# Patient Record
Sex: MALE | Race: WHITE | NOT HISPANIC OR LATINO | Employment: OTHER | URBAN - METROPOLITAN AREA
[De-identification: names, ages, dates, MRNs, and addresses within clinical notes are randomized per-mention and may not be internally consistent; named-entity substitution may affect disease eponyms.]

---

## 2017-01-30 ENCOUNTER — ALLSCRIPTS OFFICE VISIT (OUTPATIENT)
Dept: OTHER | Facility: OTHER | Age: 62
End: 2017-01-30

## 2017-05-07 LAB — HCV AB SER-ACNC: NEGATIVE

## 2017-05-30 ENCOUNTER — GENERIC CONVERSION - ENCOUNTER (OUTPATIENT)
Dept: OTHER | Facility: OTHER | Age: 62
End: 2017-05-30

## 2017-06-13 ENCOUNTER — ALLSCRIPTS OFFICE VISIT (OUTPATIENT)
Dept: OTHER | Facility: OTHER | Age: 62
End: 2017-06-13

## 2017-09-06 ENCOUNTER — ALLSCRIPTS OFFICE VISIT (OUTPATIENT)
Dept: OTHER | Facility: OTHER | Age: 62
End: 2017-09-06

## 2017-09-06 DIAGNOSIS — E11.9 TYPE 2 DIABETES MELLITUS WITHOUT COMPLICATIONS (HCC): ICD-10-CM

## 2017-09-06 DIAGNOSIS — R93.7 ABNORMAL FINDINGS ON DIAGNOSTIC IMAGING OF OTHER PARTS OF MUSCULOSKELETAL SYSTEM: ICD-10-CM

## 2017-09-06 DIAGNOSIS — M51.26 OTHER INTERVERTEBRAL DISC DISPLACEMENT, LUMBAR REGION: ICD-10-CM

## 2017-09-06 DIAGNOSIS — I25.10 ATHEROSCLEROTIC HEART DISEASE OF NATIVE CORONARY ARTERY WITHOUT ANGINA PECTORIS: ICD-10-CM

## 2017-09-06 DIAGNOSIS — M25.552 PAIN IN LEFT HIP: ICD-10-CM

## 2017-09-08 ENCOUNTER — TRANSCRIBE ORDERS (OUTPATIENT)
Dept: ADMINISTRATIVE | Facility: HOSPITAL | Age: 62
End: 2017-09-08

## 2017-09-08 DIAGNOSIS — R52 PAIN: Primary | ICD-10-CM

## 2017-09-10 ENCOUNTER — TRANSCRIBE ORDERS (OUTPATIENT)
Dept: ADMINISTRATIVE | Age: 62
End: 2017-09-10

## 2017-09-10 ENCOUNTER — LAB (OUTPATIENT)
Dept: LAB | Age: 62
End: 2017-09-10
Payer: COMMERCIAL

## 2017-09-10 DIAGNOSIS — M25.552 LEFT HIP PAIN: ICD-10-CM

## 2017-09-10 DIAGNOSIS — E13.9 OTHER SPECIFIED DIABETES MELLITUS: Primary | ICD-10-CM

## 2017-09-10 DIAGNOSIS — E11.9 TYPE 2 DIABETES MELLITUS WITHOUT COMPLICATIONS (HCC): ICD-10-CM

## 2017-09-10 DIAGNOSIS — R93.7 ABNORMAL COMPUTED TOMOGRAPHY OF LUMBAR SPINE: ICD-10-CM

## 2017-09-10 DIAGNOSIS — I25.10 ATHEROSCLEROTIC HEART DISEASE OF NATIVE CORONARY ARTERY WITHOUT ANGINA PECTORIS: ICD-10-CM

## 2017-09-10 LAB
ALBUMIN SERPL BCP-MCNC: 3.8 G/DL (ref 3.5–5)
ANION GAP SERPL CALCULATED.3IONS-SCNC: 8 MMOL/L (ref 4–13)
BUN SERPL-MCNC: 19 MG/DL (ref 5–25)
CALCIUM SERPL-MCNC: 9.2 MG/DL (ref 8.3–10.1)
CHLORIDE SERPL-SCNC: 104 MMOL/L (ref 100–108)
CO2 SERPL-SCNC: 28 MMOL/L (ref 21–32)
CREAT SERPL-MCNC: 0.99 MG/DL (ref 0.6–1.3)
CREAT UR-MCNC: 300 MG/DL
ERYTHROCYTE [DISTWIDTH] IN BLOOD BY AUTOMATED COUNT: 14.9 % (ref 11.6–15.1)
GFR SERPL CREATININE-BSD FRML MDRD: 81 ML/MIN/1.73SQ M
GLUCOSE P FAST SERPL-MCNC: 205 MG/DL (ref 65–99)
HCT VFR BLD AUTO: 44.5 % (ref 36.5–49.3)
HGB BLD-MCNC: 15.2 G/DL (ref 12–17)
MCH RBC QN AUTO: 31.5 PG (ref 26.8–34.3)
MCHC RBC AUTO-ENTMCNC: 34.2 G/DL (ref 31.4–37.4)
MCV RBC AUTO: 92 FL (ref 82–98)
MICROALBUMIN UR-MCNC: 196 MG/L (ref 0–20)
MICROALBUMIN/CREAT 24H UR: 65 MG/G CREATININE (ref 0–30)
PHOSPHATE SERPL-MCNC: 3.4 MG/DL (ref 2.3–4.1)
PLATELET # BLD AUTO: 138 THOUSANDS/UL (ref 149–390)
PMV BLD AUTO: 11.3 FL (ref 8.9–12.7)
POTASSIUM SERPL-SCNC: 4 MMOL/L (ref 3.5–5.3)
RBC # BLD AUTO: 4.83 MILLION/UL (ref 3.88–5.62)
SODIUM SERPL-SCNC: 140 MMOL/L (ref 136–145)
WBC # BLD AUTO: 4.99 THOUSAND/UL (ref 4.31–10.16)

## 2017-09-10 PROCEDURE — 85027 COMPLETE CBC AUTOMATED: CPT

## 2017-09-10 PROCEDURE — 82570 ASSAY OF URINE CREATININE: CPT | Performed by: INTERNAL MEDICINE

## 2017-09-10 PROCEDURE — 36415 COLL VENOUS BLD VENIPUNCTURE: CPT

## 2017-09-10 PROCEDURE — 80069 RENAL FUNCTION PANEL: CPT

## 2017-09-10 PROCEDURE — 82043 UR ALBUMIN QUANTITATIVE: CPT | Performed by: INTERNAL MEDICINE

## 2017-09-11 ENCOUNTER — GENERIC CONVERSION - ENCOUNTER (OUTPATIENT)
Dept: OTHER | Facility: OTHER | Age: 62
End: 2017-09-11

## 2017-09-13 ENCOUNTER — GENERIC CONVERSION - ENCOUNTER (OUTPATIENT)
Dept: OTHER | Facility: OTHER | Age: 62
End: 2017-09-13

## 2017-09-14 ENCOUNTER — ALLSCRIPTS OFFICE VISIT (OUTPATIENT)
Dept: OTHER | Facility: OTHER | Age: 62
End: 2017-09-14

## 2017-09-14 ENCOUNTER — GENERIC CONVERSION - ENCOUNTER (OUTPATIENT)
Dept: OTHER | Facility: OTHER | Age: 62
End: 2017-09-14

## 2017-09-16 ENCOUNTER — TRANSCRIBE ORDERS (OUTPATIENT)
Dept: LAB | Facility: CLINIC | Age: 62
End: 2017-09-16

## 2017-09-16 ENCOUNTER — APPOINTMENT (OUTPATIENT)
Dept: LAB | Facility: CLINIC | Age: 62
End: 2017-09-16
Payer: COMMERCIAL

## 2017-09-16 DIAGNOSIS — E11.9 TYPE 2 DIABETES MELLITUS WITHOUT COMPLICATIONS (HCC): ICD-10-CM

## 2017-09-16 LAB
EST. AVERAGE GLUCOSE BLD GHB EST-MCNC: 171 MG/DL
HBA1C MFR BLD: 7.6 % (ref 4.2–6.3)

## 2017-09-16 PROCEDURE — 36415 COLL VENOUS BLD VENIPUNCTURE: CPT

## 2017-09-16 PROCEDURE — 83036 HEMOGLOBIN GLYCOSYLATED A1C: CPT

## 2017-09-20 ENCOUNTER — HOSPITAL ENCOUNTER (OUTPATIENT)
Dept: MRI IMAGING | Facility: HOSPITAL | Age: 62
Discharge: HOME | End: 2017-09-20
Payer: COMMERCIAL

## 2017-09-20 DIAGNOSIS — R52 PAIN: ICD-10-CM

## 2017-09-20 PROCEDURE — 72158 MRI LUMBAR SPINE W/O & W/DYE: CPT

## 2017-09-20 PROCEDURE — A9585 GADOBUTROL INJECTION: HCPCS | Performed by: RADIOLOGY

## 2017-09-20 RX ADMIN — GADOBUTROL 12 ML: 604.72 INJECTION INTRAVENOUS at 23:26

## 2017-09-27 ENCOUNTER — GENERIC CONVERSION - ENCOUNTER (OUTPATIENT)
Dept: OTHER | Facility: OTHER | Age: 62
End: 2017-09-27

## 2017-10-02 ENCOUNTER — TRANSCRIBE ORDERS (OUTPATIENT)
Dept: ADMINISTRATIVE | Facility: HOSPITAL | Age: 62
End: 2017-10-02

## 2017-10-02 ENCOUNTER — APPOINTMENT (OUTPATIENT)
Dept: LAB | Facility: MEDICAL CENTER | Age: 62
End: 2017-10-02

## 2017-10-02 ENCOUNTER — APPOINTMENT (OUTPATIENT)
Dept: URGENT CARE | Facility: MEDICAL CENTER | Age: 62
End: 2017-10-02

## 2017-10-02 DIAGNOSIS — Z00.8 HEALTH EXAMINATION IN POPULATION SURVEY: Primary | ICD-10-CM

## 2017-10-02 PROCEDURE — 82175 ASSAY OF ARSENIC: CPT | Performed by: PREVENTIVE MEDICINE

## 2017-10-02 PROCEDURE — 83655 ASSAY OF LEAD: CPT

## 2017-10-02 PROCEDURE — 82495 ASSAY OF CHROMIUM: CPT | Performed by: PREVENTIVE MEDICINE

## 2017-10-02 PROCEDURE — 36415 COLL VENOUS BLD VENIPUNCTURE: CPT | Performed by: PREVENTIVE MEDICINE

## 2017-10-02 PROCEDURE — 83018 HEAVY METAL QUAN EACH NES: CPT | Performed by: PREVENTIVE MEDICINE

## 2017-10-02 PROCEDURE — 83825 ASSAY OF MERCURY: CPT

## 2017-10-02 PROCEDURE — 82300 ASSAY OF CADMIUM: CPT

## 2017-10-02 PROCEDURE — 83885 ASSAY OF NICKEL: CPT | Performed by: PREVENTIVE MEDICINE

## 2017-10-06 ENCOUNTER — APPOINTMENT (OUTPATIENT)
Dept: LAB | Facility: CLINIC | Age: 62
End: 2017-10-06
Payer: COMMERCIAL

## 2017-10-06 DIAGNOSIS — M25.552 PAIN IN LEFT HIP: ICD-10-CM

## 2017-10-06 LAB — ERYTHROCYTE [SEDIMENTATION RATE] IN BLOOD: 2 MM/HOUR (ref 0–10)

## 2017-10-06 PROCEDURE — 85652 RBC SED RATE AUTOMATED: CPT

## 2017-10-06 PROCEDURE — 36415 COLL VENOUS BLD VENIPUNCTURE: CPT

## 2017-10-07 LAB — MISCELLANEOUS LAB TEST RESULT: NORMAL

## 2017-10-08 LAB
MISCELLANEOUS LAB TEST RESULT: NORMAL

## 2017-10-09 ENCOUNTER — HOSPITAL ENCOUNTER (OUTPATIENT)
Dept: CT IMAGING | Facility: HOSPITAL | Age: 62
Discharge: HOME | End: 2017-10-09
Payer: COMMERCIAL

## 2017-10-09 ENCOUNTER — HOSPITAL ENCOUNTER (OUTPATIENT)
Dept: CT IMAGING | Facility: HOSPITAL | Age: 62
End: 2017-10-09
Payer: COMMERCIAL

## 2017-10-09 DIAGNOSIS — R93.7 ABNORMAL FINDINGS ON DIAGNOSTIC IMAGING OF OTHER PARTS OF MUSCULOSKELETAL SYSTEM: ICD-10-CM

## 2017-10-09 PROCEDURE — 72192 CT PELVIS W/O DYE: CPT

## 2017-10-11 ENCOUNTER — GENERIC CONVERSION - ENCOUNTER (OUTPATIENT)
Dept: OTHER | Facility: OTHER | Age: 62
End: 2017-10-11

## 2017-10-26 NOTE — PROGRESS NOTES
Assessment  Assessed    1  CAD (coronary artery disease) (414 00) (I25 10)   2  Hyperlipidemia (272 4) (E78 5)   3  Benign essential hypertension (401 1) (I10)    Plan  Benign essential hypertension, CAD (coronary artery disease), Hyperlipidemia    · Follow-up visit in 6 months Evaluation and Treatment  Follow-up  Status: Complete   Done: [id number]   Ordered; For: Benign essential hypertension, CAD (coronary artery disease), Hyperlipidemia; Ordered By: Freddy Rios Performed:  Due: 97CNC0817; Last Updated By: Anna Hickman; 9/14/2017 8:48:13 AM  CAD (coronary artery disease), Hyperlipidemia    · Atorvastatin Calcium 40 MG Oral Tablet; TAKE 1 TABLET AT BEDTIME   Rx By: Freddy Rios; Dispense: 90 Days ; #:90 Tablet; Refill: 3;For: CAD (coronary artery disease), Hyperlipidemia; VETO = N; Verified Transmission to [facility]; Last Updated By: [facility]; 9/14/2017 8:38:13 AM    Discussion/Summary  Cardiology Discussion Summary Free Text Note Form [facility]:   I had the pleasure of seeing Earline Wesley for a follow up visit  is a pleasant 66-year-old gentleman with a history of hypertension, diabetes and dyslipidemia  He also has coronary artery disease and has had multiple stenting procedures  Based on his last coronary angiography report from the [facility] in September 2016, left main was patent, LAD was patent including a drug-eluting stent-implanted in 2011  His proximal RCA had a stent implanted in June 2015-in-stent restenosis-December 2015-restented, again had in-stent restenosis that was treated with a drug-eluting 3X 18 mm Xience stent  This was done for anginal symptoms  his knowledge, he has not had a myocardial infarction  he is moderately active working as a   In the summer he sometimes walks the golf course  No anginal symptoms at this time  He had vein stripping of his right lower extremity which has disproportionately more edema      artery disease: Status post [id number]-patent, status post proximal RCA stent in-stent - in-stent-last-September 2016 with a Xience stentanginal symptoms at this timeaspirin and Plavix, not on metoprolol but asymptomaticatorvastatin 40 mg 2017-total cholesterol 121, triglycerides 167, HDL 39, LDL 49  was well controlled, He will check it twice daily at home and send his locked was  As above  last A1c was 6 9,   right more than left lower extremity edema: No need for Dopplers, had vein stripping for varicose veins in the past  Foot end elevation, compression stockings were recommended  in 6 months  Medication SE Review and Pt Understands Tx: Possible side effects of new medications were reviewed with the patient/guardian today  Counseling Documentation With Imm: total time of encounter was 37 minutes-- and-- 20 minutes was spent counseling  Chief Complaint  Chief Complaint Free Text Note Form: 3 month cardiac follow up  Patient gets epistasis and HA at times  History of Present Illness  Cardiology HPI Free Text Note Form [facility]: I had the pleasure of seeing Jenelle Jimenez for a follow up visit  is a pleasant 40-year-old gentleman with a history of hypertension, diabetes and dyslipidemia  He also has coronary artery disease and has had multiple stenting procedures  Based on his last coronary angiography report from the [facility] in September 2016, left main was patent, LAD was patent including a drug-eluting stent-implanted in 2011  His proximal RCA had a stent implanted in June 2015-in-stent restenosis-December 2015-restented, again had in-stent restenosis that was treated with a drug-eluting 3X 18 mm Xience stent  This was done for anginal symptoms  his knowledge, he has not had a myocardial infarction  he is moderately active working as a   In the summer he sometimes walks the golf course  No anginal symptoms at this time   He had vein stripping of his right lower extremity which has disproportionately more edema  Review of Systems  Cardiology Male ROS:     Cardiac: No complaints of chest pain, no palpitations, no fainiting  Skin: No complaints of nonhealing sores or skin rash  Genitourinary: No complaints of recurrent urinary tract infections, frequent urination at night, difficult urination, blood in urine, kidney stones, loss of bladder control, no kidney or prostate problems, no erectile dysfunction  Psychological: No complaints of feeling depressed, anxiety, panic attacks, or difficulty concentrating  General: No complaints of trouble sleeping, lack of energy, fatigue, appetite changes, weight changes, fever, frequent infections, or night sweats  Respiratory: No complaints of shortness of breath, cough with sputum, or wheezing  HEENT: No complaints of serious problems, hearing problems, nose problems, throat problems, or snoring  Gastrointestinal: No complaints of liver problems, nausea, vomiting, heartburn, constipation, bloody stools, diarrhea, problems swallowing, adbominal pain, or rectal bleeding  Hematologic: No complaints of bleeding disorders, anemia, blood clots, or excessive brusing  Neurological: No complaints of numbness, tingling, dizziness, weakness, seizures, headaches, syncope or fainting, AM fatigue, daytime sleepiness, no witnessed apnea episodes  Musculoskeletal: arthritis-- and-- back pain, but-- no swelling/pain   ROS Reviewed:   ROS reviewed  Active Problems  Problems    1  Acute medial meniscus tear of right knee (836 0) (S83 241A)   2  Aftercare following surgery of the musculoskeletal system (V58 78) (Z47 89)   3  Benign essential hypertension (401 1) (I10)   4  CAD (coronary artery disease) (414 00) (I25 10)   5  Claustrophobia (300 29) (F40 240)   6  Diabetes mellitus (250 00) (E11 9)   7  Finger pain (729 5) (M79 646)   8  Herniation of intervertebral disc of lumbar spine (722 10) (M51 26)   9  Hyperlipidemia (272 4) (E78 5)   10  Knee clicking (983 93) (T65 223)   11  Knee effusion, right (719 06) (M25 461)   12  Knee instability (718 86) (M25 369)   13  Primary osteoarthritis of left knee (715 16) (M17 12)   14  Right knee pain (719 46) (M25 561)   15  S/P trigger finger release (V45 89) (Z98 890)   16  Screen for colon cancer (V76 51) (Z12 11)    Past Medical History  Problems    1  History of H/O angioplasty (V45 89) (Z98 62)   2  History of chest pain (V13 89) (Z87 898)   3  History of edema (V13 89) (Z87 898)   4  History of varicose veins of lower extremity (V12 59) (Z86 79)   5  History of Trigger ring finger of left hand (727 03) (M65 342)  Active Problems And Past Medical History Reviewed: The active problems and past medical history were reviewed and updated today  Surgical History  Problems    1  History of Cardiac Cath Procedure Summary   2  History of Eye Surgery   3  History of Gallbladder Surgery   4  History of Heart Surgery   5  History of Varicose Vein Ligation  Surgical History Reviewed: The surgical history was reviewed and updated today  Family History  Mother    1  Family history of CAD (coronary artery disease)  Father    2  Family history of CAD (coronary artery disease)   3  Family history of diabetes mellitus (V18 0) (Z83 3)   4  Family history of hypertension (V17 49) (Z82 49)  Brother    5  Family history of CAD (coronary artery disease)   6  Family history of hyperlipidemia (V18 19) (Z83 49)   7  Family history of hypertension (V17 49) (Z82 49)  Family History Reviewed: The family history was reviewed and updated today  Social History  Problems    · Denied: History of Alcohol use   · Former smoker (V15 82) (O54 334)   · No illicit drug use  Social History Reviewed: The social history was reviewed and updated today  The social history was reviewed and is unchanged  Current Meds   1  ALPRAZolam 0 5 MG Oral Tablet; use 1/2 hr before procedure and can use one more if   necessary;  Last ZA:57ZUS0615 Ordered   2  AmLODIPine Besylate 2 5 MG Oral Tablet; Take 1 tablet daily; Therapy: (Recorded:13Jun2017) to Recorded   3  Aspirin EC 81 MG Oral Tablet Delayed Release; Take 1 tablet qod alt with 2;   Therapy: (Recorded:30Jan2017) to Recorded   4  Atorvastatin Calcium 40 MG Oral Tablet; TAKE 1 TABLET AT BEDTIME; Therapy: 91RDM3449 to (Evaluate:01Jun2017)  Requested for: 71RZX6675; Last   Rx:95Rfy6941 Ordered   5  Clopidogrel Bisulfate 75 MG Oral Tablet; Take 1 tablet daily; Therapy: (Recorded:30Jan2017) to Recorded   6  Fish Oil CAPS; Therapy: (Recorded:30Jan2017) to Recorded   7  Glimepiride 2 MG Oral Tablet; TAKE 1 TABLET DAILY AS DIRECTED; Therapy: (Recorded:30Jan2017) to Recorded   8  Lisinopril 20 MG Oral Tablet; Take 1 tablet daily; Therapy: (Recorded:30Jan2017) to Recorded   9  Lisinopril-Hydrochlorothiazide 20-25 MG Oral Tablet; TAKE 1 TABLET ONCE DAILY; Therapy: (Recorded:30Jan2017) to Recorded   10  MetFORMIN HCl - 500 MG Oral Tablet; take 2 tablet twice daily; Therapy: (Recorded:30Jan2017) to Recorded   11  Sildenafil Citrate 20 MG Oral Tablet; Therapy: (Recorded:30Jan2017) to Recorded   12  Vitamin E CAPS; Therapy: (0345 74 47 21) to Recorded  Medication List Reviewed: The medication list was reviewed and updated today  Allergies  Medication    1  Cipro SOLN   2  Norvasc TABS    Vitals  Vital Signs    Recorded: 14Sep2017 08:43AM Recorded: 14Sep2017 08:31AM   Heart Rate  58, Apical   Pulse Quality  Normal, Apical   Systolic 510 990, LUE, Sitting   Diastolic 90 94, LUE, Sitting   Height  5 ft 10 in   Weight  287 lb    BMI Calculated  41 18   BSA Calculated  2 43   O2 Saturation  98, RA     Physical Exam    Constitutional   General appearance: Abnormal   obese  Eyes   Conjunctiva and Sclera examination: Conjunctiva pink, sclera anicteric      Ears, Nose, Mouth, and Throat - Oropharynx: Clear, nares are clear, mucous membranes are moist    Neck   Neck and thyroid: Normal, supple, trachea midline, no thyromegaly  Pulmonary   Respiratory effort: No increased work of breathing or signs of respiratory distress  Auscultation of lungs: Clear to auscultation, no rales, no rhonchi, no wheezing, good air movement  Cardiovascular   Auscultation of heart: Normal rate and rhythm, normal S1 and S2, no murmurs  Carotid pulses: Normal, 2+ bilaterally  Peripheral vascular exam: Normal pulses throughout, no tenderness, erythema or swelling  Pedal pulses: Normal, 2+ bilaterally  Examination of extremities for edema and/or varicosities: Normal     Abdomen   Abdomen: Non-tender and no distention  Liver and spleen: No hepatomegaly or splenomegaly  Musculoskeletal Gait and station: Normal gait  -- Digits and nails: Normal without clubbing or cyanosis  -- Inspection/palpation of joints, bones, and muscles: Normal, ROM normal     Skin - Skin and subcutaneous tissue: Normal without rashes or lesions  Skin is warm and well perfused, normal turgor  Neurologic - Cranial nerves: II - XII intact  -- Speech: Normal     Psychiatric - Orientation to person, place, and time: Normal -- Mood and affect: Normal       Future Appointments    Date/Time Provider Specialty Site   11/01/2017 02:40 PM TORSTEN Mackay  Orthopedic Surgery [facility] SPECIALISTS [facility]   09/27/2017 04:00 PM Radha Mon MD Internal Medicine [facility] AND WOMEN'S [facility] [place] [facility]\""     Signatures   Electronically signed by :  Vashti Harada, M D ; Sep 14 2017  8:54AM EST                       (Author)

## 2017-10-31 ENCOUNTER — ALLSCRIPTS OFFICE VISIT (OUTPATIENT)
Dept: OTHER | Facility: OTHER | Age: 62
End: 2017-10-31

## 2017-10-31 DIAGNOSIS — R93.7 ABNORMAL FINDINGS ON DIAGNOSTIC IMAGING OF OTHER PARTS OF MUSCULOSKELETAL SYSTEM: ICD-10-CM

## 2017-10-31 DIAGNOSIS — M25.561 PAIN IN RIGHT KNEE: ICD-10-CM

## 2017-10-31 DIAGNOSIS — I25.10 ATHEROSCLEROTIC HEART DISEASE OF NATIVE CORONARY ARTERY WITHOUT ANGINA PECTORIS: ICD-10-CM

## 2017-10-31 DIAGNOSIS — M25.552 PAIN IN LEFT HIP: ICD-10-CM

## 2017-10-31 DIAGNOSIS — E78.5 HYPERLIPIDEMIA: ICD-10-CM

## 2017-10-31 DIAGNOSIS — E11.9 TYPE 2 DIABETES MELLITUS WITHOUT COMPLICATIONS (HCC): ICD-10-CM

## 2017-11-01 NOTE — PROGRESS NOTES
Assessment    1  Diabetes mellitus (250 00) (E11 9)  2  Herniation of intervertebral disc of lumbar spine (722 10) (M51 26)  3  Left hip pain (719 45) (M25 552)    Plan  Abnormal MRI, lumbar spine    · *1 -  [facility] (ORTHOPEDIC SURGERY ) Co-Management  *  Status:Active  Requested for: 31Oct2017  () Care Summary provided  : Yes  Abnormal MRI, lumbar spine, Left hip pain, Right knee pain    · *1 -  Physical Therapy Co-Management  *  Status: Active  Requested for: 31Oct2017  () Care Summary provided  : Yes  Benign essential hypertension    · Renew: AmLODIPine Besylate 2 5 MG Oral Tablet; Take 1 tablet daily   · Renew: Lisinopril 20 MG Oral Tablet; Take 1 tablet daily   · Renew: Lisinopril-Hydrochlorothiazide 20-25 MG Oral Tablet; TAKE 1 TABLET ONCE DAILY  Diabetes mellitus    · Renew: Glimepiride 2 MG Oral Tablet; TAKE 1 TABLET DAILY AS DIRECTED   · Renew: MetFORMIN HCl - 500 MG Oral Tablet; take 2 tablet twice daily   · Renew: Sildenafil Citrate 20 MG Oral Tablet; 5 tablest 30 minutes prior to intercourse as needed   · (1) MICROALBUMIN CREATININE RATIO, RANDOM URINE; Status:Active; Requested for:31Oct2017;    · (1) PSA (SCREEN) (Dx V76 44 Screen for Prostate Cancer); Status:Active; Requestedfor:31Oct2017;    · (1) RENAL FUNCTION PANEL; Status:Active; Requested for:31Oct2017; Health Maintenance    · (Q) HEPATITIS C ANTIBODY; Status:Active; Requested for:31Oct2017; Herniation of intervertebral disc of lumbar spine    · *VB - Foot Exam; Status:Complete;   Done: 96RCO8556 12:00AM   · *VB - Foot Exam ; every 1 year; Last 84QPB7598; Next 39TDH3594; Status:Active  Hyperlipidemia    · (1) LIPID PANEL, FASTING; Status:Active; Requested for:31Oct2017;     Discussion/Summary  Discussion Summary:   #1 hypertension: Blood pressure still elevated in the morning he recently saw his cardiologist who added amlodipine   But only 2 5 mg Will continue to monitor his blood pressure if still elevated in the morning we'll go up on amlodipine  diabetes mellitus patient is noncompliant in terms of exercise diet , as he does not check his blood sugars regularly  He was advised to do so  A continue on his present regimen  Gen  is to check his blood sugar at least once a day if not twice  And reported next visit to check labspatient has some hesitancy  Rectal exam his prostate appeared to be normal we'll check PSA  Health Maintenance discussed vaccinations including pneumonia and for herpes zoster  He also should have a screening for hepatitis C  Patient is an ex-smoker some 30 years ago he gave it up labs see orders  Counseling Documentation With Imm: The patient was counseled regarding instructions for management  total time of encounter was 30 minutes  Chief Complaint  Chief Complaint Free Text Note Form: Patient is here today for a 4 month follow up for diabetes, and back pain follow up scan done 10/9/17  testing done 10/2/17  refills needed  Chief Complaint Chronic Condition [facility]: Patient is here today for follow up of chronic conditions described in HPI  Advance Directives  Advance Directive [facility]:   The patient is in agreement to receive the Advance Care Planning service--   YES - Patient has an advance health care directive  The patient has a living will located   History of Present Illness  HPI: Pt  unsure about his BS needs to test more frequently, weight is the same  foot exam today ok ,needs podiatry, sees eye doctor reg  MRI has arthritis of hip and back   To ortho to evaluate and start PT  Pt  needs pneumonia shot and Zostavax  Pt  stopped smokeing 30 + years ago  Needs to be screen for Hep C (age related)  Review of Systems  Complete-Male:  Constitutional: No fever or chills, feels well, no tiredness, no recent weight gain or weight loss  Eyes: get reg  exams, but-- No complaints of eye pain, no red eyes, no discharge from eyes, no itchy eyes    ENT: no complaints of earache, no hearing loss, no nosebleeds, no nasal discharge, no sore throat, no hoarseness  Cardiovascular: hip pain, but-- the heart rate was not slow,-- no chest pain,-- no intermittent leg claudication,-- the heart rate was not fast-- and-- no palpitations  Respiratory: No complaints of shortness of breath, no wheezing, no cough, no SOB on exertion, no orthopnea or PND  Gastrointestinal: using yoguat, but-- No complaints of abdominal pain, no constipation, no nausea or vomiting, no diarrhea or bloody stools,-- no nausea,-- no constipation-- and-- no diarrhea  Genitourinary: urinary hesitancy, but-- no dysuria,-- no incontinence,-- no nocturia-- and-- no testicular pain  Musculoskeletal: arthralgias,-- myalgias-- and-- hip and shoulder pain  Integumentary: No complaints of skin rash or skin lesions, no itching, no skin wound, no dry skin  Neurological: headache, but-- no numbness,-- no tingling,-- no confusion,-- no dizziness,-- no limb weakness,-- no convulsions,-- no fainting-- and-- no difficulty walking  Psychiatric: using sleep apnea machine, but-- Is not suicidal, no sleep disturbances, no anxiety or depression, no change in personality, no emotional problems  Endocrine: erectile dysfunction, but-- no muscle weakness,-- no deepening of the voice-- and-- no feelings of weakness  Hematologic/Lymphatic: a tendency for easy bleeding,-- a tendency for easy bruising-- and-- on ASA and Plavix  Active Problems  1  Abnormal MRI, lumbar spine (793 7) (R93 7)  2  Acute medial meniscus tear of right knee (836 0) (S83 241A)  3  Aftercare following surgery of the musculoskeletal system (V58 78) (Z47 89)  4  Benign essential hypertension (401 1) (I10)  5  CAD (coronary artery disease) (414 00) (I25 10)  6  Cataract (366 9) (H26 9)  7  Claustrophobia (300 29) (F40 240)  8  Diabetes mellitus (250 00) (E11 9)  9  Finger pain (729 5) (M79 646)  10  Herniation of intervertebral disc of lumbar spine (722 10) (A65 11)  11  Hyperlipidemia (272 4) (E78 5)  12  Knee clicking (683 83) (V04 762)  13  Knee effusion, right (719 06) (M25 461)  14  Knee instability (718 86) (M25 369)  15  Left hip pain (719 45) (M25 552)  16  Presbyopia (367 4) (H52 4)  17  Primary osteoarthritis of left knee (715 16) (M17 12)  18  Right knee pain (719 46) (M25 561)  19  S/P trigger finger release (V45 89) (Z98 890)  20  Screen for colon cancer (V76 51) (Z12 11)  21  Uveitis (364 3) (H20 9)    Past Medical History  1  History of H/O angioplasty (V45 89) (Z98 62)  2  History of chest pain (V13 89) (Z87 898)  3  History of edema (V13 89) (Z87 898)  4  History of varicose veins of lower extremity (V12 59) (Z86 79)  5  History of Trigger ring finger of left hand (727 03) (M65 342)    Surgical History  1  History of Cardiac Cath Procedure Summary  2  History of Cholecystectomy  3  History of Eye Surgery  4  History of Gallbladder Surgery  5  History of Heart Surgery  6  History of Hernia Repair  7  History of Knee Surgery  8  History of Varicose Vein Ligation    Family History  Mother   1  Family history of CAD (coronary artery disease)  Father   2  Family history of CAD (coronary artery disease)  3  Family history of diabetes mellitus (V18 0) (Z83 3)  4  Family history of hypertension (V17 49) (Z82 49)  Brother   5  Family history of CAD (coronary artery disease)  6  Family history of hyperlipidemia (V18 19) (Z83 49)  7  Family history of hypertension (V17 49) (Z82 49)    Social History     · Denied: History of Alcohol use   · Former smoker (V15 82) (H80 626)   · No illicit drug use    Current Meds  1  AmLODIPine Besylate 2 5 MG Oral Tablet; Take 1 tablet daily; Therapy: (Recorded:13Jun2017) to Recorded  2  Aspirin EC 81 MG Oral Tablet Delayed Release; Take 1 tablet qod alt with 2; Therapy: (Recorded:30Jan2017) to Recorded  3  Atorvastatin Calcium 40 MG Oral Tablet; TAKE 1 TABLET AT BEDTIME;  Therapy: 81BOD5248 to (Evaluate:24Opt2323)  Requested for: 85Rbt4683; Last Rx:97Rym9058 Ordered  4  Clopidogrel Bisulfate 75 MG Oral Tablet; Take 1 tablet daily; Therapy: (Recorded:30Jan2017) to Recorded  5  Fish Oil CAPS; Therapy: (Recorded:30Jan2017) to Recorded  6  Glimepiride 2 MG Oral Tablet; TAKE 1 TABLET DAILY AS DIRECTED; Therapy: (Recorded:30Jan2017) to Recorded  7  Lisinopril 20 MG Oral Tablet; Take 1 tablet daily  Requested for: 18Sep2017; Last Rx:18Sep2017 Ordered  8  Lisinopril-Hydrochlorothiazide 20-25 MG Oral Tablet; TAKE 1 TABLET ONCE DAILY; Therapy: (Recorded:30Jan2017) to Recorded  9  MetFORMIN HCl - 500 MG Oral Tablet; take 2 tablet twice daily; Therapy: (Recorded:30Jan2017) to Recorded  10  Sildenafil Citrate 20 MG Oral Tablet; Therapy: (Recorded:30Jan2017) to Recorded  11  Vitamin E CAPS; Therapy: (Recorded:13Yao7078) to Recorded    Allergies  1  Cipro SOLN  2  Terazosin HCl TABS  3  Norvasc TABS    Vitals  Vital Signs    Recorded: 59ZKA9881 08:36AM   Temperature 97 8 F, Tympanic   Heart Rate 60, R Radial   Pulse Quality Regular, R Radial   Respiration 16   Systolic 406, LUE, Sitting   Diastolic 82, LUE, Sitting   BP CUFF SIZE Large   Height 5 ft 11 in   Weight 284 lb 3 2 oz   BMI Calculated 39 64   BSA Calculated 2 45   O2 Saturation 98, RA       Physical Exam  Socks and shoes removed, Right Foot Findings: no swelling,-- no tenderness,-- no warmth-- and-- no maceration  The toes were normal  Socks and Shoes removed, Left Foot Findings: no swelling,-- no erythema,-- no dryness,-- no tenderness,-- no warmth-- and-- no maceration     Monofilament Testing: Tactile sensation in the right foot (see image for location): number 1 was normal,-- number 4 was normal,-- number 5 was normal,-- number 6 was normal,-- number 2 was normal,-- number 3 was normal,-- number 7 was normal,-- number 8 was normal,-- number 9 was normal-- and-- number 10 was normal  Tactile sensation in the left foot (see image for location): number 1 was normal,-- number 4 was normal,-- number 5 was normal,-- number 6 was normal,-- number 2 was normal,-- number 3 was normal,-- number 7 was normal,-- number 8 was normal,-- number 9 was normal-- and-- number 10 was normal    Vascular: Capillary refills findings on the right were delayed in the toes  Capillary refills findings on the left were delayed in the toes  Constitutional  General appearance: No acute distress, well appearing and well nourished  Eyes  Conjunctiva and lids: No swelling, erythema, or discharge  Pupils and irises: Equal, round and reactive to light  Ears, Nose, Mouth, and Throat  External inspection of ears and nose: Normal    Otoscopic examination: Tympanic membrance translucent with normal light reflex  Canals patent without erythema  Nasal mucosa, septum, and turbinates: Normal without edema or erythema  Pulmonary  Respiratory effort: No increased work of breathing or signs of respiratory distress  Auscultation of lungs: Clear to auscultation, equal breath sounds bilaterally, no wheezes, no rales, no rhonci  Cardiovascular  Auscultation of heart: Abnormal    Examination of extremities for edema and/or varicosities: Normal    Carotid pulses: Normal    Abdomen  Abdomen: Non-tender, no masses  -- Distended  Liver and spleen: No hepatomegaly or splenomegaly  Musculoskeletal  Gait and station: Abnormal  -- atalgic gait  Digits and nails: Abnormal    Inspection/palpation of joints, bones, and muscles: Abnormal  -- DJD especially knees and shoulders  Skin  Skin and subcutaneous tissue: Normal without rashes or lesions  Psychiatric  Orientation to person, place and time: Normal    Mood and affect: Normal    Additional Exam:  Prostate exam was essentially negative stools were also negative for blood          Results/Data  *VB - Foot Exam 80ITI4485 12:00AM [place]     Test Name Result Flag Reference   FOOT EXAM [id number]           Health Management  Herniation of intervertebral disc of lumbar spine   *VB - Foot Exam; every 1 year; Last 74YNM4736; Next Due: 24GCR1615;  Active    Signatures   Electronically signed by : Pamela Rush MD; Oct 31 2017  9:34AM EST                       (Author)    Electronically signed by : Pamela Rush MD; Nov 14 2017  3:51PM EST                       (Author)

## 2017-12-05 ENCOUNTER — HOSPITAL ENCOUNTER (OUTPATIENT)
Dept: NON INVASIVE DIAGNOSTICS | Facility: HOSPITAL | Age: 62
Discharge: HOME | End: 2017-12-05
Payer: COMMERCIAL

## 2017-12-08 ENCOUNTER — HOSPITAL ENCOUNTER (OUTPATIENT)
Dept: NON INVASIVE DIAGNOSTICS | Facility: HOSPITAL | Age: 62
Discharge: HOME | End: 2017-12-08
Payer: COMMERCIAL

## 2017-12-08 ENCOUNTER — GENERIC CONVERSION - ENCOUNTER (OUTPATIENT)
Dept: CARDIOLOGY CLINIC | Facility: HOSPITAL | Age: 62
End: 2017-12-08

## 2017-12-08 ENCOUNTER — GENERIC CONVERSION - ENCOUNTER (OUTPATIENT)
Dept: OTHER | Facility: OTHER | Age: 62
End: 2017-12-08

## 2017-12-08 DIAGNOSIS — I25.10 ATHEROSCLEROTIC HEART DISEASE OF NATIVE CORONARY ARTERY WITHOUT ANGINA PECTORIS: ICD-10-CM

## 2017-12-08 PROCEDURE — 93005 ELECTROCARDIOGRAM TRACING: CPT

## 2017-12-08 PROCEDURE — 93017 CV STRESS TEST TRACING ONLY: CPT

## 2017-12-09 LAB
ATRIAL RATE: 65 BPM
P AXIS: 56 DEGREES
PR INTERVAL: 160 MS
QRS AXIS: 69 DEGREES
QRSD INTERVAL: 118 MS
QT INTERVAL: 426 MS
QTC INTERVAL: 443 MS
T WAVE AXIS: 58 DEGREES
VENTRICULAR RATE: 65 BPM

## 2017-12-11 LAB
CHEST PAIN STATEMENT: NORMAL
MAX DIASTOLIC BP: 80 MMHG
MAX HEART RATE: 139 BPM
MAX PREDICTED HEART RATE: 158 BPM
MAX. SYSTOLIC BP: 160 MMHG
PROTOCOL NAME: NORMAL
REASON FOR TERMINATION: NORMAL
TARGET HR FORMULA: NORMAL
TEST INDICATION: NORMAL
TIME IN EXERCISE PHASE: NORMAL

## 2017-12-18 ENCOUNTER — ALLSCRIPTS OFFICE VISIT (OUTPATIENT)
Dept: OTHER | Facility: OTHER | Age: 62
End: 2017-12-18

## 2017-12-18 ENCOUNTER — TRANSCRIBE ORDERS (OUTPATIENT)
Dept: ADMINISTRATIVE | Facility: HOSPITAL | Age: 62
End: 2017-12-18

## 2017-12-18 DIAGNOSIS — I10 ESSENTIAL (PRIMARY) HYPERTENSION: ICD-10-CM

## 2017-12-18 DIAGNOSIS — E78.5 HYPERLIPIDEMIA, UNSPECIFIED HYPERLIPIDEMIA TYPE: ICD-10-CM

## 2017-12-18 DIAGNOSIS — E78.5 HYPERLIPIDEMIA: ICD-10-CM

## 2017-12-18 DIAGNOSIS — I25.10 ATHEROSCLEROSIS OF NATIVE CORONARY ARTERY OF NATIVE HEART WITHOUT ANGINA PECTORIS: ICD-10-CM

## 2017-12-18 DIAGNOSIS — I10 ESSENTIAL HYPERTENSION, MALIGNANT: ICD-10-CM

## 2017-12-18 DIAGNOSIS — R94.39 ABNORMAL BALLISTOCARDIOGRAM: Primary | ICD-10-CM

## 2017-12-18 DIAGNOSIS — R94.39 ABNORMAL RESULT OF OTHER CARDIOVASCULAR FUNCTION STUDY (CODE): ICD-10-CM

## 2017-12-18 DIAGNOSIS — I25.10 ATHEROSCLEROTIC HEART DISEASE OF NATIVE CORONARY ARTERY WITHOUT ANGINA PECTORIS: ICD-10-CM

## 2017-12-19 NOTE — PROGRESS NOTES
Assessment  Assessed    1  CAD (coronary artery disease) (414 00) (I25 10)   2  Hyperlipidemia (272 4) (E78 5)   3  Benign essential hypertension (401 1) (I10)   4  Abnormal stress test (794 39) (R94 39)    Plan  Abnormal stress test, Benign essential hypertension, CAD (coronary artery disease),Hyperlipidemia    · * NM MYOCARDIAL PERFUSION SPECT (STRESS AND/OR REST); Status:NeedInformation - Financial Authorization; Requested for:[id number];    Perform:[facility] Radiology; Due:43Qin3645; Ordered; For:Abnormal stress test, Benign essential hypertension, CAD (coronary artery disease), Hyperlipidemia; Ordered By:Danita Delgado;   · Follow-up visit in 6 months Evaluation and Treatment  Follow-up  Status: Hold For -Scheduling  Requested for: 27YNX9862   Ordered; For: Abnormal stress test, Benign essential hypertension, CAD (coronary artery disease), Hyperlipidemia; Ordered By: Heather Botello Performed:  Due: 45YTB7805    Discussion/Summary  Cardiology Discussion Summary Free Text Note Form [facility]:   I had the pleasure of seeing Jose C Snyder for a follow up visit  He is a pleasant 24-year-old gentleman with a history of hypertension, diabetes and dyslipidemia  He also has coronary artery disease and has had multiple stenting procedures  Based on his last coronary angiography report from the [facility] in September 2016, left main was patent, LAD was patent including a drug-eluting stent-implanted in 2011  His proximal RCA had a stent implanted in June 2015-in-stent restenosis-December 2015-restented, again had in-stent restenosis that was treated with a drug-eluting 3X 18 mm Xience stent  This was done for anginal symptoms  To his knowledge, he has not had a myocardial infarction  Physically he is moderately active working as a   In the summer he sometimes walks the golf [place]  No anginal symptoms at this time   He had vein stripping of his right lower extremity which has disproportionately more edema  Recently had an Ex stress test that was borderline with borderline ECG changes - peak exercise of 7 min without CPsPlan:Coronary artery disease: Status post [id number]-patent, status post proximal RCA stent in-stent - in-stent-last-September 2016 with a Xience stentNo anginal symptoms at this timeContinue aspirin and Plavix, not on metoprolol but asymptomaticContinue atorvastatin 40 mg  May 2017-total cholesterol 121, triglycerides 167, HDL 39, LDL 49Hypertension: decreased by 20 mm since presentation  continue to watch  was well controlled during the stress testDyslipidemia: As aboveDiabetes: last A1c was 6 9,Unilateral right more than left lower extremity edema: No need for Dopplers, had vein stripping for varicose veins in the past  Foot end elevation, compression stockings were recommended  Abnormal Stress test: 7 min, borderline ECG changes  no CP  will repeat an Ex Nuc stress test - 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 anginal symptoms at baselineFollow-up in 6 months  Medication SE Review and Pt Understands Tx: Possible side effects of new medications were reviewed with the patient/guardian today  Counseling Documentation With Imm: total time of encounter was 37 minutes-- and-- 20 minutes was spent counseling  Chief Complaint  Chief Complaint Free Text Note Form: Patient is here today for f/u with testing  Patient has no cardiac complaints  History of Present Illness  Cardiology HPI Free Text Note Form [facility]: I had the pleasure of seeing Keesha Ohara for a follow up visit  He is a pleasant 79-year-old gentleman with a history of hypertension, diabetes and dyslipidemia  He also has coronary artery disease and has had multiple stenting procedures  Based on his last coronary angiography report from the [facility] in September 2016, left main was patent, LAD was patent including a drug-eluting stent-implanted in 2011   His proximal RCA had a stent implanted in June 2015-in-stent restenosis-December 2015-restented, again had in-stent restenosis that was treated with a drug-eluting 3X 18 mm Xience stent  This was done for anginal symptoms  To his knowledge, he has not had a myocardial infarction  Physically he is moderately active working as a   In the summer he sometimes walks the golf course  No anginal symptoms at this time  He had vein stripping of his right lower extremity which has disproportionately more edema  Recently had an Ex stress test that was borderline with borderline ECG changes - peak exercise of 7 min without CPs      Review of Systems  Cardiology Male ROS:    Cardiac: No complaints of chest pain, no palpitations, no fainiting  Skin: No complaints of nonhealing sores or skin rash  Genitourinary: No complaints of recurrent urinary tract infections, frequent urination at night, difficult urination, blood in urine, kidney stones, loss of bladder control, no kidney or prostate problems, no erectile dysfunction  Psychological: No complaints of feeling depressed, anxiety, panic attacks, or difficulty concentrating  General: No complaints of trouble sleeping, lack of energy, fatigue, appetite changes, weight changes, fever, frequent infections, or night sweats  Respiratory: No complaints of shortness of breath, cough with sputum, or wheezing  HEENT: No complaints of serious problems, hearing problems, nose problems, throat problems, or snoring  Gastrointestinal: No complaints of liver problems, nausea, vomiting, heartburn, constipation, bloody stools, diarrhea, problems swallowing, adbominal pain, or rectal bleeding  Hematologic: No complaints of bleeding disorders, anemia, blood clots, or excessive brusing  Neurological: No complaints of numbness, tingling, dizziness, weakness, seizures, headaches, syncope or fainting, AM fatigue, daytime sleepiness, no witnessed apnea episodes    Musculoskeletal: No complaints of arthritis, back pain, or painfull swelling  ROS Reviewed:   ROS reviewed  Active Problems  Problems    1  Abnormal MRI, lumbar spine (793 7) (R93 7)   2  Acute medial meniscus tear of right knee (836 0) (S83 241A)   3  Aftercare following surgery of the musculoskeletal system (V58 78) (Z47 89)   4  Benign essential hypertension (401 1) (I10)   5  CAD (coronary artery disease) (414 00) (I25 10)   6  Cataract (366 9) (H26 9)   7  Claustrophobia (300 29) (F40 240)   8  Diabetes mellitus (250 00) (E11 9)   9  Finger pain (729 5) (M79 646)   10  Herniation of intervertebral disc of lumbar spine (722 10) (M51 26)   11  Hyperlipidemia (272 4) (E78 5)   12  Knee clicking (509 62) (L88 643)   13  Knee effusion, right (719 06) (M25 461)   14  Knee instability (718 86) (M25 369)   15  Left hip pain (719 45) (M25 552)   16  Presbyopia (367 4) (H52 4)   17  Primary osteoarthritis of left knee (715 16) (M17 12)   18  Right knee pain (719 46) (M25 561)   19  S/P trigger finger release (V45 89) (Z98 890)   20  Uveitis (364 3) (H20 9)    Past Medical History  Problems    1  History of H/O angioplasty (V45 89) (Z98 62)   2  History of chest pain (V13 89) (Z87 898)   3  History of edema (V13 89) (Z87 898)   4  History of varicose veins of lower extremity (V12 59) (Z86 79)   5  History of Trigger ring finger of left hand (727 03) (M65 342)  Active Problems And Past Medical History Reviewed: The active problems and past medical history were reviewed and updated today  Surgical History  Problems    1  History of Cardiac Cath Procedure Summary   2  History of Cholecystectomy   3  History of Eye Surgery   4  History of Gallbladder Surgery   5  History of Heart Surgery   6  History of Hernia Repair   7  History of Knee Surgery   8  History of Varicose Vein Ligation  Surgical History Reviewed: The surgical history was reviewed and updated today  Family History  Mother    1  Family history of CAD (coronary artery disease)  Father    2   Family history of CAD (coronary artery disease)   3  Family history of diabetes mellitus (V18 0) (Z83 3)   4  Family history of hypertension (V17 49) (Z82 49)  Brother    5  Family history of CAD (coronary artery disease)   6  Family history of hyperlipidemia (V18 19) (Z83 49)   7  Family history of hypertension (V17 49) (Z82 49)  Family History Reviewed: The family history was reviewed and updated today  Social History  Problems    · Denied: History of Alcohol use   · Former smoker (V15 82) (Q13 094)   · No illicit drug use  Social History Reviewed: The social history was reviewed and updated today  The social history was reviewed and is unchanged  Current Meds   1  AmLODIPine Besylate 2 5 MG Oral Tablet; Take 1 tablet daily; Last Rx:31Oct2017 Ordered   2  Aspirin EC 81 MG Oral Tablet Delayed Release; Take 1 tablet qod alt with 2; Therapy: (Recorded:30Jan2017) to Recorded   3  Atorvastatin Calcium 40 MG Oral Tablet; TAKE 1 TABLET AT BEDTIME; Therapy: 98WCB3987 to (Evaluate:36Tqd2481)  Requested for: 29Srg1336; Last Rx:14Sep2017 Ordered   4  Clopidogrel Bisulfate 75 MG Oral Tablet; Take 1 tablet daily; Therapy: (Recorded:30Jan2017) to Recorded   5  Fish Oil CAPS; Therapy: (Recorded:30Jan2017) to Recorded   6  Glimepiride 2 MG Oral Tablet; TAKE 1 TABLET DAILY AS DIRECTED; Last Rx:31Oct2017 Ordered   7  Lisinopril 20 MG Oral Tablet; Take 1 tablet daily  Requested for: 63JGG6310; Last Rx:31Oct2017 Ordered   8  Lisinopril-Hydrochlorothiazide 20-25 MG Oral Tablet; TAKE 1 TABLET ONCE DAILY; Last Rx:31Oct2017 Ordered   9  MetFORMIN HCl - 500 MG Oral Tablet; take 2 tablet twice daily; Last Rx:31Oct2017 Ordered   10  Sildenafil Citrate 20 MG Oral Tablet; 5 tablest 30 minutes prior to intercourse as needed; Last Rx:31Oct2017 Ordered   11  Vitamin E CAPS; Therapy: ([phone]) to Recorded  Medication List Reviewed: The medication list was reviewed and updated today  Allergies  Medication    1  Cipro SOLN   2  Terazosin HCl TABS   3  Norvasc TABS    Vitals  Vital Signs    Recorded: 05ANC4229 02:57PM   Heart Rate 69   Systolic 546, LUE, Sitting   Diastolic 78, LUE, Sitting   Height 5 ft 11 in   Weight 284 lb    BMI Calculated 39 61   BSA Calculated 2 45   O2 Saturation 97     Physical Exam   Constitutional  General appearance: No acute distress, well appearing and well nourished  Eyes  Conjunctiva and Sclera examination: Conjunctiva pink, sclera anicteric  Ears, Nose, Mouth, and Throat - Oropharynx: Clear, nares are clear, mucous membranes are moist   Neck  Neck and thyroid: Normal, supple, trachea midline, no thyromegaly  Pulmonary  Respiratory effort: No increased work of breathing or signs of respiratory distress  Auscultation of lungs: Clear to auscultation, no rales, no rhonchi, no wheezing, good air movement  Cardiovascular  Auscultation of heart: Normal rate and rhythm, normal S1 and S2, no murmurs  Carotid pulses: Normal, 2+ bilaterally  Peripheral vascular exam: Normal pulses throughout, no tenderness, erythema or swelling  Pedal pulses: Normal, 2+ bilaterally  Examination of extremities for edema and/or varicosities: Normal    Abdomen  Abdomen: Non-tender and no distention  Liver and spleen: No hepatomegaly or splenomegaly  Musculoskeletal Gait and station: Normal gait  -- Digits and nails: Normal without clubbing or cyanosis  -- Inspection/palpation of joints, bones, and muscles: Normal, ROM normal    Skin - Skin and subcutaneous tissue: Normal without rashes or lesions  Skin is warm and well perfused, normal turgor  Neurologic - Cranial nerves: II - XII intact  -- Speech: Normal    Psychiatric - Orientation to person, place, and time: Normal -- Mood and affect: Normal       Results/Data  ECG Report:  Rhythm and rate:  normal sinus rhythm  Health Management  Herniation of intervertebral disc of lumbar spine   *VB - Foot Exam; every 1 year;  Last 65XJB6971; Next Due: 36UDR3717; Active    Signatures   Electronically signed by :  TORSTEN Perea ; Dec 18 2017  3:48PM EST                       (Author)

## 2017-12-21 ENCOUNTER — GENERIC CONVERSION - ENCOUNTER (OUTPATIENT)
Dept: CARDIOLOGY CLINIC | Facility: HOSPITAL | Age: 62
End: 2017-12-21

## 2017-12-21 ENCOUNTER — HOSPITAL ENCOUNTER (OUTPATIENT)
Dept: NON INVASIVE DIAGNOSTICS | Facility: CLINIC | Age: 62
Discharge: HOME | End: 2017-12-21
Payer: COMMERCIAL

## 2017-12-21 DIAGNOSIS — I10 ESSENTIAL HYPERTENSION, MALIGNANT: ICD-10-CM

## 2017-12-21 DIAGNOSIS — R94.39 ABNORMAL BALLISTOCARDIOGRAM: ICD-10-CM

## 2017-12-21 DIAGNOSIS — I25.10 ATHEROSCLEROSIS OF NATIVE CORONARY ARTERY OF NATIVE HEART WITHOUT ANGINA PECTORIS: ICD-10-CM

## 2017-12-21 DIAGNOSIS — E78.5 HYPERLIPIDEMIA, UNSPECIFIED HYPERLIPIDEMIA TYPE: ICD-10-CM

## 2017-12-21 PROCEDURE — A9502 TC99M TETROFOSMIN: HCPCS

## 2017-12-21 PROCEDURE — 93017 CV STRESS TEST TRACING ONLY: CPT

## 2017-12-21 PROCEDURE — 78452 HT MUSCLE IMAGE SPECT MULT: CPT

## 2017-12-22 ENCOUNTER — GENERIC CONVERSION - ENCOUNTER (OUTPATIENT)
Dept: OTHER | Facility: OTHER | Age: 62
End: 2017-12-22

## 2017-12-26 LAB
CHEST PAIN STATEMENT: NORMAL
MAX DIASTOLIC BP: 88 MMHG
MAX HEART RATE: 137 BPM
MAX PREDICTED HEART RATE: 158 BPM
MAX. SYSTOLIC BP: 216 MMHG
PROTOCOL NAME: NORMAL
REASON FOR TERMINATION: NORMAL
TARGET HR FORMULA: NORMAL
TEST INDICATION: NORMAL
TIME IN EXERCISE PHASE: NORMAL

## 2018-01-12 NOTE — MISCELLANEOUS
Message     Recorded as Task   Date: 09/10/2017 01:30 PM, Created By: Sam Carrasquillo   Task Name: Verify Patient Results   Assigned To: Griselda Milner   Regarding Patient: Rick Chiang, Status: Active   CommentJoanna [facility] - 10 Sep 2017 1:30 PM        Cristian Arnold - 11 Sep 2017 5:59 AM     TASK EDITED  pending appt 9/27   Alisha Rasmussen - 11 Sep 2017 11:36 AM     TASK REPLIED TO: Previously Assigned To [facility] Clinical,team  Please call pt and have him complete A1c prior to f/u appt with Dr Merly Schneider  lab ordered  thanks   [address] - 11 Sep 2017 3:27 PM     TASK EDITED   Griselda Milner - 11 Sep 2017 4:14 PM     TASK EDITED  Message left on patient's home voicemail with this instruction  Order printed and mailed to his home address via [address] [place] [address]  Active Problems    1  Acute medial meniscus tear of right knee (836 0) (S83 241A)   2  Aftercare following surgery of the musculoskeletal system (V58 78) (Z47 89)   3  Benign essential hypertension (401 1) (I10)   4  CAD (coronary artery disease) (414 00) (I25 10)   5  Claustrophobia (300 29) (F40 240)   6  Diabetes mellitus (250 00) (E11 9)   7  Finger pain (729 5) (M79 646)   8  Herniation of intervertebral disc of lumbar spine (722 10) (M51 26)   9  Hyperlipidemia (272 4) (E78 5)   10  Knee clicking (268 96) (E82 443)   11  Knee effusion, right (719 06) (M25 461)   12  Knee instability (718 86) (M25 369)   13  Primary osteoarthritis of left knee (715 16) (M17 12)   14  Right knee pain (719 46) (M25 561)   15  S/P trigger finger release (V45 89) (Z98 890)   16  Screen for colon cancer (V76 51) (Z12 11)    Current Meds   1  ALPRAZolam 0 5 MG Oral Tablet (Xanax); use 1/2 hr before procedure and can use one   more if necessary; Last Rx:06Sep2017 Ordered   2  AmLODIPine Besylate 2 5 MG Oral Tablet; Take 1 tablet daily; Therapy: (Recorded:13Jun2017) to Recorded   3  Aspirin EC 81 MG Oral Tablet Delayed Release;  Take 1 tablet qod alt with 2;   Therapy: (Recorded:30Jan2017) to Recorded   4  Atorvastatin Calcium 40 MG Oral Tablet; TAKE 1 TABLET AT BEDTIME; Therapy: 68PHA5968 to (Evaluate:01Jun2017)  Requested for: 80KAW9471; Last   Rx:45Erm1039 Ordered   5  Clopidogrel Bisulfate 75 MG Oral Tablet; Take 1 tablet daily; Therapy: (Recorded:30Jan2017) to Recorded   6  Fish Oil CAPS; Therapy: (Recorded:30Jan2017) to Recorded   7  Glimepiride 2 MG Oral Tablet; TAKE 1 TABLET DAILY AS DIRECTED; Therapy: (Recorded:30Jan2017) to Recorded   8  Lisinopril 20 MG Oral Tablet; Take 1 tablet daily; Therapy: (Recorded:30Jan2017) to Recorded   9  Lisinopril-Hydrochlorothiazide 20-25 MG Oral Tablet; TAKE 1 TABLET ONCE DAILY; Therapy: (Recorded:30Jan2017) to Recorded   10  MetFORMIN HCl - 500 MG Oral Tablet; take 2 tablet twice daily; Therapy: (Recorded:30Jan2017) to Recorded   11  Sildenafil Citrate 20 MG Oral Tablet; Therapy: (Recorded:30Jan2017) to Recorded   12  Vitamin E CAPS; Therapy: (Recorded:01Dvw9777) to Recorded    Allergies    1  Cipro SOLN   2   Norvasc TABS    Signatures   Electronically signed by : Tea Gallardo RN; Sep 11 2017  4:15PM EST                       (Author)

## 2018-01-12 NOTE — MISCELLANEOUS
Message  Return to work or school:        Tangela Martinez had surgery on 8/25/2016 and will be out of work until we see him back on 9/7/16  Return to work note will be updated at that visit  thank you  Jean Carlos Douglas PA-C        Signatures   Electronically signed by : Jean Carlos Douglas, [facility]; Sep  1 2016  4:53PM EST                       (Author)

## 2018-01-12 NOTE — MISCELLANEOUS
Message     Recorded as Task   Date: 09/10/2017 01:30 PM, Created By: Camilla Liu   Task Name: Verify Patient Results   Assigned To: Flaco Reyes   Regarding Patient: Padmaja Flores, Status: Active   CommentStarla Spurling - 10 Sep 2017 1:30 PM        Cristian Arnold - 11 Sep 2017 5:59 AM     TASK EDITED  pending appt 9/27   Alisha Rasmussen - 11 Sep 2017 11:36 AM     TASK REPLIED TO: Previously Assigned To hospitals Clinical,team  Please call pt and have him complete A1c prior to f/u appt with Dr Chrissie Sullivan  lab ordered  thanks   [address] - 11 Sep 2017 3:27 PM     TASK EDITED   Flaco Reyes - 11 Sep 2017 4:14 PM     TASK EDITED  Message left on patient's home voicemail with this instruction  Order printed and mailed to his home address via [address] [place] [address]  Active Problems    1  Acute medial meniscus tear of right knee (836 0) (S83 241A)   2  Aftercare following surgery of the musculoskeletal system (V58 78) (Z47 89)   3  Benign essential hypertension (401 1) (I10)   4  CAD (coronary artery disease) (414 00) (I25 10)   5  Claustrophobia (300 29) (F40 240)   6  Diabetes mellitus (250 00) (E11 9)   7  Finger pain (729 5) (M79 646)   8  Herniation of intervertebral disc of lumbar spine (722 10) (M51 26)   9  Hyperlipidemia (272 4) (E78 5)   10  Knee clicking (916 19) (M77 847)   11  Knee effusion, right (719 06) (M25 461)   12  Knee instability (718 86) (M25 369)   13  Primary osteoarthritis of left knee (715 16) (M17 12)   14  Right knee pain (719 46) (M25 561)   15  S/P trigger finger release (V45 89) (Z98 890)   16  Screen for colon cancer (V76 51) (Z12 11)    Current Meds   1  ALPRAZolam 0 5 MG Oral Tablet (Xanax); use 1/2 hr before procedure and can use one   more if necessary; Last Rx:06Sep2017 Ordered   2  AmLODIPine Besylate 2 5 MG Oral Tablet; Take 1 tablet daily; Therapy: (Recorded:13Jun2017) to Recorded   3  Aspirin EC 81 MG Oral Tablet Delayed Release;  Take 1 tablet qod alt with 2;   Therapy: (Recorded:30Jan2017) to Recorded   4  Atorvastatin Calcium 40 MG Oral Tablet; TAKE 1 TABLET AT BEDTIME; Therapy: 91HDN4404 to (Evaluate:01Jun2017)  Requested for: 10BQC6942; Last   Rx:23Onw7982 Ordered   5  Clopidogrel Bisulfate 75 MG Oral Tablet; Take 1 tablet daily; Therapy: (Recorded:30Jan2017) to Recorded   6  Fish Oil CAPS; Therapy: (Recorded:30Jan2017) to Recorded   7  Glimepiride 2 MG Oral Tablet; TAKE 1 TABLET DAILY AS DIRECTED; Therapy: (Recorded:30Jan2017) to Recorded   8  Lisinopril 20 MG Oral Tablet; Take 1 tablet daily; Therapy: (Recorded:30Jan2017) to Recorded   9  Lisinopril-Hydrochlorothiazide 20-25 MG Oral Tablet; TAKE 1 TABLET ONCE DAILY; Therapy: (Recorded:30Jan2017) to Recorded   10  MetFORMIN HCl - 500 MG Oral Tablet; take 2 tablet twice daily; Therapy: (Recorded:30Jan2017) to Recorded   11  Sildenafil Citrate 20 MG Oral Tablet; Therapy: (Recorded:30Jan2017) to Recorded   12  Vitamin E CAPS; Therapy: (Recorded:20Etd0298) to Recorded    Allergies    1  Cipro SOLN   2   Norvasc TABS    Signatures   Electronically signed by : Bulmaro Menard DO; Sep 12 2017 11:18AM EST

## 2018-01-13 VITALS
BODY MASS INDEX: 37.93 KG/M2 | HEIGHT: 72 IN | HEART RATE: 60 BPM | WEIGHT: 280 LBS | DIASTOLIC BLOOD PRESSURE: 78 MMHG | SYSTOLIC BLOOD PRESSURE: 128 MMHG

## 2018-01-14 VITALS
WEIGHT: 282.2 LBS | BODY MASS INDEX: 39.51 KG/M2 | OXYGEN SATURATION: 98 % | HEIGHT: 71 IN | DIASTOLIC BLOOD PRESSURE: 72 MMHG | HEART RATE: 60 BPM | TEMPERATURE: 98.3 F | SYSTOLIC BLOOD PRESSURE: 138 MMHG

## 2018-01-14 VITALS
DIASTOLIC BLOOD PRESSURE: 90 MMHG | SYSTOLIC BLOOD PRESSURE: 170 MMHG | BODY MASS INDEX: 41.09 KG/M2 | OXYGEN SATURATION: 98 % | HEIGHT: 70 IN | HEART RATE: 58 BPM | WEIGHT: 287 LBS

## 2018-01-14 NOTE — RESULT NOTES
Discussion/Summary   apt 2 weeks     Verified Results  (1) RENAL FUNCTION PANEL 10Sep2017 11:13AM Leeann JRKICKZ Order Number: [id number]     Test Name Result Flag Reference   ANION GAP (CALC) 8 mmol/L  4-13   ALBUMIN 3 8 g/dL  3 5-5 0   BLOOD UREA NITROGEN 19 mg/dL  5-25   CALCIUM 9 2 mg/dL  8 3-10 1   CHLORIDE 104 mmol/L  100-108   CARBON DIOXIDE 28 mmol/L  21-32   CREATININE 0 99 mg/dL  0 60-1 30   Standardized to IDMS reference method   POTASSIUM 4 0 mmol/L  3 5-5 3   SODIUM 140 mmol/L  136-145   PHOSPHORUS 3 4 mg/dL  2 3-4 1   eGFR 81 ml/min/1 73sq m     GLUCOSE FASTING 205 mg/dL H 65-99   Specimen collection should occur prior to Sulfasalazine administration due to the potential for falsely depressed results  Specimen collection should occur prior to Sulfapyridine administration due to the potential for falsely elevated results  Specimen collection should occur prior to Sulfasalazine administration due to the potential for falsely depressed results  Specimen collection should occur prior to Sulfapyridine administration due to the potential for falsely elevated results  National Kidney Disease Education Program recommendations are as follows:  GFR calculation is accurate only with a steady state creatinine  Chronic Kidney disease less than 60 ml/min/1 73 sq  meters  Kidney failure less than 15 ml/min/1 73 sq  meters       (1) MICROALBUMIN CREATININE RATIO, RANDOM URINE 10Sep2017 11:13AM Cynthia Helms     Test Name Result Flag Reference   MICROALBUMIN/ CREAT R 65 mg/g creatinine H 0-30   MICROALBUMIN,URINE 196 0 mg/L H 0 0-20 0   CREATININE URINE 300 0 mg/dL

## 2018-01-15 VITALS
WEIGHT: 284.06 LBS | HEART RATE: 70 BPM | BODY MASS INDEX: 38.48 KG/M2 | DIASTOLIC BLOOD PRESSURE: 90 MMHG | SYSTOLIC BLOOD PRESSURE: 150 MMHG | HEIGHT: 72 IN

## 2018-01-15 VITALS
WEIGHT: 284.2 LBS | HEIGHT: 71 IN | TEMPERATURE: 97.8 F | DIASTOLIC BLOOD PRESSURE: 82 MMHG | HEART RATE: 60 BPM | RESPIRATION RATE: 16 BRPM | BODY MASS INDEX: 39.79 KG/M2 | SYSTOLIC BLOOD PRESSURE: 168 MMHG | OXYGEN SATURATION: 98 %

## 2018-01-16 NOTE — RESULT NOTES
Verified Results  CT PELVIS WO CONTRAST [id number] 06:55PM [facility] Order Number: [id number]   Performing Comments: Per the radiologist that spoke with Dr Melany Fox on this matter  e   - Patient Instructions: To schedule this appointment, please contact Central Scheduling at [phone]  Test Name Result Flag Reference   CT PELVIS WO CONTRAST (Report)     CT PELVIS WITHOUT IV CONTRAST     INDICATION: R93 7: Abnormal findings on diagnostic imaging of other parts of musculoskeletal system  History taken directly from the electronic ordering system  Left hip pain and limited range of motion  COMPARISON: None  TECHNIQUE: CT examination of the pelvis was performed without intravenous contrast  Reformatted images were created in axial, sagittal, and coronal planes  Radiation dose length product (DLP) for this visit: 1041 mGy-cm   This examination, like all CT scans performed in the [facility], was performed utilizing techniques to minimize radiation dose exposure, including the use of iterative    reconstruction and automated exposure control  Enteric contrast was administered  FINDINGS:     VISUALIZED KIDNEYS/URETERS: No significant abnormality identified in the partially imaged kidneys and ureters  REPRODUCTIVE ORGANS: Unremarkable for patient's age  URINARY BLADDER: Unremarkable  APPENDIX: No findings to suggest appendicitis  VISUALIZED BOWEL: Unremarkable  ABDOMINOPELVIC CAVITY: No ascites or free intraperitoneal air  No lymphadenopathy  VISUALIZED VESSELS: Unremarkable for patient's age  ABDOMINOPELVIC WALL/INGUINAL REGIONS: Unremarkable  OSSEOUS STRUCTURES: No acute fracture or destructive osseous lesion  There is mild sacroiliac joint osteoarthritis bilaterally  There is moderate symphysis pubis osteoarthritis   There is mild osteoarthritis of both hips as evidenced by joint space    narrowing and small marginal osteophytes  IMPRESSION:     There are no acute osseous abnormalities in the pelvis  Mild osteoarthritis of both hips  Moderate symphysis pubis osteoarthritis  Mild bilateral sacroiliac joint osteoarthritis         Workstation performed: [id number]     Signed by:   Kanika Griffith MD   10/13/17

## 2018-01-16 NOTE — RESULT NOTES
Verified Results  (1) HEMOGLOBIN A1C 64Bgu7846 11:26AM Clotilde AQUINO Order Number: [id number]     Test Name Result Flag Reference   HEMOGLOBIN A1C 7 6 % H 4 2-6 3   EST  AVG   GLUCOSE 171 mg/dl

## 2018-01-22 VITALS
HEIGHT: 70 IN | BODY MASS INDEX: 40.23 KG/M2 | DIASTOLIC BLOOD PRESSURE: 68 MMHG | OXYGEN SATURATION: 98 % | TEMPERATURE: 97 F | SYSTOLIC BLOOD PRESSURE: 138 MMHG | WEIGHT: 281 LBS | HEART RATE: 66 BPM

## 2018-01-22 VITALS
SYSTOLIC BLOOD PRESSURE: 160 MMHG | HEART RATE: 69 BPM | BODY MASS INDEX: 39.76 KG/M2 | OXYGEN SATURATION: 97 % | DIASTOLIC BLOOD PRESSURE: 78 MMHG | WEIGHT: 284 LBS | HEIGHT: 71 IN

## 2018-01-23 NOTE — RESULT NOTES
Verified Results  * NM MYOCARDIAL PERFUSION SPECT (STRESS AND/OR REST) [id number] 08:13AM [facility]     Test Name Result Flag Reference   NM MYOCARDIAL PERFUSION SPECT (STRESS AND/OR REST) (Report)     Sybil Matson   [address] [facility]   [place] Magu[place], [place] [facility]   [phone]     Rest/Stress Gated SPECT Myocardial Perfusion Imaging After Exercise     Patient: Braden Muñoz   MR number: [id number]   Account number: [de-identified]   : 1955   Age: 58 years   Gender: Male   Status: Outpatient   Location: [address] [place] [address] [facility] [place]   Height: 71 in   Weight: 284 lb   BP: 150/ 82 mmHg     Allergies: CIPROFLOXACIN, AMLODIPINE     Diagnosis: R94 39 - Abnormal result of other cardiovascular function study     Interpreting Physician: Barb Do MD   Referring Physician: Debi Becerril MD   Primary Physician: Mindy Cordova MD   Technician: Janette Orozco   RN: Farooq Oconnor RN   Group: [facility]   Cardiology Fellow: Lavern Myles MD   Report Prepared by[de-identified] Farooq Oconnor RN     INDICATIONS: Evaluation of known coronary artery disease  HISTORY: The patient is a 58year old  male  Chest pain status: no chest pain  Coronary artery disease risk factors: dyslipidemia and hypertension  Cardiovascular history: coronary artery disease  Prior cardiovascular procedures:   percutaneous transluminal coronary angioplasty s/p multiple stents Co-morbidity: obesity  Medications: a calcium channel blocker, an ACE inhibitor/ARB, aspirin, clopidogrel, a lipid lowering agent, and diabetic medications  Previous test   results: abnormal stress test done 2017     PHYSICAL EXAM: Baseline physical exam screening: normal      REST ECG: Normal sinus rhythm  PROCEDURE: The study was performed at the [address]  Treadmill exercise testing was performed, using the Hugh protocol   Gated SPECT myocardial perfusion imaging was performed after stress  Systolic blood pressure   was 150 mmHg, at the start of the study  Diastolic blood pressure was 82 mmHg, at the start of the study  The heart rate was 64 bpm, at the start of the study  IV double checked  MARLENY PROTOCOL:   HR bpm SBP mmHg DBP mmHg Symptoms ST change Rhythm/conduct   Baseline 64 150 82 none none NSR, no ectopy   Stage 1 101 168 100 none none NSR, no ectopy   Stage 2 122 208 90 none none occasional PVC's   Stage 3 137 216 88 moderate fatigue none ventricular couplets   Recovery 1 102 198 96 none none occasional PVC's   Recovery 2 80 170 78 none -- --   No medications or fluids given  STRESS SUMMARY: Duration of exercise was 7 min and 31 sec  The patient exercised to protocol stage 3  Maximal work rate was 10 1 METs  Functional capacity was normal  Maximal heart rate during stress was 137 bpm ( 87 % of maximal predicted   heart rate)  The heart rate response to stress was normal  There was resting hypertension with a hypertensive blood pressure response to stress  The rate-pressure product for the peak heart rate and blood pressure was 68330  There was no   chest pain during stress  The stress test was terminated due to achievement of maximal (symptom limited) exercise  The stress ECG was negative for ischemia and normal  Arrhythmia during stress: isolated premature ventricular beats  ISOTOPE ADMINISTRATION:   Resting isotope administration Stress isotope administration   Agent Tetrofosmin Tetrofosmin   Dose 16 08 mCi 48 9 mCi   Date 12/21/2017 12/21/2017   Injection time 08:40 10:30   Imaging time 09:51 11:15   Injection-image interval 71 min 45 min     The radiopharmaceutical was injected one minute before the end of exercise  MYOCARDIAL PERFUSION IMAGING:   The image quality was good  Rotating projection images reveal mild diaphragmatic attenuation  Left ventricular size was normal      PERFUSION DEFECTS:   - There were no perfusion defects       GATED SPECT:   The calculated left ventricular ejection fraction was 56 %  Left ventricular ejection fraction was within normal limits by visual estimate  There was no left ventricular regional abnormality  SUMMARY:   - Stress results: Duration of exercise was 7 min and 31 sec  Target heart rate was achieved  There was resting hypertension with a hypertensive blood pressure response to stress  There was no chest pain during stress  - ECG conclusions: The stress ECG was negative for ischemia and normal    - Perfusion imaging: There were no perfusion defects    - Gated SPECT: The calculated left ventricular ejection fraction was 56 %  Left ventricular ejection fraction was within normal limits by visual estimate  There was no left ventricular regional abnormality  IMPRESSIONS: Normal study after maximal exercise without reproduction of symptoms  Myocardial perfusion imaging was normal at rest and with stress   Left ventricular systolic function was normal      Prepared and signed by     Alvarez Hardin MD   Signed 12/21/2017 12:49:15

## 2018-01-23 NOTE — MISCELLANEOUS
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

## 2018-02-26 DIAGNOSIS — I10 BENIGN HYPERTENSION: Primary | ICD-10-CM

## 2018-02-26 RX ORDER — LISINOPRIL AND HYDROCHLOROTHIAZIDE 25; 20 MG/1; MG/1
1 TABLET ORAL DAILY
Qty: 90 TABLET | Refills: 0 | Status: SHIPPED | OUTPATIENT
Start: 2018-02-26 | End: 2018-05-16 | Stop reason: SDUPTHER

## 2018-02-26 RX ORDER — LISINOPRIL AND HYDROCHLOROTHIAZIDE 25; 20 MG/1; MG/1
1 TABLET ORAL DAILY
COMMUNITY
End: 2018-02-26 | Stop reason: SDUPTHER

## 2018-05-14 ENCOUNTER — OFFICE VISIT (OUTPATIENT)
Dept: CARDIOLOGY CLINIC | Facility: MEDICAL CENTER | Age: 63
End: 2018-05-14
Payer: COMMERCIAL

## 2018-05-14 VITALS
DIASTOLIC BLOOD PRESSURE: 64 MMHG | WEIGHT: 279.5 LBS | BODY MASS INDEX: 39.13 KG/M2 | OXYGEN SATURATION: 97 % | HEIGHT: 71 IN | HEART RATE: 62 BPM | SYSTOLIC BLOOD PRESSURE: 128 MMHG

## 2018-05-14 DIAGNOSIS — E78.5 HYPERLIPIDEMIA, UNSPECIFIED HYPERLIPIDEMIA TYPE: ICD-10-CM

## 2018-05-14 DIAGNOSIS — I25.10 CORONARY ARTERY DISEASE INVOLVING NATIVE HEART WITHOUT ANGINA PECTORIS, UNSPECIFIED VESSEL OR LESION TYPE: Primary | ICD-10-CM

## 2018-05-14 DIAGNOSIS — I10 HYPERTENSION, UNSPECIFIED TYPE: ICD-10-CM

## 2018-05-14 PROCEDURE — 99214 OFFICE O/P EST MOD 30 MIN: CPT | Performed by: INTERNAL MEDICINE

## 2018-05-14 RX ORDER — AMLODIPINE BESYLATE 2.5 MG/1
1 TABLET ORAL DAILY
COMMUNITY
End: 2018-05-14 | Stop reason: SDUPTHER

## 2018-05-14 RX ORDER — CLOPIDOGREL BISULFATE 75 MG/1
75 TABLET ORAL DAILY
Qty: 90 TABLET | Refills: 3
Start: 2018-05-14 | End: 2018-05-16 | Stop reason: SDUPTHER

## 2018-05-14 RX ORDER — ATORVASTATIN CALCIUM 40 MG/1
40 TABLET, FILM COATED ORAL DAILY
Qty: 90 TABLET | Refills: 3
Start: 2018-05-14 | End: 2018-05-16 | Stop reason: SDUPTHER

## 2018-05-14 RX ORDER — AMLODIPINE BESYLATE 2.5 MG/1
2.5 TABLET ORAL DAILY
Qty: 90 TABLET | Refills: 3
Start: 2018-05-14 | End: 2018-05-16 | Stop reason: SDUPTHER

## 2018-05-14 RX ORDER — SILDENAFIL CITRATE 20 MG/1
TABLET ORAL DAILY
COMMUNITY
End: 2018-05-16 | Stop reason: SDUPTHER

## 2018-05-14 NOTE — PROGRESS NOTES
Cardiology Follow Up    Licha Villalta  1955  [id number]  HEART & VASCULAR [facility]    No diagnosis found  I had the pleasure of seeing Licha Villalta for a follow up visit  Interval History: none    History of the presenting illness, Discussion/Summary and My Plan are as follows:    He is a pleasant 75-year-old gentleman with a history of hypertension, diabetes and dyslipidemia  He also has coronary artery disease and has had multiple stenting procedures  Based on his last coronary angiography report from the [facility] in September 2016, left main was patent, LAD was patent including a drug-eluting stent-implanted in 2011  His proximal RCA had a stent implanted in June 2015-in-stent restenosis-December 2015-restented, again had in-stent restenosis that was treated with a drug-eluting 3X 18 mm Xience stent  This was done for anginal symptoms  To his knowledge, he has not had a myocardial infarction  He is moderately active working as a   In the summer he sometimes walks the golf course  No anginal symptoms at this time  He had vein stripping of his right lower extremity which has disproportionately more edema  Plan:     Coronary artery disease: Status post [id number]-patent, status post proximal RCA stent in-stent - in-stent-last-September 2016 with a Xience stent, (3 STENTS) anginal symptoms at this timeaspirin and Plavix, not on metoprolol but asymptomatic  December 2017: Exercise nuclear perfusion study, 7 minutes 31 seconds, resting hypertension with hypertensive response, no ischemia, no ECG changes, no chest pains    Dyslipidemia: on atorvastatin 40 mg 2017-total cholesterol 121, triglycerides 167, HDL 39, LDL 49    HTN:  well controlled,     DM: As above   last A1c was 7 6 in Sept 2017,     Chronic right more than left lower extremity edema: No need for Dopplers, had vein stripping for varicose veins in the past  Foot end elevation, compression stockings were recommended  No increase with Amlodipine use    WILL CALL WITH HIS PHARMACY DETAILS TO SEND 90 D SUPPLY FOR PLAVIX  AMLODIPINE AND ATORVASTATIN    Follow up in 6 months  Patient Active Problem List   Diagnosis   (none) - all problems resolved or deleted     Past Medical History:   Diagnosis Date    Arthritis     knee    Coronary artery disease     Diabetes mellitus ([address] )     H/O angioplasty     Hypercholesteremia     Hypertension     Varicose veins of lower extremity      Social History     Social History    Marital status: /Civil Union     Spouse name: N/A    Number of children: N/A    Years of education: N/A     Occupational History    Not on file       Social History Main Topics    Smoking status: Former Smoker     Quit date: 1982    Smokeless tobacco: Never Used    Alcohol use Yes      Comment: vacation, no alcohol use, per allscripts    Drug use: No    Sexual activity: Not on file     Other Topics Concern    Not on file     Social History Narrative    No narrative on file      Family History   Problem Relation Age of Onset    Cancer Mother     Coronary artery disease Mother     Heart disease Father     Early death Father     Coronary artery disease Father     Diabetes Father     Hypertension Father     Coronary artery disease Brother     Hyperlipidemia Brother     Hypertension Brother      Past Surgical History:   Procedure Laterality Date    CARDIAC CATHETERIZATION      CARDIAC SURGERY      CHOLECYSTECTOMY  01/01/2010    COLONOSCOPY      CORONARY ANGIOPLASTY WITH STENT PLACEMENT      EYE SURGERY Left     ocular lens implant    GALLBLADDER SURGERY      HERNIA REPAIR  01/01/2009    KNEE ARTHROSCOPY W/ MENISCAL REPAIR Right     KNEE SURGERY  09/10/2015    AZ INCISE FINGER TENDON SHEATH Left 8/25/2016    Procedure: RING TRIGGER FINGER RELEASE ; Surgeon: Lenin Arce MD;  Location[facility];  Service: Orthopedics    VARICOSE VEIN SURGERY      Ligation       Current Outpatient Prescriptions:     amLODIPine (NORVASC) 2 5 mg tablet, Take 1 tablet by mouth daily, Disp: , Rfl:     aspirin (ECOTRIN LOW STRENGTH) 81 mg EC tablet, Take 81 mg by mouth daily  , Disp: , Rfl:     clopidogrel (PLAVIX) 75 mg tablet, Take by mouth , Disp: , Rfl:     glimepiride (AMARYL) 2 mg tablet, Take 2 mg by mouth every morning before breakfast , Disp: , Rfl:     lisinopril (ZESTRIL) 20 mg tablet, Take by mouth , Disp: , Rfl:     lisinopril-hydrochlorothiazide (PRINZIDE,ZESTORETIC) 20-25 MG per tablet, Take 1 tablet by mouth daily, Disp: 90 tablet, Rfl: 0    metFORMIN (GLUCOPHAGE) 500 mg tablet, Take by mouth 2 (two) times a day  , Disp: , Rfl:     Omega-3 Fatty Acids (FISH OIL PO), Take by mouth , Disp: , Rfl:     sildenafil (REVATIO) 20 mg tablet, Take by mouth, Disp: , Rfl:     SIMVASTATIN PO, Take by mouth , Disp: , Rfl:     traMADol (ULTRAM) 50 mg tablet, Take 50 mg by mouth every 6 (six) hours as needed for moderate pain , Disp: , Rfl:     VITAMIN E PO, Take by mouth , Disp: , Rfl:     HYDROcodone-acetaminophen (NORCO) 5-325 mg per tablet, 1 tab by mouth every 6 hours for pain, Disp: 20 tablet, Rfl: 0  Allergies   Allergen Reactions    Ciprofloxacin Swelling    Norvasc [Amlodipine]     Terazosin      Annotation - [id number]: Blood in sperm       Labs:  No visits with results within 2 Month(s) from this visit  Latest known visit with results is:   Hospital Outpatient Visit on 12/21/2017   Component Date Value    Protocol Name 12/21/2017 Radha Alvarado Time In Exercise Phase 12/21/2017 00:07:31     MAX   SYSTOLIC BP 40/85/1469 528     Max Diastolic Bp 33/89/3924 88     Max Heart Rate 12/21/2017 137     Max Predicted Heart Rate 12/21/2017 158     Reason for Termination 12/21/2017 Fatigue     Test Indication 12/21/2017 Abnormal Treadmill Test     Target Hr Formular 12/21/2017 (220 - Age)*100%     Arrhy During Ex 12/21/2017                      Value:atrial premature beats  ventricular premature beats      Chest Pain Statement 12/21/2017 none      Imaging: No results found  Review of Systems:  Review of Systems   Constitutional: Negative  HENT: Negative  Eyes: Negative  Respiratory: Negative  Cardiovascular: Negative  Gastrointestinal: Negative  Endocrine: Negative  Genitourinary: Negative  Musculoskeletal: Negative  Allergic/Immunologic: Negative  Neurological: Negative  Hematological: Negative  Psychiatric/Behavioral: Negative  Physical Exam:  Physical Exam   Constitutional: He is oriented to person, place, and time  He appears well-developed and well-nourished  No distress  HENT:   Head: Normocephalic and atraumatic  Eyes: Conjunctivae and EOM are normal  Pupils are equal, round, and reactive to light  Right eye exhibits no discharge  Left eye exhibits no discharge  Neck: Normal range of motion  Neck supple  No JVD present  No tracheal deviation present  No thyromegaly present  Cardiovascular: Normal rate and normal heart sounds  Exam reveals no friction rub  No murmur heard  Pulmonary/Chest: Effort normal and breath sounds normal  No stridor  No respiratory distress  He has no wheezes  He has no rales  Abdominal: Soft  Bowel sounds are normal  He exhibits no distension  There is no tenderness  There is no rebound  Musculoskeletal: Normal range of motion  He exhibits no edema or deformity  Neurological: He is alert and oriented to person, place, and time  No cranial nerve deficit  Coordination normal    Skin: Skin is warm and dry  No rash noted  He is not diaphoretic  No erythema  No pallor

## 2018-05-16 ENCOUNTER — OFFICE VISIT (OUTPATIENT)
Dept: INTERNAL MEDICINE CLINIC | Age: 63
End: 2018-05-16
Payer: COMMERCIAL

## 2018-05-16 VITALS
TEMPERATURE: 97.7 F | SYSTOLIC BLOOD PRESSURE: 150 MMHG | DIASTOLIC BLOOD PRESSURE: 80 MMHG | BODY MASS INDEX: 38.33 KG/M2 | OXYGEN SATURATION: 97 % | HEART RATE: 67 BPM | HEIGHT: 71 IN | RESPIRATION RATE: 20 BRPM | WEIGHT: 273.8 LBS

## 2018-05-16 DIAGNOSIS — E78.5 HYPERLIPIDEMIA, UNSPECIFIED HYPERLIPIDEMIA TYPE: ICD-10-CM

## 2018-05-16 DIAGNOSIS — N52.9 ERECTILE DYSFUNCTION, UNSPECIFIED ERECTILE DYSFUNCTION TYPE: ICD-10-CM

## 2018-05-16 DIAGNOSIS — Z23 ENCOUNTER FOR IMMUNIZATION: ICD-10-CM

## 2018-05-16 DIAGNOSIS — I25.10 CORONARY ARTERY DISEASE INVOLVING NATIVE HEART WITHOUT ANGINA PECTORIS, UNSPECIFIED VESSEL OR LESION TYPE: ICD-10-CM

## 2018-05-16 DIAGNOSIS — I10 HYPERTENSION, UNSPECIFIED TYPE: ICD-10-CM

## 2018-05-16 DIAGNOSIS — Z12.11 SCREENING FOR COLON CANCER: ICD-10-CM

## 2018-05-16 DIAGNOSIS — E13.9 DIABETES 1.5, MANAGED AS TYPE 2 (HCC): Primary | ICD-10-CM

## 2018-05-16 DIAGNOSIS — I10 BENIGN HYPERTENSION: ICD-10-CM

## 2018-05-16 PROCEDURE — 82962 GLUCOSE BLOOD TEST: CPT | Performed by: INTERNAL MEDICINE

## 2018-05-16 PROCEDURE — 99214 OFFICE O/P EST MOD 30 MIN: CPT | Performed by: INTERNAL MEDICINE

## 2018-05-16 RX ORDER — AMLODIPINE BESYLATE 5 MG/1
5 TABLET ORAL DAILY
Qty: 30 TABLET | Refills: 1 | Status: SHIPPED | OUTPATIENT
Start: 2018-05-16 | End: 2018-07-02 | Stop reason: SDUPTHER

## 2018-05-16 RX ORDER — GLIMEPIRIDE 2 MG/1
4 TABLET ORAL
Qty: 90 TABLET | Refills: 1 | Status: SHIPPED | OUTPATIENT
Start: 2018-05-16 | End: 2018-07-02 | Stop reason: SDUPTHER

## 2018-05-16 RX ORDER — CLOPIDOGREL BISULFATE 75 MG/1
75 TABLET ORAL DAILY
Qty: 90 TABLET | Refills: 0
Start: 2018-05-16 | End: 2018-12-18 | Stop reason: SDUPTHER

## 2018-05-16 RX ORDER — ATORVASTATIN CALCIUM 40 MG/1
40 TABLET, FILM COATED ORAL DAILY
Qty: 90 TABLET | Refills: 1 | Status: SHIPPED | OUTPATIENT
Start: 2018-05-16 | End: 2018-08-24 | Stop reason: SDUPTHER

## 2018-05-16 RX ORDER — INSULIN PUMP/INFUS. SET/METER
KIT MISCELLANEOUS 2 TIMES DAILY
Qty: 1 KIT | Refills: 0 | Status: SHIPPED | COMMUNITY
Start: 2018-05-16 | End: 2018-06-20 | Stop reason: CLARIF

## 2018-05-16 RX ORDER — SILDENAFIL CITRATE 20 MG/1
20 TABLET ORAL DAILY
Qty: 50 TABLET | Refills: 1 | Status: SHIPPED | OUTPATIENT
Start: 2018-05-16 | End: 2019-03-20 | Stop reason: SDUPTHER

## 2018-05-16 RX ORDER — LISINOPRIL AND HYDROCHLOROTHIAZIDE 25; 20 MG/1; MG/1
1 TABLET ORAL DAILY
Qty: 90 TABLET | Refills: 0 | Status: SHIPPED | OUTPATIENT
Start: 2018-05-16 | End: 2018-09-19 | Stop reason: SDUPTHER

## 2018-05-16 RX ORDER — LISINOPRIL 20 MG/1
20 TABLET ORAL DAILY
Qty: 90 TABLET | Refills: 1 | Status: SHIPPED | OUTPATIENT
Start: 2018-05-16 | End: 2018-10-17 | Stop reason: SDUPTHER

## 2018-05-16 RX ORDER — LANCETS
EACH MISCELLANEOUS 2 TIMES DAILY
Qty: 200 EACH | Refills: 1 | Status: SHIPPED | OUTPATIENT
Start: 2018-05-16 | End: 2018-06-20 | Stop reason: CLARIF

## 2018-05-16 NOTE — PATIENT INSTRUCTIONS
1  He was advised to use a paraffin bath to relieve swelling and pain in his index finger  He was also encouraged to use a small hand-sized exercise ball to relieve pain  2  He was advised to eliminate any salt, caffeine, and soda from his diet  3  He will increase his dosage of Amlodipine to 5 mg daily  4  He will have a Renal function panel, album/creat ratio drawn at his next appointment  5   He will have a PSA drawn prior to his next appointment  6   He was instructed to increase his Amaryl to 4 mg daily  7  Because of the chance of hypoglycemia, we instructed him to keep an instant glucose tablet for emergencies  8   He will have a fasting blood sugar and an HgbA1C level drawn prior to his next appointment  9   He will keep a blood glucose level log which he will check twice daily  10   He was instructed to continue using his compression stockings  11   He was advised to minimize processed foods and increase natural foods in his diet  He was encouraged to minimize his white starches, carbohydrates, and concentrated sugars from his diet  12   He was advised about the benefits of the new Shingrix vaccine and was given a prescription  He will have it done at his earliest convenience  13  He will come back for a BP check in 1 month  14  He will follow up with us in 2 months

## 2018-05-16 NOTE — PROGRESS NOTES
Assessment/Plan:    1  Arthritic pain of Right index finger  He was advised to use a paraffin bath to relieve swelling and pain in his index finger  He was also encouraged to use a small hand-sized exercise ball to relieve pain  2  HTN  His BP today was elevated at 150/80 at the office today  He was advised to eliminate any salt, caffeine, and soda from his diet  He will increase his dosage of Amlodipine to 5 mg daily  He will have a Renal function panel, album/creat ratio drawn at his next appointment  3  Difficulty urinating  He will have a PSA drawn prior to his next appointment  We will do the ISAIAS at his next appointment  4  Diabetes  His blood glucose checked in the office today was elevated at 296  He reports he has not checked his sugar levels regularly  We gave him a sample of a glucometer so he can check his sugar level regularly  He was instructed to increase his Amaryl to 4 mg daily  Because of the chance of hypoglycemia, we instructed him to keep an instant glucose tablet for emergencies  He will have a fasting blood sugar and an HgbA1C level drawn prior to his next appointment  He will keep a blood glucose level log which he will check twice daily  5  BLE edema with brawny discolorations and stasis dermatitis  He was instructed to continue using his compression stockings  6  Morbid Obesity  He is morbidly obese with a BMI of 38 46  He was advised to minimize processed foods and increase natural foods in his diet  He was encouraged to minimize his white starches, carbohydrates, and concentrated sugars from his diet  7  Health Maintenance  He was advised about the benefits of the new Shingrix vaccine and was given a prescription  He will have it done at his earliest convenience  He will come back for a BP check in 1 month  He will follow up with us in 2 months        Diagnoses and all orders for this visit:    Diabetes 1 5, managed as type 2 ([address] )  -     metFORMIN (GLUCOPHAGE) 1000 MG tablet; Take 1 tablet (1,000 mg total) by mouth 2 (two) times a day  -     glimepiride (AMARYL) 2 mg tablet; Take 2 tablets (4 mg total) by mouth daily with breakfast  -     HEMOGLOBIN A1C W/ EAG ESTIMATION; Future  -     Glucose, fasting; Future    Screening for colon cancer  -     Ambulatory referral to Gastroenterology; Future    Benign hypertension  -     lisinopril-hydrochlorothiazide (PRINZIDE,ZESTORETIC) 20-25 MG per tablet; Take 1 tablet by mouth daily  -     lisinopril (ZESTRIL) 20 mg tablet; Take 1 tablet (20 mg total) by mouth daily  -     PSA Total, Diagnostic; Future    Coronary artery disease involving native heart without angina pectoris, unspecified vessel or lesion type  -     clopidogrel (PLAVIX) 75 mg tablet; Take 1 tablet (75 mg total) by mouth daily    Hyperlipidemia, unspecified hyperlipidemia type  -     atorvastatin (LIPITOR) 40 mg tablet; Take 1 tablet (40 mg total) by mouth daily    Hypertension, unspecified type  -     amLODIPine (NORVASC) 5 mg tablet; Take 1 tablet (5 mg total) by mouth daily  -     Renal function panel; Future  -     Microalbumin / creatinine urine ratio    Erectile dysfunction, unspecified erectile dysfunction type  -     sildenafil (REVATIO) 20 mg tablet; Take 1 tablet (20 mg total) by mouth daily    Encounter for immunization  -     Zoster Vaccine Recombinant IM          Subjective:      Patient ID: Beba Caceres is a 61 y o  male  Nando Tian is a 61year old male who presents today for a 6 month follow up regarding his Diabetes and HTN  He complains of arthritic pain in his right index finger and attributes that to occupational pain due to him working as a   He complains of seasonal allergies which are relieved by OTC medications  He is morbidly obese with a BMI of 38 46  He has a ventral hernia in the midline of the abdomen             The following portions of the patient's history were reviewed and updated as appropriate: allergies, current medications, past family history, past medical history, past social history, past surgical history and problem list     Review of Systems   Constitutional: Negative for appetite change, chills, diaphoresis, fatigue, fever and unexpected weight change  HENT: Positive for dental problem  Negative for congestion, drooling, ear discharge, ear pain, facial swelling, hearing loss, mouth sores, nosebleeds, postnasal drip, rhinorrhea, sinus pain, sinus pressure and sore throat  Eyes: Negative  Respiratory: Negative  Cardiovascular: Negative  Gastrointestinal: Negative  Genitourinary: Positive for difficulty urinating (on getting up)  Negative for dysuria, frequency and hematuria  Musculoskeletal: Negative for arthralgias, back pain, gait problem, joint swelling and myalgias  Skin: Negative  Allergic/Immunologic: Negative for environmental allergies (seasonal)  Neurological: Negative for dizziness, tremors, seizures, syncope, facial asymmetry, speech difficulty, weakness, light-headedness, numbness and headaches  Hematological: Negative  Negative for adenopathy  Does not bruise/bleed easily  Psychiatric/Behavioral: Negative  Objective:      /80 (BP Location: Left arm, Patient Position: Sitting, Cuff Size: Large)   Pulse 67   Temp 97 7 °F (36 5 °C) (Tympanic)   Resp 20   Ht 5' 10 75" (1 797 m)   Wt 124 kg (273 lb 12 8 oz)   SpO2 97% Comment: room air  BMI 38 46 kg/m²          Physical Exam   Constitutional: He is oriented to person, place, and time  He appears well-developed and well-nourished  No distress  HENT:   Head: Normocephalic and atraumatic  Mouth/Throat: Oropharynx is clear and moist    Eyes: Conjunctivae and EOM are normal  Right eye exhibits no discharge  Left eye exhibits no discharge  Neck: Normal range of motion  Neck supple  No JVD present  No thyromegaly present  Cardiovascular: Normal rate, regular rhythm and normal heart sounds      No murmur heard   Pulmonary/Chest: Effort normal and breath sounds normal  No respiratory distress  He has no wheezes  He has no rales  Abdominal: Soft  Bowel sounds are normal  He exhibits no distension  There is no tenderness  There is no rebound  Musculoskeletal: He exhibits edema  brawny changes of stasis dermatitis and varicosities  Neurological: He is alert and oriented to person, place, and time  He has normal reflexes  Skin: Skin is warm and dry  Rash noted  He is not diaphoretic  There is erythema  Psychiatric: He has a normal mood and affect  His behavior is normal  Judgment and thought content normal          Scribe Signature and Attestation:  By signing my name below, Aj Jeane attest that this documentation has been prepared under the direction and in the presence of Dr Cinthia Billy MD  Electronically signed: Rojelio Price  5/16/18    Provider Attestation:  I, Dr Cinthia Billy, personally performed the services described in this documentation  All medical record entries made by the scribyumiko were at my direction and in my presence  I have reviewed the chart and discharge instructions (if applicable) and agree that the record reflects my personal performance and is accurate and complete  Dr Cinthia Billy MD  5/16/18

## 2018-05-21 ENCOUNTER — TELEPHONE (OUTPATIENT)
Dept: INTERNAL MEDICINE CLINIC | Facility: CLINIC | Age: 63
End: 2018-05-21

## 2018-05-21 NOTE — TELEPHONE ENCOUNTER
Criss Carey called from [facility] [employer] to [employer]o that they can give him the test strips he needs in stead of what was sent in  He needs accucheck guide test strips  Call # [phone] ref # [id number]

## 2018-05-22 DIAGNOSIS — E11.8 TYPE 2 DIABETES MELLITUS WITH COMPLICATION, WITHOUT LONG-TERM CURRENT USE OF INSULIN (HCC): Primary | ICD-10-CM

## 2018-06-04 LAB
LEFT EYE DIABETIC RETINOPATHY: NORMAL
RIGHT EYE DIABETIC RETINOPATHY: NORMAL
SEVERITY (EYE EXAM): NORMAL

## 2018-06-05 DIAGNOSIS — E11.8 TYPE 2 DIABETES MELLITUS WITH COMPLICATION, UNSPECIFIED WHETHER LONG TERM INSULIN USE: Primary | ICD-10-CM

## 2018-06-19 DIAGNOSIS — E11.9 DIABETES MELLITUS TYPE 2, NONINSULIN DEPENDENT (HCC): Primary | ICD-10-CM

## 2018-06-19 NOTE — TELEPHONE ENCOUNTER
Patient needs one touch products are the preferred brand for his insurance he will be calling back to give us the name of his jerome  Is the one touch ultra he needs test strips sent to the pharmacy he tests BID

## 2018-06-20 RX ORDER — BLOOD-GLUCOSE METER
EACH MISCELLANEOUS
Qty: 1 EACH | Refills: 0 | Status: SHIPPED | OUTPATIENT
Start: 2018-06-20

## 2018-06-20 RX ORDER — LANCETS 33 GAUGE
EACH MISCELLANEOUS
Qty: 300 EACH | Refills: 0 | Status: SHIPPED | OUTPATIENT
Start: 2018-06-20

## 2018-07-02 ENCOUNTER — TRANSCRIBE ORDERS (OUTPATIENT)
Dept: LAB | Facility: CLINIC | Age: 63
End: 2018-07-02

## 2018-07-02 ENCOUNTER — APPOINTMENT (OUTPATIENT)
Dept: LAB | Facility: CLINIC | Age: 63
End: 2018-07-02
Payer: COMMERCIAL

## 2018-07-02 DIAGNOSIS — E13.9 DIABETES 1.5, MANAGED AS TYPE 2 (HCC): ICD-10-CM

## 2018-07-02 DIAGNOSIS — I10 HYPERTENSION, UNSPECIFIED TYPE: ICD-10-CM

## 2018-07-02 DIAGNOSIS — E78.5 HYPERLIPIDEMIA, UNSPECIFIED HYPERLIPIDEMIA TYPE: ICD-10-CM

## 2018-07-02 DIAGNOSIS — I10 BENIGN HYPERTENSION: ICD-10-CM

## 2018-07-02 LAB
ALBUMIN SERPL BCP-MCNC: 4.2 G/DL (ref 3.5–5)
ANION GAP SERPL CALCULATED.3IONS-SCNC: 10 MMOL/L (ref 4–13)
BUN SERPL-MCNC: 18 MG/DL (ref 5–25)
CALCIUM SERPL-MCNC: 9.4 MG/DL (ref 8.3–10.1)
CHLORIDE SERPL-SCNC: 103 MMOL/L (ref 100–108)
CHOLEST SERPL-MCNC: 118 MG/DL (ref 50–200)
CO2 SERPL-SCNC: 27 MMOL/L (ref 21–32)
CREAT SERPL-MCNC: 1.05 MG/DL (ref 0.6–1.3)
EST. AVERAGE GLUCOSE BLD GHB EST-MCNC: 169 MG/DL
GFR SERPL CREATININE-BSD FRML MDRD: 75 ML/MIN/1.73SQ M
GLUCOSE P FAST SERPL-MCNC: 177 MG/DL (ref 65–99)
GLUCOSE P FAST SERPL-MCNC: 178 MG/DL (ref 65–99)
HBA1C MFR BLD: 7.5 % (ref 4.2–6.3)
HDLC SERPL-MCNC: 42 MG/DL (ref 40–60)
LDLC SERPL CALC-MCNC: 51 MG/DL (ref 0–100)
PHOSPHATE SERPL-MCNC: 3.9 MG/DL (ref 2.3–4.1)
POTASSIUM SERPL-SCNC: 3.9 MMOL/L (ref 3.5–5.3)
PSA SERPL-MCNC: 1.8 NG/ML (ref 0–4)
SODIUM SERPL-SCNC: 140 MMOL/L (ref 136–145)
TRIGL SERPL-MCNC: 123 MG/DL

## 2018-07-02 PROCEDURE — 80061 LIPID PANEL: CPT

## 2018-07-02 PROCEDURE — 80069 RENAL FUNCTION PANEL: CPT

## 2018-07-02 PROCEDURE — 84153 ASSAY OF PSA TOTAL: CPT

## 2018-07-02 PROCEDURE — 83036 HEMOGLOBIN GLYCOSYLATED A1C: CPT

## 2018-07-02 PROCEDURE — 82947 ASSAY GLUCOSE BLOOD QUANT: CPT

## 2018-07-02 PROCEDURE — 36415 COLL VENOUS BLD VENIPUNCTURE: CPT

## 2018-07-02 RX ORDER — GLIMEPIRIDE 4 MG/1
4 TABLET ORAL
Qty: 90 TABLET | Refills: 1 | Status: SHIPPED | OUTPATIENT
Start: 2018-07-02 | End: 2018-10-17 | Stop reason: SDUPTHER

## 2018-07-02 RX ORDER — AMLODIPINE BESYLATE 5 MG/1
5 TABLET ORAL DAILY
Qty: 90 TABLET | Refills: 1 | Status: SHIPPED | OUTPATIENT
Start: 2018-07-02 | End: 2018-10-17 | Stop reason: SDUPTHER

## 2018-08-24 DIAGNOSIS — E78.5 HYPERLIPIDEMIA, UNSPECIFIED HYPERLIPIDEMIA TYPE: ICD-10-CM

## 2018-08-24 RX ORDER — ATORVASTATIN CALCIUM 40 MG/1
TABLET, FILM COATED ORAL
Qty: 90 TABLET | Refills: 3 | Status: SHIPPED | OUTPATIENT
Start: 2018-08-24 | End: 2019-03-20 | Stop reason: SDUPTHER

## 2018-08-29 ENCOUNTER — OFFICE VISIT (OUTPATIENT)
Dept: INTERNAL MEDICINE CLINIC | Age: 63
End: 2018-08-29
Payer: COMMERCIAL

## 2018-08-29 VITALS
HEART RATE: 62 BPM | TEMPERATURE: 97.7 F | HEIGHT: 71 IN | SYSTOLIC BLOOD PRESSURE: 144 MMHG | WEIGHT: 277.6 LBS | BODY MASS INDEX: 38.86 KG/M2 | OXYGEN SATURATION: 96 % | DIASTOLIC BLOOD PRESSURE: 78 MMHG

## 2018-08-29 DIAGNOSIS — E11.8 TYPE 2 DIABETES MELLITUS WITH COMPLICATION, WITHOUT LONG-TERM CURRENT USE OF INSULIN (HCC): Primary | ICD-10-CM

## 2018-08-29 DIAGNOSIS — I10 ESSENTIAL HYPERTENSION: ICD-10-CM

## 2018-08-29 DIAGNOSIS — E66.01 CLASS 2 SEVERE OBESITY DUE TO EXCESS CALORIES WITH SERIOUS COMORBIDITY AND BODY MASS INDEX (BMI) OF 39.0 TO 39.9 IN ADULT (HCC): ICD-10-CM

## 2018-08-29 PROCEDURE — 3008F BODY MASS INDEX DOCD: CPT | Performed by: INTERNAL MEDICINE

## 2018-08-29 PROCEDURE — 99213 OFFICE O/P EST LOW 20 MIN: CPT | Performed by: INTERNAL MEDICINE

## 2018-08-29 NOTE — PROGRESS NOTES
Assessment/Plan:    1  Type 2 non-insulin dependent diabetes mellitus  He reports he checked his fasting glucose level which ranged between 175-270 in the past two weeks  His fasting glucose was elevated at 178 on 7/2/18 and his HgbA1C was elevated at 7 2 on 7/2/18  He was instructed to discontinue his Metformin and replace it with Janumet  mg per tablet  He will have an amylase, lipase, ALT, fasting glucose, hgba1c, and renal function panel drawn prior to his next appointment  If he becomes hypoglycemic, we will consider decreasing his Amaryl  He should call us in 1 week to let us know how he is doing  2  Hyperlipidemia  Her Triglycerides were normal at 123, total cholesterol normal at 118, HDL was normal at 41, LDL was normal at 51 on 7/2/18  He will continue on his current medications as his hyperlipidemia is well controlled and he is tolerating them without significant side effects  3  Constipation  He reports he has occasional constipation  He was advised to use Miralax as instructed for his constipation  4  Digital Recta Exam  His PSA was normal at 1 8 on 7/2/18  A ISAIAS was performed and the prostate was small without nodules  The stool blood test performed in the office was negative for blood  5  Hypertension  His BP today was elevated at 144/78 today at the office  He was also advised to minimize salt and caffeine from his diet  6  Healthcare Maintenance  He was advised about the benefits of the new Shingrix vaccine and he will have it done at his earliest convenience  He was advised about [facility] Screening programs  He will follow up with us in 4-5 weeks or as needed  We will discuss the pneumonia vaccination at his next visit  Diagnoses and all orders for this visit:    Type 2 diabetes mellitus with complication, without long-term current use of insulin (HCC)  -     sitaGLIPtin-metFORMIN (JANUMET)  MG per tablet;  Take 1 tablet by mouth 2 (two) times a day with meals  - Amylase; Future  -     Lipase; Future  -     ALT; Future  -     Glucose, fasting; Future  -     HEMOGLOBIN A1C W/ EAG ESTIMATION; Future  -     Renal function panel; Future    Essential hypertension    Class 2 severe obesity due to excess calories with serious comorbidity and body mass index (BMI) of 39 0 to 39 9 in [facility])          Subjective:      Patient ID: Hector Mancilla is a 61 y o  male  Peg Ash is a 61year old male who presents today for a 2 month follow up regarding his type 2 non-insulin dependent diabetes mellitus, hyperlipidemia, and hypertension  He has no significant complaints today and states is doing well overall  He follows with his cardiologist regularly who informed him he was stable  He has some edema in the legs but he is doing well with wearing compression stockings  He reports he checked his fasting glucose level which ranged between 175-270 in the past two weeks  He follows with Dr Medina Jackson for his ocular follow ups  He reports he has occasional bilateral hip pain, and attributes that to arthritis  He has occasional back pain due to a history of herniated discs  He reports he has some constipation  He notes he is a former smoker and quit over 35 years ago  The following portions of the patient's history were reviewed and updated as appropriate: allergies, current medications, past family history, past medical history, past social history, past surgical history and problem list     Review of Systems   Constitutional: Negative for activity change, appetite change, chills, diaphoresis, fatigue, fever and unexpected weight change  HENT: Negative for congestion, ear discharge, ear pain, hearing loss, mouth sores, nosebleeds and postnasal drip  Eyes: Negative  Negative for visual disturbance (sees dr Vitor Simpson)  Respiratory: Negative  Negative for apnea, cough, choking, chest tightness, shortness of breath, wheezing and stridor  Cardiovascular: Negative  Gastrointestinal: Positive for constipation  Negative for abdominal pain, anal bleeding, blood in stool, diarrhea and nausea  Genitourinary: Negative for difficulty urinating, flank pain, frequency and hematuria  Musculoskeletal: Positive for arthralgias (hip) and back pain (occ  herniated disc)  Negative for gait problem, joint swelling and neck pain  Skin: Negative  Neurological: Negative for dizziness, syncope, speech difficulty, light-headedness, numbness and headaches  Psychiatric/Behavioral: Negative  Objective:      /78 (BP Location: Left arm, Patient Position: Sitting, Cuff Size: Large)   Pulse 62   Temp 97 7 °F (36 5 °C) (Tympanic)   Ht 5' 10 67" (1 795 m)   Wt 126 kg (277 lb 9 6 oz)   SpO2 96%   BMI 39 08 kg/m²          Physical Exam   Constitutional: He is oriented to person, place, and time  He appears well-developed and well-nourished  No distress  HENT:   Head: Normocephalic and atraumatic  Eyes: Conjunctivae and EOM are normal  Right eye exhibits no discharge  Left eye exhibits no discharge  No scleral icterus  Neck: Normal range of motion  Neck supple  Cardiovascular: Regular rhythm  Murmur heard  Pulmonary/Chest: Effort normal and breath sounds normal  No respiratory distress  He has no wheezes  He has no rales  Abdominal: He exhibits distension  There is no tenderness  There is no rebound and no guarding  Genitourinary: Rectum normal and prostate normal    Genitourinary Comments: Stools neg  Prostate small no nodules   Musculoskeletal: He exhibits edema (plus 1 rt>left)  Neurological: He is alert and oriented to person, place, and time  He has normal reflexes  Skin: Skin is warm and dry  No rash noted  He is not diaphoretic  There is erythema  No pallor  Psychiatric: He has a normal mood and affect   His behavior is normal  Judgment and thought content normal          Scribe Signature and Attestation:  By signing my name below, Emily Benitez attest that this documentation has been prepared under the direction and in the presence of Dr Carol Higuera MD  Electronically signed: Rojelio Gage  8/29/18    Provider Attestation:  I, Dr Carol Higuera, personally performed the services described in this documentation  All medical record entries made by the scribe were at my direction and in my presence  I have reviewed the chart and discharge instructions (if applicable) and agree that the record reflects my personal performance and is accurate and complete  Dr Carol Higuera MD  8/29/18

## 2018-08-29 NOTE — PATIENT INSTRUCTIONS
1  He will have an amylase, lipase, ALT, fasting glucose, hgba1c, and renal function panel drawn prior to his next appointment  If he becomes hypoglycemic, we will consider decreasing his Amaryl  He should call us in 1 week to let us know how he is doing  2  He was advised to use Miralax as instructed for his constipation  3  He was advised to minimize salt and caffeine from his diet  4  He was advised about the benefits of the new Shingrix vaccine and he will have it done at his earliest convenience  He was advised about [facility] programs  5 He will follow up with us in 4-5 weeks or as needed

## 2018-08-31 ENCOUNTER — TRANSCRIBE ORDERS (OUTPATIENT)
Dept: ADMINISTRATIVE | Facility: HOSPITAL | Age: 63
End: 2018-08-31

## 2018-08-31 DIAGNOSIS — E13.9 DIABETES MELLITUS OF OTHER TYPE WITHOUT COMPLICATION, UNSPECIFIED WHETHER LONG TERM INSULIN USE (HCC): Primary | ICD-10-CM

## 2018-09-17 ENCOUNTER — HOSPITAL ENCOUNTER (OUTPATIENT)
Dept: NON INVASIVE DIAGNOSTICS | Facility: CLINIC | Age: 63
Discharge: HOME | End: 2018-09-17
Payer: COMMERCIAL

## 2018-09-17 DIAGNOSIS — E13.9 DIABETES MELLITUS OF OTHER TYPE WITHOUT COMPLICATION, UNSPECIFIED WHETHER LONG TERM INSULIN USE (HCC): ICD-10-CM

## 2018-09-19 DIAGNOSIS — I10 BENIGN HYPERTENSION: ICD-10-CM

## 2018-09-19 RX ORDER — LISINOPRIL AND HYDROCHLOROTHIAZIDE 25; 20 MG/1; MG/1
1 TABLET ORAL DAILY
Qty: 90 TABLET | Refills: 1 | Status: SHIPPED | OUTPATIENT
Start: 2018-09-19 | End: 2018-10-17 | Stop reason: SDUPTHER

## 2018-09-29 ENCOUNTER — APPOINTMENT (OUTPATIENT)
Dept: LAB | Facility: CLINIC | Age: 63
End: 2018-09-29
Payer: COMMERCIAL

## 2018-09-29 DIAGNOSIS — E11.8 TYPE 2 DIABETES MELLITUS WITH COMPLICATION, WITHOUT LONG-TERM CURRENT USE OF INSULIN (HCC): ICD-10-CM

## 2018-09-29 LAB
ALBUMIN SERPL BCP-MCNC: 3.6 G/DL (ref 3.5–5)
ALT SERPL W P-5'-P-CCNC: 31 U/L (ref 12–78)
AMYLASE SERPL-CCNC: 49 IU/L (ref 25–115)
ANION GAP SERPL CALCULATED.3IONS-SCNC: 8 MMOL/L (ref 4–13)
BUN SERPL-MCNC: 24 MG/DL (ref 5–25)
CALCIUM SERPL-MCNC: 8.7 MG/DL (ref 8.3–10.1)
CHLORIDE SERPL-SCNC: 105 MMOL/L (ref 100–108)
CO2 SERPL-SCNC: 28 MMOL/L (ref 21–32)
CREAT SERPL-MCNC: 0.99 MG/DL (ref 0.6–1.3)
CREAT UR-MCNC: 193 MG/DL
EST. AVERAGE GLUCOSE BLD GHB EST-MCNC: 160 MG/DL
GFR SERPL CREATININE-BSD FRML MDRD: 81 ML/MIN/1.73SQ M
GLUCOSE P FAST SERPL-MCNC: 218 MG/DL (ref 65–99)
GLUCOSE P FAST SERPL-MCNC: 218 MG/DL (ref 65–99)
HBA1C MFR BLD: 7.2 % (ref 4.2–6.3)
LIPASE SERPL-CCNC: 545 U/L (ref 73–393)
MICROALBUMIN UR-MCNC: 119 MG/L (ref 0–20)
MICROALBUMIN/CREAT 24H UR: 62 MG/G CREATININE (ref 0–30)
PHOSPHATE SERPL-MCNC: 3.8 MG/DL (ref 2.3–4.1)
POTASSIUM SERPL-SCNC: 4.1 MMOL/L (ref 3.5–5.3)
SODIUM SERPL-SCNC: 141 MMOL/L (ref 136–145)

## 2018-09-29 PROCEDURE — 82150 ASSAY OF AMYLASE: CPT

## 2018-09-29 PROCEDURE — 84460 ALANINE AMINO (ALT) (SGPT): CPT

## 2018-09-29 PROCEDURE — 83036 HEMOGLOBIN GLYCOSYLATED A1C: CPT

## 2018-09-29 PROCEDURE — 80069 RENAL FUNCTION PANEL: CPT

## 2018-09-29 PROCEDURE — 82570 ASSAY OF URINE CREATININE: CPT | Performed by: INTERNAL MEDICINE

## 2018-09-29 PROCEDURE — 82043 UR ALBUMIN QUANTITATIVE: CPT | Performed by: INTERNAL MEDICINE

## 2018-09-29 PROCEDURE — 3060F POS MICROALBUMINURIA REV: CPT | Performed by: INTERNAL MEDICINE

## 2018-09-29 PROCEDURE — 36415 COLL VENOUS BLD VENIPUNCTURE: CPT

## 2018-09-29 PROCEDURE — 83690 ASSAY OF LIPASE: CPT

## 2018-09-29 PROCEDURE — 82947 ASSAY GLUCOSE BLOOD QUANT: CPT

## 2018-10-02 ENCOUNTER — TELEPHONE (OUTPATIENT)
Dept: INTERNAL MEDICINE CLINIC | Facility: CLINIC | Age: 63
End: 2018-10-02

## 2018-10-02 DIAGNOSIS — E78.2 MIXED HYPERLIPIDEMIA: ICD-10-CM

## 2018-10-02 DIAGNOSIS — K85.90 ACUTE PANCREATITIS WITHOUT INFECTION OR NECROSIS, UNSPECIFIED PANCREATITIS TYPE: Primary | ICD-10-CM

## 2018-10-02 DIAGNOSIS — E11.9 NON-INSULIN DEPENDENT TYPE 2 DIABETES MELLITUS (HCC): ICD-10-CM

## 2018-10-02 DIAGNOSIS — R74.8 ELEVATED LIPASE: Primary | ICD-10-CM

## 2018-10-02 DIAGNOSIS — K85.90 ACUTE PANCREATITIS WITHOUT INFECTION OR NECROSIS, UNSPECIFIED PANCREATITIS TYPE: ICD-10-CM

## 2018-10-02 NOTE — TELEPHONE ENCOUNTER
Left message  Pt should have an US of liver and pancreas to r/u gallstone for cause of elevated lipase  He will have blood work completed prior to his appointment

## 2018-10-03 ENCOUNTER — TELEPHONE (OUTPATIENT)
Dept: INTERNAL MEDICINE CLINIC | Age: 63
End: 2018-10-03

## 2018-10-04 ENCOUNTER — HOSPITAL ENCOUNTER (OUTPATIENT)
Dept: RADIOLOGY | Age: 63
Discharge: HOME | End: 2018-10-04
Payer: COMMERCIAL

## 2018-10-04 ENCOUNTER — OFFICE VISIT (OUTPATIENT)
Dept: INTERNAL MEDICINE CLINIC | Age: 63
End: 2018-10-04
Payer: COMMERCIAL

## 2018-10-04 ENCOUNTER — APPOINTMENT (OUTPATIENT)
Dept: LAB | Facility: CLINIC | Age: 63
End: 2018-10-04
Payer: COMMERCIAL

## 2018-10-04 ENCOUNTER — TELEPHONE (OUTPATIENT)
Dept: INTERNAL MEDICINE CLINIC | Age: 63
End: 2018-10-04

## 2018-10-04 VITALS
OXYGEN SATURATION: 97 % | HEIGHT: 71 IN | TEMPERATURE: 97.1 F | DIASTOLIC BLOOD PRESSURE: 84 MMHG | HEART RATE: 62 BPM | SYSTOLIC BLOOD PRESSURE: 140 MMHG | BODY MASS INDEX: 38.92 KG/M2 | WEIGHT: 278 LBS

## 2018-10-04 DIAGNOSIS — N52.9 ERECTILE DYSFUNCTION, UNSPECIFIED ERECTILE DYSFUNCTION TYPE: ICD-10-CM

## 2018-10-04 DIAGNOSIS — K85.30 DRUG-INDUCED ACUTE PANCREATITIS, UNSPECIFIED COMPLICATION STATUS: ICD-10-CM

## 2018-10-04 DIAGNOSIS — R10.30 LOWER ABDOMINAL PAIN: Primary | ICD-10-CM

## 2018-10-04 DIAGNOSIS — R39.198 DIFFICULTY URINATING: ICD-10-CM

## 2018-10-04 DIAGNOSIS — E11.9 NON-INSULIN DEPENDENT TYPE 2 DIABETES MELLITUS (HCC): ICD-10-CM

## 2018-10-04 DIAGNOSIS — R10.30 LOWER ABDOMINAL PAIN: ICD-10-CM

## 2018-10-04 LAB
ALP SERPL-CCNC: 56 U/L (ref 46–116)
AMYLASE SERPL-CCNC: 49 IU/L (ref 25–115)
AST SERPL W P-5'-P-CCNC: 13 U/L (ref 5–45)
BASOPHILS # BLD AUTO: 0.03 THOUSANDS/ΜL (ref 0–0.1)
BASOPHILS NFR BLD AUTO: 0 % (ref 0–1)
CALCIUM SERPL-MCNC: 9.3 MG/DL (ref 8.3–10.1)
CHOLEST SERPL-MCNC: 133 MG/DL (ref 50–200)
EOSINOPHIL # BLD AUTO: 0.03 THOUSAND/ΜL (ref 0–0.61)
EOSINOPHIL NFR BLD AUTO: 0 % (ref 0–6)
ERYTHROCYTE [DISTWIDTH] IN BLOOD BY AUTOMATED COUNT: 15.3 % (ref 11.6–15.1)
GLUCOSE P FAST SERPL-MCNC: 115 MG/DL (ref 65–99)
HCT VFR BLD AUTO: 45.7 % (ref 36.5–49.3)
HDLC SERPL-MCNC: 42 MG/DL (ref 40–60)
HGB BLD-MCNC: 15.6 G/DL (ref 12–17)
IMM GRANULOCYTES # BLD AUTO: 0.02 THOUSAND/UL (ref 0–0.2)
IMM GRANULOCYTES NFR BLD AUTO: 0 % (ref 0–2)
LDH SERPL-CCNC: 123 U/L (ref 81–234)
LDLC SERPL CALC-MCNC: 57 MG/DL (ref 0–100)
LIPASE SERPL-CCNC: 333 U/L (ref 73–393)
LYMPHOCYTES # BLD AUTO: 1.24 THOUSANDS/ΜL (ref 0.6–4.47)
LYMPHOCYTES NFR BLD AUTO: 17 % (ref 14–44)
MCH RBC QN AUTO: 31.6 PG (ref 26.8–34.3)
MCHC RBC AUTO-ENTMCNC: 34.1 G/DL (ref 31.4–37.4)
MCV RBC AUTO: 93 FL (ref 82–98)
MONOCYTES # BLD AUTO: 0.41 THOUSAND/ΜL (ref 0.17–1.22)
MONOCYTES NFR BLD AUTO: 6 % (ref 4–12)
NEUTROPHILS # BLD AUTO: 5.58 THOUSANDS/ΜL (ref 1.85–7.62)
NEUTS SEG NFR BLD AUTO: 77 % (ref 43–75)
NONHDLC SERPL-MCNC: 91 MG/DL
NRBC BLD AUTO-RTO: 0 /100 WBCS
PLATELET # BLD AUTO: 169 THOUSANDS/UL (ref 149–390)
PMV BLD AUTO: 9.9 FL (ref 8.9–12.7)
RBC # BLD AUTO: 4.94 MILLION/UL (ref 3.88–5.62)
TRIGL SERPL-MCNC: 170 MG/DL
WBC # BLD AUTO: 7.31 THOUSAND/UL (ref 4.31–10.16)

## 2018-10-04 PROCEDURE — 84450 TRANSFERASE (AST) (SGOT): CPT

## 2018-10-04 PROCEDURE — 84075 ASSAY ALKALINE PHOSPHATASE: CPT

## 2018-10-04 PROCEDURE — 82150 ASSAY OF AMYLASE: CPT

## 2018-10-04 PROCEDURE — 76700 US EXAM ABDOM COMPLETE: CPT

## 2018-10-04 PROCEDURE — 36415 COLL VENOUS BLD VENIPUNCTURE: CPT

## 2018-10-04 PROCEDURE — 99214 OFFICE O/P EST MOD 30 MIN: CPT | Performed by: INTERNAL MEDICINE

## 2018-10-04 PROCEDURE — 83615 LACTATE (LD) (LDH) ENZYME: CPT

## 2018-10-04 PROCEDURE — 80061 LIPID PANEL: CPT

## 2018-10-04 PROCEDURE — 85025 COMPLETE CBC W/AUTO DIFF WBC: CPT

## 2018-10-04 PROCEDURE — 83690 ASSAY OF LIPASE: CPT

## 2018-10-04 PROCEDURE — 82310 ASSAY OF CALCIUM: CPT

## 2018-10-04 PROCEDURE — 82947 ASSAY GLUCOSE BLOOD QUANT: CPT

## 2018-10-04 NOTE — TELEPHONE ENCOUNTER
Spoke with pt, u/s normal and labs have returned to normal, lipase is 333  lfts and ca+ normal  Pt may return to work - letter in chart  I left him a message to this effect

## 2018-10-04 NOTE — TELEPHONE ENCOUNTER
Spoke to the patient and faxed his work letter [phone]  Patient would like a call back from [facility]

## 2018-10-04 NOTE — PROGRESS NOTES
Assessment/Plan:    1  Pancreatitis  He reports lower abdominal pain and because he has had a cholecystectomy, his elevated amylase level, and his current use of Janumet, he will have a STAT US of the abdomen for further evaluation  He was instructed to go on a bland diet for 2-3 days  Kirsten will f/u with the results of the [address]  He will have STAT lipase, LDH, CBC, fasting blood sugar, amyalse, AST, and calcium drawn for immediate evaluation  2  Hypertension  His BP is slightly elevated at 140/84 today at the office  He was advised to monitor his salt and caffeine in his diet to improve his BP  3  Type 2 non-insulin dependent diabetes mellitus  His fasting glucose was elevated at 218 and his hgba1c was elevated at 7 2 on 9/29/18  His Microalbumin/creatinine ratio was elevated at 62 on 9/29/18  He states his blood glucose levels have been ranging in the 160s at home, but he has not been monitoring his food habits closely  His lipase was elevated at 545 on 9/29/18 and that might be a side effect of his Janumet  He will discontinue his Janumet and restart with his Metformin 1000 mg BID  He will also take 4 mg Amaryl daily  He was advised to monitor his diet closely and lose weight as that will help him greatly  4  Erectile Dysfunction  He states his ED has been doing worse and he is concerned about this  He was given a referral to Dr Joe Keller for further evaluation  5  Healthcare Maintenance  He is due for his Prevnar vaccine  He states he had his influenza vaccine administered on 9/24/18 through his work  He will follow up with us in 1 week  If he feels worse, he should go to the ER  Diagnoses and all orders for this visit:    Lower abdominal pain  -     US abdomen complete; Future    Non-insulin dependent type 2 diabetes mellitus (HCC)  -     metFORMIN (GLUCOPHAGE) 1000 MG tablet;  Take 1 tablet (1,000 mg total) by mouth 2 (two) times a day with meals    Drug-induced acute pancreatitis, unspecified complication status  -     Lipid panel; Future    Difficulty urinating  -     Ambulatory referral to Urology; Future    Erectile dysfunction, unspecified erectile dysfunction type  -     Ambulatory referral to Urology; Future          Subjective:      Patient ID: Moisés Grossman is a 61 y o  male  Renea Boxer is a 61year old male who presents today for a 4 week follow up regarding his hypertension, diabetes, and lower abdominal pain  He reports lower abdominal pain andd he has pancreatitis due to his taking Janumet  He notes his ED has been doing worse and he is concerned about this  He denies any congestion, sinus problems, ocular problems, respiratory problems, CV problems, muscular problems, neurological problems, and psychiatric problems  He follows with Dr Rosa Ferguson, ophthalmologist, regularly  He drinks alcohol socially occasionally  The following portions of the patient's history were reviewed and updated as appropriate: allergies, current medications, past family history, past medical history, past social history, past surgical history and problem list     Review of Systems   Constitutional: Negative for appetite change, chills, diaphoresis, fatigue, fever and unexpected weight change  HENT: Negative for congestion, ear discharge, ear pain, facial swelling, hearing loss, mouth sores, nosebleeds and postnasal drip  Eyes: Negative for photophobia, pain, discharge, redness, itching and visual disturbance  Respiratory: Negative for cough, choking, chest tightness, shortness of breath, wheezing and stridor  Cardiovascular: Positive for leg swelling  Negative for chest pain and palpitations  Gastrointestinal: Positive for abdominal distention and abdominal pain (lower abdominal discomfort)  Negative for anal bleeding, blood in stool, constipation, diarrhea, nausea, rectal pain and vomiting  Genitourinary: Positive for difficulty urinating (when he gets up)   Negative for flank pain, frequency, hematuria and urgency  Musculoskeletal: Negative for arthralgias, back pain, gait problem, joint swelling, myalgias, neck pain and neck stiffness  Skin: Positive for color change  Negative for pallor, rash and wound  Neurological: Negative for dizziness, tremors, seizures, syncope, speech difficulty, weakness, light-headedness, numbness and headaches  Psychiatric/Behavioral: Negative  Objective:      /84 (BP Location: Left arm, Patient Position: Sitting, Cuff Size: Adult)   Pulse 62   Temp (!) 97 1 °F (36 2 °C) (Tympanic)   Ht 5' 10 87" (1 8 m)   Wt 126 kg (278 lb)   SpO2 97% Comment: ra  BMI 38 92 kg/m²          Physical Exam   Constitutional: He is oriented to person, place, and time  He appears well-developed and well-nourished  No distress  obese   HENT:   Head: Normocephalic and atraumatic  Mouth/Throat: No oropharyngeal exudate  Eyes: Conjunctivae and EOM are normal  Right eye exhibits no discharge  Left eye exhibits no discharge  No scleral icterus  Neck: Normal range of motion  Neck supple  Cardiovascular: Normal rate, regular rhythm and normal heart sounds  Exam reveals no gallop  No murmur heard  Pulmonary/Chest: No respiratory distress  He has no wheezes  He has no rales  He exhibits no tenderness  Abdominal: Soft  Bowel sounds are normal  He exhibits no distension  There is no tenderness  There is no rebound  Ventral hernia   Musculoskeletal: He exhibits edema (trace)  He exhibits no tenderness or deformity  Neurological: He is alert and oriented to person, place, and time  He has normal reflexes  Skin: Skin is warm and dry  No rash noted  He is not diaphoretic  There is erythema  No pallor  brawny changes of venous insuf  Psychiatric: He has a normal mood and affect   His behavior is normal          Scribe Signature and Attestation:  By signing my name below, Regla DELONG attest that this documentation has been prepared under the direction and in the presence of Dr Michelle Sierra MD  Electronically signed: Rojelio Pham  10/4/18    Provider Attestation:  I, Dr Michelle Sierra, personally performed the services described in this documentation  All medical record entries made by the govindibe were at my direction and in my presence  I have reviewed the chart and discharge instructions (if applicable) and agree that the record reflects my personal performance and is accurate and complete  Dr Michelle Sierra MD  10/4/18

## 2018-10-04 NOTE — PATIENT INSTRUCTIONS
1  He will have STAT blood work done and STAT US of the abdomen to evaluate his pancreatitis  2  He was advised to monitor his salt and caffeine in his diet to improve his BP    3  He will discontinue his Janumet and restart with his Metformin 1000 mg BID  He will also take 4 mg Amaryl daily  He was advised to monitor his diet closely and lose weight as that will help him greatly  4  He was instructed to go on a liquid diet with occasional solid bland food for 2-3 days  However, if any pain develops with eating, he should continue with liquids  5  He was given a referral to Dr Jitendra Fox for further evaluation including impotence and urinary dysfunction  6  He is due for his Prevnar vaccine  7  He states he had his influenza vaccine administered on 9/24/18 through his work  8  He will follow up with us in 1 week  9  If he feels worse, he should go to the ER

## 2018-10-04 NOTE — TELEPHONE ENCOUNTER
Patient called requesting a doctor's note to return to work tomorrow, latest Monday  Would like this before the weekend  Patient was also looking for lab results  We informed him that they are still in process

## 2018-10-04 NOTE — TELEPHONE ENCOUNTER
Spoke with patient and gave results, he will hold janumet and stay on bland diet and f/u with Dr Cindi Ball next week

## 2018-10-09 ENCOUNTER — TELEPHONE (OUTPATIENT)
Dept: INTERNAL MEDICINE CLINIC | Age: 63
End: 2018-10-09

## 2018-10-09 DIAGNOSIS — K85.30 DRUG-INDUCED ACUTE PANCREATITIS WITHOUT INFECTION OR NECROSIS: ICD-10-CM

## 2018-10-09 DIAGNOSIS — R16.1 SPLENOMEGALY: Primary | ICD-10-CM

## 2018-10-09 DIAGNOSIS — E11.8 TYPE 2 DIABETES MELLITUS WITH COMPLICATION, WITHOUT LONG-TERM CURRENT USE OF INSULIN (HCC): ICD-10-CM

## 2018-10-09 NOTE — TELEPHONE ENCOUNTER
Patient returned your phone call  He is doing well but wondering if he needs to have lab work done before his next visit on next Wednesday?

## 2018-10-09 NOTE — TELEPHONE ENCOUNTER
Yes, pls call him and inform that he needs to have more bloodwork completed  I have placed the orders

## 2018-10-13 ENCOUNTER — APPOINTMENT (OUTPATIENT)
Dept: LAB | Facility: CLINIC | Age: 63
End: 2018-10-13
Payer: COMMERCIAL

## 2018-10-13 DIAGNOSIS — K85.30 DRUG-INDUCED ACUTE PANCREATITIS WITHOUT INFECTION OR NECROSIS: ICD-10-CM

## 2018-10-13 DIAGNOSIS — R16.1 SPLENOMEGALY: ICD-10-CM

## 2018-10-13 DIAGNOSIS — E11.8 TYPE 2 DIABETES MELLITUS WITH COMPLICATION, WITHOUT LONG-TERM CURRENT USE OF INSULIN (HCC): ICD-10-CM

## 2018-10-13 LAB
ALT SERPL W P-5'-P-CCNC: 35 U/L (ref 12–78)
AMYLASE SERPL-CCNC: 35 IU/L (ref 25–115)
BASOPHILS # BLD AUTO: 0.02 THOUSANDS/ΜL (ref 0–0.1)
BASOPHILS NFR BLD AUTO: 0 % (ref 0–1)
EOSINOPHIL # BLD AUTO: 0.06 THOUSAND/ΜL (ref 0–0.61)
EOSINOPHIL NFR BLD AUTO: 1 % (ref 0–6)
ERYTHROCYTE [DISTWIDTH] IN BLOOD BY AUTOMATED COUNT: 14.8 % (ref 11.6–15.1)
GLUCOSE P FAST SERPL-MCNC: 202 MG/DL (ref 65–99)
HCT VFR BLD AUTO: 46.2 % (ref 36.5–49.3)
HGB BLD-MCNC: 15.8 G/DL (ref 12–17)
IMM GRANULOCYTES # BLD AUTO: 0.03 THOUSAND/UL (ref 0–0.2)
IMM GRANULOCYTES NFR BLD AUTO: 1 % (ref 0–2)
LIPASE SERPL-CCNC: 222 U/L (ref 73–393)
LYMPHOCYTES # BLD AUTO: 1.12 THOUSANDS/ΜL (ref 0.6–4.47)
LYMPHOCYTES NFR BLD AUTO: 19 % (ref 14–44)
MCH RBC QN AUTO: 31.6 PG (ref 26.8–34.3)
MCHC RBC AUTO-ENTMCNC: 34.2 G/DL (ref 31.4–37.4)
MCV RBC AUTO: 92 FL (ref 82–98)
MONOCYTES # BLD AUTO: 0.42 THOUSAND/ΜL (ref 0.17–1.22)
MONOCYTES NFR BLD AUTO: 7 % (ref 4–12)
NEUTROPHILS # BLD AUTO: 4.37 THOUSANDS/ΜL (ref 1.85–7.62)
NEUTS SEG NFR BLD AUTO: 72 % (ref 43–75)
NRBC BLD AUTO-RTO: 0 /100 WBCS
PLATELET # BLD AUTO: 152 THOUSANDS/UL (ref 149–390)
PMV BLD AUTO: 10.1 FL (ref 8.9–12.7)
RBC # BLD AUTO: 5 MILLION/UL (ref 3.88–5.62)
WBC # BLD AUTO: 6.02 THOUSAND/UL (ref 4.31–10.16)

## 2018-10-13 PROCEDURE — 82150 ASSAY OF AMYLASE: CPT

## 2018-10-13 PROCEDURE — 82947 ASSAY GLUCOSE BLOOD QUANT: CPT

## 2018-10-13 PROCEDURE — 85025 COMPLETE CBC W/AUTO DIFF WBC: CPT

## 2018-10-13 PROCEDURE — 84460 ALANINE AMINO (ALT) (SGPT): CPT

## 2018-10-13 PROCEDURE — 83690 ASSAY OF LIPASE: CPT

## 2018-10-13 PROCEDURE — 36415 COLL VENOUS BLD VENIPUNCTURE: CPT

## 2018-10-17 ENCOUNTER — OFFICE VISIT (OUTPATIENT)
Dept: INTERNAL MEDICINE CLINIC | Age: 63
End: 2018-10-17
Payer: COMMERCIAL

## 2018-10-17 VITALS
BODY MASS INDEX: 38.72 KG/M2 | DIASTOLIC BLOOD PRESSURE: 84 MMHG | TEMPERATURE: 98.1 F | SYSTOLIC BLOOD PRESSURE: 158 MMHG | WEIGHT: 276.6 LBS | OXYGEN SATURATION: 98 % | HEART RATE: 57 BPM | HEIGHT: 71 IN

## 2018-10-17 DIAGNOSIS — R16.0 HEPATOMEGALY: ICD-10-CM

## 2018-10-17 DIAGNOSIS — E11.9 NON-INSULIN DEPENDENT TYPE 2 DIABETES MELLITUS (HCC): ICD-10-CM

## 2018-10-17 DIAGNOSIS — E78.5 HYPERLIPIDEMIA, UNSPECIFIED HYPERLIPIDEMIA TYPE: ICD-10-CM

## 2018-10-17 DIAGNOSIS — I10 HYPERTENSION, UNSPECIFIED TYPE: ICD-10-CM

## 2018-10-17 DIAGNOSIS — E13.9 DIABETES 1.5, MANAGED AS TYPE 2 (HCC): ICD-10-CM

## 2018-10-17 DIAGNOSIS — I10 BENIGN HYPERTENSION: ICD-10-CM

## 2018-10-17 DIAGNOSIS — R16.1 SPLENOMEGALY: ICD-10-CM

## 2018-10-17 DIAGNOSIS — K85.30 DRUG-INDUCED ACUTE PANCREATITIS WITHOUT INFECTION OR NECROSIS: Primary | ICD-10-CM

## 2018-10-17 PROCEDURE — 1036F TOBACCO NON-USER: CPT | Performed by: INTERNAL MEDICINE

## 2018-10-17 PROCEDURE — 3008F BODY MASS INDEX DOCD: CPT | Performed by: INTERNAL MEDICINE

## 2018-10-17 PROCEDURE — 99214 OFFICE O/P EST MOD 30 MIN: CPT | Performed by: INTERNAL MEDICINE

## 2018-10-17 RX ORDER — LISINOPRIL AND HYDROCHLOROTHIAZIDE 25; 20 MG/1; MG/1
1 TABLET ORAL DAILY
Qty: 90 TABLET | Refills: 1 | Status: SHIPPED | OUTPATIENT
Start: 2018-10-17 | End: 2019-02-28 | Stop reason: SDUPTHER

## 2018-10-17 RX ORDER — LISINOPRIL 20 MG/1
20 TABLET ORAL DAILY
Qty: 90 TABLET | Refills: 1 | Status: SHIPPED | OUTPATIENT
Start: 2018-10-17 | End: 2019-02-25 | Stop reason: SDUPTHER

## 2018-10-17 RX ORDER — PREDNISOLONE ACETATE 10 MG/ML
1 SUSPENSION/ DROPS OPHTHALMIC EVERY OTHER DAY
COMMUNITY
Start: 2018-10-15

## 2018-10-17 RX ORDER — GLIMEPIRIDE 2 MG/1
TABLET ORAL
Qty: 90 TABLET | Refills: 3 | Status: SHIPPED | OUTPATIENT
Start: 2018-10-17 | End: 2018-10-17 | Stop reason: SDUPTHER

## 2018-10-17 RX ORDER — AMLODIPINE BESYLATE 5 MG/1
5 TABLET ORAL DAILY
Qty: 30 TABLET | Refills: 0 | Status: SHIPPED | OUTPATIENT
Start: 2018-10-17 | End: 2018-10-17 | Stop reason: SDUPTHER

## 2018-10-17 NOTE — PATIENT INSTRUCTIONS
1  He was instructed to not drink any alcohol  2  He will also complete a CT scan of the abdomen to rule out any pancreatic tumors and to assess his hepatomegaly and his splenomegaly  3  He will have an Alpha Fetoprotein drawn at his earliest convenience  4  He was instructed to take 2 Amaryl tablets in the morning and 4 tablets at night  5  He will also take his Metformin 2000 mg 2 hours after dinner  6  He was given a referral to Dr Brice and will see him at his earliest convenience  7  He will have a CMP drawn prior to his next appointment  8  He was instructed to lose 10% of his body weight and eliminate any fatty foods in his diet  He needs to monitor his diet carefully as that will improve his hepatomegaly and offer better control to his diabetes  9  He was instructed to use Miralax daily to relieve his constipation  10  He will follow up with us in 4 weeks or as needed

## 2018-10-17 NOTE — PROGRESS NOTES
Assessment/Plan:    1  Pancreatitis secondary to adverse drug effects  He has Diabetes and was given Janumet which caused pancreatitis  This has now resolved and his amylase, Lipase, ALT, and CBC are all normal at 10/13/18  He was instructed to not drink any alcohol  He will also complete a CT scan to rule out any pancreatic tumors  He will have an Alpha Fetoprotein drawn at his earliest convenience  2  Type 2 Diabetes Mellitus  He is not able to use Janumet as that causes adverse side effects  We spoke to Dr Rommel Dial, endocrinologist, for consultation of starting him on basal insulin  Instead, he instructed the patient to take 2 Amaryl tablets in the morning and 4 tablets at night  He will also take his Metformin 2000 mg 2 hours after dinner  He was given a referral to Dr Rommel Dial and will see him at his earliest convenience  He will have a CMP drawn prior to his next appointment  We will advise him to speak to his ophthalmologist about changing his eye drops as that may be exacerbating his blood glucose levels  3  Hepatomegaly with fatty infiltration  The US of his abdomen showed hepatomegaly with fatty infiltration and splenomegaly  He was instructed to lose 10% of his body weight and eliminate any fatty foods in his diet  He needs to monitor his diet carefully as that will improve his hepatomegaly and offer better control to his diabetes  The splenomegaly will be evaluated with the CT scan as well  4  HTN  His BP today at the office is elevated at 158/84  He has not been taking his BP medications on time, but he said he will start doing that  He will take his Lisinopril in the morning and his HCTZ at night  5  Constipation  He reports he has some constipation  He was instructed to use Miralax daily to relieve his constipation  6  Healthcare Maintenance  We will discuss his Prevnar vaccine at his next visit  He will follow up with us in 4 weeks or as needed        Diagnoses and all orders for this visit:    Drug-induced acute pancreatitis without infection or necrosis  -     CT abdomen pelvis wo contrast; Future  -     AFP tumor marker; Future  -     Comprehensive metabolic panel; Future    Benign hypertension  -     lisinopril (ZESTRIL) 20 mg tablet; Take 1 tablet (20 mg total) by mouth daily  -     lisinopril-hydrochlorothiazide (PRINZIDE,ZESTORETIC) 20-25 MG per tablet; Take 1 tablet by mouth daily    Diabetes 1 5, managed as type 2 (HCC)  -     glimepiride (AMARYL) 2 mg tablet; Take 2 mg with breakfast and 4 mg in the evening daily  Hypertension, unspecified type  -     amLODIPine (NORVASC) 5 mg tablet; Take 1 tablet (5 mg total) by mouth daily    Hyperlipidemia, unspecified hyperlipidemia type    Non-insulin dependent type 2 diabetes mellitus ([facility] [address] )  -     metFORMIN (GLUCOPHAGE) 1000 MG tablet; Take 2 tablets (2,000 mg total) by mouth daily after dinner  -     Ambulatory referral to Endocrinology; Future  -     Comprehensive metabolic panel; Future    Hepatomegaly  -     CT abdomen pelvis wo contrast; Future  -     Comprehensive metabolic panel; Future    Splenomegaly  -     CT abdomen pelvis wo contrast; Future  -     Comprehensive metabolic panel; Future    Other orders  -     prednisoLONE acetate (PRED FORTE) 1 % ophthalmic suspension;           Subjective:      Patient ID: Elvi Luther is a 61 y o  male  Dannielle Oakley is a 61year old male who presents today for a 1 week follow up regarding his pancreatitis and diabetes  His pancreatitis has improved  He denies any significant complaints today  Because he is driving, he states he does not take his BP medications at the morning as he will need the bathroom throughout the day  He complains of constipation occasionally  He reports abdominal distension and abdominal pain especially in his left upper quadrant  He states he has left hip and knee pain             The following portions of the patient's history were reviewed and updated as appropriate: allergies, current medications, past family history, past medical history, past social history, past surgical history and problem list     Review of Systems   Constitutional: Negative for activity change, appetite change, chills, diaphoresis, fatigue, fever and unexpected weight change  HENT: Negative for congestion, ear discharge, ear pain, facial swelling, hearing loss, mouth sores, nosebleeds, postnasal drip, rhinorrhea, sinus pain, sinus pressure and sneezing  Eyes: Negative  Negative for photophobia, pain, discharge, redness, itching and visual disturbance  Respiratory: Negative for apnea, cough, choking, chest tightness, shortness of breath, wheezing and stridor  Cardiovascular: Negative for chest pain, palpitations and leg swelling  Gastrointestinal: Positive for abdominal distention, abdominal pain (left upper quad ) and constipation  Negative for anal bleeding, blood in stool, diarrhea, nausea, rectal pain and vomiting  Genitourinary: Negative for difficulty urinating, dysuria, hematuria, testicular pain and urgency  Musculoskeletal: Positive for arthralgias (hips and left knee)  Negative for back pain, gait problem, joint swelling, myalgias, neck pain and neck stiffness  Skin: Negative  Neurological: Negative for dizziness, tremors, seizures, syncope, facial asymmetry, speech difficulty, weakness, light-headedness and numbness  Hematological: Negative for adenopathy  Psychiatric/Behavioral: Negative  Objective:      /84 (BP Location: Left arm, Patient Position: Sitting, Cuff Size: Standard)   Pulse 57   Temp 98 1 °F (36 7 °C) (Tympanic)   Ht 5' 11 02" (1 804 m)   Wt 125 kg (276 lb 9 6 oz)   SpO2 98%   BMI 38 55 kg/m²          Physical Exam   Constitutional: He is oriented to person, place, and time  He appears well-developed and well-nourished  He appears distressed  obese   HENT:   Head: Normocephalic and atraumatic     Eyes: Conjunctivae and EOM are normal  Right eye exhibits no discharge  Left eye exhibits no discharge  No scleral icterus  Neck: Normal range of motion  Neck supple  No thyromegaly present  Cardiovascular: Normal rate, regular rhythm and normal heart sounds  Pulmonary/Chest: Effort normal and breath sounds normal  No respiratory distress  He has no wheezes  He has no rales  Abdominal: Soft  Bowel sounds are normal  He exhibits distension  He exhibits no mass  There is tenderness (luq)  There is no rebound and no guarding  Musculoskeletal: Normal range of motion  He exhibits edema (trace)  He exhibits no tenderness or deformity  Neurological: He is alert and oriented to person, place, and time  He has normal reflexes  Skin: Skin is warm and dry  No rash noted  He is not diaphoretic  No erythema  No pallor  Psychiatric: He has a normal mood and affect  His behavior is normal  Judgment and thought content normal          Scribe Signature and Attestation:  By signing my name below, Diamante Kranthi attest that this documentation has been prepared under the direction and in the presence of Dr Chanda Allen MD  Electronically signed: Rojelio Valerio  10/17/18    Provider Attestation:  I, Dr Chanda Allen, personally performed the services described in this documentation  All medical record entries made by the govindibyumiko were at my direction and in my presence  I have reviewed the chart and discharge instructions (if applicable) and agree that the record reflects my personal performance and is accurate and complete  Dr Chanda Allen MD  10/17/18

## 2018-10-18 RX ORDER — AMLODIPINE BESYLATE 5 MG/1
5 TABLET ORAL DAILY
Qty: 90 TABLET | Refills: 1 | Status: SHIPPED | OUTPATIENT
Start: 2018-10-18 | End: 2019-02-28 | Stop reason: SDUPTHER

## 2018-10-18 RX ORDER — GLIMEPIRIDE 2 MG/1
TABLET ORAL
Qty: 270 TABLET | Refills: 1 | Status: SHIPPED | OUTPATIENT
Start: 2018-10-18 | End: 2019-02-28 | Stop reason: SDUPTHER

## 2018-12-08 ENCOUNTER — APPOINTMENT (OUTPATIENT)
Dept: LAB | Facility: CLINIC | Age: 63
End: 2018-12-08
Payer: COMMERCIAL

## 2018-12-08 ENCOUNTER — TRANSCRIBE ORDERS (OUTPATIENT)
Dept: LAB | Facility: CLINIC | Age: 63
End: 2018-12-08

## 2018-12-08 DIAGNOSIS — R16.1 SPLENOMEGALY: ICD-10-CM

## 2018-12-08 DIAGNOSIS — E11.9 NON-INSULIN DEPENDENT TYPE 2 DIABETES MELLITUS (HCC): ICD-10-CM

## 2018-12-08 DIAGNOSIS — K85.30 DRUG-INDUCED ACUTE PANCREATITIS WITHOUT INFECTION OR NECROSIS: ICD-10-CM

## 2018-12-08 DIAGNOSIS — R16.0 HEPATOMEGALY: ICD-10-CM

## 2018-12-08 LAB
AFP-TM SERPL-MCNC: 3.4 NG/ML (ref 0.5–8)
ALBUMIN SERPL BCP-MCNC: 4.1 G/DL (ref 3.5–5)
ALP SERPL-CCNC: 61 U/L (ref 46–116)
ALT SERPL W P-5'-P-CCNC: 42 U/L (ref 12–78)
ANION GAP SERPL CALCULATED.3IONS-SCNC: 8 MMOL/L (ref 4–13)
AST SERPL W P-5'-P-CCNC: 36 U/L (ref 5–45)
BILIRUB SERPL-MCNC: 1.2 MG/DL (ref 0.2–1)
BUN SERPL-MCNC: 22 MG/DL (ref 5–25)
CALCIUM SERPL-MCNC: 9.6 MG/DL (ref 8.3–10.1)
CHLORIDE SERPL-SCNC: 101 MMOL/L (ref 100–108)
CO2 SERPL-SCNC: 30 MMOL/L (ref 21–32)
CREAT SERPL-MCNC: 1.15 MG/DL (ref 0.6–1.3)
GFR SERPL CREATININE-BSD FRML MDRD: 67 ML/MIN/1.73SQ M
GLUCOSE P FAST SERPL-MCNC: 151 MG/DL (ref 65–99)
POTASSIUM SERPL-SCNC: 4.5 MMOL/L (ref 3.5–5.3)
PROT SERPL-MCNC: 7.4 G/DL (ref 6.4–8.2)
SODIUM SERPL-SCNC: 139 MMOL/L (ref 136–145)

## 2018-12-08 PROCEDURE — 36415 COLL VENOUS BLD VENIPUNCTURE: CPT

## 2018-12-08 PROCEDURE — 80053 COMPREHEN METABOLIC PANEL: CPT

## 2018-12-08 PROCEDURE — 82105 ALPHA-FETOPROTEIN SERUM: CPT

## 2018-12-18 ENCOUNTER — OFFICE VISIT (OUTPATIENT)
Dept: INTERNAL MEDICINE CLINIC | Age: 63
End: 2018-12-18
Payer: COMMERCIAL

## 2018-12-18 VITALS
BODY MASS INDEX: 37.8 KG/M2 | TEMPERATURE: 97.2 F | WEIGHT: 270 LBS | SYSTOLIC BLOOD PRESSURE: 134 MMHG | HEART RATE: 57 BPM | DIASTOLIC BLOOD PRESSURE: 76 MMHG | OXYGEN SATURATION: 98 % | HEIGHT: 71 IN

## 2018-12-18 DIAGNOSIS — E11.9 NON-INSULIN DEPENDENT TYPE 2 DIABETES MELLITUS (HCC): ICD-10-CM

## 2018-12-18 DIAGNOSIS — I10 MODERATE HYPERTENSION CONTROL: ICD-10-CM

## 2018-12-18 DIAGNOSIS — I25.10 CORONARY ARTERY DISEASE INVOLVING NATIVE HEART WITHOUT ANGINA PECTORIS, UNSPECIFIED VESSEL OR LESION TYPE: ICD-10-CM

## 2018-12-18 DIAGNOSIS — K59.04 CHRONIC IDIOPATHIC CONSTIPATION: ICD-10-CM

## 2018-12-18 DIAGNOSIS — L21.9 SEBORRHEA: ICD-10-CM

## 2018-12-18 DIAGNOSIS — R39.198 SLOW URINARY STREAM: Primary | ICD-10-CM

## 2018-12-18 DIAGNOSIS — I10 ESSENTIAL HYPERTENSION: ICD-10-CM

## 2018-12-18 DIAGNOSIS — Z87.19 HISTORY OF PANCREATITIS: ICD-10-CM

## 2018-12-18 PROCEDURE — 99214 OFFICE O/P EST MOD 30 MIN: CPT | Performed by: INTERNAL MEDICINE

## 2018-12-18 RX ORDER — KETOCONAZOLE 20 MG/ML
1 SHAMPOO TOPICAL 2 TIMES WEEKLY
Qty: 120 ML | Refills: 0 | Status: SHIPPED | OUTPATIENT
Start: 2018-12-20 | End: 2020-12-10 | Stop reason: ALTCHOICE

## 2018-12-18 RX ORDER — CLOPIDOGREL BISULFATE 75 MG/1
75 TABLET ORAL DAILY
Qty: 90 TABLET | Refills: 1 | Status: SHIPPED | OUTPATIENT
Start: 2018-12-18 | End: 2019-04-01 | Stop reason: SDUPTHER

## 2018-12-18 NOTE — PROGRESS NOTES
Diabetic Foot Exam    Patient's shoes and socks removed  Right Foot/Ankle   Right Foot Inspection  Skin Exam: skin normal and skin intact no dry skin, no warmth, no callus, no erythema, no maceration, no abnormal color, no pre-ulcer, no ulcer and no callus                          Toe Exam: ROM and strength within normal limits  Sensory       Monofilament testing: intact      Left Foot/Ankle  Left Foot Inspection  Skin Exam: skin normal and skin intactno dry skin, no warmth, no erythema, no maceration, normal color, no pre-ulcer, no ulcer and no callus                                         Sensory       Monofilament: intact

## 2018-12-18 NOTE — PROGRESS NOTES
Assessment/Plan:    1  Slow urinary stream  He reports a slower start in his stream and had a ISAIAS completed in the past few months  He will have a PSA drawn prior to his next appointment  2  Type 2 Diabetes Mellitus  He reports that his blood glucose was 167 this morning  His fasting blood glucose was 151 on 12/8/18  He is currently taking Metformin 1000 mg BID and Amaryl 2 mg in the morning and 4 mg in the evening daily  He was advised to minimize processed foods and increase natural foods in his diet  He was encouraged to minimize his white starches, carbohydrates, and concentrated sugars from his diet  Losing weight will improve his sugar levels  3  Hypertension  His BP was stable at 134/76 today at the office  He will continue on his current medications as he is tolerating those without significant side effects  He will have a CMP drawn prior to his next appointment  4  Status post acute pancreatitis  His AFP tumor marker was normal on 3 4 on 12/8/18  He will have an amylase and lipase level drawn prior to his next appointment  5  Seborrhea on scalp  He complains of itchiness on his scalp and upon physical exam, seborrhea on scalp was noticed  He was instructed to use ketoconazole shampoo once or twice weekly to improve this  6  Chronic venous insufficiency with bilateral edema  He uses compression stockings regularly  He was advised to elevate his legs and improve his weight loss as that will help  7  Constipation  He states he takes maalox occasionally for constipation  He was instructed to use Miralax regularly for this  8  Healthcare Maintenance  He will follow up with us in 3 months or as needed  Greater than 50% of the 45 minute evaluation was a face-to-face discussion regarding the pathophysiology of his current symptoms and further plan, as well as counseling, educating, and coordinating the patient's care          Diagnoses and all orders for this visit:    Slow urinary stream  - PSA Total, Diagnostic; Future    Coronary artery disease involving native heart without angina pectoris, unspecified vessel or lesion type  -     clopidogrel (PLAVIX) 75 mg tablet; Take 1 tablet (75 mg total) by mouth daily    Moderate hypertension control  -     Comprehensive metabolic panel; Future    Seborrhea  -     ketoconazole (NIZORAL) 2 % shampoo; Apply 1 application topically 2 (two) times a week    History of pancreatitis  -     Comprehensive metabolic panel; Future  -     Amylase; Future  -     Lipase; Future    Essential hypertension    Non-insulin dependent type 2 diabetes mellitus ([facility] [address] )  -     Comprehensive metabolic panel; Future    Chronic idiopathic constipation          Subjective:      Patient ID: Tangela Pringle is a 61 y o  male  Romana Fly is a 61year old male who presents today for an 8 week follow up regarding his Type 2 Diabetes mellitus, history of pancreatitis, and hypertension  He complains of an itchy scalp today  Otherwise, he has no acute complaints  He has no new ocular problems, but has uvites for many years without any diabetic changes  He decided to not complete a CT scan of the abdomen as of yet and would like to wait about 13 months until he is on Medicare due to affordability  He reports he has constipation and takes Maalox occasionally for this  He reports a slower start in his stream and had a ISAIAS completed in the past few months  He states he has back pain due to problems in his fifth vertebrae  He states he has lost some weight  This morning his sugar was 167  He has very strong family history of cardiac disease with his father passing away at 40 and brother passing away at 61  He had stents placed in his heart a few years ago             The following portions of the patient's history were reviewed and updated as appropriate: allergies, current medications, past family history, past medical history, past social history, past surgical history and problem list     Review of Systems   Constitutional: Positive for unexpected weight change (loss of several lbs)  Negative for chills, diaphoresis, fatigue and fever  HENT: Negative for congestion, ear pain, facial swelling, hearing loss, mouth sores, nosebleeds, rhinorrhea and sinus pain  Eyes: Positive for visual disturbance (uvites  for many years, no diabetic changes)  Negative for pain, discharge and itching  Respiratory: Negative for cough, choking, chest tightness, shortness of breath and wheezing  Cardiovascular: Negative for chest pain, palpitations and leg swelling  Gastrointestinal: Positive for constipation  Negative for abdominal distention, abdominal pain, anal bleeding, blood in stool, diarrhea, nausea, rectal pain and vomiting  Genitourinary: Positive for frequency  Negative for difficulty urinating, dysuria, scrotal swelling, testicular pain and urgency  Slower start to urine in am to check psa   Musculoskeletal: Positive for back pain (fifth vertibre)  Negative for arthralgias, gait problem, joint swelling, myalgias, neck pain and neck stiffness  Skin: Negative  Neurological: Negative for dizziness, tremors, seizures, syncope, speech difficulty, weakness, light-headedness, numbness and headaches  Psychiatric/Behavioral: Negative  Objective:      /76 (BP Location: Left arm, Patient Position: Sitting, Cuff Size: Large)   Pulse 57   Temp (!) 97 2 °F (36 2 °C) (Tympanic)   Ht 5' 10 98" (1 803 m)   Wt 122 kg (270 lb)   SpO2 98%   BMI 37 67 kg/m²          Physical Exam   Constitutional: He appears well-developed and well-nourished  No distress  HENT:   Head: Normocephalic and atraumatic  Mouth/Throat: No oropharyngeal exudate  Eyes: Conjunctivae and EOM are normal  Right eye exhibits no discharge  Left eye exhibits no discharge  No scleral icterus  Neck: Normal range of motion  Neck supple  No thyromegaly present  Cardiovascular: Normal rate      Murmur heard   Pulmonary/Chest: Effort normal and breath sounds normal  No respiratory distress  He has no wheezes  He has no rales  Abdominal: Soft  Bowel sounds are normal  He exhibits distension  There is tenderness  There is no rebound and no guarding  Musculoskeletal: He exhibits edema  brawny changes of  Chronic venous insuf  With pigmentation no ulcerations   Skin: Rash noted  He is not diaphoretic  There is erythema  Chronic venous insuf  Mild seborea on scalp   Psychiatric: He has a normal mood and affect  His behavior is normal  Judgment and thought content normal          Scribe Signature and Attestation:  By signing my name below, Junior Burkitt attest that this documentation has been prepared under the direction and in the presence of Dr Melecio Brownlee MD  Electronically signed: Eddie Barton Asa  12/18/18    Provider Attestation:  I, Dr Melecio Brownlee, personally performed the services described in this documentation  All medical record entries made by the eddie were at my direction and in my presence  I have reviewed the chart and discharge instructions (if applicable) and agree that the record reflects my personal performance and is accurate and complete  Dr Melecio Brownlee MD  12/18/18

## 2018-12-18 NOTE — PATIENT INSTRUCTIONS
1  He will have a PSA drawn prior to his next appointment  2  He was advised to minimize processed foods and increase natural foods in his diet  He was encouraged to minimize his white starches, carbohydrates, and concentrated sugars from his diet  Losing weight will improve his sugar levels  3  He will have a CMP drawn prior to his next appointment  4  He will have an amylase and lipase level drawn prior to his next appointment  5  He was instructed to use ketoconazole shampoo once or twice weekly to improve his scalp  6  He was advised to elevate his legs and improve his weight loss as that will help  7  He was instructed to use Miralax regularly for his constipation  8  He will follow up with us in 3 months or as needed

## 2018-12-20 ENCOUNTER — TELEPHONE (OUTPATIENT)
Dept: CARDIOLOGY CLINIC | Facility: CLINIC | Age: 63
End: 2018-12-20

## 2018-12-20 ENCOUNTER — TELEPHONE (OUTPATIENT)
Dept: INTERNAL MEDICINE CLINIC | Age: 63
End: 2018-12-20

## 2018-12-20 NOTE — TELEPHONE ENCOUNTER
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

## 2018-12-21 NOTE — TELEPHONE ENCOUNTER
Okay, if there is anything for me to sign, send to me    I do not see any letters for me to sign on epic

## 2018-12-21 NOTE — TELEPHONE ENCOUNTER
Patient called back and just wanted to make sure that the Hemoglobin A1C that he had done back in September was faxed to the [facility] place  I told him that I did fax it to them the being of this week when he called  He will call them to make sure that they did receive it because he hasn't heard back from them on this  He thinks they are waiting for the cardiologist to get back to them    If they didn't receive the blood work from us patient will come back to pick it up here at the bath office

## 2019-02-22 DIAGNOSIS — I10 BENIGN HYPERTENSION: ICD-10-CM

## 2019-02-25 RX ORDER — LISINOPRIL 20 MG/1
20 TABLET ORAL DAILY
Qty: 90 TABLET | Refills: 1 | Status: SHIPPED | OUTPATIENT
Start: 2019-02-25 | End: 2019-02-28

## 2019-02-28 DIAGNOSIS — E11.9 NON-INSULIN DEPENDENT TYPE 2 DIABETES MELLITUS (HCC): ICD-10-CM

## 2019-02-28 DIAGNOSIS — I10 BENIGN HYPERTENSION: ICD-10-CM

## 2019-02-28 DIAGNOSIS — E13.9 DIABETES 1.5, MANAGED AS TYPE 2 (HCC): ICD-10-CM

## 2019-02-28 DIAGNOSIS — I10 HYPERTENSION, UNSPECIFIED TYPE: ICD-10-CM

## 2019-02-28 RX ORDER — AMLODIPINE BESYLATE 5 MG/1
5 TABLET ORAL DAILY
Qty: 90 TABLET | Refills: 1 | Status: SHIPPED | OUTPATIENT
Start: 2019-02-28 | End: 2019-10-21 | Stop reason: SDUPTHER

## 2019-02-28 RX ORDER — GLIMEPIRIDE 2 MG/1
TABLET ORAL
Qty: 270 TABLET | Refills: 1 | Status: SHIPPED | OUTPATIENT
Start: 2019-02-28 | End: 2019-11-29 | Stop reason: SDUPTHER

## 2019-02-28 RX ORDER — LISINOPRIL AND HYDROCHLOROTHIAZIDE 25; 20 MG/1; MG/1
1 TABLET ORAL DAILY
Qty: 90 TABLET | Refills: 1 | Status: SHIPPED | OUTPATIENT
Start: 2019-02-28 | End: 2019-07-18

## 2019-03-16 ENCOUNTER — APPOINTMENT (OUTPATIENT)
Dept: LAB | Facility: CLINIC | Age: 64
End: 2019-03-16
Payer: COMMERCIAL

## 2019-03-16 DIAGNOSIS — R39.198 SLOW URINARY STREAM: ICD-10-CM

## 2019-03-16 DIAGNOSIS — I10 MODERATE HYPERTENSION CONTROL: ICD-10-CM

## 2019-03-16 DIAGNOSIS — E11.9 NON-INSULIN DEPENDENT TYPE 2 DIABETES MELLITUS (HCC): ICD-10-CM

## 2019-03-16 DIAGNOSIS — Z87.19 HISTORY OF PANCREATITIS: ICD-10-CM

## 2019-03-16 LAB
ALBUMIN SERPL BCP-MCNC: 3.7 G/DL (ref 3.5–5)
ALP SERPL-CCNC: 62 U/L (ref 46–116)
ALT SERPL W P-5'-P-CCNC: 33 U/L (ref 12–78)
AMYLASE SERPL-CCNC: 30 IU/L (ref 25–115)
ANION GAP SERPL CALCULATED.3IONS-SCNC: 9 MMOL/L (ref 4–13)
AST SERPL W P-5'-P-CCNC: 13 U/L (ref 5–45)
BILIRUB SERPL-MCNC: 0.7 MG/DL (ref 0.2–1)
BUN SERPL-MCNC: 20 MG/DL (ref 5–25)
CALCIUM SERPL-MCNC: 9.4 MG/DL (ref 8.3–10.1)
CHLORIDE SERPL-SCNC: 104 MMOL/L (ref 100–108)
CO2 SERPL-SCNC: 29 MMOL/L (ref 21–32)
CREAT SERPL-MCNC: 1.12 MG/DL (ref 0.6–1.3)
GFR SERPL CREATININE-BSD FRML MDRD: 69 ML/MIN/1.73SQ M
GLUCOSE P FAST SERPL-MCNC: 178 MG/DL (ref 65–99)
LIPASE SERPL-CCNC: 127 U/L (ref 73–393)
POTASSIUM SERPL-SCNC: 4.5 MMOL/L (ref 3.5–5.3)
PROT SERPL-MCNC: 7 G/DL (ref 6.4–8.2)
SODIUM SERPL-SCNC: 142 MMOL/L (ref 136–145)

## 2019-03-16 PROCEDURE — 80053 COMPREHEN METABOLIC PANEL: CPT

## 2019-03-16 PROCEDURE — 82150 ASSAY OF AMYLASE: CPT

## 2019-03-16 PROCEDURE — 36415 COLL VENOUS BLD VENIPUNCTURE: CPT

## 2019-03-16 PROCEDURE — 83690 ASSAY OF LIPASE: CPT

## 2019-03-17 ENCOUNTER — TRANSCRIBE ORDERS (OUTPATIENT)
Dept: LAB | Facility: HOSPITAL | Age: 64
End: 2019-03-17

## 2019-03-17 DIAGNOSIS — R39.198 SLOW URINARY STREAM: Primary | ICD-10-CM

## 2019-03-18 ENCOUNTER — APPOINTMENT (OUTPATIENT)
Dept: LAB | Facility: CLINIC | Age: 64
End: 2019-03-18
Payer: COMMERCIAL

## 2019-03-18 DIAGNOSIS — R39.198 SLOW URINARY STREAM: ICD-10-CM

## 2019-03-18 LAB — PSA SERPL-MCNC: 2.3 NG/ML (ref 0–4)

## 2019-03-18 PROCEDURE — 84153 ASSAY OF PSA TOTAL: CPT

## 2019-03-20 ENCOUNTER — OFFICE VISIT (OUTPATIENT)
Dept: INTERNAL MEDICINE CLINIC | Age: 64
End: 2019-03-20
Payer: COMMERCIAL

## 2019-03-20 VITALS
WEIGHT: 273.4 LBS | TEMPERATURE: 97.4 F | SYSTOLIC BLOOD PRESSURE: 144 MMHG | DIASTOLIC BLOOD PRESSURE: 86 MMHG | OXYGEN SATURATION: 98 % | HEIGHT: 71 IN | HEART RATE: 64 BPM | BODY MASS INDEX: 38.27 KG/M2

## 2019-03-20 DIAGNOSIS — N52.9 ERECTILE DYSFUNCTION, UNSPECIFIED ERECTILE DYSFUNCTION TYPE: ICD-10-CM

## 2019-03-20 DIAGNOSIS — E78.5 HYPERLIPIDEMIA, UNSPECIFIED HYPERLIPIDEMIA TYPE: ICD-10-CM

## 2019-03-20 DIAGNOSIS — I10 BENIGN ESSENTIAL HYPERTENSION: ICD-10-CM

## 2019-03-20 DIAGNOSIS — E11.59 TYPE 2 DIABETES MELLITUS WITH OTHER CIRCULATORY COMPLICATION, WITHOUT LONG-TERM CURRENT USE OF INSULIN (HCC): Primary | ICD-10-CM

## 2019-03-20 PROBLEM — H26.9 CATARACT: Status: ACTIVE | Noted: 2017-10-27

## 2019-03-20 PROBLEM — H26.9 CATARACT: Status: RESOLVED | Noted: 2017-10-27 | Resolved: 2019-03-20

## 2019-03-20 PROBLEM — F40.240 CLAUSTROPHOBIA: Status: ACTIVE | Noted: 2017-09-06

## 2019-03-20 PROBLEM — M51.26 HERNIATION OF INTERVERTEBRAL DISC OF LUMBAR SPINE: Status: ACTIVE | Noted: 2017-09-06

## 2019-03-20 PROBLEM — R93.7 ABNORMAL MRI, LUMBAR SPINE: Status: ACTIVE | Noted: 2017-09-27

## 2019-03-20 PROBLEM — H52.4 PRESBYOPIA: Status: ACTIVE | Noted: 2017-10-27

## 2019-03-20 PROCEDURE — 99214 OFFICE O/P EST MOD 30 MIN: CPT | Performed by: PHYSICIAN ASSISTANT

## 2019-03-20 PROCEDURE — 1036F TOBACCO NON-USER: CPT | Performed by: PHYSICIAN ASSISTANT

## 2019-03-20 PROCEDURE — 3008F BODY MASS INDEX DOCD: CPT | Performed by: PHYSICIAN ASSISTANT

## 2019-03-20 RX ORDER — ATORVASTATIN CALCIUM 40 MG/1
40 TABLET, FILM COATED ORAL
Qty: 90 TABLET | Refills: 1 | Status: SHIPPED | OUTPATIENT
Start: 2019-03-20 | End: 2019-11-29 | Stop reason: SDUPTHER

## 2019-03-20 RX ORDER — SILDENAFIL CITRATE 20 MG/1
20 TABLET ORAL DAILY
Qty: 50 TABLET | Refills: 1 | Status: SHIPPED | OUTPATIENT
Start: 2019-03-20 | End: 2019-11-21 | Stop reason: SDUPTHER

## 2019-03-20 NOTE — PROGRESS NOTES
Assessment/Plan:       Diagnoses and all orders for this visit:    Type 2 diabetes mellitus with other circulatory complication, without long-term current use of insulin (HCC)  Comments:  discussed diet  check hga1c and will likely need to add third agent, would recommend prandin with meals if elevated   Orders:  -     HEMOGLOBIN A1C W/ EAG ESTIMATION; Future    Erectile dysfunction, unspecified erectile dysfunction type  -     sildenafil (REVATIO) 20 mg tablet; Take 1 tablet (20 mg total) by mouth daily    Benign essential hypertension  Comments:  controlled, avoid salt in diet     Hyperlipidemia, unspecified hyperlipidemia type  -     atorvastatin (LIPITOR) 40 mg tablet; Take 1 tablet (40 mg total) by mouth daily at bedtime for 90 days        If bs not improved would recommend prandin in addition to current regimen, may need to start basal insulin if not responding     Subjective:      Patient ID: Renetta Valentin is a 59 y o  male  F/u for DM - pt has been checking at home   Due for hga1c  Pt states he has not been doing well with his diet and has had trouble avoiding sweets   Not as active as he would like to be   Pt has not been following with endocrine for this   Pt states he is taking his amaryl and metformin as prescribed     Hx of pancreatitis - lipase and amylase are normal   No ab pain, no change in stools             The following portions of the patient's history were reviewed and updated as appropriate: allergies, current medications, past family history, past medical history, past social history, past surgical history and problem list     Review of Systems   Constitutional: Negative for activity change, appetite change, chills, fatigue and fever  HENT: Negative for congestion, hearing loss and postnasal drip  Respiratory: Negative for cough, shortness of breath and wheezing  Cardiovascular: Negative for chest pain, palpitations and leg swelling     Gastrointestinal: Negative for abdominal pain, constipation, diarrhea and nausea  Genitourinary: Negative for dysuria and urgency  Musculoskeletal: Negative for arthralgias, back pain, gait problem and myalgias  Skin: Negative for rash  Neurological: Negative for dizziness, light-headedness and headaches  Hematological: Does not bruise/bleed easily  Psychiatric/Behavioral: Negative for sleep disturbance  The patient is not nervous/anxious  Past Medical History:   Diagnosis Date    Arthritis     knee    Coronary artery disease     Diabetes mellitus ([facility] [address] )     H/O angioplasty     Hypercholesteremia     Hypertension     Varicose veins of lower extremity          Current Outpatient Medications:     amLODIPine (NORVASC) 5 mg tablet, Take 1 tablet (5 mg total) by mouth daily, Disp: 90 tablet, Rfl: 1    aspirin (ECOTRIN LOW STRENGTH) 81 mg EC tablet, Take 81 mg by mouth daily  , Disp: , Rfl:     atorvastatin (LIPITOR) 40 mg tablet, Take 1 tablet (40 mg total) by mouth daily at bedtime for 90 days, Disp: 90 tablet, Rfl: 1    Blood Glucose Monitoring Suppl (ONE TOUCH ULTRA 2) w/Device KIT, Test blood glucose 2 times daily (One Touch Ultra 2), Disp: 1 each, Rfl: 0    clopidogrel (PLAVIX) 75 mg tablet, Take 1 tablet (75 mg total) by mouth daily, Disp: 90 tablet, Rfl: 1    glimepiride (AMARYL) 2 mg tablet, Take 2 mg with breakfast and 4 mg in the evening daily  , Disp: 270 tablet, Rfl: 1    glucose blood (ONE TOUCH ULTRA TEST) test strip, Test blood glucose 2 times daily (One Touch Ultra Blue), Disp: 300 each, Rfl: 0    ketoconazole (NIZORAL) 2 % shampoo, Apply 1 application topically 2 (two) times a week, Disp: 120 mL, Rfl: 0    lisinopril-hydrochlorothiazide (PRINZIDE,ZESTORETIC) 20-25 MG per tablet, Take 1 tablet by mouth daily, Disp: 90 tablet, Rfl: 1    metFORMIN (GLUCOPHAGE) 1000 MG tablet, Take 2 tablets (2,000 mg total) by mouth daily after dinner, Disp: 180 tablet, Rfl: 1    Omega-3 Fatty Acids (FISH OIL PO), Take by mouth daily  , Disp: , Rfl:     ONETOUCH DELICA LANCETS 84F MISC, Test blood glucose 2 times daily, Disp: 300 each, Rfl: 0    prednisoLONE acetate (PRED FORTE) 1 % ophthalmic suspension, Administer 1 drop into the left eye once a week , Disp: , Rfl:     sildenafil (REVATIO) 20 mg tablet, Take 1 tablet (20 mg total) by mouth daily, Disp: 50 tablet, Rfl: 1    VITAMIN E PO, Take 1 capsule by mouth daily  , Disp: , Rfl:     Allergies   Allergen Reactions    Ciprofloxacin Swelling    Janumet [Sitagliptin-Metformin Hcl]      Elevated liver enzymes    Terazosin      Annotation - [id number]: Blood in sperm       Social History   Past Surgical History:   Procedure Laterality Date    CARDIAC CATHETERIZATION      CARDIAC SURGERY      CHOLECYSTECTOMY  01/01/2010    COLONOSCOPY      CORONARY ANGIOPLASTY WITH STENT PLACEMENT      EYE SURGERY Left     ocular lens implant    GALLBLADDER SURGERY      HERNIA REPAIR  01/01/2009    KNEE ARTHROSCOPY W/ MENISCAL REPAIR Right     KNEE SURGERY  09/10/2015    AL INCISE FINGER TENDON SHEATH Left 8/25/2016    Procedure: RING TRIGGER FINGER RELEASE ;  Surgeon: Jearldine Goodell, MD;  Location:  MAIN OR;  Service: Orthopedics    VARICOSE VEIN SURGERY      Ligation     Family History   Problem Relation Age of Onset    Cancer Mother     Coronary artery disease Mother     Heart disease Father     Early death Father     Coronary artery disease Father     Diabetes Father     Hypertension Father     Coronary artery disease Brother     Hyperlipidemia Brother     Hypertension Brother        Objective:  /86 (BP Location: Left arm, Patient Position: Sitting, Cuff Size: Large)   Pulse 64   Temp (!) 97 4 °F (36 3 °C) (Tympanic)   Ht 5' 10 75" (1 797 m)   Wt 124 kg (273 lb 6 4 oz)   SpO2 98%   BMI 38 40 kg/m²        Physical Exam   Constitutional: He is oriented to person, place, and time  He appears well-developed and well-nourished  No distress     HENT:   Head: Normocephalic and atraumatic  Right Ear: External ear normal    Left Ear: External ear normal    Nose: Nose normal    Mouth/Throat: Oropharynx is clear and moist    Eyes: Pupils are equal, round, and reactive to light  Conjunctivae and EOM are normal    Neck: Normal range of motion  Neck supple  Cardiovascular: Normal rate, regular rhythm and normal heart sounds  No murmur heard  Pulmonary/Chest: Effort normal and breath sounds normal  No respiratory distress  He has no wheezes  He has no rales  Abdominal: Soft  Bowel sounds are normal  He exhibits no distension  There is no tenderness  Musculoskeletal: Normal range of motion  He exhibits no edema or deformity  Lymphadenopathy:     He has no cervical adenopathy  Neurological: He is alert and oriented to person, place, and time  No cranial nerve deficit  Coordination normal    Skin: Skin is warm and dry  No rash noted  He is not diaphoretic  No erythema  Psychiatric: He has a normal mood and affect  His behavior is normal  Judgment and thought content normal    Vitals reviewed

## 2019-04-01 DIAGNOSIS — I25.10 CORONARY ARTERY DISEASE INVOLVING NATIVE HEART WITHOUT ANGINA PECTORIS, UNSPECIFIED VESSEL OR LESION TYPE: ICD-10-CM

## 2019-04-02 RX ORDER — CLOPIDOGREL BISULFATE 75 MG/1
75 TABLET ORAL DAILY
Qty: 90 TABLET | Refills: 1 | Status: SHIPPED | OUTPATIENT
Start: 2019-04-02 | End: 2019-04-04 | Stop reason: SDUPTHER

## 2019-04-04 DIAGNOSIS — I25.10 CORONARY ARTERY DISEASE INVOLVING NATIVE HEART WITHOUT ANGINA PECTORIS, UNSPECIFIED VESSEL OR LESION TYPE: ICD-10-CM

## 2019-04-04 RX ORDER — CLOPIDOGREL BISULFATE 75 MG/1
75 TABLET ORAL DAILY
Qty: 90 TABLET | Refills: 1 | Status: SHIPPED | OUTPATIENT
Start: 2019-04-04 | End: 2019-10-21 | Stop reason: SDUPTHER

## 2019-04-18 ENCOUNTER — TRANSCRIBE ORDERS (OUTPATIENT)
Dept: LAB | Facility: CLINIC | Age: 64
End: 2019-04-18

## 2019-06-22 ENCOUNTER — TRANSCRIBE ORDERS (OUTPATIENT)
Dept: LAB | Facility: CLINIC | Age: 64
End: 2019-06-22

## 2019-06-22 ENCOUNTER — APPOINTMENT (OUTPATIENT)
Dept: LAB | Facility: CLINIC | Age: 64
End: 2019-06-22
Payer: COMMERCIAL

## 2019-06-22 DIAGNOSIS — E11.59 TYPE 2 DIABETES MELLITUS WITH OTHER CIRCULATORY COMPLICATION, WITHOUT LONG-TERM CURRENT USE OF INSULIN (HCC): ICD-10-CM

## 2019-06-22 LAB
EST. AVERAGE GLUCOSE BLD GHB EST-MCNC: 171 MG/DL
HBA1C MFR BLD: 7.6 % (ref 4.2–6.3)

## 2019-06-22 PROCEDURE — 36415 COLL VENOUS BLD VENIPUNCTURE: CPT

## 2019-06-22 PROCEDURE — 83036 HEMOGLOBIN GLYCOSYLATED A1C: CPT

## 2019-07-18 ENCOUNTER — OFFICE VISIT (OUTPATIENT)
Dept: INTERNAL MEDICINE CLINIC | Age: 64
End: 2019-07-18
Payer: COMMERCIAL

## 2019-07-18 VITALS
SYSTOLIC BLOOD PRESSURE: 152 MMHG | HEIGHT: 70 IN | WEIGHT: 272 LBS | HEART RATE: 72 BPM | OXYGEN SATURATION: 98 % | BODY MASS INDEX: 38.94 KG/M2 | TEMPERATURE: 97.8 F | DIASTOLIC BLOOD PRESSURE: 82 MMHG

## 2019-07-18 DIAGNOSIS — E11.59 TYPE 2 DIABETES MELLITUS WITH OTHER CIRCULATORY COMPLICATION, WITHOUT LONG-TERM CURRENT USE OF INSULIN (HCC): Primary | ICD-10-CM

## 2019-07-18 DIAGNOSIS — I10 BENIGN ESSENTIAL HYPERTENSION: ICD-10-CM

## 2019-07-18 DIAGNOSIS — E78.5 HYPERLIPIDEMIA, UNSPECIFIED HYPERLIPIDEMIA TYPE: ICD-10-CM

## 2019-07-18 DIAGNOSIS — Z23 NEED FOR VACCINATION AGAINST STREPTOCOCCUS PNEUMONIAE USING PNEUMOCOCCAL CONJUGATE VACCINE 13: ICD-10-CM

## 2019-07-18 PROCEDURE — 1036F TOBACCO NON-USER: CPT | Performed by: PHYSICIAN ASSISTANT

## 2019-07-18 PROCEDURE — 90670 PCV13 VACCINE IM: CPT | Performed by: PHYSICIAN ASSISTANT

## 2019-07-18 PROCEDURE — 3008F BODY MASS INDEX DOCD: CPT | Performed by: PHYSICIAN ASSISTANT

## 2019-07-18 PROCEDURE — 99214 OFFICE O/P EST MOD 30 MIN: CPT | Performed by: PHYSICIAN ASSISTANT

## 2019-07-18 PROCEDURE — 90471 IMMUNIZATION ADMIN: CPT | Performed by: PHYSICIAN ASSISTANT

## 2019-07-18 RX ORDER — REPAGLINIDE 1 MG/1
1 TABLET ORAL
Qty: 90 TABLET | Refills: 4 | Status: SHIPPED | OUTPATIENT
Start: 2019-07-18 | End: 2019-11-21

## 2019-07-18 RX ORDER — LISINOPRIL AND HYDROCHLOROTHIAZIDE 20; 12.5 MG/1; MG/1
2 TABLET ORAL DAILY
Qty: 60 TABLET | Refills: 4 | Status: SHIPPED | OUTPATIENT
Start: 2019-07-18 | End: 2020-01-06 | Stop reason: SDUPTHER

## 2019-07-18 NOTE — PROGRESS NOTES
Assessment/Plan:         Diagnoses and all orders for this visit:    Type 2 diabetes mellitus with other circulatory complication, without long-term current use of insulin (HCC)  Comments:  add repaglinide with meals  f/u hga1c and labs   Orders:  -     repaglinide (PRANDIN) 1 mg tablet; Take 1 tablet (1 mg total) by mouth 3 (three) times a day before meals  -     CBC and differential; Future  -     Comprehensive metabolic panel; Future  -     Lipid panel; Future  -     HEMOGLOBIN A1C W/ EAG ESTIMATION; Future  -     PNEUMOCOCCAL CONJUGATE VACCINE 13-VALENT GREATER THAN 6 MONTHS    Benign essential hypertension  Comments:  increase lisinopril to 40/25mg (will take 2 20/12  5)   Orders:  -     lisinopril-hydrochlorothiazide (PRINZIDE,ZESTORETIC) 20-12 5 MG per tablet; Take 2 tablets by mouth daily  -     CBC and differential; Future  -     Comprehensive metabolic panel; Future  -     Lipid panel; Future  -     HEMOGLOBIN A1C W/ EAG ESTIMATION; Future    Hyperlipidemia, unspecified hyperlipidemia type  -     CBC and differential; Future  -     Comprehensive metabolic panel; Future  -     Lipid panel; Future  -     HEMOGLOBIN A1C W/ EAG ESTIMATION; Future    Need for vaccination against Streptococcus pneumoniae using pneumococcal conjugate vaccine 13  -     PNEUMOCOCCAL CONJUGATE VACCINE 13-VALENT GREATER THAN 6 MONTHS        Start prandin tid with meals, hold if skipping meal   F/u labs in 3 months   Subjective:   BMI Counseling: Body mass index is 38 68 kg/m²  Discussed the patient's BMI with him  The BMI is above average  BMI counseling and education was provided to the patient  Nutrition recommendations include decreasing overall calorie intake and increasing intake of lean protein  Exercise recommendations include exercising 3-5 times per week  Patient ID: Yanely Marin is a 59 y o  male  Pt with history of HTN, HLD, DM, arthritis, BARBI, and radiculopathy presents for his follow up with no complaints    He occasionally checks his BP at home, usually 140s/80s  He is taking his BP medications every day  He does not check his sugars  He does take his medications  He does follow a particular diet  He occasionally has some joint pain in his fingers and hips but is doing well  He has a 15 packyear smoking history but does not smoke anymore  The following portions of the patient's history were reviewed and updated as appropriate: allergies, current medications, past family history, past medical history, past social history, past surgical history and problem list     Review of Systems   Constitutional: Negative for chills, fatigue, fever and unexpected weight change  HENT: Negative for congestion, hearing loss, sinus pressure, sinus pain and sore throat  Eyes: Negative for visual disturbance  Respiratory: Negative for cough, chest tightness and shortness of breath  Cardiovascular: Negative for chest pain, palpitations and leg swelling  Gastrointestinal: Positive for diarrhea (after eating or drinking soda, couple times per week)  Negative for abdominal pain, blood in stool, constipation, nausea and vomiting  Endocrine: Negative for polydipsia and polyuria  Genitourinary: Positive for difficulty urinating (hesitancy in the morning, resolves after sitting upright for a few minutes)  Negative for dysuria, hematuria and urgency  Musculoskeletal: Positive for arthralgias (occasionally hips and fingers, right shoulder)  Negative for myalgias  Skin: Negative  Neurological: Negative for dizziness, light-headedness and headaches  Hematological: Negative  Psychiatric/Behavioral: Negative for dysphoric mood  The patient is not nervous/anxious            Objective:      /82 (BP Location: Left arm, Patient Position: Sitting, Cuff Size: Large)   Pulse 72   Temp 97 8 °F (36 6 °C) (Tympanic)   Ht 5' 10 32" (1 786 m)   Wt 123 kg (272 lb)   SpO2 98%   BMI 38 68 kg/m²          Physical Exam Constitutional: He is oriented to person, place, and time  He appears well-developed and well-nourished  No distress  obese   HENT:   Head: Normocephalic and atraumatic  Right Ear: External ear normal    Left Ear: External ear normal    Nose: Nose normal    Mouth/Throat: Oropharynx is clear and moist    Eyes: Pupils are equal, round, and reactive to light  Conjunctivae and EOM are normal    Neck: Normal range of motion  Cardiovascular: Normal rate, regular rhythm and normal heart sounds  No murmur heard  Pulmonary/Chest: Effort normal and breath sounds normal  No respiratory distress  He has no wheezes  He has no rales  Abdominal: Soft  Bowel sounds are normal    Musculoskeletal: Normal range of motion  He exhibits no edema or deformity  Lymphadenopathy:     He has no cervical adenopathy  Neurological: He is alert and oriented to person, place, and time  No cranial nerve deficit  Coordination normal    Skin: Skin is warm and dry  No rash noted  No erythema  Psychiatric: He has a normal mood and affect  His behavior is normal    Vitals reviewed

## 2019-08-26 DIAGNOSIS — I10 BENIGN ESSENTIAL HYPERTENSION: ICD-10-CM

## 2019-08-26 DIAGNOSIS — E11.9 NON-INSULIN DEPENDENT TYPE 2 DIABETES MELLITUS (HCC): ICD-10-CM

## 2019-08-26 LAB
LEFT EYE DIABETIC RETINOPATHY: NORMAL
RIGHT EYE DIABETIC RETINOPATHY: NORMAL

## 2019-08-26 RX ORDER — LISINOPRIL AND HYDROCHLOROTHIAZIDE 20; 12.5 MG/1; MG/1
2 TABLET ORAL DAILY
Qty: 60 TABLET | Refills: 4 | Status: CANCELLED | OUTPATIENT
Start: 2019-08-26

## 2019-08-26 NOTE — TELEPHONE ENCOUNTER
Please advise patient that there are refills on   Lisinopril hydrochlorothiazide, will send in   Metformin

## 2019-08-28 DIAGNOSIS — E11.9 NON-INSULIN DEPENDENT TYPE 2 DIABETES MELLITUS (HCC): ICD-10-CM

## 2019-09-16 ENCOUNTER — TELEPHONE (OUTPATIENT)
Dept: INTERNAL MEDICINE CLINIC | Age: 64
End: 2019-09-16

## 2019-09-16 NOTE — TELEPHONE ENCOUNTER
Patient dropped off a form for Kirsten to fill out for his new job at work  Patient did make an appointment for PPD test for next week but the job wants him to have this done before next week      Patient would like to have the form filled out by [facility] and he will have to go to a [facility] to have the PPD test done since they have late hours

## 2019-09-23 ENCOUNTER — CLINICAL SUPPORT (OUTPATIENT)
Dept: INTERNAL MEDICINE CLINIC | Age: 64
End: 2019-09-23
Payer: COMMERCIAL

## 2019-09-23 DIAGNOSIS — Z11.1 SCREENING FOR TUBERCULOSIS: Primary | ICD-10-CM

## 2019-09-23 PROCEDURE — 86580 TB INTRADERMAL TEST: CPT

## 2019-09-25 ENCOUNTER — CLINICAL SUPPORT (OUTPATIENT)
Dept: INTERNAL MEDICINE CLINIC | Age: 64
End: 2019-09-25

## 2019-09-25 DIAGNOSIS — Z11.1 TUBERCULOSIS SCREENING: Primary | ICD-10-CM

## 2019-09-25 LAB
INDURATION: 0 MM
TB SKIN TEST: NEGATIVE

## 2019-10-21 ENCOUNTER — APPOINTMENT (OUTPATIENT)
Dept: LAB | Facility: CLINIC | Age: 64
End: 2019-10-21
Payer: COMMERCIAL

## 2019-10-21 DIAGNOSIS — I10 BENIGN ESSENTIAL HYPERTENSION: ICD-10-CM

## 2019-10-21 DIAGNOSIS — I10 HYPERTENSION, UNSPECIFIED TYPE: ICD-10-CM

## 2019-10-21 DIAGNOSIS — E11.59 TYPE 2 DIABETES MELLITUS WITH OTHER CIRCULATORY COMPLICATION, WITHOUT LONG-TERM CURRENT USE OF INSULIN (HCC): ICD-10-CM

## 2019-10-21 DIAGNOSIS — E78.5 HYPERLIPIDEMIA, UNSPECIFIED HYPERLIPIDEMIA TYPE: ICD-10-CM

## 2019-10-21 DIAGNOSIS — I25.10 CORONARY ARTERY DISEASE INVOLVING NATIVE HEART WITHOUT ANGINA PECTORIS, UNSPECIFIED VESSEL OR LESION TYPE: ICD-10-CM

## 2019-10-21 LAB
ALBUMIN SERPL BCP-MCNC: 3.7 G/DL (ref 3.5–5)
ALP SERPL-CCNC: 65 U/L (ref 46–116)
ALT SERPL W P-5'-P-CCNC: 28 U/L (ref 12–78)
ANION GAP SERPL CALCULATED.3IONS-SCNC: 10 MMOL/L (ref 4–13)
AST SERPL W P-5'-P-CCNC: 15 U/L (ref 5–45)
BASOPHILS # BLD AUTO: 0.03 THOUSANDS/ΜL (ref 0–0.1)
BASOPHILS NFR BLD AUTO: 1 % (ref 0–1)
BILIRUB SERPL-MCNC: 0.6 MG/DL (ref 0.2–1)
BUN SERPL-MCNC: 17 MG/DL (ref 5–25)
CALCIUM SERPL-MCNC: 9.3 MG/DL (ref 8.3–10.1)
CHLORIDE SERPL-SCNC: 103 MMOL/L (ref 100–108)
CHOLEST SERPL-MCNC: 119 MG/DL (ref 50–200)
CO2 SERPL-SCNC: 28 MMOL/L (ref 21–32)
CREAT SERPL-MCNC: 0.97 MG/DL (ref 0.6–1.3)
EOSINOPHIL # BLD AUTO: 0.07 THOUSAND/ΜL (ref 0–0.61)
EOSINOPHIL NFR BLD AUTO: 1 % (ref 0–6)
ERYTHROCYTE [DISTWIDTH] IN BLOOD BY AUTOMATED COUNT: 14.8 % (ref 11.6–15.1)
EST. AVERAGE GLUCOSE BLD GHB EST-MCNC: 171 MG/DL
GFR SERPL CREATININE-BSD FRML MDRD: 82 ML/MIN/1.73SQ M
GLUCOSE P FAST SERPL-MCNC: 211 MG/DL (ref 65–99)
HBA1C MFR BLD: 7.6 % (ref 4.2–6.3)
HCT VFR BLD AUTO: 45.2 % (ref 36.5–49.3)
HDLC SERPL-MCNC: 36 MG/DL
HGB BLD-MCNC: 15 G/DL (ref 12–17)
IMM GRANULOCYTES # BLD AUTO: 0.03 THOUSAND/UL (ref 0–0.2)
IMM GRANULOCYTES NFR BLD AUTO: 1 % (ref 0–2)
LDLC SERPL CALC-MCNC: 50 MG/DL (ref 0–100)
LYMPHOCYTES # BLD AUTO: 1.16 THOUSANDS/ΜL (ref 0.6–4.47)
LYMPHOCYTES NFR BLD AUTO: 21 % (ref 14–44)
MCH RBC QN AUTO: 31.8 PG (ref 26.8–34.3)
MCHC RBC AUTO-ENTMCNC: 33.2 G/DL (ref 31.4–37.4)
MCV RBC AUTO: 96 FL (ref 82–98)
MONOCYTES # BLD AUTO: 0.43 THOUSAND/ΜL (ref 0.17–1.22)
MONOCYTES NFR BLD AUTO: 8 % (ref 4–12)
NEUTROPHILS # BLD AUTO: 3.87 THOUSANDS/ΜL (ref 1.85–7.62)
NEUTS SEG NFR BLD AUTO: 68 % (ref 43–75)
NONHDLC SERPL-MCNC: 83 MG/DL
NRBC BLD AUTO-RTO: 0 /100 WBCS
PLATELET # BLD AUTO: 153 THOUSANDS/UL (ref 149–390)
PMV BLD AUTO: 10.4 FL (ref 8.9–12.7)
POTASSIUM SERPL-SCNC: 3.7 MMOL/L (ref 3.5–5.3)
PROT SERPL-MCNC: 7 G/DL (ref 6.4–8.2)
RBC # BLD AUTO: 4.72 MILLION/UL (ref 3.88–5.62)
SODIUM SERPL-SCNC: 141 MMOL/L (ref 136–145)
TRIGL SERPL-MCNC: 164 MG/DL
WBC # BLD AUTO: 5.59 THOUSAND/UL (ref 4.31–10.16)

## 2019-10-21 PROCEDURE — 36415 COLL VENOUS BLD VENIPUNCTURE: CPT

## 2019-10-21 PROCEDURE — 85025 COMPLETE CBC W/AUTO DIFF WBC: CPT

## 2019-10-21 PROCEDURE — 80061 LIPID PANEL: CPT

## 2019-10-21 PROCEDURE — 80053 COMPREHEN METABOLIC PANEL: CPT

## 2019-10-21 PROCEDURE — 83036 HEMOGLOBIN GLYCOSYLATED A1C: CPT

## 2019-10-21 RX ORDER — CLOPIDOGREL BISULFATE 75 MG/1
75 TABLET ORAL DAILY
Qty: 90 TABLET | Refills: 1 | Status: SHIPPED | OUTPATIENT
Start: 2019-10-21 | End: 2020-04-10 | Stop reason: SDUPTHER

## 2019-10-21 RX ORDER — AMLODIPINE BESYLATE 5 MG/1
5 TABLET ORAL DAILY
Qty: 90 TABLET | Refills: 1 | Status: SHIPPED | OUTPATIENT
Start: 2019-10-21 | End: 2019-11-19 | Stop reason: SDUPTHER

## 2019-11-12 ENCOUNTER — OFFICE VISIT (OUTPATIENT)
Dept: CARDIOLOGY CLINIC | Facility: MEDICAL CENTER | Age: 64
End: 2019-11-12
Payer: COMMERCIAL

## 2019-11-12 VITALS
SYSTOLIC BLOOD PRESSURE: 128 MMHG | OXYGEN SATURATION: 97 % | WEIGHT: 274.8 LBS | DIASTOLIC BLOOD PRESSURE: 70 MMHG | HEIGHT: 70 IN | BODY MASS INDEX: 39.34 KG/M2 | HEART RATE: 66 BPM

## 2019-11-12 DIAGNOSIS — E78.5 HYPERLIPIDEMIA, UNSPECIFIED HYPERLIPIDEMIA TYPE: ICD-10-CM

## 2019-11-12 DIAGNOSIS — I25.10 CORONARY ARTERY DISEASE INVOLVING NATIVE HEART WITHOUT ANGINA PECTORIS, UNSPECIFIED VESSEL OR LESION TYPE: Primary | ICD-10-CM

## 2019-11-12 DIAGNOSIS — I25.10 ATHEROSCLEROSIS OF NATIVE CORONARY ARTERY WITHOUT ANGINA PECTORIS, UNSPECIFIED WHETHER NATIVE OR TRANSPLANTED HEART: ICD-10-CM

## 2019-11-12 DIAGNOSIS — I10 HYPERTENSION, UNSPECIFIED TYPE: ICD-10-CM

## 2019-11-12 DIAGNOSIS — I10 BENIGN ESSENTIAL HYPERTENSION: ICD-10-CM

## 2019-11-12 PROCEDURE — 99214 OFFICE O/P EST MOD 30 MIN: CPT | Performed by: INTERNAL MEDICINE

## 2019-11-12 PROCEDURE — 93000 ELECTROCARDIOGRAM COMPLETE: CPT | Performed by: INTERNAL MEDICINE

## 2019-11-12 NOTE — PROGRESS NOTES
Cardiology Follow Up    Tianmeliza Ramses  1955  [id number]  [facility]  [address]  [address]  [phone]    1  Coronary artery disease involving native heart without angina pectoris, unspecified vessel or lesion type  POCT ECG   2  Hypertension, unspecified type  POCT ECG   3  Atherosclerosis of native coronary artery without angina pectoris, unspecified whether native or transplanted heart  POCT ECG       Diagnoses and all orders for this visit:    Coronary artery disease involving native heart without angina pectoris, unspecified vessel or lesion type  -     POCT ECG    Hypertension, unspecified type  -     POCT ECG    Atherosclerosis of native coronary artery without angina pectoris, unspecified whether native or transplanted heart  -     POCT ECG      I had the pleasure of seeing Tianmeliza Ramses for a follow up visit  INTERVAL HISTORY:  None    History of the presenting illness, Discussion/Summary and My Plan are as follows:::    He is a pleasant 79-year-old gentleman with a history of hypertension, diabetes and dyslipidemia  He also has coronary artery disease and has had multiple stenting procedures  Based on his last coronary angiography report from the [facility] in September 2016, left main was patent, LAD was patent including a drug-eluting stent-implanted in 2011  His proximal RCA had a stent implanted in June 2015-in-stent restenosis-December 2015-restented, again had in-stent restenosis that was treated with a drug-eluting 3X 18 mm Xience stent  This was done for anginal symptoms  To his knowledge, he has not had a myocardial infarction  Working with [employer]  now  Sometimes walks the golf course  No anginal symptoms at this time   He had vein stripping of his right lower extremity which has disproportionately more edema      Plan:      Coronary artery disease: Status post [id number]-patent, status post proximal RCA stent in-stent - in-stent-last-September 2016 with a Xience stent, (3 STENTS)  No anginal symptoms at this time  Aspirin and Plavix, not on metoprolol but asymptomatic  , heart rates in the 60s     December 2017: Exercise nuclear perfusion study, 7 minutes 31 seconds, resting hypertension with hypertensive response, no ischemia, no ECG changes, no chest pains     Dyslipidemia: on atorvastatin 40 mg, October 2019 LDL-50    HTN:  well controlled,      DM: As above  last A1c was 7 6 in Sept 2017, June and October 2019     Chronic right more than left lower extremity edema: No need for Dopplers, had vein stripping for varicose veins in the past  Foot end elevation, compression stockings were recommended  No increase with Amlodipine use    For his DOT physical, 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 stress test      Follow up in 6 months       Results for Mark Snyder (MRN [id number]) as of 11/12/2019 11:56   Ref   Range 10/21/2019 11:47   Cholesterol Latest Ref Range: 50 - 200 mg/dL 119   Triglycerides Latest Ref Range: <=150 mg/dL 164 (H)   HDL Latest Ref Range: >=40 mg/dL 36 (L)   Non-HDL Cholesterol Latest Units: mg/dl 83   LDL Direct Latest Ref Range: 0 - 100 mg/dL 50       Patient Active Problem List   Diagnosis    Abnormal MRI, lumbar spine    Benign essential hypertension    CAD (coronary artery disease)    Claustrophobia    Diabetes mellitus with circulatory complication (HCC)    Herniation of intervertebral disc of lumbar spine    Hyperlipidemia    Presbyopia    Primary osteoarthritis of left knee     Past Medical History:   Diagnosis Date    Arthritis     knee    Coronary artery disease     Diabetes mellitus ([address] )     H/O angioplasty     Hypercholesteremia     Hypertension     Varicose veins of lower extremity      Social History     Socioeconomic History    Marital status: /Civil Union     Spouse name: Not on file    Number of children: Not on file    Years of education: Not on file    Highest education level: Not on file   Occupational History    Not on file   Social Needs    Financial resource strain: Not on file    Food insecurity:     Worry: Not on file     Inability: Not on file    Transportation needs:     Medical: Not on file     Non-medical: Not on file   Tobacco Use    Smoking status: Former Smoker     Types: Cigarettes     Last attempt to quit:      Years since quittin 8    Smokeless tobacco: Never Used   Substance and Sexual Activity    Alcohol use: Yes     Comment: social use    Drug use: No    Sexual activity: Not on file   Lifestyle    Physical activity:     Days per week: Not on file     Minutes per session: Not on file    Stress: Not on file   Relationships    Social connections:     Talks on phone: Not on file     Gets together: Not on file     Attends [employer] service: Not on file     Active member of club or organization: Not on file     Attends meetings of clubs or organizations: Not on file     Relationship status: Not on file    Intimate partner violence:     Fear of current or ex partner: Not on file     Emotionally abused: Not on file     Physically abused: Not on file     Forced sexual activity: Not on file   Other Topics Concern    Not on file   Social History Narrative    Not on file      Family History   Problem Relation Age of Onset    Cancer Mother     Coronary artery disease Mother     Heart disease Father     Early death Father     Coronary artery disease Father     Diabetes Father     Hypertension Father     Coronary artery disease Brother     Hyperlipidemia Brother     Hypertension Brother      Past Surgical History:   Procedure Laterality Date    CARDIAC CATHETERIZATION      CARDIAC SURGERY      CHOLECYSTECTOMY  2010    COLONOSCOPY      CORONARY ANGIOPLASTY WITH STENT PLACEMENT      EYE SURGERY Left     ocular lens implant    GALLBLADDER SURGERY      HERNIA REPAIR  01/01/2009    KNEE ARTHROSCOPY W/ MENISCAL REPAIR Right     KNEE SURGERY  09/10/2015    NE INCISE FINGER TENDON SHEATH Left 8/25/2016    Procedure: RING TRIGGER FINGER RELEASE ;  Surgeon: Junior Luna MD;  Location: [facility];  Service: Orthopedics    VARICOSE VEIN SURGERY      Ligation       Current Outpatient Medications:     amLODIPine (NORVASC) 5 mg tablet, Take 1 tablet (5 mg total) by mouth daily, Disp: 90 tablet, Rfl: 1    aspirin (ECOTRIN LOW STRENGTH) 81 mg EC tablet, Take 81 mg by mouth daily  , Disp: , Rfl:     atorvastatin (LIPITOR) 40 mg tablet, Take 1 tablet (40 mg total) by mouth daily at bedtime for 90 days, Disp: 90 tablet, Rfl: 1    Blood Glucose Monitoring Suppl (ONE TOUCH ULTRA 2) w/Device KIT, Test blood glucose 2 times daily (One Touch Ultra 2), Disp: 1 each, Rfl: 0    clopidogrel (PLAVIX) 75 mg tablet, Take 1 tablet (75 mg total) by mouth daily, Disp: 90 tablet, Rfl: 1    glimepiride (AMARYL) 2 mg tablet, Take 2 mg with breakfast and 4 mg in the evening daily  , Disp: 270 tablet, Rfl: 1    glucose blood (ONE TOUCH ULTRA TEST) test strip, Test blood glucose 2 times daily (One Touch Ultra Blue), Disp: 300 each, Rfl: 0    ketoconazole (NIZORAL) 2 % shampoo, Apply 1 application topically 2 (two) times a week, Disp: 120 mL, Rfl: 0    lisinopril-hydrochlorothiazide (PRINZIDE,ZESTORETIC) 20-12 5 MG per tablet, Take 2 tablets by mouth daily, Disp: 60 tablet, Rfl: 4    metFORMIN (GLUCOPHAGE) 1000 MG tablet, Take 2 tablets (2,000 mg total) by mouth daily after dinner, Disp: 180 tablet, Rfl: 1    Omega-3 Fatty Acids (FISH OIL PO), Take by mouth daily  , Disp: , Rfl:     ONETOUCH DELICA LANCETS 16R MISC, Test blood glucose 2 times daily, Disp: 300 each, Rfl: 0    repaglinide (PRANDIN) 1 mg tablet, Take 1 tablet (1 mg total) by mouth 3 (three) times a day before meals, Disp: 90 tablet, Rfl: 4    VITAMIN E PO, Take 1 capsule by mouth daily  , Disp: , Rfl:    prednisoLONE acetate (PRED FORTE) 1 % ophthalmic suspension, Administer 1 drop into the left eye once a week , Disp: , Rfl:     sildenafil (REVATIO) 20 mg tablet, Take 1 tablet (20 mg total) by mouth daily (Patient not taking: Reported on 11/12/2019), Disp: 50 tablet, Rfl: 1  Allergies   Allergen Reactions    Ciprofloxacin Swelling    Janumet [Sitagliptin-Metformin Hcl]      Elevated liver enzymes    Terazosin      Annotation - [id number]: Blood in sperm       Imaging: No results found  Review of Systems:  Review of Systems   Constitutional: Negative  HENT: Negative  Respiratory: Negative  Cardiovascular: Negative  Endocrine: Negative  Musculoskeletal: Negative  Neurological: Negative  Physical Exam:  /70 (BP Location: Left arm, Patient Position: Sitting, Cuff Size: Large)   Pulse 66   Ht 5' 10 32" (1 786 m)   Wt 125 kg (274 lb 12 8 oz)   SpO2 97%   BMI 39 07 kg/m²   Physical Exam   Constitutional: He appears well-developed and well-nourished  No distress  HENT:   Head: Normocephalic and atraumatic  Eyes: Pupils are equal, round, and reactive to light  Conjunctivae are normal  Right eye exhibits no discharge  Left eye exhibits no discharge  No scleral icterus  Neck: Normal range of motion  No JVD present  No tracheal deviation present  No thyromegaly present  Cardiovascular: Normal rate and regular rhythm  Exam reveals no friction rub  No murmur heard  Pulmonary/Chest: Effort normal and breath sounds normal  No stridor  No respiratory distress  Abdominal: Soft  Bowel sounds are normal  He exhibits no distension  There is no tenderness  There is no guarding  Musculoskeletal: Normal range of motion  He exhibits edema (bilateral mild)  He exhibits no tenderness or deformity  Skin: Skin is warm  No rash noted  He is not diaphoretic  No erythema  No pallor

## 2019-11-19 ENCOUNTER — HOSPITAL ENCOUNTER (OUTPATIENT)
Dept: NON INVASIVE DIAGNOSTICS | Facility: CLINIC | Age: 64
Discharge: HOME | End: 2019-11-19
Payer: COMMERCIAL

## 2019-11-19 DIAGNOSIS — I25.10 CORONARY ARTERY DISEASE INVOLVING NATIVE HEART WITHOUT ANGINA PECTORIS, UNSPECIFIED VESSEL OR LESION TYPE: ICD-10-CM

## 2019-11-19 DIAGNOSIS — I10 HYPERTENSION, UNSPECIFIED TYPE: ICD-10-CM

## 2019-11-19 DIAGNOSIS — E78.5 HYPERLIPIDEMIA, UNSPECIFIED HYPERLIPIDEMIA TYPE: ICD-10-CM

## 2019-11-19 LAB
CHEST PAIN STATEMENT: NORMAL
MAX DIASTOLIC BP: 94 MMHG
MAX HEART RATE: 141 BPM
MAX PREDICTED HEART RATE: 156 BPM
MAX. SYSTOLIC BP: 220 MMHG
PROTOCOL NAME: NORMAL
REASON FOR TERMINATION: NORMAL
TARGET HR FORMULA: NORMAL
TEST INDICATION: NORMAL
TIME IN EXERCISE PHASE: NORMAL

## 2019-11-19 PROCEDURE — 93018 CV STRESS TEST I&R ONLY: CPT | Performed by: INTERNAL MEDICINE

## 2019-11-19 PROCEDURE — 93017 CV STRESS TEST TRACING ONLY: CPT

## 2019-11-19 PROCEDURE — 93016 CV STRESS TEST SUPVJ ONLY: CPT | Performed by: INTERNAL MEDICINE

## 2019-11-19 RX ORDER — AMLODIPINE BESYLATE 5 MG/1
10 TABLET ORAL DAILY
Qty: 90 TABLET | Refills: 1 | Status: SHIPPED | OUTPATIENT
Start: 2019-11-19 | End: 2019-11-20 | Stop reason: SDUPTHER

## 2019-11-19 NOTE — PROGRESS NOTES
For elevated blood pressures during exercise with a borderline hypertensive response to stress, will increase amlodipine to 10 mg

## 2019-11-20 ENCOUNTER — TELEPHONE (OUTPATIENT)
Dept: CARDIOLOGY CLINIC | Facility: CLINIC | Age: 64
End: 2019-11-20

## 2019-11-20 DIAGNOSIS — I10 HYPERTENSION, UNSPECIFIED TYPE: ICD-10-CM

## 2019-11-20 NOTE — TELEPHONE ENCOUNTER
Spoke with pt, advised pt of stress test results   Pt agreed to increase dose of amlodipine to 10 mg -take one tablet daily, due to elevated blood pressures during stress test

## 2019-11-20 NOTE — TELEPHONE ENCOUNTER
----- Message from Kelsi Gonzales MD sent at 11/19/2019  2:49 PM EST -----  I will increase amlodipine to 10 mg, currently on 5 mg, for elevated blood pressures during the stress test   Please let him know  Otherwise did not show any suggestion of blockages

## 2019-11-21 ENCOUNTER — OFFICE VISIT (OUTPATIENT)
Dept: INTERNAL MEDICINE CLINIC | Age: 64
End: 2019-11-21

## 2019-11-21 VITALS
SYSTOLIC BLOOD PRESSURE: 140 MMHG | HEART RATE: 63 BPM | TEMPERATURE: 98.5 F | BODY MASS INDEX: 39.37 KG/M2 | WEIGHT: 275 LBS | OXYGEN SATURATION: 98 % | DIASTOLIC BLOOD PRESSURE: 82 MMHG | HEIGHT: 70 IN

## 2019-11-21 DIAGNOSIS — R53.83 FATIGUE, UNSPECIFIED TYPE: ICD-10-CM

## 2019-11-21 DIAGNOSIS — I10 BENIGN ESSENTIAL HYPERTENSION: ICD-10-CM

## 2019-11-21 DIAGNOSIS — E78.5 HYPERLIPIDEMIA, UNSPECIFIED HYPERLIPIDEMIA TYPE: ICD-10-CM

## 2019-11-21 DIAGNOSIS — N52.9 ERECTILE DYSFUNCTION, UNSPECIFIED ERECTILE DYSFUNCTION TYPE: ICD-10-CM

## 2019-11-21 DIAGNOSIS — E11.59 TYPE 2 DIABETES MELLITUS WITH OTHER CIRCULATORY COMPLICATION, WITHOUT LONG-TERM CURRENT USE OF INSULIN (HCC): Primary | ICD-10-CM

## 2019-11-21 PROCEDURE — 99214 OFFICE O/P EST MOD 30 MIN: CPT | Performed by: PHYSICIAN ASSISTANT

## 2019-11-21 RX ORDER — SILDENAFIL CITRATE 20 MG/1
20 TABLET ORAL DAILY
Qty: 50 TABLET | Refills: 1 | Status: SHIPPED | OUTPATIENT
Start: 2019-11-21 | End: 2021-01-12 | Stop reason: SDUPTHER

## 2019-11-21 RX ORDER — REPAGLINIDE 2 MG/1
2 TABLET ORAL
Qty: 270 TABLET | Refills: 2 | Status: SHIPPED | OUTPATIENT
Start: 2019-11-21 | End: 2020-11-20 | Stop reason: SDUPTHER

## 2019-11-21 RX ORDER — AMLODIPINE BESYLATE 10 MG/1
10 TABLET ORAL DAILY
Qty: 30 TABLET | Refills: 11 | Status: SHIPPED | OUTPATIENT
Start: 2019-11-21 | End: 2019-11-27 | Stop reason: SDUPTHER

## 2019-11-21 NOTE — PROGRESS NOTES
Assessment/Plan:         Diagnoses and all orders for this visit:    Type 2 diabetes mellitus with other circulatory complication, without long-term current use of insulin (HCC)  Comments:  increase repaglinide 2mg tid   f/u hga1c and labs 3-4  months   Orders:  -     Microalbumin / creatinine urine ratio  -     repaglinide (PRANDIN) 2 mg tablet; Take 1 tablet (2 mg total) by mouth 3 (three) times a day before meals  -     CBC and differential; Future  -     Comprehensive metabolic panel; Future  -     Lipid panel; Future  -     TSH, 3rd generation; Future  -     HEMOGLOBIN A1C W/ EAG ESTIMATION; Future    Erectile dysfunction, unspecified erectile dysfunction type  -     sildenafil (REVATIO) 20 mg tablet; Take 1 tablet (20 mg total) by mouth daily    Benign essential hypertension  Comments:  controlled  avoid salt in diet   Orders:  -     CBC and differential; Future  -     Comprehensive metabolic panel; Future  -     Lipid panel; Future  -     TSH, 3rd generation; Future  -     HEMOGLOBIN A1C W/ EAG ESTIMATION; Future    Hyperlipidemia, unspecified hyperlipidemia type  -     CBC and differential; Future  -     Lipid panel; Future    Fatigue, unspecified type  -     CBC and differential; Future  -     Comprehensive metabolic panel; Future  -     Lipid panel; Future  -     TSH, 3rd generation; Future  -     HEMOGLOBIN A1C W/ EAG ESTIMATION; Future        Extensively discussed exercise with pt, needs to dedicate 30 min / day to aerobic exercise at least 4-5 / day  Discussed need to start insulin if hga1c continues to rise  Pt is motivated to decrease weight   Subjective:      Patient ID: Anjel Bradshaw is a 59 y o  male  58 y/o male with history of HTN, HLD, DM, arthritis, BARBI, and radiculopathy presents for his follow up  He occasionally checks his BP at home, usually 140s/80s  He is taking his BP medications every day  He does not check his sugars  He does take his medications    He occasionally has some joint pain in his fingers and hips but is doing well  He has a 15 pack year smoking history but does not smoke currently     Pt is active but not exercising reg         Recent Results (from the past 1344 hour(s))  -CBC and differential  Collection Time: 10/21/19 11:47 AM       Result                      Value             Ref Range           WBC                         5 59              4 31 - 10 16*       RBC                         4 72              3 88 - 5 62 *       Hemoglobin                  15 0              12 0 - 17 0 *       Hematocrit                  45 2              36 5 - 49 3 %       MCV                         96                82 - 98 fL          MCH                         31 8              26 8 - 34 3 *       MCHC                        33 2              31 4 - 37 4 *       RDW                         14 8              11 6 - 15 1 %       MPV                         10 4              8 9 - 12 7 fL       Platelets                   153               149 - 390 Th*       nRBC                        0                 /100 WBCs           Neutrophils Relative        68                43 - 75 %           Immat GRANS %               1                 0 - 2 %             Lymphocytes Relative        21                14 - 44 %           Monocytes Relative          8                 4 - 12 %            Eosinophils Relative        1                 0 - 6 %             Basophils Relative          1                 0 - 1 %             Neutrophils Absolute        3 87              1 85 - 7 62 *       Immature Grans Absolute     0 03              0 00 - 0 20 *       Lymphocytes Absolute        1 16              0 60 - 4 47 *       Monocytes Absolute          0 43              0 17 - 1 22 *       Eosinophils Absolute        0 07              0 00 - 0 61 *       Basophils Absolute          0 03              0 00 - 0 10 *  -Comprehensive metabolic panel  Collection Time: 10/21/19 11:47 AM       Result Value             Ref Range           Sodium                      141               136 - 145 mm*       Potassium                   3 7               3 5 - 5 3 mm*       Chloride                    103               100 - 108 mm*       CO2                         28                21 - 32 mmol*       ANION GAP                   10                4 - 13 mmol/L       BUN                         17                5 - 25 mg/dL        Creatinine                  0 97              0 60 - 1 30 *       Glucose, Fasting            211 (H)           65 - 99 mg/dL       Calcium                     9 3               8 3 - 10 1 m*       AST                         15                5 - 45 U/L          ALT                         28                12 - 78 U/L         Alkaline Phosphatase        65                46 - 116 U/L        Total Protein               7 0               6 4 - 8 2 g/*       Albumin                     3 7               3 5 - 5 0 g/*       Total Bilirubin             0 60              0 20 - 1 00 *       eGFR                        82                ml/min/1 73s*  -Lipid panel  Collection Time: 10/21/19 11:47 AM       Result                      Value             Ref Range           Cholesterol                 119               50 - 200 mg/*       Triglycerides               164 (H)           <=150 mg/dL         HDL, Direct                 36 (L)            >=40 mg/dL          LDL Calculated              50                0 - 100 mg/dL       Non-HDL-Chol (CHOL-HDL)     83                mg/dl          -HEMOGLOBIN A1C W/ EAG ESTIMATION  Collection Time: 10/21/19 11:47 AM       Result                      Value             Ref Range           Hemoglobin A1C              7 6 (H)           4 2 - 6 3 %         EAG                         171               mg/dl                                                      The following portions of the patient's history were reviewed and updated as appropriate: allergies, current medications, past family history, past medical history, past social history, past surgical history and problem list     Review of Systems   Constitutional: Negative for activity change, appetite change, chills, fatigue and fever  HENT: Negative for congestion, hearing loss and postnasal drip  Eyes: Negative for itching and visual disturbance  Respiratory: Negative for cough, shortness of breath and wheezing  Cardiovascular: Negative for chest pain, palpitations and leg swelling  Gastrointestinal: Negative for abdominal pain, constipation, diarrhea and nausea  Genitourinary: Negative for dysuria, hematuria and urgency  Musculoskeletal: Negative for arthralgias, back pain, gait problem and myalgias  Skin: Negative for rash  Allergic/Immunologic: Negative for environmental allergies  Neurological: Negative for dizziness, speech difficulty, light-headedness and headaches  Hematological: Does not bruise/bleed easily  Psychiatric/Behavioral: Negative for sleep disturbance  The patient is not nervous/anxious  Past Medical History:   Diagnosis Date    Arthritis     knee    Coronary artery disease     Diabetes mellitus [address] [address] )     H/O angioplasty     Hypercholesteremia     Hypertension     Varicose veins of lower extremity          Current Outpatient Medications:     amLODIPine (NORVASC) 5 mg tablet, Take 2 tablets (10 mg total) by mouth daily, Disp: 90 tablet, Rfl: 1    aspirin (ECOTRIN LOW STRENGTH) 81 mg EC tablet, Take 81 mg by mouth daily  , Disp: , Rfl:     atorvastatin (LIPITOR) 40 mg tablet, Take 1 tablet (40 mg total) by mouth daily at bedtime for 90 days, Disp: 90 tablet, Rfl: 1    Blood Glucose Monitoring Suppl (ONE TOUCH ULTRA 2) w/Device KIT, Test blood glucose 2 times daily (One Touch Ultra 2), Disp: 1 each, Rfl: 0    clopidogrel (PLAVIX) 75 mg tablet, Take 1 tablet (75 mg total) by mouth daily, Disp: 90 tablet, Rfl: 1    glimepiride (AMARYL) 2 mg tablet, Take 2 mg with breakfast and 4 mg in the evening daily  , Disp: 270 tablet, Rfl: 1    glucose blood (ONE TOUCH ULTRA TEST) test strip, Test blood glucose 2 times daily (One Touch Ultra Blue), Disp: 300 each, Rfl: 0    ketoconazole (NIZORAL) 2 % shampoo, Apply 1 application topically 2 (two) times a week, Disp: 120 mL, Rfl: 0    lisinopril-hydrochlorothiazide (PRINZIDE,ZESTORETIC) 20-12 5 MG per tablet, Take 2 tablets by mouth daily, Disp: 60 tablet, Rfl: 4    metFORMIN (GLUCOPHAGE) 1000 MG tablet, Take 2 tablets (2,000 mg total) by mouth daily after dinner, Disp: 180 tablet, Rfl: 1    Omega-3 Fatty Acids (FISH OIL PO), Take by mouth daily  , Disp: , Rfl:     ONETOUCH DELICA LANCETS 34G MISC, Test blood glucose 2 times daily, Disp: 300 each, Rfl: 0    prednisoLONE acetate (PRED FORTE) 1 % ophthalmic suspension, Administer 1 drop into the left eye once a week , Disp: , Rfl:     repaglinide (PRANDIN) 2 mg tablet, Take 1 tablet (2 mg total) by mouth 3 (three) times a day before meals, Disp: 270 tablet, Rfl: 2    sildenafil (REVATIO) 20 mg tablet, Take 1 tablet (20 mg total) by mouth daily, Disp: 50 tablet, Rfl: 1    VITAMIN E PO, Take 1 capsule by mouth daily  , Disp: , Rfl:     Allergies   Allergen Reactions    Ciprofloxacin Swelling    Janumet [Sitagliptin-Metformin Hcl]      Elevated liver enzymes    Terazosin      Annotation - [id number]: Blood in sperm       Social History   Past Surgical History:   Procedure Laterality Date    CARDIAC CATHETERIZATION      CARDIAC SURGERY      CHOLECYSTECTOMY  01/01/2010    COLONOSCOPY      CORONARY ANGIOPLASTY WITH STENT PLACEMENT      EYE SURGERY Left     ocular lens implant    GALLBLADDER SURGERY      HERNIA REPAIR  01/01/2009    KNEE ARTHROSCOPY W/ MENISCAL REPAIR Right     KNEE SURGERY  09/10/2015    NE INCISE FINGER TENDON SHEATH Left 8/25/2016    Procedure: RING TRIGGER FINGER RELEASE ;  Surgeon: Bernice Robin MD;  Location:  MAIN OR; Service: Orthopedics    VARICOSE VEIN SURGERY      Ligation     Family History   Problem Relation Age of Onset   Glory Motta Cancer Mother     Coronary artery disease Mother     Heart disease Father     Early death Father     Coronary artery disease Father     Diabetes Father     Hypertension Father     Coronary artery disease Brother     Hyperlipidemia Brother     Hypertension Brother        Objective:  /82 (BP Location: Left arm, Patient Position: Sitting, Cuff Size: Large)   Pulse 63   Temp 98 5 °F (36 9 °C) (Tympanic)   Ht 5' 10 32" (1 786 m)   Wt 125 kg (275 lb) Comment: shoes on  SpO2 98%   BMI 39 10 kg/m²        Physical Exam   Constitutional: He is oriented to person, place, and time  He appears well-developed and well-nourished  No distress  HENT:   Head: Normocephalic and atraumatic  Right Ear: External ear normal    Left Ear: External ear normal    Nose: Nose normal    Mouth/Throat: Oropharynx is clear and moist  No oropharyngeal exudate  Eyes: Pupils are equal, round, and reactive to light  Conjunctivae and EOM are normal    Neck: Normal range of motion  Cardiovascular: Normal rate, regular rhythm, normal heart sounds and intact distal pulses  No murmur heard  Pulmonary/Chest: Effort normal and breath sounds normal  No respiratory distress  He has no wheezes  He has no rales  Abdominal: Soft  Bowel sounds are normal  There is no tenderness  Musculoskeletal: Normal range of motion  He exhibits no edema or deformity  Lymphadenopathy:     He has no cervical adenopathy  Neurological: He is alert and oriented to person, place, and time  No cranial nerve deficit  Coordination normal    Skin: Skin is warm and dry  No rash noted  No erythema  Psychiatric: He has a normal mood and affect  His behavior is normal  Judgment and thought content normal    Vitals reviewed

## 2019-11-27 DIAGNOSIS — I10 HYPERTENSION, UNSPECIFIED TYPE: ICD-10-CM

## 2019-11-27 RX ORDER — AMLODIPINE BESYLATE 10 MG/1
10 TABLET ORAL DAILY
Qty: 90 TABLET | Refills: 2 | Status: SHIPPED | OUTPATIENT
Start: 2019-11-27 | End: 2020-12-10 | Stop reason: SDUPTHER

## 2019-11-29 DIAGNOSIS — E78.5 HYPERLIPIDEMIA, UNSPECIFIED HYPERLIPIDEMIA TYPE: ICD-10-CM

## 2019-11-29 DIAGNOSIS — E13.9 DIABETES 1.5, MANAGED AS TYPE 2 (HCC): ICD-10-CM

## 2019-11-29 RX ORDER — GLIMEPIRIDE 2 MG/1
TABLET ORAL
Qty: 270 TABLET | Refills: 1 | Status: SHIPPED | OUTPATIENT
Start: 2019-11-29 | End: 2020-02-26

## 2019-11-29 RX ORDER — ATORVASTATIN CALCIUM 40 MG/1
40 TABLET, FILM COATED ORAL
Qty: 90 TABLET | Refills: 1 | Status: SHIPPED | OUTPATIENT
Start: 2019-11-29 | End: 2020-05-26 | Stop reason: SDUPTHER

## 2020-01-06 DIAGNOSIS — I10 BENIGN ESSENTIAL HYPERTENSION: ICD-10-CM

## 2020-01-06 RX ORDER — LISINOPRIL AND HYDROCHLOROTHIAZIDE 20; 12.5 MG/1; MG/1
2 TABLET ORAL DAILY
Qty: 90 TABLET | Refills: 1 | Status: SHIPPED | OUTPATIENT
Start: 2020-01-06 | End: 2020-02-26 | Stop reason: SDUPTHER

## 2020-02-21 ENCOUNTER — APPOINTMENT (OUTPATIENT)
Dept: LAB | Facility: CLINIC | Age: 65
End: 2020-02-21
Payer: MEDICARE

## 2020-02-21 ENCOUNTER — TRANSCRIBE ORDERS (OUTPATIENT)
Dept: LAB | Facility: CLINIC | Age: 65
End: 2020-02-21

## 2020-02-21 DIAGNOSIS — I10 BENIGN ESSENTIAL HYPERTENSION: ICD-10-CM

## 2020-02-21 DIAGNOSIS — R53.83 FATIGUE, UNSPECIFIED TYPE: ICD-10-CM

## 2020-02-21 DIAGNOSIS — E78.5 HYPERLIPIDEMIA, UNSPECIFIED HYPERLIPIDEMIA TYPE: ICD-10-CM

## 2020-02-21 DIAGNOSIS — E11.59 TYPE 2 DIABETES MELLITUS WITH OTHER CIRCULATORY COMPLICATION, WITHOUT LONG-TERM CURRENT USE OF INSULIN (HCC): ICD-10-CM

## 2020-02-21 LAB
ALBUMIN SERPL BCP-MCNC: 3.7 G/DL (ref 3.5–5)
ALP SERPL-CCNC: 59 U/L (ref 46–116)
ALT SERPL W P-5'-P-CCNC: 41 U/L (ref 12–78)
ANION GAP SERPL CALCULATED.3IONS-SCNC: 7 MMOL/L (ref 4–13)
AST SERPL W P-5'-P-CCNC: 15 U/L (ref 5–45)
BASOPHILS # BLD AUTO: 0.03 THOUSANDS/ΜL (ref 0–0.1)
BASOPHILS NFR BLD AUTO: 1 % (ref 0–1)
BILIRUB SERPL-MCNC: 0.97 MG/DL (ref 0.2–1)
BUN SERPL-MCNC: 21 MG/DL (ref 5–25)
CALCIUM SERPL-MCNC: 9 MG/DL (ref 8.3–10.1)
CHLORIDE SERPL-SCNC: 102 MMOL/L (ref 100–108)
CHOLEST SERPL-MCNC: 95 MG/DL (ref 50–200)
CO2 SERPL-SCNC: 28 MMOL/L (ref 21–32)
CREAT SERPL-MCNC: 1 MG/DL (ref 0.6–1.3)
CREAT UR-MCNC: 131 MG/DL
EOSINOPHIL # BLD AUTO: 0.05 THOUSAND/ΜL (ref 0–0.61)
EOSINOPHIL NFR BLD AUTO: 1 % (ref 0–6)
ERYTHROCYTE [DISTWIDTH] IN BLOOD BY AUTOMATED COUNT: 15.3 % (ref 11.6–15.1)
EST. AVERAGE GLUCOSE BLD GHB EST-MCNC: 180 MG/DL
GFR SERPL CREATININE-BSD FRML MDRD: 79 ML/MIN/1.73SQ M
GLUCOSE P FAST SERPL-MCNC: 204 MG/DL (ref 65–99)
HBA1C MFR BLD: 7.9 %
HCT VFR BLD AUTO: 44.8 % (ref 36.5–49.3)
HDLC SERPL-MCNC: 36 MG/DL
HGB BLD-MCNC: 15 G/DL (ref 12–17)
IMM GRANULOCYTES # BLD AUTO: 0.03 THOUSAND/UL (ref 0–0.2)
IMM GRANULOCYTES NFR BLD AUTO: 1 % (ref 0–2)
LDLC SERPL CALC-MCNC: 27 MG/DL (ref 0–100)
LYMPHOCYTES # BLD AUTO: 1.13 THOUSANDS/ΜL (ref 0.6–4.47)
LYMPHOCYTES NFR BLD AUTO: 20 % (ref 14–44)
MCH RBC QN AUTO: 31.5 PG (ref 26.8–34.3)
MCHC RBC AUTO-ENTMCNC: 33.5 G/DL (ref 31.4–37.4)
MCV RBC AUTO: 94 FL (ref 82–98)
MICROALBUMIN UR-MCNC: 103 MG/L (ref 0–20)
MICROALBUMIN/CREAT 24H UR: 79 MG/G CREATININE (ref 0–30)
MONOCYTES # BLD AUTO: 0.46 THOUSAND/ΜL (ref 0.17–1.22)
MONOCYTES NFR BLD AUTO: 8 % (ref 4–12)
NEUTROPHILS # BLD AUTO: 4.08 THOUSANDS/ΜL (ref 1.85–7.62)
NEUTS SEG NFR BLD AUTO: 69 % (ref 43–75)
NONHDLC SERPL-MCNC: 59 MG/DL
NRBC BLD AUTO-RTO: 0 /100 WBCS
PLATELET # BLD AUTO: 146 THOUSANDS/UL (ref 149–390)
PMV BLD AUTO: 9.9 FL (ref 8.9–12.7)
POTASSIUM SERPL-SCNC: 4.4 MMOL/L (ref 3.5–5.3)
PROT SERPL-MCNC: 6.9 G/DL (ref 6.4–8.2)
RBC # BLD AUTO: 4.76 MILLION/UL (ref 3.88–5.62)
SODIUM SERPL-SCNC: 137 MMOL/L (ref 136–145)
TRIGL SERPL-MCNC: 162 MG/DL
TSH SERPL DL<=0.05 MIU/L-ACNC: 0.88 UIU/ML (ref 0.36–3.74)
WBC # BLD AUTO: 5.78 THOUSAND/UL (ref 4.31–10.16)

## 2020-02-21 PROCEDURE — 80061 LIPID PANEL: CPT

## 2020-02-21 PROCEDURE — 36415 COLL VENOUS BLD VENIPUNCTURE: CPT

## 2020-02-21 PROCEDURE — 82043 UR ALBUMIN QUANTITATIVE: CPT | Performed by: PHYSICIAN ASSISTANT

## 2020-02-21 PROCEDURE — 85025 COMPLETE CBC W/AUTO DIFF WBC: CPT

## 2020-02-21 PROCEDURE — 80053 COMPREHEN METABOLIC PANEL: CPT

## 2020-02-21 PROCEDURE — 83036 HEMOGLOBIN GLYCOSYLATED A1C: CPT

## 2020-02-21 PROCEDURE — 84443 ASSAY THYROID STIM HORMONE: CPT

## 2020-02-21 PROCEDURE — 82570 ASSAY OF URINE CREATININE: CPT | Performed by: PHYSICIAN ASSISTANT

## 2020-02-26 ENCOUNTER — OFFICE VISIT (OUTPATIENT)
Dept: INTERNAL MEDICINE CLINIC | Age: 65
End: 2020-02-26
Payer: MEDICARE

## 2020-02-26 VITALS
BODY MASS INDEX: 38.08 KG/M2 | SYSTOLIC BLOOD PRESSURE: 142 MMHG | HEART RATE: 59 BPM | HEIGHT: 71 IN | TEMPERATURE: 98.1 F | WEIGHT: 272 LBS | DIASTOLIC BLOOD PRESSURE: 66 MMHG | OXYGEN SATURATION: 95 %

## 2020-02-26 DIAGNOSIS — I10 BENIGN ESSENTIAL HYPERTENSION: ICD-10-CM

## 2020-02-26 DIAGNOSIS — E13.9 DIABETES 1.5, MANAGED AS TYPE 2 (HCC): ICD-10-CM

## 2020-02-26 DIAGNOSIS — E11.9 NON-INSULIN DEPENDENT TYPE 2 DIABETES MELLITUS (HCC): ICD-10-CM

## 2020-02-26 DIAGNOSIS — E66.01 CLASS 2 SEVERE OBESITY DUE TO EXCESS CALORIES WITH SERIOUS COMORBIDITY AND BODY MASS INDEX (BMI) OF 39.0 TO 39.9 IN ADULT (HCC): ICD-10-CM

## 2020-02-26 DIAGNOSIS — E78.5 HYPERLIPIDEMIA, UNSPECIFIED HYPERLIPIDEMIA TYPE: Primary | ICD-10-CM

## 2020-02-26 PROBLEM — I20.89 EXERCISE-INDUCED ANGINA: Status: ACTIVE | Noted: 2020-02-26

## 2020-02-26 PROBLEM — E66.812 CLASS 2 SEVERE OBESITY DUE TO EXCESS CALORIES WITH SERIOUS COMORBIDITY AND BODY MASS INDEX (BMI) OF 39.0 TO 39.9 IN ADULT (HCC): Status: ACTIVE | Noted: 2020-02-26

## 2020-02-26 PROBLEM — I20.8 EXERCISE-INDUCED ANGINA (HCC): Status: ACTIVE | Noted: 2020-02-26

## 2020-02-26 PROCEDURE — 4040F PNEUMOC VAC/ADMIN/RCVD: CPT | Performed by: PHYSICIAN ASSISTANT

## 2020-02-26 PROCEDURE — 4010F ACE/ARB THERAPY RXD/TAKEN: CPT | Performed by: PHYSICIAN ASSISTANT

## 2020-02-26 PROCEDURE — 3078F DIAST BP <80 MM HG: CPT | Performed by: PHYSICIAN ASSISTANT

## 2020-02-26 PROCEDURE — 3008F BODY MASS INDEX DOCD: CPT | Performed by: PHYSICIAN ASSISTANT

## 2020-02-26 PROCEDURE — 3051F HG A1C>EQUAL 7.0%<8.0%: CPT | Performed by: PHYSICIAN ASSISTANT

## 2020-02-26 PROCEDURE — 1036F TOBACCO NON-USER: CPT | Performed by: PHYSICIAN ASSISTANT

## 2020-02-26 PROCEDURE — 3060F POS MICROALBUMINURIA REV: CPT | Performed by: PHYSICIAN ASSISTANT

## 2020-02-26 PROCEDURE — 3077F SYST BP >= 140 MM HG: CPT | Performed by: PHYSICIAN ASSISTANT

## 2020-02-26 PROCEDURE — 99213 OFFICE O/P EST LOW 20 MIN: CPT | Performed by: PHYSICIAN ASSISTANT

## 2020-02-26 RX ORDER — LISINOPRIL AND HYDROCHLOROTHIAZIDE 20; 12.5 MG/1; MG/1
2 TABLET ORAL DAILY
Qty: 180 TABLET | Refills: 1 | Status: SHIPPED | OUTPATIENT
Start: 2020-02-26 | End: 2020-10-06 | Stop reason: SDUPTHER

## 2020-02-26 RX ORDER — GLIMEPIRIDE 4 MG/1
TABLET ORAL
Qty: 90 TABLET | Refills: 1 | Status: SHIPPED | OUTPATIENT
Start: 2020-02-26 | End: 2020-12-10 | Stop reason: SDUPTHER

## 2020-02-26 NOTE — PROGRESS NOTES
Assessment/Plan:         Diagnoses and all orders for this visit:    Hyperlipidemia, unspecified hyperlipidemia type  Comments:  low chol diet, continue statin   Orders:  -     Comprehensive metabolic panel; Future  -     CBC and differential; Future  -     HEMOGLOBIN A1C W/ EAG ESTIMATION; Future    Benign essential hypertension  Comments:  continue lisinopril to 40/25mg (will take 2 20/12  5)   Orders:  -     lisinopril-hydrochlorothiazide (PRINZIDE,ZESTORETIC) 20-12 5 MG per tablet; Take 2 tablets by mouth daily  -     Comprehensive metabolic panel; Future  -     CBC and differential; Future  -     HEMOGLOBIN A1C W/ EAG ESTIMATION; Future    Non-insulin dependent type 2 diabetes mellitus ([facility] [address] )  Comments:  noncompliant with diet, discussed increased exercise with goal 30 min / day   hga1c increased  increase glimepiride to 4mg daily   Orders:  -     metFORMIN (GLUCOPHAGE) 1000 MG tablet; Take 2 tablets (2,000 mg total) by mouth daily after dinner  -     Comprehensive metabolic panel; Future  -     CBC and differential; Future  -     HEMOGLOBIN A1C W/ EAG ESTIMATION; Future    Diabetes 1 5, managed as type 2 (HCC)  -     glimepiride (AMARYL) 4 mg tablet; Take 2 mg with breakfast and 4 mg in the evening daily  Class 2 severe obesity due to excess calories with serious comorbidity and body mass index (BMI) of 39 0 to 39 9 in [facility])  Comments:  discussed increasing exercise on daily basis           Subjective:      Patient ID: Laina Crum is a 72 y o  male      71 y/o male presents for dm, htn, dyslipidemia   Labs reviewed 2/11/20  hga1c 7 9  Pt not checking glucose at home  Wife reports he has not been watching diet well   Pt is not very active, he does golf and does this once weekly typically           The following portions of the patient's history were reviewed and updated as appropriate: allergies, current medications, past family history, past medical history, past social history, past surgical history and problem list     Review of Systems   Constitutional: Negative for appetite change, chills, fatigue and fever  HENT: Negative for congestion, hearing loss and postnasal drip  Eyes: Negative for itching and visual disturbance  Respiratory: Negative for cough, shortness of breath and wheezing  Cardiovascular: Negative for chest pain, palpitations and leg swelling  Gastrointestinal: Negative for abdominal pain, constipation, diarrhea and nausea  Genitourinary: Negative for dysuria, hematuria and urgency  Musculoskeletal: Positive for arthralgias (L hip )  Negative for back pain, gait problem and myalgias  Skin: Negative for rash  Allergic/Immunologic: Negative for environmental allergies  Neurological: Negative for dizziness, speech difficulty, light-headedness and headaches  Hematological: Does not bruise/bleed easily  Psychiatric/Behavioral: Negative for sleep disturbance  The patient is not nervous/anxious  Past Medical History:   Diagnosis Date    Arthritis     knee    Coronary artery disease     Diabetes mellitus ([facility] [address] )     H/O angioplasty     Hypercholesteremia     Hypertension     Varicose veins of lower extremity          Current Outpatient Medications:     amLODIPine (NORVASC) 10 mg tablet, Take 1 tablet (10 mg total) by mouth daily, Disp: 90 tablet, Rfl: 2    aspirin (ECOTRIN LOW STRENGTH) 81 mg EC tablet, Take 81 mg by mouth daily  , Disp: , Rfl:     atorvastatin (LIPITOR) 40 mg tablet, Take 1 tablet (40 mg total) by mouth daily at bedtime, Disp: 90 tablet, Rfl: 1    Blood Glucose Monitoring Suppl (ONE TOUCH ULTRA 2) w/Device KIT, Test blood glucose 2 times daily (One Touch Ultra 2), Disp: 1 each, Rfl: 0    clopidogrel (PLAVIX) 75 mg tablet, Take 1 tablet (75 mg total) by mouth daily, Disp: 90 tablet, Rfl: 1    glimepiride (AMARYL) 4 mg tablet, Take 2 mg with breakfast and 4 mg in the evening daily  , Disp: 90 tablet, Rfl: 1    glucose blood (ONE TOUCH ULTRA TEST) test strip, Test blood glucose 2 times daily (One Touch Ultra Blue), Disp: 300 each, Rfl: 0    ketoconazole (NIZORAL) 2 % shampoo, Apply 1 application topically 2 (two) times a week, Disp: 120 mL, Rfl: 0    lisinopril-hydrochlorothiazide (PRINZIDE,ZESTORETIC) 20-12 5 MG per tablet, Take 2 tablets by mouth daily, Disp: 180 tablet, Rfl: 1    metFORMIN (GLUCOPHAGE) 1000 MG tablet, Take 2 tablets (2,000 mg total) by mouth daily after dinner, Disp: 180 tablet, Rfl: 1    Omega-3 Fatty Acids (FISH OIL PO), Take by mouth daily  , Disp: , Rfl:     ONETOUCH DELICA LANCETS 16B MISC, Test blood glucose 2 times daily, Disp: 300 each, Rfl: 0    prednisoLONE acetate (PRED FORTE) 1 % ophthalmic suspension, Administer 1 drop into the left eye once a week , Disp: , Rfl:     repaglinide (PRANDIN) 2 mg tablet, Take 1 tablet (2 mg total) by mouth 3 (three) times a day before meals, Disp: 270 tablet, Rfl: 2    sildenafil (REVATIO) 20 mg tablet, Take 1 tablet (20 mg total) by mouth daily, Disp: 50 tablet, Rfl: 1    VITAMIN E PO, Take 1 capsule by mouth daily  , Disp: , Rfl:     Allergies   Allergen Reactions    Ciprofloxacin Swelling    Janumet [Sitagliptin-Metformin Hcl]      Elevated liver enzymes    Terazosin      Annotation - [id number]: Blood in sperm       Social History   Past Surgical History:   Procedure Laterality Date    CARDIAC CATHETERIZATION      CARDIAC SURGERY      CHOLECYSTECTOMY  01/01/2010    COLONOSCOPY      CORONARY ANGIOPLASTY WITH STENT PLACEMENT      EYE SURGERY Left     ocular lens implant    GALLBLADDER SURGERY      HERNIA REPAIR  01/01/2009    KNEE ARTHROSCOPY W/ MENISCAL REPAIR Right     KNEE SURGERY  09/10/2015    MI INCISE FINGER TENDON SHEATH Left 8/25/2016    Procedure: RING TRIGGER FINGER RELEASE ;  Surgeon: Rafael Rodrigues MD;  Location:  MAIN OR;  Service: Orthopedics    VARICOSE VEIN SURGERY      Ligation     Family History   Problem Relation Age of Onset    Cancer Mother     Coronary artery disease Mother     Heart disease Father     Early death Father     Coronary artery disease Father     Diabetes Father     Hypertension Father     Coronary artery disease Brother     Hyperlipidemia Brother     Hypertension Brother        Objective:  /66 (BP Location: Left arm, Patient Position: Sitting, Cuff Size: Standard)   Pulse 59   Temp 98 1 °F (36 7 °C) (Tympanic)   Ht 5' 11" (1 803 m) Comment: shoes off  Wt 123 kg (272 lb) Comment: shoes off  SpO2 95%   BMI 37 94 kg/m²        Physical Exam   Constitutional: He is oriented to person, place, and time  He appears well-developed and well-nourished  No distress  HENT:   Head: Normocephalic and atraumatic  Right Ear: External ear normal    Left Ear: External ear normal    Nose: Nose normal    Mouth/Throat: Oropharynx is clear and moist    Eyes: Pupils are equal, round, and reactive to light  Conjunctivae and EOM are normal  Right eye exhibits no discharge  Left eye exhibits no discharge  Neck: Normal range of motion  Cardiovascular: Normal rate, regular rhythm and normal heart sounds  No murmur heard  Pulmonary/Chest: Effort normal and breath sounds normal  No respiratory distress  He has no wheezes  He has no rales  Abdominal: Soft  Bowel sounds are normal  There is no tenderness  Musculoskeletal: Normal range of motion  He exhibits edema (trace LE edema) and deformity (L hip pain with flexion )  Lymphadenopathy:     He has no cervical adenopathy  Neurological: He is alert and oriented to person, place, and time  No cranial nerve deficit  Coordination normal    Skin: Skin is warm and dry  No rash noted  Psychiatric: He has a normal mood and affect  His behavior is normal    Vitals reviewed

## 2020-03-17 ENCOUNTER — TELEPHONE (OUTPATIENT)
Dept: INTERNAL MEDICINE CLINIC | Age: 65
End: 2020-03-17

## 2020-03-17 NOTE — TELEPHONE ENCOUNTER
Received a fax from [facility] for clearance for patient on the Plavix prior to his endoscopy  Patient was seen by his cardiologist and per Dr Bernadette Vargas to fax the form over to the Cardiologist office      I faxed it to [facility] Cardiologist office for them to fill out this form for this patient prior to his procedure

## 2020-03-24 ENCOUNTER — TELEPHONE (OUTPATIENT)
Dept: CARDIOLOGY CLINIC | Facility: CLINIC | Age: 65
End: 2020-03-24

## 2020-04-10 DIAGNOSIS — I25.10 CORONARY ARTERY DISEASE INVOLVING NATIVE HEART WITHOUT ANGINA PECTORIS, UNSPECIFIED VESSEL OR LESION TYPE: ICD-10-CM

## 2020-04-10 RX ORDER — CLOPIDOGREL BISULFATE 75 MG/1
75 TABLET ORAL DAILY
Qty: 90 TABLET | Refills: 1 | Status: SHIPPED | OUTPATIENT
Start: 2020-04-10 | End: 2020-10-30 | Stop reason: SDUPTHER

## 2020-05-26 DIAGNOSIS — E78.5 HYPERLIPIDEMIA, UNSPECIFIED HYPERLIPIDEMIA TYPE: ICD-10-CM

## 2020-05-27 RX ORDER — ATORVASTATIN CALCIUM 40 MG/1
40 TABLET, FILM COATED ORAL
Qty: 90 TABLET | Refills: 1 | Status: SHIPPED | OUTPATIENT
Start: 2020-05-27 | End: 2020-11-20 | Stop reason: SDUPTHER

## 2020-06-15 ENCOUNTER — TELEPHONE (OUTPATIENT)
Dept: OTHER | Facility: OTHER | Age: 65
End: 2020-06-15

## 2020-07-08 ENCOUNTER — TRANSCRIBE ORDERS (OUTPATIENT)
Dept: LAB | Facility: CLINIC | Age: 65
End: 2020-07-08

## 2020-07-08 ENCOUNTER — APPOINTMENT (OUTPATIENT)
Dept: LAB | Facility: CLINIC | Age: 65
End: 2020-07-08
Payer: MEDICARE

## 2020-07-08 DIAGNOSIS — I10 BENIGN ESSENTIAL HYPERTENSION: ICD-10-CM

## 2020-07-08 DIAGNOSIS — E78.5 HYPERLIPIDEMIA, UNSPECIFIED HYPERLIPIDEMIA TYPE: ICD-10-CM

## 2020-07-08 DIAGNOSIS — E11.9 NON-INSULIN DEPENDENT TYPE 2 DIABETES MELLITUS (HCC): ICD-10-CM

## 2020-07-08 LAB
ALBUMIN SERPL BCP-MCNC: 3.9 G/DL (ref 3.5–5)
ALP SERPL-CCNC: 48 U/L (ref 46–116)
ALT SERPL W P-5'-P-CCNC: 31 U/L (ref 12–78)
ANION GAP SERPL CALCULATED.3IONS-SCNC: 10 MMOL/L (ref 4–13)
AST SERPL W P-5'-P-CCNC: 19 U/L (ref 5–45)
BASOPHILS # BLD AUTO: 0.02 THOUSANDS/ΜL (ref 0–0.1)
BASOPHILS NFR BLD AUTO: 0 % (ref 0–1)
BILIRUB SERPL-MCNC: 0.94 MG/DL (ref 0.2–1)
BUN SERPL-MCNC: 25 MG/DL (ref 5–25)
CALCIUM SERPL-MCNC: 9 MG/DL (ref 8.3–10.1)
CHLORIDE SERPL-SCNC: 101 MMOL/L (ref 100–108)
CO2 SERPL-SCNC: 27 MMOL/L (ref 21–32)
CREAT SERPL-MCNC: 0.99 MG/DL (ref 0.6–1.3)
EOSINOPHIL # BLD AUTO: 0.06 THOUSAND/ΜL (ref 0–0.61)
EOSINOPHIL NFR BLD AUTO: 1 % (ref 0–6)
ERYTHROCYTE [DISTWIDTH] IN BLOOD BY AUTOMATED COUNT: 15 % (ref 11.6–15.1)
EST. AVERAGE GLUCOSE BLD GHB EST-MCNC: 146 MG/DL
GFR SERPL CREATININE-BSD FRML MDRD: 80 ML/MIN/1.73SQ M
GLUCOSE P FAST SERPL-MCNC: 165 MG/DL (ref 65–99)
HBA1C MFR BLD: 6.7 %
HCT VFR BLD AUTO: 43.7 % (ref 36.5–49.3)
HGB BLD-MCNC: 14.7 G/DL (ref 12–17)
IMM GRANULOCYTES # BLD AUTO: 0.03 THOUSAND/UL (ref 0–0.2)
IMM GRANULOCYTES NFR BLD AUTO: 1 % (ref 0–2)
LYMPHOCYTES # BLD AUTO: 1.19 THOUSANDS/ΜL (ref 0.6–4.47)
LYMPHOCYTES NFR BLD AUTO: 23 % (ref 14–44)
MCH RBC QN AUTO: 31.8 PG (ref 26.8–34.3)
MCHC RBC AUTO-ENTMCNC: 33.6 G/DL (ref 31.4–37.4)
MCV RBC AUTO: 95 FL (ref 82–98)
MONOCYTES # BLD AUTO: 0.59 THOUSAND/ΜL (ref 0.17–1.22)
MONOCYTES NFR BLD AUTO: 11 % (ref 4–12)
NEUTROPHILS # BLD AUTO: 3.31 THOUSANDS/ΜL (ref 1.85–7.62)
NEUTS SEG NFR BLD AUTO: 64 % (ref 43–75)
NRBC BLD AUTO-RTO: 0 /100 WBCS
PLATELET # BLD AUTO: 135 THOUSANDS/UL (ref 149–390)
PMV BLD AUTO: 10.2 FL (ref 8.9–12.7)
POTASSIUM SERPL-SCNC: 3.8 MMOL/L (ref 3.5–5.3)
PROT SERPL-MCNC: 6.9 G/DL (ref 6.4–8.2)
RBC # BLD AUTO: 4.62 MILLION/UL (ref 3.88–5.62)
SODIUM SERPL-SCNC: 138 MMOL/L (ref 136–145)
WBC # BLD AUTO: 5.2 THOUSAND/UL (ref 4.31–10.16)

## 2020-07-08 PROCEDURE — 36415 COLL VENOUS BLD VENIPUNCTURE: CPT

## 2020-07-08 PROCEDURE — 83036 HEMOGLOBIN GLYCOSYLATED A1C: CPT

## 2020-07-08 PROCEDURE — 85025 COMPLETE CBC W/AUTO DIFF WBC: CPT

## 2020-07-08 PROCEDURE — 80053 COMPREHEN METABOLIC PANEL: CPT

## 2020-07-16 ENCOUNTER — OFFICE VISIT (OUTPATIENT)
Dept: INTERNAL MEDICINE CLINIC | Age: 65
End: 2020-07-16
Payer: MEDICARE

## 2020-07-16 VITALS
TEMPERATURE: 97.2 F | OXYGEN SATURATION: 97 % | HEART RATE: 51 BPM | WEIGHT: 275 LBS | HEIGHT: 72 IN | BODY MASS INDEX: 37.25 KG/M2 | DIASTOLIC BLOOD PRESSURE: 68 MMHG | SYSTOLIC BLOOD PRESSURE: 118 MMHG

## 2020-07-16 DIAGNOSIS — N39.41 URGE INCONTINENCE OF URINE: ICD-10-CM

## 2020-07-16 DIAGNOSIS — I20.8 EXERCISE-INDUCED ANGINA (HCC): ICD-10-CM

## 2020-07-16 DIAGNOSIS — E11.59 TYPE 2 DIABETES MELLITUS WITH OTHER CIRCULATORY COMPLICATION, WITHOUT LONG-TERM CURRENT USE OF INSULIN (HCC): Primary | ICD-10-CM

## 2020-07-16 DIAGNOSIS — Z00.00 WELCOME TO MEDICARE PREVENTIVE VISIT: ICD-10-CM

## 2020-07-16 DIAGNOSIS — I10 BENIGN ESSENTIAL HYPERTENSION: ICD-10-CM

## 2020-07-16 DIAGNOSIS — Z12.5 PROSTATE CANCER SCREENING: ICD-10-CM

## 2020-07-16 PROCEDURE — G0402 INITIAL PREVENTIVE EXAM: HCPCS | Performed by: PHYSICIAN ASSISTANT

## 2020-07-16 PROCEDURE — 3074F SYST BP LT 130 MM HG: CPT | Performed by: PHYSICIAN ASSISTANT

## 2020-07-16 PROCEDURE — 3008F BODY MASS INDEX DOCD: CPT | Performed by: PHYSICIAN ASSISTANT

## 2020-07-16 PROCEDURE — 4040F PNEUMOC VAC/ADMIN/RCVD: CPT | Performed by: PHYSICIAN ASSISTANT

## 2020-07-16 PROCEDURE — 3078F DIAST BP <80 MM HG: CPT | Performed by: PHYSICIAN ASSISTANT

## 2020-07-16 PROCEDURE — G0403 EKG FOR INITIAL PREVENT EXAM: HCPCS | Performed by: PHYSICIAN ASSISTANT

## 2020-07-16 PROCEDURE — 99214 OFFICE O/P EST MOD 30 MIN: CPT | Performed by: PHYSICIAN ASSISTANT

## 2020-07-16 PROCEDURE — 1036F TOBACCO NON-USER: CPT | Performed by: PHYSICIAN ASSISTANT

## 2020-07-16 PROCEDURE — 3044F HG A1C LEVEL LT 7.0%: CPT | Performed by: PHYSICIAN ASSISTANT

## 2020-07-16 PROCEDURE — 1123F ACP DISCUSS/DSCN MKR DOCD: CPT | Performed by: PHYSICIAN ASSISTANT

## 2020-07-16 PROCEDURE — 3060F POS MICROALBUMINURIA REV: CPT | Performed by: PHYSICIAN ASSISTANT

## 2020-07-16 RX ORDER — ASCORBIC ACID 500 MG
500 TABLET ORAL DAILY
COMMUNITY

## 2020-07-16 NOTE — PROGRESS NOTES
Assessment and Plan:     Problem List Items Addressed This Visit     None           Preventive health issues were discussed with patient, and age appropriate screening tests were ordered as noted in patient's After Visit Summary  Personalized health advice and appropriate referrals for health education or preventive services given if needed, as noted in patient's After Visit Summary  History of Present Illness:     Patient presents for Welcome to Medicare visit       Patient Care Team:  Adelso Coughlin PA-C as PCP - General (Physician Assistant)  MD Judie Dave MD Jennings Bonine, MD Lollie Rinks, MD (Ophthalmology)     Review of Systems:     Review of Systems   Problem List:     Patient Active Problem List   Diagnosis    Abnormal MRI, lumbar spine    Benign essential hypertension    CAD (coronary artery disease)    Claustrophobia    Diabetes mellitus with circulatory complication ([address] )    Herniation of intervertebral disc of lumbar spine    Hyperlipidemia    Presbyopia    Primary osteoarthritis of left knee    Hypertension    Atherosclerosis of native coronary artery without angina pectoris    Class 2 severe obesity due to excess calories with serious comorbidity and body mass index (BMI) of 39 0 to 39 9 in adult ([address] )    Exercise-induced angina ([address] )      Past Medical and Surgical History:     Past Medical History:   Diagnosis Date    Arthritis     knee    Coronary artery disease     Diabetes mellitus ([address] )     H/O angioplasty     Hypercholesteremia     Hypertension     Varicose veins of lower extremity      Past Surgical History:   Procedure Laterality Date    CARDIAC CATHETERIZATION      CARDIAC SURGERY      CHOLECYSTECTOMY  01/01/2010    COLONOSCOPY      CORONARY ANGIOPLASTY WITH STENT PLACEMENT      EYE SURGERY Left     ocular lens implant    GALLBLADDER SURGERY      HERNIA REPAIR  01/01/2009    KNEE ARTHROSCOPY W/ MENISCAL REPAIR Right     KNEE SURGERY  09/10/2015    [place] INCISE FINGER TENDON SHEATH Left 2016    Procedure: RING TRIGGER FINGER RELEASE ;  Surgeon: Maxine Carroll MD;  Location:  MAIN OR;  Service: Orthopedics    VARICOSE VEIN SURGERY      Ligation      Family History:     Family History   Problem Relation Age of Onset   [employer] Cancer Mother     Coronary artery disease Mother     Heart disease Father     Early death Father     Coronary artery disease Father     Diabetes Father     Hypertension Father     Coronary artery disease Brother     Hyperlipidemia Brother     Hypertension Brother       Social History:        Social History     Socioeconomic History    Marital status: /Civil Union     Spouse name: Not on file    Number of children: Not on file    Years of education: Not on file    Highest education level: Not on file   Occupational History    Not on file   Social Needs    Financial resource strain: Not on file    Food insecurity:     Worry: Not on file     Inability: Not on file    Transportation needs:     Medical: Not on file     Non-medical: Not on file   Tobacco Use    Smoking status: Former Smoker     Packs/day: 2 00     Years: 15 00     Pack years: 30 00     Types: Cigarettes     Last attempt to quit: 1982     Years since quittin 5    Smokeless tobacco: Never Used   Substance and Sexual Activity    Alcohol use: Yes     Comment: social use    Drug use: No    Sexual activity: Yes   Lifestyle    Physical activity:     Days per week: Not on file     Minutes per session: Not on file    Stress: Not on file   Relationships    Social connections:     Talks on phone: Not on file     Gets together: Not on file     Attends Islam service: Not on file     Active member of club or organization: Not on file     Attends meetings of clubs or organizations: Not on file     Relationship status: Not on file    Intimate partner violence:     Fear of current or ex partner: Not on file     Emotionally abused: Not on file     Physically abused: Not on file     Forced sexual activity: Not on file   Other Topics Concern    Not on file   Social History Narrative    Not on file      Medications and Allergies:     Current Outpatient Medications   Medication Sig Dispense Refill    amLODIPine (NORVASC) 10 mg tablet Take 1 tablet (10 mg total) by mouth daily 90 tablet 2    aspirin (ECOTRIN LOW STRENGTH) 81 mg EC tablet Take 81 mg by mouth daily   atorvastatin (LIPITOR) 40 mg tablet Take 1 tablet (40 mg total) by mouth daily at bedtime 90 tablet 1    Blood Glucose Monitoring Suppl (ONE TOUCH ULTRA 2) w/Device KIT Test blood glucose 2 times daily (One Touch Ultra 2) 1 each 0    clopidogrel (Plavix) 75 mg tablet Take 1 tablet (75 mg total) by mouth daily 90 tablet 1    glimepiride (AMARYL) 4 mg tablet Take 2 mg with breakfast and 4 mg in the evening daily  90 tablet 1    glucose blood (ONE TOUCH ULTRA TEST) test strip Test blood glucose 2 times daily (One Touch Ultra Blue) 300 each 0    ketoconazole (NIZORAL) 2 % shampoo Apply 1 application topically 2 (two) times a week 120 mL 0    lisinopril-hydrochlorothiazide (PRINZIDE,ZESTORETIC) 20-12 5 MG per tablet Take 2 tablets by mouth daily 180 tablet 1    metFORMIN (GLUCOPHAGE) 1000 MG tablet Take 2 tablets (2,000 mg total) by mouth daily after dinner 180 tablet 1    Omega-3 Fatty Acids (FISH OIL PO) Take by mouth daily        ONETOUCH DELICA LANCETS 39B MISC Test blood glucose 2 times daily 300 each 0    prednisoLONE acetate (PRED FORTE) 1 % ophthalmic suspension Administer 1 drop into the left eye once a week       repaglinide (PRANDIN) 2 mg tablet Take 1 tablet (2 mg total) by mouth 3 (three) times a day before meals 270 tablet 2    sildenafil (REVATIO) 20 mg tablet Take 1 tablet (20 mg total) by mouth daily 50 tablet 1    VITAMIN E PO Take 1 capsule by mouth daily         No current facility-administered medications for this visit        Allergies Allergen Reactions    Ciprofloxacin Swelling    Janumet [Sitagliptin-Metformin Hcl]      Elevated liver enzymes    Terazosin      Annotation - [id number]: Blood in sperm      Immunizations:     Immunization History   Administered Date(s) Administered    INFLUENZA 09/24/2018    Influenza Quadrivalent Preservative Free 3 years and older IM 10/31/2017    Influenza TIV (IM) 10/16/2019    Pneumococcal Conjugate 13-Valent 07/18/2019    Tuberculin Skin Test-PPD Intradermal 09/23/2019      Health Maintenance:         Topic Date Due    CRC Screening: Colonoscopy  08/31/2022    Hepatitis C Screening  Completed         Topic Date Due    Influenza Vaccine  07/01/2020      Medicare Screening Tests and Risk Assessments:     Nga Hercules is here for his Welcome to Medicare visit  Health Risk Assessment:   Patient rates overall health as good  Patient feels that their physical health rating is same  Eyesight was rated as same  Hearing was rated as same  Patient feels that their emotional and mental health rating is same  Pain experienced in the last 7 days has been some  Patient's pain rating has been 4/10  Patient states that he has experienced no weight loss or gain in last 6 months  Depression Screening:   PHQ-2 Score: 0      Fall Risk Screening: In the past year, patient has experienced: no history of falling in past year      Home Safety:  Patient does not have trouble with stairs inside or outside of their home  Patient has working smoke alarms and has working carbon monoxide detector  Home safety hazards include: none  Nutrition:   Current diet is Diabetic and Limited junk food  Medications:   Patient is currently taking over-the-counter supplements  OTC medications include: see medication list  Patient is able to manage medications       Activities of Daily Living (ADLs)/Instrumental Activities of Daily Living (IADLs):   Walk and transfer into and out of bed and chair?: Yes  Dress and groom yourself?: Yes    Bathe or shower yourself?: Yes    Feed yourself? Yes  Do your laundry/housekeeping?: Yes  Manage your money, pay your bills and track your expenses?: Yes  Make your own meals?: Yes    Do your own shopping?: Yes    Previous Hospitalizations:   Any hospitalizations or ED visits within the last 12 months?: No      Advance Care Planning:   Living will: Yes    Advanced directive: Yes      Cognitive Screening:   Provider or family/friend/caregiver concerned regarding cognition?: No    PREVENTIVE SCREENINGS      Cardiovascular Screening:    General: Screening Not Indicated and History Lipid Disorder      Diabetes Screening:     General: Screening Not Indicated and History Diabetes      Colorectal Cancer Screening:     General: Screening Current      Prostate Cancer Screening:      Due for: PSA      Osteoporosis Screening:    General: Screening Not Indicated      Abdominal Aortic Aneurysm (AAA) Screening:    Risk factors include: age between 73-69 yo and tobacco use        General: Screening Current      Lung Cancer Screening:     General: Screening Not Indicated      Hepatitis C Screening:    General: Screening Current    No exam data present     Physical Exam:     There were no vitals taken for this visit  Physical Exam   Constitutional: He appears well-developed and well-nourished  No distress  HENT:   Head: Normocephalic and atraumatic  Right Ear: External ear normal    Left Ear: External ear normal    Mouth/Throat: Oropharynx is clear and moist    Eyes: Pupils are equal, round, and reactive to light  Conjunctivae and EOM are normal    Neck: Normal range of motion  Neck supple  Cardiovascular: Normal rate, regular rhythm and normal heart sounds  Pulmonary/Chest: Effort normal and breath sounds normal  He has no wheezes  Musculoskeletal: Normal range of motion  He exhibits no edema or deformity  Vitals reviewed        Demetrio Aponte PA-C

## 2020-07-16 NOTE — PROGRESS NOTES
Assessment/Plan:       Diagnoses and all orders for this visit:    Type 2 diabetes mellitus with other circulatory complication, without long-term current use of insulin (HCC)  Comments:  hga1c much improved (below 7 for the first time)   Orders:  -     Ambulatory referral to Ophthalmology; Future  -     CBC and differential; Future  -     Comprehensive metabolic panel; Future  -     HEMOGLOBIN A1C W/ EAG ESTIMATION; Future    Welcome to Medicare preventive visit  -     POCT ECG    Prostate cancer screening  -     PSA, Total Screen; Future    Exercise-induced angina (HCC)  -     CBC and differential; Future  -     Comprehensive metabolic panel; Future  -     HEMOGLOBIN A1C W/ EAG ESTIMATION; Future    Benign essential hypertension  -     CBC and differential; Future  -     Comprehensive metabolic panel; Future  -     HEMOGLOBIN A1C W/ EAG ESTIMATION; Future    Urge incontinence of urine  -     Ambulatory referral to Urology; Future    Other orders  -     ascorbic acid (VITAMIN C) 500 mg tablet; Take 500 mg by mouth daily        BMI Counseling: Body mass index is 37 3 kg/m²  The BMI is above normal  Nutrition recommendations include decreasing portion sizes, encouraging healthy choices of fruits and vegetables, consuming healthier snacks and increasing intake of lean protein  Exercise recommendations include exercising 3-5 times per week and strength training exercises  Subjective:      Patient ID: Giselle Sharma is a 72 y o  male      Pt presents for f/u for dm, htn, dyslipidemia, cad  Pt currently working driving school bus  Pt reports his FBS have been around 110-130s  Pt reports he has been watching his sugar intake and exercising more than previously  hga1c below 7 which is the first time this has occurred    Pt presents for welcome to medicare [place]  ekg reviewed, no changes  Pt denies cp, sob  Pt golfing a few times a week and is more active due to this       The following portions of the patient's history were reviewed and updated as appropriate: allergies, current medications, past family history, past medical history, past social history, past surgical history and problem list     Review of Systems   Constitutional: Negative for activity change, appetite change, chills, fatigue and fever  HENT: Negative for congestion and postnasal drip  Eyes: Negative for redness and itching  Respiratory: Negative for cough, shortness of breath and wheezing  Cardiovascular: Negative for chest pain and palpitations  Gastrointestinal: Negative for abdominal pain, constipation, diarrhea and nausea  Genitourinary: Negative for dysuria, flank pain and frequency  Musculoskeletal: Positive for arthralgias (b/l hip pain )  Skin: Negative for rash  Neurological: Negative for dizziness and headaches  Psychiatric/Behavioral: Negative for sleep disturbance  The patient is not nervous/anxious  Past Medical History:   Diagnosis Date    Arthritis     knee    Coronary artery disease     Diabetes mellitus ([address] )     H/O angioplasty     Hypercholesteremia     Hypertension     Varicose veins of lower extremity          Current Outpatient Medications:     amLODIPine (NORVASC) 10 mg tablet, Take 1 tablet (10 mg total) by mouth daily, Disp: 90 tablet, Rfl: 2    ascorbic acid (VITAMIN C) 500 mg tablet, Take 500 mg by mouth daily, Disp: , Rfl:     aspirin (ECOTRIN LOW STRENGTH) 81 mg EC tablet, Take 81 mg by mouth daily  , Disp: , Rfl:     atorvastatin (LIPITOR) 40 mg tablet, Take 1 tablet (40 mg total) by mouth daily at bedtime, Disp: 90 tablet, Rfl: 1    Blood Glucose Monitoring Suppl (ONE TOUCH ULTRA 2) w/Device KIT, Test blood glucose 2 times daily (One Touch Ultra 2), Disp: 1 each, Rfl: 0    clopidogrel (Plavix) 75 mg tablet, Take 1 tablet (75 mg total) by mouth daily, Disp: 90 tablet, Rfl: 1    glimepiride (AMARYL) 4 mg tablet, Take 2 mg with breakfast and 4 mg in the evening daily  , Disp: 90 tablet, Rfl: 1    glucose blood (ONE TOUCH ULTRA TEST) test strip, Test blood glucose 2 times daily (One Touch Ultra Blue), Disp: 300 each, Rfl: 0    lisinopril-hydrochlorothiazide (PRINZIDE,ZESTORETIC) 20-12 5 MG per tablet, Take 2 tablets by mouth daily, Disp: 180 tablet, Rfl: 1    metFORMIN (GLUCOPHAGE) 1000 MG tablet, Take 2 tablets (2,000 mg total) by mouth daily after dinner, Disp: 180 tablet, Rfl: 1    Omega-3 Fatty Acids (FISH OIL PO), Take by mouth daily  , Disp: , Rfl:     ONETOUCH DELICA LANCETS 69N MISC, Test blood glucose 2 times daily, Disp: 300 each, Rfl: 0    prednisoLONE acetate (PRED FORTE) 1 % ophthalmic suspension, Administer 1 drop into the left eye once a week , Disp: , Rfl:     repaglinide (PRANDIN) 2 mg tablet, Take 1 tablet (2 mg total) by mouth 3 (three) times a day before meals, Disp: 270 tablet, Rfl: 2    sildenafil (REVATIO) 20 mg tablet, Take 1 tablet (20 mg total) by mouth daily, Disp: 50 tablet, Rfl: 1    VITAMIN E PO, Take 1 capsule by mouth daily  , Disp: , Rfl:     ketoconazole (NIZORAL) 2 % shampoo, Apply 1 application topically 2 (two) times a week (Patient not taking: Reported on 7/16/2020), Disp: 120 mL, Rfl: 0    Allergies   Allergen Reactions    Ciprofloxacin Swelling    Janumet [Sitagliptin-Metformin Hcl]      Elevated liver enzymes    Terazosin      Annotation - [id number]: Blood in sperm       Social History   Past Surgical History:   Procedure Laterality Date    CARDIAC CATHETERIZATION      CARDIAC SURGERY      CHOLECYSTECTOMY  01/01/2010    COLONOSCOPY      CORONARY ANGIOPLASTY WITH STENT PLACEMENT      EYE SURGERY Left     ocular lens implant    GALLBLADDER SURGERY      HERNIA REPAIR  01/01/2009    KNEE ARTHROSCOPY W/ MENISCAL REPAIR Right     KNEE SURGERY  09/10/2015    [place] INCISE FINGER TENDON SHEATH Left 8/25/2016    Procedure: RING TRIGGER FINGER RELEASE ;  Surgeon: Stephany Scruggs MD;  Location:  MAIN OR;  Service: Orthopedics    VARICOSE VEIN SURGERY      Ligation     Family History   Problem Relation Age of Onset   [facility] Cancer Mother     Coronary artery disease Mother     Heart disease Father     Early death Father     Coronary artery disease Father     Diabetes Father     Hypertension Father     Coronary artery disease Brother     Hyperlipidemia Brother     Hypertension Brother        Objective:  /68 (BP Location: Left arm, Patient Position: Sitting, Cuff Size: Standard)   Pulse (!) 51   Temp (!) 97 2 °F (36 2 °C) (Tympanic)   Ht 6' (1 829 m) Comment: shoes on  Wt 125 kg (275 lb) Comment: shoes on  SpO2 97%   BMI 37 30 kg/m²        Physical Exam   Constitutional: He is oriented to person, place, and time  He appears well-developed and well-nourished  No distress  HENT:   Head: Normocephalic and atraumatic  Right Ear: External ear normal    Left Ear: External ear normal    Mouth/Throat: Oropharynx is clear and moist    Eyes: Pupils are equal, round, and reactive to light  Conjunctivae and EOM are normal    Neck: Normal range of motion  Neck supple  Cardiovascular: Normal rate and regular rhythm  Pulmonary/Chest: Effort normal and breath sounds normal  He has no wheezes  He has no rales  Musculoskeletal: Normal range of motion  Brawny venous stasis changes   Neurological: He is alert and oriented to person, place, and time  Skin: No rash noted  He is not diaphoretic  Psychiatric: He has a normal mood and affect  His behavior is normal  Judgment and thought content normal    Vitals reviewed

## 2020-07-16 NOTE — PATIENT INSTRUCTIONS

## 2020-08-24 DIAGNOSIS — E11.9 NON-INSULIN DEPENDENT TYPE 2 DIABETES MELLITUS (HCC): ICD-10-CM

## 2020-09-14 ENCOUNTER — CONSULT (OUTPATIENT)
Dept: UROLOGY | Facility: CLINIC | Age: 65
End: 2020-09-14
Payer: MEDICARE

## 2020-09-14 VITALS
TEMPERATURE: 97 F | SYSTOLIC BLOOD PRESSURE: 132 MMHG | HEART RATE: 58 BPM | BODY MASS INDEX: 38.78 KG/M2 | HEIGHT: 71 IN | WEIGHT: 277 LBS | DIASTOLIC BLOOD PRESSURE: 70 MMHG

## 2020-09-14 DIAGNOSIS — N39.41 URGE INCONTINENCE OF URINE: ICD-10-CM

## 2020-09-14 DIAGNOSIS — Z12.5 SCREENING FOR PROSTATE CANCER: Primary | ICD-10-CM

## 2020-09-14 LAB
POST-VOID RESIDUAL VOLUME, ML POC: 36 ML
SL AMB  POCT GLUCOSE, UA: NORMAL
SL AMB LEUKOCYTE ESTERASE,UA: NORMAL
SL AMB POCT BILIRUBIN,UA: NORMAL
SL AMB POCT BLOOD,UA: NORMAL
SL AMB POCT CLARITY,UA: CLEAR
SL AMB POCT COLOR,UA: YELLOW
SL AMB POCT KETONES,UA: NORMAL
SL AMB POCT NITRITE,UA: NORMAL
SL AMB POCT PH,UA: 6
SL AMB POCT SPECIFIC GRAVITY,UA: 1.03
SL AMB POCT URINE PROTEIN: NORMAL
SL AMB POCT UROBILINOGEN: 0.2

## 2020-09-14 PROCEDURE — 81002 URINALYSIS NONAUTO W/O SCOPE: CPT | Performed by: PHYSICIAN ASSISTANT

## 2020-09-14 PROCEDURE — 99203 OFFICE O/P NEW LOW 30 MIN: CPT | Performed by: PHYSICIAN ASSISTANT

## 2020-09-14 PROCEDURE — 51798 US URINE CAPACITY MEASURE: CPT | Performed by: PHYSICIAN ASSISTANT

## 2020-09-14 RX ORDER — TAMSULOSIN HYDROCHLORIDE 0.4 MG/1
0.4 CAPSULE ORAL
Qty: 14 CAPSULE | Refills: 0 | Status: SHIPPED | OUTPATIENT
Start: 2020-09-14 | End: 2020-09-24 | Stop reason: SDUPTHER

## 2020-09-14 NOTE — PROGRESS NOTES
1  Screening for prostate cancer  PSA, Total Screen   2  Urge incontinence of urine  Ambulatory referral to Urology    POCT urine dip    POCT Measure PVR    tamsulosin (FLOMAX) 0 4 mg         Assessment and plan:       1  BPH with LUTS  -patient is demonstrating adequate bladder emptying in the office today  Patient was offered trial of alpha blockade to facilitate urination  Use and side effect profile reviewed  Patient was counseled not to take this in conjunction with the sildenafil in order to avoid any unsafe drops of blood pressure  He verbalized understanding  Patient will contact us after his 14 day trial   If his a has symptom improvement, refills will be sent to his pharmacy  Patient should follow up in 2-3 months for re-evaluation  All questions answered  2  Prostate cancer screening  - normal digital rectal examination in the office today  - patient will obtain a PSA prior to follow-up    3  Erectile dysfunction  -continue sildenafil as needed    Tk Dickinson PA-C      Chief Complaint     Lower urinary tract symptoms    History of Present Illness     Keshia Manjarrez is a 72 y o  male presenting today as a new patient for lower urinary tract symptoms  Patient states he has noticed some progressive worsening of his urination over the past few years  Typically if this include some hesitancy in the morning which tapers off after the 1st 1-2 voids  He is noting nocturia 0-1 time nightly  He does endorse a weaker stream, some postvoid dribbling, as well as some intermittent stream   Denies any dysuria, gross hematuria, or urinary infections  Denies any history of  surgical manipulation  Denies any family history of  malignancies  Patient's last PSA is 2 3 (03/18/2019)  Medical comorbidities include diabetes, hypertension, angina, coronary artery disease, lumbosacral disc disease, obesity, hypertension  Patient is managed on sildenafil for erectile dysfunction      Urine dip in the office today is leukocyte, nitrite, and blood negative  Postvoid residual of 36 mL  Urinary Incontinence Screening      Most Recent Value   Urinary Incontinence   Urinary Incontinence? No   Incomplete emptying? No   Urinary frequency? No   Urinary urgency? No   Urinary hesitancy? Yes [only in the AM]   Dysuria (painful difficult urination)? No   Nocturia (waking up to use the bathroom)? No   Straining (having to push to go)? No   Weak stream?  Yes   Intermittent stream?  Yes [in the AM]   Post void dribbling? Yes          Laboratory     Lab Results   Component Value Date    CREATININE 0 99 07/08/2020       Lab Results   Component Value Date    PSA 2 3 03/18/2019    PSA 1 8 07/02/2018       Review of Systems     Review of Systems   Constitutional: Negative for activity change, appetite change, chills, diaphoresis, fatigue, fever and unexpected weight change  Respiratory: Negative for chest tightness and shortness of breath  Cardiovascular: Negative for chest pain, palpitations and leg swelling  Gastrointestinal: Negative for abdominal distention, abdominal pain, constipation, diarrhea, nausea and vomiting  Genitourinary: Positive for decreased urine volume and difficulty urinating  Negative for dysuria, enuresis, flank pain, frequency, genital sores, hematuria and urgency  Musculoskeletal: Negative for back pain, gait problem and myalgias  Skin: Negative for color change, pallor, rash and wound  Psychiatric/Behavioral: Negative for behavioral problems  The patient is not nervous/anxious  Allergies     Allergies   Allergen Reactions    Ciprofloxacin Swelling    Janumet [Sitagliptin-Metformin Hcl]      Elevated liver enzymes    Terazosin      Annotation - [id number]: Blood in sperm       Physical Exam     Physical Exam  Constitutional:       General: He is not in acute distress  Appearance: Normal appearance  He is normal weight  He is not ill-appearing  HENT:      Head: Normocephalic and atraumatic  Eyes:      General:         Right eye: No discharge  Left eye: No discharge  Conjunctiva/sclera: Conjunctivae normal    Pulmonary:      Effort: Pulmonary effort is normal  No respiratory distress  Genitourinary:     Comments: Good rectal tone  Prostate 35g, smooth, symmetric, no nodules  Skin:     General: Skin is warm and dry  Coloration: Skin is not pale  Neurological:      General: No focal deficit present  Mental Status: He is alert and oriented to person, place, and time  Psychiatric:         Mood and Affect: Mood normal          Behavior: Behavior normal          Thought Content: Thought content normal            Vital Signs     Vitals:    09/14/20 0927   BP: 132/70   BP Location: Left arm   Patient Position: Sitting   Cuff Size: Standard   Pulse: 58   Temp: (!) 97 °F (36 1 °C)   TempSrc: Temporal   Weight: 126 kg (277 lb)   Height: 5' 11" (1 803 m)         Current Medications       Current Outpatient Medications:     amLODIPine (NORVASC) 10 mg tablet, Take 1 tablet (10 mg total) by mouth daily, Disp: 90 tablet, Rfl: 2    ascorbic acid (VITAMIN C) 500 mg tablet, Take 500 mg by mouth daily, Disp: , Rfl:     aspirin (ECOTRIN LOW STRENGTH) 81 mg EC tablet, Take 81 mg by mouth daily  , Disp: , Rfl:     Blood Glucose Monitoring Suppl (ONE TOUCH ULTRA 2) w/Device KIT, Test blood glucose 2 times daily (One Touch Ultra 2), Disp: 1 each, Rfl: 0    clopidogrel (Plavix) 75 mg tablet, Take 1 tablet (75 mg total) by mouth daily, Disp: 90 tablet, Rfl: 1    glimepiride (AMARYL) 4 mg tablet, Take 2 mg with breakfast and 4 mg in the evening daily  , Disp: 90 tablet, Rfl: 1    glucose blood (ONE TOUCH ULTRA TEST) test strip, Test blood glucose 2 times daily (One Touch Ultra Blue), Disp: 300 each, Rfl: 0    lisinopril-hydrochlorothiazide (PRINZIDE,ZESTORETIC) 20-12 5 MG per tablet, Take 2 tablets by mouth daily, Disp: 180 tablet, Rfl: 1   metFORMIN (GLUCOPHAGE) 1000 MG tablet, Take 2 tablets (2,000 mg total) by mouth daily after dinner, Disp: 180 tablet, Rfl: 0    Omega-3 Fatty Acids (FISH OIL PO), Take by mouth daily  , Disp: , Rfl:     ONETOUCH DELICA LANCETS 19V MISC, Test blood glucose 2 times daily, Disp: 300 each, Rfl: 0    prednisoLONE acetate (PRED FORTE) 1 % ophthalmic suspension, Administer 1 drop into the left eye once a week , Disp: , Rfl:     repaglinide (PRANDIN) 2 mg tablet, Take 1 tablet (2 mg total) by mouth 3 (three) times a day before meals, Disp: 270 tablet, Rfl: 2    sildenafil (REVATIO) 20 mg tablet, Take 1 tablet (20 mg total) by mouth daily, Disp: 50 tablet, Rfl: 1    VITAMIN E PO, Take 1 capsule by mouth daily  , Disp: , Rfl:     atorvastatin (LIPITOR) 40 mg tablet, Take 1 tablet (40 mg total) by mouth daily at bedtime, Disp: 90 tablet, Rfl: 1    ketoconazole (NIZORAL) 2 % shampoo, Apply 1 application topically 2 (two) times a week (Patient not taking: Reported on 7/16/2020), Disp: 120 mL, Rfl: 0    tamsulosin (FLOMAX) 0 4 mg, Take 1 capsule (0 4 mg total) by mouth daily with dinner, Disp: 14 capsule, Rfl: 0      Active Problems     Patient Active Problem List   Diagnosis    Abnormal MRI, lumbar spine    Benign essential hypertension    CAD (coronary artery disease)    Claustrophobia    Diabetes mellitus with circulatory complication (HCC)    Herniation of intervertebral disc of lumbar spine    Hyperlipidemia    Presbyopia    Primary osteoarthritis of left knee    Hypertension    Atherosclerosis of native coronary artery without angina pectoris    Class 2 severe obesity due to excess calories with serious comorbidity and body mass index (BMI) of 39 0 to 39 9 in adult (HCC)    Exercise-induced angina (HCC)         Past Medical History     Past Medical History:   Diagnosis Date    Arthritis     knee    Coronary artery disease     Diabetes mellitus ([facility] [address]     H/O angioplasty     Hypercholesteremia     Hypertension     Varicose veins of lower extremity          Surgical History     Past Surgical History:   Procedure Laterality Date    CARDIAC CATHETERIZATION      CARDIAC SURGERY      CHOLECYSTECTOMY  01/01/2010    COLONOSCOPY      CORONARY ANGIOPLASTY WITH STENT PLACEMENT      EYE SURGERY Left     ocular lens implant    GALLBLADDER SURGERY      HERNIA REPAIR  01/01/2009    KNEE ARTHROSCOPY W/ MENISCAL REPAIR Right     KNEE SURGERY  09/10/2015    [place] INCISE FINGER TENDON SHEATH Left 8/25/2016    Procedure: RING TRIGGER FINGER RELEASE ;  Surgeon: Maikel Prasad MD;  Location: [facility];  Service: Orthopedics    VARICOSE VEIN SURGERY      Ligation         Family History     Family History   Problem Relation Age of Onset    Cancer Mother     Coronary artery disease Mother     Heart disease Father     Early death Father     Coronary artery disease Father     Diabetes Father     Hypertension Father     Coronary artery disease Brother     Hyperlipidemia Brother     Hypertension Brother          Social History     Social History       Radiology

## 2020-09-24 ENCOUNTER — TELEPHONE (OUTPATIENT)
Dept: UROLOGY | Facility: MEDICAL CENTER | Age: 65
End: 2020-09-24

## 2020-09-24 DIAGNOSIS — N39.41 URGE INCONTINENCE OF URINE: ICD-10-CM

## 2020-09-24 RX ORDER — TAMSULOSIN HYDROCHLORIDE 0.4 MG/1
0.4 CAPSULE ORAL
Qty: 90 CAPSULE | Refills: 3 | Status: SHIPPED | OUTPATIENT
Start: 2020-09-24 | End: 2021-10-11 | Stop reason: SDUPTHER

## 2020-09-24 NOTE — TELEPHONE ENCOUNTER
Patient managed by Eliu Mcleod seen in the [place] office  Patient called and advised that the tamsulosin is working well for him and would like to continue taking it  Patient uses the [address] [facility]  Please advise

## 2020-09-24 NOTE — TELEPHONE ENCOUNTER
Patient at the [facility] office, patient known for BPH  Patient last office visit 9/14/2020  During visit, patient advised to trial 14 days of tamsulosin  Patient to call us if he would like to continue  Patient advised medication is working and would like to continue it and if it can be sent to [address] [facility] route to provider to advise

## 2020-09-24 NOTE — TELEPHONE ENCOUNTER
Refills of tamsulosin have been sent to the patient's pharmacy  I have sent a 90 day prescription with 3 refills

## 2020-09-24 NOTE — TELEPHONE ENCOUNTER
Attempted to call patient at this time, no answer left message with office number  When patient returns call please advise      Mira Rocha PA-C   to [address]      9/24/20 1:43 PM   Note      Refills of tamsulosin have been sent to the patient's pharmacy  I have sent a 90 day prescription with 3 refills

## 2020-09-25 ENCOUNTER — APPOINTMENT (OUTPATIENT)
Dept: LAB | Facility: AMBULARY SURGERY CENTER | Age: 65
End: 2020-09-25
Payer: MEDICARE

## 2020-09-25 DIAGNOSIS — Z12.5 SCREENING FOR PROSTATE CANCER: ICD-10-CM

## 2020-09-25 LAB — PSA SERPL-MCNC: 1.6 NG/ML (ref 0–4)

## 2020-09-25 PROCEDURE — 36415 COLL VENOUS BLD VENIPUNCTURE: CPT

## 2020-09-25 PROCEDURE — G0103 PSA SCREENING: HCPCS

## 2020-10-02 ENCOUNTER — TELEPHONE (OUTPATIENT)
Dept: UROLOGY | Facility: MEDICAL CENTER | Age: 65
End: 2020-10-02

## 2020-10-06 DIAGNOSIS — I10 BENIGN ESSENTIAL HYPERTENSION: ICD-10-CM

## 2020-10-06 RX ORDER — LISINOPRIL AND HYDROCHLOROTHIAZIDE 20; 12.5 MG/1; MG/1
2 TABLET ORAL DAILY
Qty: 180 TABLET | Refills: 1 | Status: SHIPPED | OUTPATIENT
Start: 2020-10-06 | End: 2020-11-19 | Stop reason: SDUPTHER

## 2020-10-29 ENCOUNTER — TRANSCRIBE ORDERS (OUTPATIENT)
Dept: ADMINISTRATIVE | Facility: HOSPITAL | Age: 65
End: 2020-10-29

## 2020-10-29 ENCOUNTER — HOSPITAL ENCOUNTER (OUTPATIENT)
Dept: RADIOLOGY | Facility: HOSPITAL | Age: 65
Discharge: HOME | End: 2020-10-29
Payer: MEDICARE

## 2020-10-29 DIAGNOSIS — H20.9 UVEITIS: ICD-10-CM

## 2020-10-29 DIAGNOSIS — H40.40X0 GLAUCOMATOCYCLITIC CRISIS, UNSPECIFIED GLAUCOMA STAGE, UNSPECIFIED LATERALITY: Primary | ICD-10-CM

## 2020-10-29 DIAGNOSIS — H20.9 GLAUCOMATOCYCLITIC CRISIS, UNSPECIFIED GLAUCOMA STAGE, UNSPECIFIED LATERALITY: Primary | ICD-10-CM

## 2020-10-29 LAB
LEFT EYE DIABETIC RETINOPATHY: NORMAL
RIGHT EYE DIABETIC RETINOPATHY: NORMAL

## 2020-10-29 PROCEDURE — 71046 X-RAY EXAM CHEST 2 VIEWS: CPT

## 2020-10-30 DIAGNOSIS — I25.10 CORONARY ARTERY DISEASE INVOLVING NATIVE HEART WITHOUT ANGINA PECTORIS, UNSPECIFIED VESSEL OR LESION TYPE: ICD-10-CM

## 2020-10-30 RX ORDER — CLOPIDOGREL BISULFATE 75 MG/1
75 TABLET ORAL DAILY
Qty: 90 TABLET | Refills: 1 | Status: SHIPPED | OUTPATIENT
Start: 2020-10-30 | End: 2021-05-07 | Stop reason: SDUPTHER

## 2020-11-04 ENCOUNTER — LAB (OUTPATIENT)
Dept: LAB | Facility: CLINIC | Age: 65
End: 2020-11-04
Payer: MEDICARE

## 2020-11-04 DIAGNOSIS — H20.9 GLAUCOMATOCYCLITIC CRISIS, UNSPECIFIED GLAUCOMA STAGE, UNSPECIFIED LATERALITY: ICD-10-CM

## 2020-11-04 DIAGNOSIS — H40.40X0 GLAUCOMATOCYCLITIC CRISIS, UNSPECIFIED GLAUCOMA STAGE, UNSPECIFIED LATERALITY: ICD-10-CM

## 2020-11-04 DIAGNOSIS — H20.9 UVEITIS: ICD-10-CM

## 2020-11-04 LAB
BASOPHILS # BLD AUTO: 0.02 THOUSANDS/ΜL (ref 0–0.1)
BASOPHILS NFR BLD AUTO: 0 % (ref 0–1)
CRP SERPL QL: <3 MG/L
EOSINOPHIL # BLD AUTO: 0.06 THOUSAND/ΜL (ref 0–0.61)
EOSINOPHIL NFR BLD AUTO: 1 % (ref 0–6)
ERYTHROCYTE [DISTWIDTH] IN BLOOD BY AUTOMATED COUNT: 14.8 % (ref 11.6–15.1)
ERYTHROCYTE [SEDIMENTATION RATE] IN BLOOD: 3 MM/HOUR (ref 0–19)
HCT VFR BLD AUTO: 43.5 % (ref 36.5–49.3)
HGB BLD-MCNC: 14.4 G/DL (ref 12–17)
IMM GRANULOCYTES # BLD AUTO: 0.03 THOUSAND/UL (ref 0–0.2)
IMM GRANULOCYTES NFR BLD AUTO: 1 % (ref 0–2)
LYMPHOCYTES # BLD AUTO: 1.08 THOUSANDS/ΜL (ref 0.6–4.47)
LYMPHOCYTES NFR BLD AUTO: 21 % (ref 14–44)
MCH RBC QN AUTO: 31.6 PG (ref 26.8–34.3)
MCHC RBC AUTO-ENTMCNC: 33.1 G/DL (ref 31.4–37.4)
MCV RBC AUTO: 95 FL (ref 82–98)
MONOCYTES # BLD AUTO: 0.38 THOUSAND/ΜL (ref 0.17–1.22)
MONOCYTES NFR BLD AUTO: 7 % (ref 4–12)
NEUTROPHILS # BLD AUTO: 3.64 THOUSANDS/ΜL (ref 1.85–7.62)
NEUTS SEG NFR BLD AUTO: 70 % (ref 43–75)
NRBC BLD AUTO-RTO: 0 /100 WBCS
PLATELET # BLD AUTO: 140 THOUSANDS/UL (ref 149–390)
PMV BLD AUTO: 10.4 FL (ref 8.9–12.7)
RBC # BLD AUTO: 4.56 MILLION/UL (ref 3.88–5.62)
WBC # BLD AUTO: 5.21 THOUSAND/UL (ref 4.31–10.16)

## 2020-11-04 PROCEDURE — 86618 LYME DISEASE ANTIBODY: CPT

## 2020-11-04 PROCEDURE — 86780 TREPONEMA PALLIDUM: CPT

## 2020-11-04 PROCEDURE — 86592 SYPHILIS TEST NON-TREP QUAL: CPT

## 2020-11-04 PROCEDURE — 36415 COLL VENOUS BLD VENIPUNCTURE: CPT

## 2020-11-04 PROCEDURE — 86038 ANTINUCLEAR ANTIBODIES: CPT

## 2020-11-04 PROCEDURE — 82164 ANGIOTENSIN I ENZYME TEST: CPT

## 2020-11-04 PROCEDURE — 85652 RBC SED RATE AUTOMATED: CPT

## 2020-11-04 PROCEDURE — 86140 C-REACTIVE PROTEIN: CPT

## 2020-11-04 PROCEDURE — 86480 TB TEST CELL IMMUN MEASURE: CPT

## 2020-11-04 PROCEDURE — 85025 COMPLETE CBC W/AUTO DIFF WBC: CPT

## 2020-11-04 PROCEDURE — 86430 RHEUMATOID FACTOR TEST QUAL: CPT

## 2020-11-05 LAB
ACE SERPL-CCNC: <5 U/L (ref 14–82)
B BURGDOR IGG+IGM SER-ACNC: 69
GAMMA INTERFERON BACKGROUND BLD IA-ACNC: 0.03 IU/ML
M TB IFN-G BLD-IMP: NEGATIVE
M TB IFN-G CD4+ BCKGRND COR BLD-ACNC: 0 IU/ML
M TB IFN-G CD4+ BCKGRND COR BLD-ACNC: 0 IU/ML
MITOGEN IGNF BCKGRD COR BLD-ACNC: >10 IU/ML
RHEUMATOID FACT SER QL LA: NEGATIVE
RPR SER QL: NORMAL
T PALLIDUM AB SER QL IF: NON REACTIVE

## 2020-11-06 LAB — RYE IGE QN: NEGATIVE

## 2020-11-11 ENCOUNTER — TRANSCRIBE ORDERS (OUTPATIENT)
Dept: ADMINISTRATIVE | Facility: HOSPITAL | Age: 65
End: 2020-11-11

## 2020-11-11 DIAGNOSIS — R91.1 NODULE OF RIGHT LUNG: Primary | ICD-10-CM

## 2020-11-19 ENCOUNTER — OFFICE VISIT (OUTPATIENT)
Dept: CARDIOLOGY CLINIC | Facility: CLINIC | Age: 65
End: 2020-11-19
Payer: MEDICARE

## 2020-11-19 VITALS
HEART RATE: 64 BPM | BODY MASS INDEX: 39.16 KG/M2 | OXYGEN SATURATION: 98 % | WEIGHT: 279.7 LBS | HEIGHT: 71 IN | TEMPERATURE: 97.3 F | DIASTOLIC BLOOD PRESSURE: 70 MMHG | SYSTOLIC BLOOD PRESSURE: 144 MMHG

## 2020-11-19 DIAGNOSIS — I10 HYPERTENSION, UNSPECIFIED TYPE: ICD-10-CM

## 2020-11-19 DIAGNOSIS — E78.5 HYPERLIPIDEMIA, UNSPECIFIED HYPERLIPIDEMIA TYPE: ICD-10-CM

## 2020-11-19 DIAGNOSIS — I25.10 CORONARY ARTERY DISEASE INVOLVING NATIVE HEART WITHOUT ANGINA PECTORIS, UNSPECIFIED VESSEL OR LESION TYPE: Primary | ICD-10-CM

## 2020-11-19 DIAGNOSIS — I10 BENIGN ESSENTIAL HYPERTENSION: ICD-10-CM

## 2020-11-19 PROCEDURE — 99214 OFFICE O/P EST MOD 30 MIN: CPT | Performed by: INTERNAL MEDICINE

## 2020-11-19 RX ORDER — LISINOPRIL AND HYDROCHLOROTHIAZIDE 20; 12.5 MG/1; MG/1
2 TABLET ORAL DAILY
Qty: 180 TABLET | Refills: 1 | Status: SHIPPED | OUTPATIENT
Start: 2020-11-19 | End: 2021-10-10 | Stop reason: SDUPTHER

## 2020-11-20 DIAGNOSIS — E78.5 HYPERLIPIDEMIA, UNSPECIFIED HYPERLIPIDEMIA TYPE: ICD-10-CM

## 2020-11-20 DIAGNOSIS — E11.59 TYPE 2 DIABETES MELLITUS WITH OTHER CIRCULATORY COMPLICATION, WITHOUT LONG-TERM CURRENT USE OF INSULIN (HCC): ICD-10-CM

## 2020-11-20 DIAGNOSIS — E11.9 NON-INSULIN DEPENDENT TYPE 2 DIABETES MELLITUS (HCC): ICD-10-CM

## 2020-11-20 RX ORDER — ATORVASTATIN CALCIUM 40 MG/1
40 TABLET, FILM COATED ORAL
Qty: 90 TABLET | Refills: 1 | Status: SHIPPED | OUTPATIENT
Start: 2020-11-20 | End: 2021-05-28 | Stop reason: SDUPTHER

## 2020-11-20 RX ORDER — REPAGLINIDE 2 MG/1
2 TABLET ORAL
Qty: 270 TABLET | Refills: 1 | Status: SHIPPED | OUTPATIENT
Start: 2020-11-20 | End: 2021-10-14

## 2020-11-24 ENCOUNTER — HOSPITAL ENCOUNTER (OUTPATIENT)
Dept: CT IMAGING | Facility: HOSPITAL | Age: 65
Discharge: HOME | End: 2020-11-24
Payer: MEDICARE

## 2020-11-24 DIAGNOSIS — R91.1 NODULE OF RIGHT LUNG: ICD-10-CM

## 2020-11-24 PROCEDURE — 71250 CT THORAX DX C-: CPT

## 2020-11-24 PROCEDURE — G1004 CDSM NDSC: HCPCS

## 2020-12-01 ENCOUNTER — TELEPHONE (OUTPATIENT)
Dept: INTERNAL MEDICINE CLINIC | Facility: CLINIC | Age: 65
End: 2020-12-01

## 2020-12-03 DIAGNOSIS — R91.1 RIGHT UPPER LOBE PULMONARY NODULE: Primary | ICD-10-CM

## 2020-12-03 DIAGNOSIS — R91.8 PULMONARY NODULES: ICD-10-CM

## 2020-12-03 DIAGNOSIS — Z87.891 HISTORY OF TOBACCO USE: ICD-10-CM

## 2020-12-04 ENCOUNTER — LAB (OUTPATIENT)
Dept: LAB | Facility: CLINIC | Age: 65
End: 2020-12-04
Payer: MEDICARE

## 2020-12-04 ENCOUNTER — TELEPHONE (OUTPATIENT)
Dept: PULMONOLOGY | Facility: CLINIC | Age: 65
End: 2020-12-04

## 2020-12-04 ENCOUNTER — TRANSCRIBE ORDERS (OUTPATIENT)
Dept: LAB | Facility: CLINIC | Age: 65
End: 2020-12-04

## 2020-12-04 DIAGNOSIS — I10 BENIGN ESSENTIAL HYPERTENSION: ICD-10-CM

## 2020-12-04 DIAGNOSIS — Z12.5 PROSTATE CANCER SCREENING: ICD-10-CM

## 2020-12-04 DIAGNOSIS — E11.59 TYPE 2 DIABETES MELLITUS WITH OTHER CIRCULATORY COMPLICATION, WITHOUT LONG-TERM CURRENT USE OF INSULIN (HCC): ICD-10-CM

## 2020-12-04 DIAGNOSIS — I20.8 EXERCISE-INDUCED ANGINA (HCC): ICD-10-CM

## 2020-12-04 LAB
ALBUMIN SERPL BCP-MCNC: 4 G/DL (ref 3.5–5)
ALP SERPL-CCNC: 57 U/L (ref 46–116)
ALT SERPL W P-5'-P-CCNC: 35 U/L (ref 12–78)
ANION GAP SERPL CALCULATED.3IONS-SCNC: 8 MMOL/L (ref 4–13)
AST SERPL W P-5'-P-CCNC: 16 U/L (ref 5–45)
BASOPHILS # BLD AUTO: 0.03 THOUSANDS/ΜL (ref 0–0.1)
BASOPHILS NFR BLD AUTO: 1 % (ref 0–1)
BILIRUB SERPL-MCNC: 0.86 MG/DL (ref 0.2–1)
BUN SERPL-MCNC: 20 MG/DL (ref 5–25)
CALCIUM SERPL-MCNC: 9.6 MG/DL (ref 8.3–10.1)
CHLORIDE SERPL-SCNC: 100 MMOL/L (ref 100–108)
CO2 SERPL-SCNC: 29 MMOL/L (ref 21–32)
CREAT SERPL-MCNC: 1.01 MG/DL (ref 0.6–1.3)
EOSINOPHIL # BLD AUTO: 0.08 THOUSAND/ΜL (ref 0–0.61)
EOSINOPHIL NFR BLD AUTO: 1 % (ref 0–6)
ERYTHROCYTE [DISTWIDTH] IN BLOOD BY AUTOMATED COUNT: 14.6 % (ref 11.6–15.1)
GFR SERPL CREATININE-BSD FRML MDRD: 78 ML/MIN/1.73SQ M
GLUCOSE P FAST SERPL-MCNC: 207 MG/DL (ref 65–99)
HCT VFR BLD AUTO: 46.2 % (ref 36.5–49.3)
HGB BLD-MCNC: 15.3 G/DL (ref 12–17)
IMM GRANULOCYTES # BLD AUTO: 0.04 THOUSAND/UL (ref 0–0.2)
IMM GRANULOCYTES NFR BLD AUTO: 1 % (ref 0–2)
LYMPHOCYTES # BLD AUTO: 1.21 THOUSANDS/ΜL (ref 0.6–4.47)
LYMPHOCYTES NFR BLD AUTO: 21 % (ref 14–44)
MCH RBC QN AUTO: 31.5 PG (ref 26.8–34.3)
MCHC RBC AUTO-ENTMCNC: 33.1 G/DL (ref 31.4–37.4)
MCV RBC AUTO: 95 FL (ref 82–98)
MONOCYTES # BLD AUTO: 0.4 THOUSAND/ΜL (ref 0.17–1.22)
MONOCYTES NFR BLD AUTO: 7 % (ref 4–12)
NEUTROPHILS # BLD AUTO: 4.08 THOUSANDS/ΜL (ref 1.85–7.62)
NEUTS SEG NFR BLD AUTO: 69 % (ref 43–75)
NRBC BLD AUTO-RTO: 0 /100 WBCS
PLATELET # BLD AUTO: 145 THOUSANDS/UL (ref 149–390)
PMV BLD AUTO: 9.9 FL (ref 8.9–12.7)
POTASSIUM SERPL-SCNC: 3.9 MMOL/L (ref 3.5–5.3)
PROT SERPL-MCNC: 7.3 G/DL (ref 6.4–8.2)
RBC # BLD AUTO: 4.86 MILLION/UL (ref 3.88–5.62)
SODIUM SERPL-SCNC: 137 MMOL/L (ref 136–145)
WBC # BLD AUTO: 5.84 THOUSAND/UL (ref 4.31–10.16)

## 2020-12-04 PROCEDURE — 80053 COMPREHEN METABOLIC PANEL: CPT

## 2020-12-04 PROCEDURE — 36415 COLL VENOUS BLD VENIPUNCTURE: CPT

## 2020-12-04 PROCEDURE — 85025 COMPLETE CBC W/AUTO DIFF WBC: CPT

## 2020-12-10 ENCOUNTER — OFFICE VISIT (OUTPATIENT)
Dept: INTERNAL MEDICINE CLINIC | Age: 65
End: 2020-12-10
Payer: MEDICARE

## 2020-12-10 VITALS
OXYGEN SATURATION: 97 % | TEMPERATURE: 98.4 F | BODY MASS INDEX: 38.78 KG/M2 | DIASTOLIC BLOOD PRESSURE: 70 MMHG | SYSTOLIC BLOOD PRESSURE: 138 MMHG | WEIGHT: 277 LBS | HEART RATE: 62 BPM | HEIGHT: 71 IN

## 2020-12-10 DIAGNOSIS — D69.6 PLATELETS DECREASED (HCC): ICD-10-CM

## 2020-12-10 DIAGNOSIS — R91.8 PULMONARY NODULES: ICD-10-CM

## 2020-12-10 DIAGNOSIS — E13.9 DIABETES 1.5, MANAGED AS TYPE 2 (HCC): Primary | ICD-10-CM

## 2020-12-10 DIAGNOSIS — I87.2 VENOUS INSUFFICIENCY: Primary | ICD-10-CM

## 2020-12-10 DIAGNOSIS — I10 HYPERTENSION, UNSPECIFIED TYPE: ICD-10-CM

## 2020-12-10 PROCEDURE — 99214 OFFICE O/P EST MOD 30 MIN: CPT | Performed by: PHYSICIAN ASSISTANT

## 2020-12-10 RX ORDER — AMLODIPINE BESYLATE 10 MG/1
10 TABLET ORAL DAILY
Qty: 90 TABLET | Refills: 2 | Status: SHIPPED | OUTPATIENT
Start: 2020-12-10 | End: 2021-07-31 | Stop reason: SDUPTHER

## 2020-12-10 RX ORDER — GLIMEPIRIDE 4 MG/1
TABLET ORAL
Qty: 90 TABLET | Refills: 1 | Status: SHIPPED | OUTPATIENT
Start: 2020-12-10 | End: 2021-02-19 | Stop reason: SDUPTHER

## 2020-12-11 ENCOUNTER — HOSPITAL ENCOUNTER (OUTPATIENT)
Dept: RADIOLOGY | Age: 65
Discharge: HOME | End: 2020-12-11
Payer: MEDICARE

## 2020-12-11 DIAGNOSIS — R91.1 RIGHT UPPER LOBE PULMONARY NODULE: ICD-10-CM

## 2020-12-11 DIAGNOSIS — R91.8 PULMONARY NODULES: ICD-10-CM

## 2020-12-11 DIAGNOSIS — R91.8 PULMONARY NODULES: Primary | ICD-10-CM

## 2020-12-11 DIAGNOSIS — Z87.891 HISTORY OF TOBACCO USE: ICD-10-CM

## 2020-12-11 LAB — GLUCOSE SERPL-MCNC: 161 MG/DL (ref 65–140)

## 2020-12-11 PROCEDURE — G1004 CDSM NDSC: HCPCS

## 2020-12-11 PROCEDURE — 82948 REAGENT STRIP/BLOOD GLUCOSE: CPT

## 2020-12-11 PROCEDURE — A9552 F18 FDG: HCPCS

## 2020-12-11 PROCEDURE — 78815 PET IMAGE W/CT SKULL-THIGH: CPT

## 2020-12-15 ENCOUNTER — OFFICE VISIT (OUTPATIENT)
Dept: PULMONOLOGY | Facility: CLINIC | Age: 65
End: 2020-12-15
Payer: MEDICARE

## 2020-12-15 VITALS
OXYGEN SATURATION: 97 % | HEART RATE: 67 BPM | SYSTOLIC BLOOD PRESSURE: 126 MMHG | BODY MASS INDEX: 38.64 KG/M2 | WEIGHT: 276 LBS | TEMPERATURE: 97.3 F | HEIGHT: 71 IN | DIASTOLIC BLOOD PRESSURE: 64 MMHG

## 2020-12-15 DIAGNOSIS — R91.1 RIGHT UPPER LOBE PULMONARY NODULE: ICD-10-CM

## 2020-12-15 DIAGNOSIS — I25.10 CORONARY ARTERY DISEASE INVOLVING NATIVE CORONARY ARTERY OF NATIVE HEART WITHOUT ANGINA PECTORIS: ICD-10-CM

## 2020-12-15 DIAGNOSIS — R91.8 LUNG MASS: Primary | ICD-10-CM

## 2020-12-15 DIAGNOSIS — R91.8 PULMONARY NODULES: ICD-10-CM

## 2020-12-15 DIAGNOSIS — Z87.891 HISTORY OF TOBACCO USE: ICD-10-CM

## 2020-12-15 DIAGNOSIS — I10 BENIGN ESSENTIAL HYPERTENSION: ICD-10-CM

## 2020-12-15 PROCEDURE — 99204 OFFICE O/P NEW MOD 45 MIN: CPT | Performed by: INTERNAL MEDICINE

## 2021-01-12 ENCOUNTER — OFFICE VISIT (OUTPATIENT)
Dept: UROLOGY | Facility: CLINIC | Age: 66
End: 2021-01-12
Payer: MEDICARE

## 2021-01-12 VITALS
DIASTOLIC BLOOD PRESSURE: 70 MMHG | SYSTOLIC BLOOD PRESSURE: 128 MMHG | WEIGHT: 278.2 LBS | HEART RATE: 64 BPM | BODY MASS INDEX: 38.95 KG/M2 | HEIGHT: 71 IN

## 2021-01-12 DIAGNOSIS — N52.9 ERECTILE DYSFUNCTION, UNSPECIFIED ERECTILE DYSFUNCTION TYPE: ICD-10-CM

## 2021-01-12 DIAGNOSIS — Z12.5 SCREENING FOR PROSTATE CANCER: ICD-10-CM

## 2021-01-12 DIAGNOSIS — N39.41 URGE INCONTINENCE OF URINE: ICD-10-CM

## 2021-01-12 DIAGNOSIS — N40.0 BENIGN PROSTATIC HYPERPLASIA, UNSPECIFIED WHETHER LOWER URINARY TRACT SYMPTOMS PRESENT: Primary | ICD-10-CM

## 2021-01-12 LAB — POST-VOID RESIDUAL VOLUME, ML POC: 30 ML

## 2021-01-12 PROCEDURE — 99213 OFFICE O/P EST LOW 20 MIN: CPT | Performed by: PHYSICIAN ASSISTANT

## 2021-01-12 PROCEDURE — 51798 US URINE CAPACITY MEASURE: CPT | Performed by: PHYSICIAN ASSISTANT

## 2021-01-12 RX ORDER — SILDENAFIL CITRATE 20 MG/1
20 TABLET ORAL AS NEEDED
Qty: 90 TABLET | Refills: 1 | Status: SHIPPED | OUTPATIENT
Start: 2021-01-12 | End: 2021-10-13 | Stop reason: ALTCHOICE

## 2021-01-12 NOTE — PROGRESS NOTES
1  Benign prostatic hyperplasia, unspecified whether lower urinary tract symptoms present  POCT Measure PVR   2  Urge incontinence of urine     3  Erectile dysfunction, unspecified erectile dysfunction type  sildenafil (REVATIO) 20 mg tablet   4  Screening for prostate cancer  PSA, Total Screen         Assessment and plan:       1  BPH with LUTS  - continue tamsulosin daily    2  Prostate cancer screening  - recent normal ISAIAS with stable PSA  - follow-up 1 year PSA present    3  Erectile dysfunction  -continue sildenafil as needed    Anita Mcclure PA-C      Chief Complaint     Lower urinary tract symptoms    History of Present Illness     Arturo Adams is a 72 y o  male presenting for follow up lower urinary tract symptoms  Patient is shortly had noted some urinary hesitancy, weakened stream, postvoid dribbling, and sensation of incomplete bladder emptying  At his last visit he was started on tamsulosin  Patient has noticed improvement of his symptoms  Denies any adverse side effects  Overall happy with tamsulosin and wishes to continue  Patient's last PSA is 1 6 (9/25/2020), previously 2 3 (03/18/2019)  Medical comorbidities include diabetes, hypertension, angina, coronary artery disease, lumbosacral disc disease, obesity, hypertension  Patient is managed on sildenafil for erectile dysfunction  Patient had a recent PET scan with some activity around the area of the prostate  PVR 30mL      Laboratory     Lab Results   Component Value Date    CREATININE 1 01 12/04/2020       Lab Results   Component Value Date    PSA 1 6 09/25/2020    PSA 2 3 03/18/2019    PSA 1 8 07/02/2018       Review of Systems     Review of Systems   Constitutional: Negative for activity change, appetite change, chills, diaphoresis, fatigue, fever and unexpected weight change  Respiratory: Negative for chest tightness and shortness of breath      Cardiovascular: Negative for chest pain, palpitations and leg swelling  Gastrointestinal: Negative for abdominal distention, abdominal pain, constipation, diarrhea, nausea and vomiting  Genitourinary: Positive for decreased urine volume and difficulty urinating  Negative for dysuria, enuresis, flank pain, frequency, genital sores, hematuria and urgency  Musculoskeletal: Negative for back pain, gait problem and myalgias  Skin: Negative for color change, pallor, rash and wound  Psychiatric/Behavioral: Negative for behavioral problems  The patient is not nervous/anxious  Allergies     Allergies   Allergen Reactions    Ciprofloxacin Swelling    Janumet [Sitagliptin-Metformin Hcl]      Elevated liver enzymes    Terazosin      Annotation - [id number]: Blood in sperm       Physical Exam     Physical Exam  Constitutional:       General: He is not in acute distress  Appearance: Normal appearance  He is normal weight  He is not ill-appearing  HENT:      Head: Normocephalic and atraumatic  Eyes:      General:         Right eye: No discharge  Left eye: No discharge  Conjunctiva/sclera: Conjunctivae normal    Pulmonary:      Effort: Pulmonary effort is normal  No respiratory distress  Genitourinary:     Comments: Good rectal tone  Prostate 35g, smooth, symmetric, no nodules  Skin:     General: Skin is warm and dry  Coloration: Skin is not pale  Neurological:      General: No focal deficit present  Mental Status: He is alert and oriented to person, place, and time  Psychiatric:         Mood and Affect: Mood normal          Behavior: Behavior normal          Thought Content:  Thought content normal            Vital Signs     Vitals:    01/12/21 1058   BP: 128/70   BP Location: Left arm   Patient Position: Sitting   Cuff Size: Standard   Pulse: 64   Weight: 126 kg (278 lb 3 2 oz)   Height: 5' 11" (1 803 m)         Current Medications       Current Outpatient Medications:     amLODIPine (NORVASC) 10 mg tablet, Take 1 tablet (10 mg total) by mouth daily, Disp: 90 tablet, Rfl: 2    ascorbic acid (VITAMIN C) 500 mg tablet, Take 500 mg by mouth daily, Disp: , Rfl:     aspirin (ECOTRIN LOW STRENGTH) 81 mg EC tablet, Take 81 mg by mouth daily  , Disp: , Rfl:     atorvastatin (LIPITOR) 40 mg tablet, Take 1 tablet (40 mg total) by mouth daily at bedtime, Disp: 90 tablet, Rfl: 1    Blood Glucose Monitoring Suppl (ONE TOUCH ULTRA 2) w/Device KIT, Test blood glucose 2 times daily (One Touch Ultra 2), Disp: 1 each, Rfl: 0    clopidogrel (Plavix) 75 mg tablet, Take 1 tablet (75 mg total) by mouth daily, Disp: 90 tablet, Rfl: 1    glimepiride (AMARYL) 4 mg tablet, Take 4 mg with breakfast and 8 mg in the evening daily  , Disp: 90 tablet, Rfl: 1    glucose blood (ONE TOUCH ULTRA TEST) test strip, Test blood glucose 2 times daily (One Touch Ultra Blue), Disp: 300 each, Rfl: 0    lisinopril-hydrochlorothiazide (PRINZIDE,ZESTORETIC) 20-12 5 MG per tablet, Take 2 tablets by mouth daily, Disp: 180 tablet, Rfl: 1    metFORMIN (GLUCOPHAGE) 1000 MG tablet, Take 2 tablets (2,000 mg total) by mouth daily after dinner, Disp: 180 tablet, Rfl: 1    Omega-3 Fatty Acids (FISH OIL PO), Take by mouth daily  , Disp: , Rfl:     ONETOUCH DELICA LANCETS 60X MISC, Test blood glucose 2 times daily, Disp: 300 each, Rfl: 0    prednisoLONE acetate (PRED FORTE) 1 % ophthalmic suspension, Administer 1 drop into the left eye once a week , Disp: , Rfl:     repaglinide (PRANDIN) 2 mg tablet, Take 1 tablet (2 mg total) by mouth 3 (three) times a day before meals, Disp: 270 tablet, Rfl: 1    sildenafil (REVATIO) 20 mg tablet, Take 1 tablet (20 mg total) by mouth as needed (erectile dysfunction), Disp: 90 tablet, Rfl: 1    tamsulosin (FLOMAX) 0 4 mg, Take 1 capsule (0 4 mg total) by mouth daily with dinner, Disp: 90 capsule, Rfl: 3    VITAMIN E PO, Take 1 capsule by mouth daily  , Disp: , Rfl:       Active Problems     Patient Active Problem List   Diagnosis    Abnormal MRI, lumbar spine    Benign essential hypertension    CAD (coronary artery disease)    Claustrophobia    Diabetes mellitus with circulatory complication (HCC)    Herniation of intervertebral disc of lumbar spine    Hyperlipidemia    Presbyopia    Primary osteoarthritis of left knee    Hypertension    Atherosclerosis of native coronary artery without angina pectoris    Class 2 severe obesity due to excess calories with serious comorbidity and body mass index (BMI) of 39 0 to 39 9 in adult ([facility] [address] )    Exercise-induced angina (HCC)    Platelets decreased ([facility] [address] )    Lung mass         Past Medical History     Past Medical History:   Diagnosis Date    Arthritis     knee    Coronary artery disease     Diabetes mellitus ([facility] [address] [facility]     H/O angioplasty     Hypercholesteremia     Hypertension     Varicose veins of lower extremity          Surgical History     Past Surgical History:   Procedure Laterality Date    CARDIAC CATHETERIZATION      CARDIAC SURGERY      CHOLECYSTECTOMY  01/01/2010    COLONOSCOPY      CORONARY ANGIOPLASTY WITH STENT PLACEMENT      EYE SURGERY Left     ocular lens implant    GALLBLADDER SURGERY      HERNIA REPAIR  01/01/2009    KNEE ARTHROSCOPY W/ MENISCAL REPAIR Right     KNEE SURGERY  09/10/2015    TX INCISE FINGER TENDON SHEATH Left 8/25/2016    Procedure: RING TRIGGER FINGER RELEASE ;  Surgeon: Mian Ann MD;  [facility]: [facility];  Service: Orthopedics    VARICOSE VEIN SURGERY      Ligation         Family History     Family History   Problem Relation Age of Onset    Cancer Mother     Coronary artery disease Mother     Heart disease Father     Early death Father     Coronary artery disease Father     Diabetes Father     Hypertension Father     Coronary artery disease Brother     Hyperlipidemia Brother     Hypertension Brother          Social History     Social History       Radiology

## 2021-01-15 LAB — HLA-B27 QL NAA+PROBE: NORMAL

## 2021-02-13 DIAGNOSIS — Z23 ENCOUNTER FOR IMMUNIZATION: ICD-10-CM

## 2021-02-15 ENCOUNTER — IMMUNIZATIONS (OUTPATIENT)
Dept: FAMILY MEDICINE CLINIC | Facility: HOSPITAL | Age: 66
End: 2021-02-15

## 2021-02-15 DIAGNOSIS — Z23 ENCOUNTER FOR IMMUNIZATION: Primary | ICD-10-CM

## 2021-02-15 PROCEDURE — 0001A SARS-COV-2 / COVID-19 MRNA VACCINE (PFIZER-BIONTECH) 30 MCG: CPT | Performed by: PHYSICIAN ASSISTANT

## 2021-02-15 PROCEDURE — 91300 SARS-COV-2 / COVID-19 MRNA VACCINE (PFIZER-BIONTECH) 30 MCG: CPT | Performed by: PHYSICIAN ASSISTANT

## 2021-02-19 DIAGNOSIS — E13.9 DIABETES 1.5, MANAGED AS TYPE 2 (HCC): ICD-10-CM

## 2021-02-19 RX ORDER — GLIMEPIRIDE 4 MG/1
TABLET ORAL
Qty: 90 TABLET | Refills: 5 | Status: SHIPPED | OUTPATIENT
Start: 2021-02-19 | End: 2021-08-30 | Stop reason: SDUPTHER

## 2021-02-19 NOTE — TELEPHONE ENCOUNTER
Patient calling for a refill for glimepiride 4mg tablet 1 am and 2 in the evening #[id number] to be sent to [facility]    Patient has 3 left

## 2021-03-09 ENCOUNTER — IMMUNIZATIONS (OUTPATIENT)
Dept: FAMILY MEDICINE CLINIC | Facility: HOSPITAL | Age: 66
End: 2021-03-09

## 2021-03-09 DIAGNOSIS — Z23 ENCOUNTER FOR IMMUNIZATION: Primary | ICD-10-CM

## 2021-03-09 PROCEDURE — 0002A SARS-COV-2 / COVID-19 MRNA VACCINE (PFIZER-BIONTECH) 30 MCG: CPT

## 2021-03-09 PROCEDURE — 91300 SARS-COV-2 / COVID-19 MRNA VACCINE (PFIZER-BIONTECH) 30 MCG: CPT

## 2021-04-13 ENCOUNTER — APPOINTMENT (OUTPATIENT)
Dept: LAB | Facility: CLINIC | Age: 66
End: 2021-04-13
Payer: MEDICARE

## 2021-04-13 DIAGNOSIS — E13.9 DIABETES 1.5, MANAGED AS TYPE 2 (HCC): ICD-10-CM

## 2021-04-13 LAB
ALBUMIN SERPL BCP-MCNC: 3.8 G/DL (ref 3.5–5)
ALP SERPL-CCNC: 63 U/L (ref 46–116)
ALT SERPL W P-5'-P-CCNC: 36 U/L (ref 12–78)
ANION GAP SERPL CALCULATED.3IONS-SCNC: 9 MMOL/L (ref 4–13)
AST SERPL W P-5'-P-CCNC: 15 U/L (ref 5–45)
BILIRUB SERPL-MCNC: 0.55 MG/DL (ref 0.2–1)
BUN SERPL-MCNC: 25 MG/DL (ref 5–25)
CALCIUM SERPL-MCNC: 8.8 MG/DL (ref 8.3–10.1)
CHLORIDE SERPL-SCNC: 105 MMOL/L (ref 100–108)
CO2 SERPL-SCNC: 26 MMOL/L (ref 21–32)
CREAT SERPL-MCNC: 0.97 MG/DL (ref 0.6–1.3)
EST. AVERAGE GLUCOSE BLD GHB EST-MCNC: 180 MG/DL
GFR SERPL CREATININE-BSD FRML MDRD: 81 ML/MIN/1.73SQ M
GLUCOSE P FAST SERPL-MCNC: 250 MG/DL (ref 65–99)
HBA1C MFR BLD: 7.9 %
POTASSIUM SERPL-SCNC: 4.3 MMOL/L (ref 3.5–5.3)
PROT SERPL-MCNC: 6.7 G/DL (ref 6.4–8.2)
SODIUM SERPL-SCNC: 140 MMOL/L (ref 136–145)

## 2021-04-13 PROCEDURE — 80053 COMPREHEN METABOLIC PANEL: CPT

## 2021-04-13 PROCEDURE — 36415 COLL VENOUS BLD VENIPUNCTURE: CPT

## 2021-04-13 PROCEDURE — 83036 HEMOGLOBIN GLYCOSYLATED A1C: CPT

## 2021-04-15 ENCOUNTER — NURSE TRIAGE (OUTPATIENT)
Dept: PHYSICAL THERAPY | Facility: OTHER | Age: 66
End: 2021-04-15

## 2021-04-15 ENCOUNTER — OFFICE VISIT (OUTPATIENT)
Dept: INTERNAL MEDICINE CLINIC | Age: 66
End: 2021-04-15
Payer: MEDICARE

## 2021-04-15 VITALS
WEIGHT: 280 LBS | HEART RATE: 60 BPM | BODY MASS INDEX: 39.2 KG/M2 | OXYGEN SATURATION: 97 % | TEMPERATURE: 97.7 F | SYSTOLIC BLOOD PRESSURE: 128 MMHG | HEIGHT: 71 IN | DIASTOLIC BLOOD PRESSURE: 80 MMHG

## 2021-04-15 DIAGNOSIS — E66.01 CLASS 2 SEVERE OBESITY DUE TO EXCESS CALORIES WITH SERIOUS COMORBIDITY AND BODY MASS INDEX (BMI) OF 39.0 TO 39.9 IN ADULT (HCC): ICD-10-CM

## 2021-04-15 DIAGNOSIS — E78.5 HYPERLIPIDEMIA, UNSPECIFIED HYPERLIPIDEMIA TYPE: ICD-10-CM

## 2021-04-15 DIAGNOSIS — I10 BENIGN ESSENTIAL HYPERTENSION: ICD-10-CM

## 2021-04-15 DIAGNOSIS — M51.36 DDD (DEGENERATIVE DISC DISEASE), LUMBAR: ICD-10-CM

## 2021-04-15 DIAGNOSIS — M25.551 ACUTE PAIN OF BOTH HIPS: ICD-10-CM

## 2021-04-15 DIAGNOSIS — M54.50 CHRONIC BILATERAL LOW BACK PAIN, UNSPECIFIED WHETHER SCIATICA PRESENT: Primary | ICD-10-CM

## 2021-04-15 DIAGNOSIS — E11.59 TYPE 2 DIABETES MELLITUS WITH OTHER CIRCULATORY COMPLICATION, WITHOUT LONG-TERM CURRENT USE OF INSULIN (HCC): Primary | ICD-10-CM

## 2021-04-15 DIAGNOSIS — G89.29 CHRONIC BILATERAL LOW BACK PAIN, UNSPECIFIED WHETHER SCIATICA PRESENT: Primary | ICD-10-CM

## 2021-04-15 DIAGNOSIS — M25.552 ACUTE PAIN OF BOTH HIPS: ICD-10-CM

## 2021-04-15 DIAGNOSIS — D69.6 PLATELETS DECREASED (HCC): ICD-10-CM

## 2021-04-15 DIAGNOSIS — I20.8 EXERCISE-INDUCED ANGINA (HCC): ICD-10-CM

## 2021-04-15 PROCEDURE — 99214 OFFICE O/P EST MOD 30 MIN: CPT | Performed by: PHYSICIAN ASSISTANT

## 2021-04-15 NOTE — PROGRESS NOTES
Assessment/Plan:       Diagnoses and all orders for this visit:    Type 2 diabetes mellitus with other circulatory complication, without long-term current use of insulin (HCC)  Comments:  discussed diet and exercise recommendations   not well controlled  change diet and repeat in 3m    Orders:  -     Comprehensive metabolic panel; Future  -     HEMOGLOBIN A1C W/ EAG ESTIMATION; Future    Benign essential hypertension  Comments:  controlled  Orders:  -     Comprehensive metabolic panel; Future  -     HEMOGLOBIN A1C W/ EAG ESTIMATION; Future    Hyperlipidemia, unspecified hyperlipidemia type  Comments:  continue atorvastatin   Orders:  -     Comprehensive metabolic panel; Future  -     HEMOGLOBIN A1C W/ EAG ESTIMATION; Future    DDD (degenerative disc disease), lumbar  -     Ambulatory Referral to Comprehensive Spine Program; Future    Acute pain of both hips  Comments:  tylenol prn    Orders:  -     Ambulatory Referral to Comprehensive Spine Program; Future  -     XR hip/pelv 2-3 vws right if performed; Future  -     XR hip/pelv 2-3 vws left if performed; Future    Class 2 severe obesity due to excess calories with serious comorbidity and body mass index (BMI) of 39 0 to 39 9 in adult [facility])    Platelets decreased (HCC)    Exercise-induced angina (HCC)        BMI Counseling: Body mass index is 39 05 kg/m²  The BMI is above normal  Nutrition recommendations include decreasing portion sizes, encouraging healthy choices of fruits and vegetables, decreasing fast food intake, limiting drinks that contain sugar and increasing intake of lean protein  Exercise recommendations include exercising 3-5 times per week  Subjective:      Patient ID: Yobani Borjas is a 77 y o  male      78 yo male with hx of dyslipidemia, dm, htn, OA  Pt c/o b/l hip pain - worse after golfing last week  Pt notes painful with rotation, worsening over the past few months   Pt has hx of low back pain, imaging mri in 2017 showed mild ddd and degenerative changes    DM not well controlled  Pt reports this is due to his diet and eating candy over the past month  hga1c was 6 9 no 7 9   Pt has not been active over the winter months   Reports compliance with medications      The following portions of the patient's history were reviewed and updated as appropriate: allergies, current medications, past family history, past medical history, past social history, past surgical history and problem list     Review of Systems   Constitutional: Negative for activity change, appetite change, chills, fatigue and fever  HENT: Negative for congestion and sore throat  Eyes: Negative for pain  Respiratory: Negative for cough and shortness of breath  Cardiovascular: Negative for chest pain and leg swelling  Gastrointestinal: Negative for abdominal pain, constipation, diarrhea and nausea  Genitourinary: Negative for dysuria and hematuria  Musculoskeletal: Positive for arthralgias, back pain and gait problem  Skin: Negative for rash  Neurological: Negative for dizziness, light-headedness and headaches  Psychiatric/Behavioral: Negative for sleep disturbance  The patient is not nervous/anxious  Past Medical History:   Diagnosis Date    Arthritis     knee    Coronary artery disease     Diabetes mellitus ([facility] [address] )     H/O angioplasty     Hypercholesteremia     Hypertension     Varicose veins of lower extremity          Current Outpatient Medications:     amLODIPine (NORVASC) 10 mg tablet, Take 1 tablet (10 mg total) by mouth daily, Disp: 90 tablet, Rfl: 2    ascorbic acid (VITAMIN C) 500 mg tablet, Take 500 mg by mouth daily, Disp: , Rfl:     aspirin (ECOTRIN LOW STRENGTH) 81 mg EC tablet, Take 81 mg by mouth daily  , Disp: , Rfl:     Blood Glucose Monitoring Suppl (ONE TOUCH ULTRA 2) w/Device KIT, Test blood glucose 2 times daily (One Touch Ultra 2), Disp: 1 each, Rfl: 0    clopidogrel (Plavix) 75 mg tablet, Take 1 tablet (75 mg total) by mouth daily, Disp: 90 tablet, Rfl: 1    glimepiride (AMARYL) 4 mg tablet, Take 4 mg with breakfast and 8 mg in the evening daily  , Disp: 90 tablet, Rfl: 5    glucose blood (ONE TOUCH ULTRA TEST) test strip, Test blood glucose 2 times daily (One Touch Ultra Blue), Disp: 300 each, Rfl: 0    lisinopril-hydrochlorothiazide (PRINZIDE,ZESTORETIC) 20-12 5 MG per tablet, Take 2 tablets by mouth daily, Disp: 180 tablet, Rfl: 1    metFORMIN (GLUCOPHAGE) 1000 MG tablet, Take 2 tablets (2,000 mg total) by mouth daily after dinner, Disp: 180 tablet, Rfl: 1    Omega-3 Fatty Acids (FISH OIL PO), Take by mouth daily  , Disp: , Rfl:     ONETOUCH DELICA LANCETS 31S MISC, Test blood glucose 2 times daily, Disp: 300 each, Rfl: 0    prednisoLONE acetate (PRED FORTE) 1 % ophthalmic suspension, Administer 1 drop into the left eye once a week , Disp: , Rfl:     repaglinide (PRANDIN) 2 mg tablet, Take 1 tablet (2 mg total) by mouth 3 (three) times a day before meals, Disp: 270 tablet, Rfl: 1    sildenafil (REVATIO) 20 mg tablet, Take 1 tablet (20 mg total) by mouth as needed (erectile dysfunction), Disp: 90 tablet, Rfl: 1    tamsulosin (FLOMAX) 0 4 mg, Take 1 capsule (0 4 mg total) by mouth daily with dinner, Disp: 90 capsule, Rfl: 3    VITAMIN E PO, Take 1 capsule by mouth daily  , Disp: , Rfl:     atorvastatin (LIPITOR) 40 mg tablet, Take 1 tablet (40 mg total) by mouth daily at bedtime, Disp: 90 tablet, Rfl: 1    Allergies   Allergen Reactions    Ciprofloxacin Swelling    Janumet [Sitagliptin-Metformin Hcl]      Elevated liver enzymes    Terazosin      Annotation - [id number]: Blood in sperm       Social History   Past Surgical History:   Procedure Laterality Date    CARDIAC CATHETERIZATION      CARDIAC SURGERY      CHOLECYSTECTOMY  01/01/2010    COLONOSCOPY      CORONARY ANGIOPLASTY WITH STENT PLACEMENT      EYE SURGERY Left     ocular lens implant    GALLBLADDER SURGERY      HERNIA REPAIR  01/01/2009    KNEE ARTHROSCOPY W/ MENISCAL REPAIR Right     KNEE SURGERY  09/10/2015    IN INCISE FINGER TENDON SHEATH Left 8/25/2016    Procedure: RING TRIGGER FINGER RELEASE ;  Surgeon: Bela White MD;  [facility];  Service: Orthopedics    VARICOSE VEIN SURGERY      Ligation     Family History   Problem Relation Age of Onset   Philip Mackey Cancer Mother     Coronary artery disease Mother     Heart disease Father     Early death Father     Coronary artery disease Father     Diabetes Father     Hypertension Father     Coronary artery disease Brother     Hyperlipidemia Brother     Hypertension Brother        Objective:  /80 (BP Location: Right arm, Patient Position: Sitting, Cuff Size: Standard)   Pulse 60   Temp 97 7 °F (36 5 °C) (Temporal)   Ht 5' 11" (1 803 m)   Wt 127 kg (280 lb)   SpO2 97%   BMI 39 05 kg/m²        Physical Exam  Vitals signs reviewed  Constitutional:       General: He is not in acute distress  Appearance: He is obese  HENT:      Head: Normocephalic and atraumatic  Eyes:      General:         Right eye: No discharge  Left eye: No discharge  Extraocular Movements: Extraocular movements intact  Conjunctiva/sclera: Conjunctivae normal       Pupils: Pupils are equal, round, and reactive to light  Cardiovascular:      Rate and Rhythm: Normal rate and regular rhythm  Pulmonary:      Effort: Pulmonary effort is normal       Breath sounds: Normal breath sounds  No wheezing, rhonchi or rales  Abdominal:      General: Bowel sounds are normal  There is no distension  Tenderness: There is no abdominal tenderness  Musculoskeletal: Normal range of motion  Right lower leg: No edema  Left lower leg: No edema  Skin:     General: Skin is warm  Findings: No rash  Neurological:      General: No focal deficit present  Mental Status: He is alert and oriented to person, place, and time     Psychiatric:         Mood and Affect: Mood normal

## 2021-04-15 NOTE — TELEPHONE ENCOUNTER
Additional Information   Negative: Is this related to an MVA?  Negative: Is this related to a work injury?  Negative: Are you currently recieving homecare services?  Negative: Does the patient have a fever?  Negative: Has the patient had unexplained weight loss?  Negative: Is the patient experiencing blood in sputum?  Negative: Is the patient experiencing urine retention?  Negative: Has the patient experienced major trauma? (fall from height, high speed collision, direct blow to spine) and is also experiencing nausea, light-headedness, or loss of consciousness?  Negative: Is the patient experiencing acute drop foot or paralysis?  Affirmative: Is this a chronic condition? Background - Initial Assessment  Clinical complaint: left sided low back pain that radiates to B/L hips- intermittent LLE pain/numbness  Date of onset: lower back pain for about 5 yrs- NKI- Left hip started getting "tight" about 2 years ago  Now the right hip pain started about 6 mths ago  Denies trauma  Frequency of pain: intermittent  Quality of pain: aching, "tight"    Protocols used: [facility] COMPREHENSIVE SPINE PROGRAM PROTOCOL    Patient had OV today  Triaged for above complaints and NO RF s/s present  Acute on chronic back and hip pain  Referral entered for [facility] site and contact info given to him as well  Nurse wished him well and referral closed

## 2021-04-19 ENCOUNTER — CLINICAL SUPPORT (OUTPATIENT)
Dept: INTERNAL MEDICINE CLINIC | Age: 66
End: 2021-04-19
Payer: MEDICARE

## 2021-04-19 DIAGNOSIS — Z11.1 PPD SCREENING TEST: Primary | ICD-10-CM

## 2021-04-19 PROCEDURE — 86580 TB INTRADERMAL TEST: CPT

## 2021-04-21 ENCOUNTER — CLINICAL SUPPORT (OUTPATIENT)
Dept: INTERNAL MEDICINE CLINIC | Age: 66
End: 2021-04-21

## 2021-04-21 DIAGNOSIS — Z11.1 ENCOUNTER FOR PPD SKIN TEST READING: Primary | ICD-10-CM

## 2021-04-21 LAB
INDURATION: 0 MM
TB SKIN TEST: NEGATIVE

## 2021-05-05 ENCOUNTER — HOSPITAL ENCOUNTER (OUTPATIENT)
Dept: RADIOLOGY | Facility: HOSPITAL | Age: 66
Discharge: HOME | End: 2021-05-05
Payer: MEDICARE

## 2021-05-05 DIAGNOSIS — M25.552 ACUTE PAIN OF BOTH HIPS: ICD-10-CM

## 2021-05-05 DIAGNOSIS — M25.551 ACUTE PAIN OF BOTH HIPS: ICD-10-CM

## 2021-05-05 PROCEDURE — 73521 X-RAY EXAM HIPS BI 2 VIEWS: CPT

## 2021-05-07 ENCOUNTER — EVALUATION (OUTPATIENT)
Dept: PHYSICAL THERAPY | Facility: CLINIC | Age: 66
End: 2021-05-07
Payer: MEDICARE

## 2021-05-07 VITALS — DIASTOLIC BLOOD PRESSURE: 80 MMHG | HEART RATE: 58 BPM | TEMPERATURE: 97.1 F | SYSTOLIC BLOOD PRESSURE: 148 MMHG

## 2021-05-07 DIAGNOSIS — G89.29 CHRONIC BILATERAL LOW BACK PAIN, UNSPECIFIED WHETHER SCIATICA PRESENT: Primary | ICD-10-CM

## 2021-05-07 DIAGNOSIS — M54.50 CHRONIC BILATERAL LOW BACK PAIN, UNSPECIFIED WHETHER SCIATICA PRESENT: Primary | ICD-10-CM

## 2021-05-07 DIAGNOSIS — I25.10 CORONARY ARTERY DISEASE INVOLVING NATIVE HEART WITHOUT ANGINA PECTORIS, UNSPECIFIED VESSEL OR LESION TYPE: ICD-10-CM

## 2021-05-07 PROCEDURE — 97162 PT EVAL MOD COMPLEX 30 MIN: CPT | Performed by: PHYSICAL THERAPIST

## 2021-05-07 PROCEDURE — 97110 THERAPEUTIC EXERCISES: CPT | Performed by: PHYSICAL THERAPIST

## 2021-05-07 PROCEDURE — 97112 NEUROMUSCULAR REEDUCATION: CPT | Performed by: PHYSICAL THERAPIST

## 2021-05-07 NOTE — PROGRESS NOTES
PT Evaluation     Today's date: 2021  Patient name: Thao Dias  : 1955  MRN: [id number]  Referring provider: Fiorella Thomas PT  Dx:   Encounter Diagnosis     ICD-10-CM    1  Chronic bilateral low back pain, unspecified whether sciatica present  M54 5 Ambulatory referral to PT spine    G89 29                   Assessment  Assessment details: Pt presents with signs and symptoms synonymous of L5-S1 Radiculopathy, with mechanical diagnosis of Ext preference  Pt presents with pain, decreased strength, decreased range, flexibility, as well as tolerance to activity and postural awareness  Pt would benefit skilled PT intervention in order to address these impairments in order to be able to perform all desired activities with minimal to nil symptom exacerbation  If he fails to find improvement over the next 3-4 weeks he may benefit consultation from Pain management however based on today's session he will likely have a strong outcome as he was able to abolish his symptoms relatively quickly within session via postural adjustment and exercise introduction  Impairments: abnormal or restricted ROM, activity intolerance, impaired physical strength, lacks appropriate home exercise program, pain with function, poor posture  and poor body mechanics  Understanding of Dx/Px/POC: good   Prognosis: good    Goals  STG 4 Weeks:  Decrease pain at worst to 3/10  Improve range to improve all planes to min/mod  Improve strength to TA Draw 20", hip to 4-  Independent with HEP  LTG 8 Weeks:  Decrease pain at worst to 1/10  Improve range to improve all planes min to nil  Improve strength to TA draw 30", hip to 4      Able to perform all desired activities with minimal to nil symptom exacerbation      Plan  Patient would benefit from: skilled physical therapy  Planned modality interventions: cryotherapy, TENS and thermotherapy: hydrocollator packs  Planned therapy interventions: joint mobilization, manual therapy, abdominal trunk stabilization, balance, neuromuscular re-education, patient education, postural training, strengthening, stretching, therapeutic activities, therapeutic training, therapeutic exercise, home exercise program, graded motor, graded exercise, graded activity, functional ROM exercises and flexibility  Frequency: 2x week  Duration in weeks: 10  Treatment plan discussed with: patient        Subjective Evaluation    History of Present Illness  Date of onset: 5/7/2021  Mechanism of injury: Pt is a 77 yomale who presents via comprehensive spine program stating that he has a long history of low back pain, HNP that he believes it started out when he was in his 29's he states it progressively developed with insidious onset of low back pain that has been progressive since then  He states he vaguely remembers an incident that his son jumped on his back that resulted in pain, but 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  Pt reports that's most recently over the last year he has had an awareness in his L Low back and glute  It will also occasionally travels to the top of his thigh, he states he feels as though it may be numb in his 4th and 5th toe on occasion  Pt reports that base line is 1-2 annoyance, and can get up to a 5/10  Pt reports that his hips have also begun to bother him especially after being very busy house and yard work or playing golf  He states since last fall this has also seemed to increase and they bother him symmetrically  Pt indicates that if he plays 9 holes he is okay, if he plays 18 he is very sore  Pt reports that he is using naproxen to help his back and hip pain  Pt reports that he hasn't been using ice or heat as of late  Pt reports that he does not have difficulty falling asleep, and can sleep uninterrupted due to pain  Pt reports when he wakes up he feels the best he will, and it will progressively get worse through out the day    Pt reports that he did have some difficulty initiating bladder voidance but medication has assisted, and no radicular symptoms into his R leg  Pt reports that he recently had X-rays performed of his hips, established meeting with his PA Melissa Almonte on 2021, who referred him to PCPT program and thus presents today  Pt reports his goals are to improve his endurance so that he can continue playing golf, and participate with grandchildren and performing activities around the house and yard  Quality of life: good    Pain  Current pain ratin  At best pain ratin  At worst pain ratin  Quality: dull ache and radiating  Relieving factors: medications  Aggravating factors: standing, walking and stair climbing  Progression: worsening      Diagnostic Tests  Abnormal x-ray: performed no results thus far  Treatments  Previous treatment: chiropractic and medication  Patient Goals  Patient goals for therapy: decreased pain, increased motion, increased strength and return to sport/leisure activities          Objective     Active Range of Motion     Lumbar   Flexion:  Restriction level: minimal  Extension:  Restriction level: maximal  Left lateral flexion:  with pain Restriction level: moderate  Right lateral flexion:  Restriction level: minimal  Left rotation:  Restriction level: minimal  Right rotation:  Restriction level: moderate    Additional Active Range of Motion Details  Forward head, rounded shoulders, decreased Lordosis  DTR Patella B 2+, Achilles 1+ on L, 2+ on R    B/L Sensation intact to L3,(4,5 Impaired L vs R) ,S1,S2  Postural correction  L back and Glute  Better  Repeated motion   Flex  Ext  EIL low back pain  Better  Abolished  Hip strength improved  EIS nil  Nil  SB R  SB L  LE Strength  Hip   L Flex 3+ Ext 5 Abd 3+ Add 4  R Flex 4- Ext 5 Abd 4- Add 4  Strong and painless B/L     Joint Mobility NP  Palpation NP  STs  TA draw 11"  + Slump L      Flowsheet Rows      Most Recent Value   PT/OT G-Codes   Current Score 67   Projected Score  71             Precautions: Comp Spine Mod Risk group 6-8 visits, HTN, DM2, Fall concern      Manuals 5/7                                                                Neuro Re-Ed             Postural Ed 10 min            Pball Press Down             Hip Abd + Ext                                                                 Ther Ex             EIL 3 x 10            Progression? EIS 3 x 10            Progression?                                                     Slump ST L  1"           Ther Activity             Mini Squat             Sit to Stand             Gait Training                                       Modalities             CP/HP in Sustained Ext prn

## 2021-05-10 RX ORDER — CLOPIDOGREL BISULFATE 75 MG/1
75 TABLET ORAL DAILY
Qty: 90 TABLET | Refills: 0 | Status: SHIPPED | OUTPATIENT
Start: 2021-05-10 | End: 2021-07-31 | Stop reason: SDUPTHER

## 2021-05-11 ENCOUNTER — HOSPITAL ENCOUNTER (OUTPATIENT)
Dept: CT IMAGING | Facility: HOSPITAL | Age: 66
Discharge: HOME | End: 2021-05-11
Attending: INTERNAL MEDICINE
Payer: MEDICARE

## 2021-05-11 ENCOUNTER — OFFICE VISIT (OUTPATIENT)
Dept: PHYSICAL THERAPY | Facility: CLINIC | Age: 66
End: 2021-05-11
Payer: MEDICARE

## 2021-05-11 DIAGNOSIS — G89.29 CHRONIC BILATERAL LOW BACK PAIN, UNSPECIFIED WHETHER SCIATICA PRESENT: Primary | ICD-10-CM

## 2021-05-11 DIAGNOSIS — R91.8 LUNG MASS: ICD-10-CM

## 2021-05-11 DIAGNOSIS — M54.50 CHRONIC BILATERAL LOW BACK PAIN, UNSPECIFIED WHETHER SCIATICA PRESENT: Primary | ICD-10-CM

## 2021-05-11 PROCEDURE — 71250 CT THORAX DX C-: CPT

## 2021-05-11 PROCEDURE — G1004 CDSM NDSC: HCPCS

## 2021-05-11 PROCEDURE — 97110 THERAPEUTIC EXERCISES: CPT | Performed by: PHYSICAL THERAPIST

## 2021-05-11 PROCEDURE — 97530 THERAPEUTIC ACTIVITIES: CPT | Performed by: PHYSICAL THERAPIST

## 2021-05-11 PROCEDURE — 97112 NEUROMUSCULAR REEDUCATION: CPT | Performed by: PHYSICAL THERAPIST

## 2021-05-11 NOTE — PROGRESS NOTES
Daily Note     Today's date: 2021  Patient name: Keshia Manjarrez  : 1955  MRN: [id number]  Referring provider: Amy Gardner PT  Dx:   Encounter Diagnosis     ICD-10-CM    1  Chronic bilateral low back pain, unspecified whether sciatica present  M54 5     G89 29                   Subjective: Pt presents today stating he is feeling better  Still some back pain, but has not had any leg pain since last visit  Pt reports that he was preferring of the EIS vs EIL because of R shoulder  Pt reports compliance with HEP and compliance with sitting  Objective: See treatment diary below      Assessment: Pt has sustaining radicular symptoms  Pain has decreased low back pain  Educated to keep Ext based program and hit golf balls, after 10-20 swings perform 5 EIS, sooner if he has symptom presentation  Will follow up in 2 days  Precautions: Comp Spine Mod Risk group 6-8 visits, HTN, DM2, Fall concern      Manuals                                                                Neuro Re-Ed             Postural Ed 10 min            Pball Press Down  3" x 10           Hip Abd + Ext  2 x 10                                                               Ther Ex             EIL 3 x 10 prn           Progression? EIS 3 x 10 3 x 10            Progression?                                                     Slump ST L  1" x 10            Ther Activity             Mini Squat             Sit to Stand             Golf Swing Review  10 min                                     Modalities             CP/HP in Sustained Ext prn

## 2021-05-13 ENCOUNTER — OFFICE VISIT (OUTPATIENT)
Dept: PHYSICAL THERAPY | Facility: CLINIC | Age: 66
End: 2021-05-13
Payer: MEDICARE

## 2021-05-13 DIAGNOSIS — M54.50 CHRONIC BILATERAL LOW BACK PAIN, UNSPECIFIED WHETHER SCIATICA PRESENT: Primary | ICD-10-CM

## 2021-05-13 DIAGNOSIS — G89.29 CHRONIC BILATERAL LOW BACK PAIN, UNSPECIFIED WHETHER SCIATICA PRESENT: Primary | ICD-10-CM

## 2021-05-13 PROCEDURE — 97110 THERAPEUTIC EXERCISES: CPT | Performed by: PHYSICAL THERAPIST

## 2021-05-13 PROCEDURE — 97112 NEUROMUSCULAR REEDUCATION: CPT | Performed by: PHYSICAL THERAPIST

## 2021-05-13 NOTE — PROGRESS NOTES
Daily Note     Today's date: 2021  Patient name: Dimas Marin  : 1955  MRN: [id number]  Referring provider: Juanita Mott PT  Dx:   Encounter Diagnosis     ICD-10-CM    1  Chronic bilateral low back pain, unspecified whether sciatica present  M54 5     G89 29                   Subjective: Pt presents today stating that he played 9 on Tuesday  He states mild back pain that evening with L glute, performed HEP, completely okay  Mild hip pain that night, improved next day  Objective: See treatment diary below      Assessment:  Progressing very well  Offered further review of exercise and posture, introduction to hip stretching exercise  Continue to progress as able  Precautions: Comp Spine Mod Risk group 6-8 visits, HTN, DM2, Fall concern      Manuals                                                               Neuro Re-Ed             Postural Ed 10 min            Pball Press Down  3" x 10 3" x 20          Hip Abd + Ext  2 x 10 3 x 10                                                              Ther Ex             EIL 3 x 10 prn           Progression? EIS 3 x 10 3 x 10  3 x 10          Progression?              Hip IR/ER ST B   6" x 10                                    Slump ST L  1" x 10  1" x 10          Ther Activity             Mini Squat             Sit to Stand             Golf Swing Review  10 min                                     Modalities             CP/HP in Sustained Ext prn

## 2021-05-17 ENCOUNTER — OFFICE VISIT (OUTPATIENT)
Dept: PHYSICAL THERAPY | Facility: CLINIC | Age: 66
End: 2021-05-17
Payer: MEDICARE

## 2021-05-17 DIAGNOSIS — M54.50 CHRONIC BILATERAL LOW BACK PAIN, UNSPECIFIED WHETHER SCIATICA PRESENT: Primary | ICD-10-CM

## 2021-05-17 DIAGNOSIS — G89.29 CHRONIC BILATERAL LOW BACK PAIN, UNSPECIFIED WHETHER SCIATICA PRESENT: Primary | ICD-10-CM

## 2021-05-17 PROCEDURE — 97112 NEUROMUSCULAR REEDUCATION: CPT | Performed by: PHYSICAL THERAPIST

## 2021-05-17 PROCEDURE — 97110 THERAPEUTIC EXERCISES: CPT | Performed by: PHYSICAL THERAPIST

## 2021-05-17 NOTE — PROGRESS NOTES
Daily Note     Today's date: 2021  Patient name: Jaden Hammond  : 1955  MRN: [id number]  Referring provider: Dilshad Cotto PT  Dx:   Encounter Diagnosis     ICD-10-CM    1  Chronic bilateral low back pain, unspecified whether sciatica present  M54 5     G89 29                   Subjective: Pt presents today stating he is feeling well  HEP is going very well  Objective: See treatment diary below      Assessment:  Proceeded with mini squats without difficulty  Making functional gains in movement, sustaining longer periods of time without difficulty  Continue to progress as able with resistance  Precautions: Comp Spine Mod Risk group 6-8 visits, HTN, DM2, Fall concern      Manuals                                                              Neuro Re-Ed             Postural Ed 10 min            Pball Press Down  3" x 10 3" x 20 3" x 20         Hip Abd + Ext  2 x 10 3 x 10 3 x 10         Tband Row + Ext     Blue        Multi Rot     GTB                                  Ther Ex             EIL 3 x 10 prn           Progression? EIS 3 x 10 3 x 10  3 x 10 3 x 10         Progression?              Hip IR/ER ST B   6" x 10 6" x 10                                   Slump ST L  1" x 10  1" x 10 1" x 10         Ther Activity             Mini Squat    2 x 10         Sit to Stand             Golf Swing Review  10 min                                     Modalities             CP/HP in Sustained Ext prn

## 2021-05-20 ENCOUNTER — TELEPHONE (OUTPATIENT)
Dept: INTERNAL MEDICINE CLINIC | Age: 66
End: 2021-05-20

## 2021-05-20 ENCOUNTER — LAB (OUTPATIENT)
Dept: LAB | Facility: CLINIC | Age: 66
End: 2021-05-20
Payer: MEDICARE

## 2021-05-20 ENCOUNTER — TELEPHONE (OUTPATIENT)
Dept: PULMONOLOGY | Facility: CLINIC | Age: 66
End: 2021-05-20

## 2021-05-20 DIAGNOSIS — E78.5 HYPERLIPIDEMIA, UNSPECIFIED HYPERLIPIDEMIA TYPE: ICD-10-CM

## 2021-05-20 DIAGNOSIS — I25.10 CORONARY ARTERY DISEASE INVOLVING NATIVE HEART WITHOUT ANGINA PECTORIS, UNSPECIFIED VESSEL OR LESION TYPE: ICD-10-CM

## 2021-05-20 LAB
CHOLEST SERPL-MCNC: 132 MG/DL (ref 50–200)
HDLC SERPL-MCNC: 40 MG/DL
LDLC SERPL CALC-MCNC: 60 MG/DL (ref 0–100)
PSA SERPL-MCNC: 2.2 NG/ML (ref 0–4)
TRIGL SERPL-MCNC: 162 MG/DL

## 2021-05-20 PROCEDURE — G0103 PSA SCREENING: HCPCS

## 2021-05-20 PROCEDURE — 80061 LIPID PANEL: CPT

## 2021-05-20 PROCEDURE — 36415 COLL VENOUS BLD VENIPUNCTURE: CPT

## 2021-05-20 NOTE — TELEPHONE ENCOUNTER
Patient is returning your phone call    Please call him back on the mobile phone    He will have it up high so he can here when outside

## 2021-05-20 NOTE — TELEPHONE ENCOUNTER
Gabby Ocampo called and requested last office note, patient now on medicare   T[place] patient is now see Dr Johan Reyes but I also faxed over office note from 12/15/21

## 2021-05-21 ENCOUNTER — OFFICE VISIT (OUTPATIENT)
Dept: PHYSICAL THERAPY | Facility: CLINIC | Age: 66
End: 2021-05-21
Payer: MEDICARE

## 2021-05-21 ENCOUNTER — TELEPHONE (OUTPATIENT)
Dept: CARDIOLOGY CLINIC | Facility: CLINIC | Age: 66
End: 2021-05-21

## 2021-05-21 DIAGNOSIS — M54.50 CHRONIC BILATERAL LOW BACK PAIN, UNSPECIFIED WHETHER SCIATICA PRESENT: Primary | ICD-10-CM

## 2021-05-21 DIAGNOSIS — G89.29 CHRONIC BILATERAL LOW BACK PAIN, UNSPECIFIED WHETHER SCIATICA PRESENT: Primary | ICD-10-CM

## 2021-05-21 PROCEDURE — 97112 NEUROMUSCULAR REEDUCATION: CPT | Performed by: PHYSICAL THERAPIST

## 2021-05-21 PROCEDURE — 97110 THERAPEUTIC EXERCISES: CPT | Performed by: PHYSICAL THERAPIST

## 2021-05-21 NOTE — PROGRESS NOTES
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  Objective: See treatment diary below      Assessment:  Pt progressing very well  Sustaining centralization, encouraged to perform further golfing, exercise based activities and follow up next week with tolerance to it  Continue to progress as able  Precautions: Comp Spine Mod Risk group 6-8 visits, HTN, DM2, Fall concern      Manuals                                                             Neuro Re-Ed             Postural Ed 10 min            Pball Press Down  3" x 10 3" x 20 3" x 20 3" x 20        Hip Abd + Ext  2 x 10 3 x 10 3 x 10 3 x 10        Tband Row + Ext     Blue 2 x 10        Multi Rot     GTB nv                                  Ther Ex             EIL 3 x 10 prn           Progression? EIS 3 x 10 3 x 10  3 x 10 3 x 10 3 x 10        Progression?              Hip IR/ER ST B   6" x 10 6" x 10 6" x 10                                  Slump ST L  1" x 10  1" x 10 1" x 10 1" x 10        Ther Activity             Mini Squat    2 x 10 2 x 10        Sit to Stand     2 x 10        Golf Swing Review  10 min                                     Modalities             CP/HP in Sustained Ext prn

## 2021-05-25 ENCOUNTER — OFFICE VISIT (OUTPATIENT)
Dept: PHYSICAL THERAPY | Facility: CLINIC | Age: 66
End: 2021-05-25
Payer: MEDICARE

## 2021-05-25 DIAGNOSIS — M54.50 CHRONIC BILATERAL LOW BACK PAIN, UNSPECIFIED WHETHER SCIATICA PRESENT: Primary | ICD-10-CM

## 2021-05-25 DIAGNOSIS — G89.29 CHRONIC BILATERAL LOW BACK PAIN, UNSPECIFIED WHETHER SCIATICA PRESENT: Primary | ICD-10-CM

## 2021-05-25 PROCEDURE — 97112 NEUROMUSCULAR REEDUCATION: CPT | Performed by: PHYSICAL THERAPIST

## 2021-05-25 PROCEDURE — 97110 THERAPEUTIC EXERCISES: CPT | Performed by: PHYSICAL THERAPIST

## 2021-05-25 NOTE — PROGRESS NOTES
Daily Note     Today's date: 2021  Patient name: Almaz Escobedo  : 1955  MRN: [id number]  Referring provider: Andrzej Hernandez, PT  Dx:   Encounter Diagnosis     ICD-10-CM    1  Chronic bilateral low back pain, unspecified whether sciatica present  M54 5     G89 29                   Subjective: Pt reports today stating that his back is doing well, his hips are sore from 16 holes  Otherwise     Objective: See treatment diary below      Assessment:  Soreness in Hip likely due to golfing and returning back to recreational exercise  Education of symptoms reduce within 24-36 hours normal   Longer lingering symptoms mean he is being too aggressive with his sessions  Educated about re-introduction to exercise and DOMs  RE nv    Precautions: Comp Spine Mod Risk group 6-8 visits, HTN, DM2, Fall concern      Manuals                                                            Neuro Re-Ed             Postural Ed 10 min            Pball Press Down  3" x 10 3" x 20 3" x 20 3" x 20 3" x 20       Hip Abd + Ext  2 x 10 3 x 10 3 x 10 3 x 10 3 x 10       Tband Row + Ext     Blue 2 x 10 Blk 2 x 10       Multi Rot     GTB nv GTB 2 x 10                                  Ther Ex             EIL 3 x 10 prn           Progression? EIS 3 x 10 3 x 10  3 x 10 3 x 10 3 x 10 3 x 10       Progression?              Hip IR/ER ST B   6" x 10 6" x 10 6" x 10 6" x 20                                 Slump ST L  1" x 10  1" x 10 1" x 10 1" x 10 1" x 10       Ther Activity             Mini Squat    2 x 10 2 x 10 2 x 10       Sit to Stand     2 x 10 2 x 10       Golf Swing Review  10 min                                     Modalities             CP/HP in Sustained Ext prn

## 2021-05-27 ENCOUNTER — OFFICE VISIT (OUTPATIENT)
Dept: PHYSICAL THERAPY | Facility: CLINIC | Age: 66
End: 2021-05-27
Payer: MEDICARE

## 2021-05-27 DIAGNOSIS — M54.50 CHRONIC BILATERAL LOW BACK PAIN, UNSPECIFIED WHETHER SCIATICA PRESENT: Primary | ICD-10-CM

## 2021-05-27 DIAGNOSIS — G89.29 CHRONIC BILATERAL LOW BACK PAIN, UNSPECIFIED WHETHER SCIATICA PRESENT: Primary | ICD-10-CM

## 2021-05-27 PROCEDURE — 97140 MANUAL THERAPY 1/> REGIONS: CPT | Performed by: PHYSICAL THERAPIST

## 2021-05-27 NOTE — PROGRESS NOTES
PT Evaluation     Today's date: 2021  Patient name: Moisés Grossman  : 1955  MRN: [id number]  Referring provider: Andre Zhao PT  Dx:   Encounter Diagnosis     ICD-10-CM    1  Chronic bilateral low back pain, unspecified whether sciatica present  M54 5     G89 29                   Assessment  Assessment details: Pt at this time demonstrates excellent range, strength, flexibility as well as tolerance to activity and postural awareness  He has achieved all goals sought out for him as well as by him and is likely safe to DC to HEP  If he is to have a decline in any form he is welcome to return as needed    Thank you very much for this kind and motivated referral      Impairments: abnormal or restricted ROM, activity intolerance, impaired physical strength, lacks appropriate home exercise program, pain with function, poor posture  and poor body mechanics  Understanding of Dx/Px/POC: good   Prognosis: good    Goals  STG 4 Weeks:  Decrease pain at worst to 3/10 -met  Improve range to improve all planes to min/mod -met  Improve strength to TA Draw 20", hip to 4- -met  Independent with HEP -met  LTG 8 Weeks:  Decrease pain at worst to 1/10 -met  Improve range to improve all planes min to nil -met  Improve strength to TA draw 30", hip to 4    -met  Able to perform all desired activities with minimal to nil symptom exacerbation -met      Plan  Patient would benefit from: skilled physical therapy  Planned modality interventions: cryotherapy, TENS and thermotherapy: hydrocollator packs  Planned therapy interventions: joint mobilization, manual therapy, abdominal trunk stabilization, balance, neuromuscular re-education, patient education, postural training, strengthening, stretching, therapeutic activities, therapeutic training, therapeutic exercise, home exercise program, graded motor, graded exercise, graded activity, functional ROM exercises and flexibility  Frequency: 2x week  Duration in weeks: 10  Treatment plan discussed with: patient        Subjective Evaluation    History of Present Illness  Date of onset: 2021  Mechanism of injury: Pt presents today stating that he is feeling great, has an extra spring in his step that he has not had in several years, indicating this is the first time that he has had no pain for a long time  Reports that he has been compliant with he HEP, he is performing 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  He states that he has been able to play golf with out great difficulty and is very content with his progression  Pt reports that he feels as though he is ready to be DC to HEP  Quality of life: good    Pain  Current pain ratin  At best pain ratin  At worst pain ratin  Quality: dull ache and radiating  Relieving factors: medications  Aggravating factors: standing, walking and stair climbing  Progression: worsening      Diagnostic Tests  Abnormal x-ray: performed no results thus far  Treatments  Previous treatment: chiropractic and medication  Patient Goals  Patient goals for therapy: decreased pain, increased motion, increased strength and return to sport/leisure activities          Objective     Active Range of Motion     Lumbar   Flexion:  WFL  Extension:  WFL  Left lateral flexion:  WFL  Right lateral flexion:  WFL  Left rotation:  WF  Right rotation:  [facility]    Additional Active Range of Motion Details  Forward head, rounded shoulders, decreased Lordosis  DTR Patella B 2+, Achilles 1+ on L, 2+ on R    B/L Sensation intact to L3,(4,5 Impaired L vs R) ,S1,S2  Postural correction  L back and Glute  Better  Repeated motion   Flex  Ext  EIL low back pain  Better  Abolished  Hip strength improved  EIS nil  Nil  SB R  SB L  LE Strength  Hip   L Flex 5 Ext 5 Abd 5 Add 5  R Flex 5 Ext 5 Abd 5 Add 5  Strong and painless B/L     Joint Mobility NP  Palpation NP  STs  TA draw 30"  (-) Slump L          Precautions: Comp Spine Mod Risk group 6-8 visits, HTN, DM2, Fall concern      Manuals 5/7 5/11 5/13 5/17 5/21 5/25 5/27             assessment x 15                                             Neuro Re-Ed             Postural Ed 10 min            Pball Press Down  3" x 10 3" x 20 3" x 20 3" x 20 3" x 20       Hip Abd + Ext  2 x 10 3 x 10 3 x 10 3 x 10 3 x 10       Tband Row + Ext     Blue 2 x 10 Blk 2 x 10       Multi Rot     GTB nv GTB 2 x 10                                  Ther Ex             EIL 3 x 10 prn           Progression? EIS 3 x 10 3 x 10  3 x 10 3 x 10 3 x 10 3 x 10       Progression?              Hip IR/ER ST B   6" x 10 6" x 10 6" x 10 6" x 20                                 Slump ST L  1" x 10  1" x 10 1" x 10 1" x 10 1" x 10       Ther Activity             Mini Squat    2 x 10 2 x 10 2 x 10       Sit to Stand     2 x 10 2 x 10       Golf Swing Review  10 min                                     Modalities             CP/HP in Sustained Ext prn

## 2021-05-28 ENCOUNTER — APPOINTMENT (OUTPATIENT)
Dept: PHYSICAL THERAPY | Facility: CLINIC | Age: 66
End: 2021-05-28
Payer: MEDICARE

## 2021-05-28 ENCOUNTER — OFFICE VISIT (OUTPATIENT)
Dept: CARDIOLOGY CLINIC | Facility: CLINIC | Age: 66
End: 2021-05-28
Payer: MEDICARE

## 2021-05-28 VITALS
HEIGHT: 71 IN | WEIGHT: 276.1 LBS | OXYGEN SATURATION: 98 % | HEART RATE: 74 BPM | DIASTOLIC BLOOD PRESSURE: 70 MMHG | SYSTOLIC BLOOD PRESSURE: 128 MMHG | BODY MASS INDEX: 38.65 KG/M2

## 2021-05-28 DIAGNOSIS — E78.5 HYPERLIPIDEMIA, UNSPECIFIED HYPERLIPIDEMIA TYPE: ICD-10-CM

## 2021-05-28 DIAGNOSIS — I25.10 CORONARY ARTERY DISEASE INVOLVING NATIVE CORONARY ARTERY OF NATIVE HEART WITHOUT ANGINA PECTORIS: ICD-10-CM

## 2021-05-28 DIAGNOSIS — E11.9 NON-INSULIN DEPENDENT TYPE 2 DIABETES MELLITUS (HCC): ICD-10-CM

## 2021-05-28 DIAGNOSIS — I10 BENIGN ESSENTIAL HYPERTENSION: Primary | ICD-10-CM

## 2021-05-28 PROCEDURE — 99214 OFFICE O/P EST MOD 30 MIN: CPT | Performed by: INTERNAL MEDICINE

## 2021-05-28 RX ORDER — ATORVASTATIN CALCIUM 40 MG/1
40 TABLET, FILM COATED ORAL
Qty: 90 TABLET | Refills: 1 | Status: SHIPPED | OUTPATIENT
Start: 2021-05-28 | End: 2021-12-15 | Stop reason: SDUPTHER

## 2021-05-28 NOTE — PROGRESS NOTES
Cardiology Follow Up    Ritesh Van  1955  [id number]  [facility]  [address] [address] [phone]    No diagnosis found  There are no diagnoses linked to this encounter  I had the pleasure of seeing Ritesh Van for a follow up visit  INTERVAL HISTORY:  None    History of the presenting illness, Discussion/Summary and My Plan are as follows:::    He is a pleasant 78-year-old gentleman with a history of hypertension, diabetes and dyslipidemia  He also has coronary artery disease and has had multiple stenting procedures  Based on his last coronary angiography report from the [facility] in September 2016, left main was patent, LAD was patent including a drug-eluting stent-implanted in 2011  His proximal RCA had a stent implanted in June 2015-in-stent restenosis-December 2015-restented, again had in-stent restenosis that was treated with a drug-eluting 3X 18 mm Xience stent  This was done for anginal symptoms  To his knowledge, he has not had a myocardial infarction  Works as a  making deliveries now  Sometimes walks the golf course - no change from before  No anginal symptoms at this time  He had vein stripping of his right lower extremity which has disproportionately more edema      Plan:      Coronary artery disease: Status post [id number]-patent in 2016, status post proximal RCA stent in - stent - in-stent-last-September 2016 with a Xience stent, (3 STENTS)   No anginal symptoms at this time  Aspirin and Plavix, not on metoprolol but asymptomatic, heart rates often are bradycardic     Stress tests:   November 2019: Exercise time 7 minutes 30 seconds, no ischemia   December 2017:Exercise nuclear perfusion study, 7 minutes 31 seconds, resting hypertension with hypertensive response, no ischemia, no ECG changes, no chest pains     Dyslipidemia: on atorvastatin 40 mg,   Controlled, see below, continue to watch LDL    HTN: Told on 2nd reading, no changes at this time   Maxed out on amlodipine/lisinopril/ hydrochlorothiazide     DM: As above  last A1c was 7 9     Chronic right more than left lower extremity edema: No need for Dopplers, had vein stripping for varicose veins in the past  Foot end elevation, sometimes uses compression stockings  No increase with Amlodipine use    Follow up in 6 months  Results for Jeanette Monahan (MRN [id number]) as of 5/28/2021 11:30   Ref   Range 2/21/2020 10:06 7/8/2020 10:44 4/13/2021 11:32 5/20/2021 11:05   Cholesterol Latest Ref Range: 50 - 200 mg/dL 95   132   Triglycerides Latest Ref Range: <=150 mg/dL 162 (H)   162 (H)   HDL Latest Ref Range: >=40 mg/dL 36 (L)   40   Non-HDL Cholesterol Latest Units: mg/dl 59      LDL Calculated Latest Ref Range: 0 - 100 mg/dL 27   60   Hemoglobin A1C  7 9 (H) 6 7 (H) 7 9 (H)          Patient Active Problem List   Diagnosis    Abnormal MRI, lumbar spine    Benign essential hypertension    CAD (coronary artery disease)    Claustrophobia    Diabetes mellitus with circulatory complication (HCC)    Herniation of intervertebral disc of lumbar spine    Hyperlipidemia    Presbyopia    Primary osteoarthritis of left knee    Hypertension    Atherosclerosis of native coronary artery without angina pectoris    Class 2 severe obesity due to excess calories with serious comorbidity and body mass index (BMI) of 39 0 to 39 9 in adult (HCC)    Exercise-induced angina (HCC)    Platelets decreased (HCC)    Lung mass     Past Medical History:   Diagnosis Date    Arthritis     knee    Coronary artery disease     Diabetes mellitus ([address] )     H/O angioplasty     Hypercholesteremia     Hypertension     Varicose veins of lower extremity      Social History     Socioeconomic History    Marital status: /Civil Union     Spouse name: Not on file    Number of children: Not on file    Years of education: Not on file    Highest education level: Not on file   Occupational History    Not on file   Social Needs    Financial resource strain: Not on file    Food insecurity     Worry: Not on file     Inability: Not on file    Transportation needs     Medical: Not on file     Non-medical: Not on file   Tobacco Use    Smoking status: Former Smoker     Packs/day: 2 00     Years: 15 00     Pack years: 30 00     Types: Cigarettes     Quit date: 1982     Years since quittin 4    Smokeless tobacco: Never Used   Substance and Sexual Activity    Alcohol use: Not Currently     Comment: social use    Drug use: No    Sexual activity: Yes   Lifestyle    Physical activity     Days per week: Not on file     Minutes per session: Not on file    Stress: Not on file   Relationships    Social connections     Talks on phone: Not on file     Gets together: Not on file     Attends Muslim service: Not on file     Active member of club or organization: Not on file     Attends meetings of clubs or organizations: Not on file     Relationship status: Not on file    Intimate partner violence     Fear of current or ex partner: Not on file     Emotionally abused: Not on file     Physically abused: Not on file     Forced sexual activity: Not on file   Other Topics Concern    Not on file   Social History Narrative    Not on file      Family History   Problem Relation Age of Onset    Cancer Mother     Coronary artery disease Mother     Heart disease Father     Early death Father     Coronary artery disease Father     Diabetes Father     Hypertension Father     Coronary artery disease Brother     Hyperlipidemia Brother     Hypertension Brother      Past Surgical History:   Procedure Laterality Date    CARDIAC CATHETERIZATION      CARDIAC SURGERY      CHOLECYSTECTOMY  2010    COLONOSCOPY      CORONARY ANGIOPLASTY WITH STENT PLACEMENT      EYE SURGERY Left     ocular lens implant    GALLBLADDER SURGERY      HERNIA REPAIR  01/01/2009    KNEE ARTHROSCOPY W/ MENISCAL REPAIR Right     KNEE SURGERY  09/10/2015    VT INCISE FINGER TENDON SHEATH Left 8/25/2016    Procedure: RING TRIGGER FINGER RELEASE ;  Surgeon: Gabbi Zayas MD;  Location: [facility];  Service: Orthopedics    VARICOSE VEIN SURGERY      Ligation       Current Outpatient Medications:     amLODIPine (NORVASC) 10 mg tablet, Take 1 tablet (10 mg total) by mouth daily, Disp: 90 tablet, Rfl: 2    ascorbic acid (VITAMIN C) 500 mg tablet, Take 500 mg by mouth daily, Disp: , Rfl:     aspirin (ECOTRIN LOW STRENGTH) 81 mg EC tablet, Take 81 mg by mouth daily  , Disp: , Rfl:     atorvastatin (LIPITOR) 40 mg tablet, Take 1 tablet (40 mg total) by mouth daily at bedtime, Disp: 90 tablet, Rfl: 1    Blood Glucose Monitoring Suppl (ONE TOUCH ULTRA 2) w/Device KIT, Test blood glucose 2 times daily (One Touch Ultra 2), Disp: 1 each, Rfl: 0    clopidogrel (Plavix) 75 mg tablet, Take 1 tablet (75 mg total) by mouth daily, Disp: 90 tablet, Rfl: 0    glimepiride (AMARYL) 4 mg tablet, Take 4 mg with breakfast and 8 mg in the evening daily  , Disp: 90 tablet, Rfl: 5    glucose blood (ONE TOUCH ULTRA TEST) test strip, Test blood glucose 2 times daily (One Touch Ultra Blue), Disp: 300 each, Rfl: 0    lisinopril-hydrochlorothiazide (PRINZIDE,ZESTORETIC) 20-12 5 MG per tablet, Take 2 tablets by mouth daily, Disp: 180 tablet, Rfl: 1    metFORMIN (GLUCOPHAGE) 1000 MG tablet, Take 2 tablets (2,000 mg total) by mouth daily after dinner, Disp: 180 tablet, Rfl: 1    Omega-3 Fatty Acids (FISH OIL PO), Take by mouth daily  , Disp: , Rfl:     ONETOUCH DELICA LANCETS 57R MISC, Test blood glucose 2 times daily, Disp: 300 each, Rfl: 0    prednisoLONE acetate (PRED FORTE) 1 % ophthalmic suspension, Administer 1 drop into the left eye once a week , Disp: , Rfl:     repaglinide (PRANDIN) 2 mg tablet, Take 1 tablet (2 mg total) by mouth 3 (three) times a day before meals, Disp: 270 tablet, Rfl: 1    sildenafil (REVATIO) 20 mg tablet, Take 1 tablet (20 mg total) by mouth as needed (erectile dysfunction), Disp: 90 tablet, Rfl: 1    tamsulosin (FLOMAX) 0 4 mg, Take 1 capsule (0 4 mg total) by mouth daily with dinner, Disp: 90 capsule, Rfl: 3    VITAMIN E PO, Take 1 capsule by mouth daily  , Disp: , Rfl:   Allergies   Allergen Reactions    Ciprofloxacin Swelling    Janumet [Sitagliptin-Metformin Hcl]      Elevated liver enzymes    Terazosin      Annotation - [id number]: Blood in sperm       Imaging: No results found  Review of Systems:  Review of Systems   Constitutional: Negative  HENT: Negative  Respiratory: Negative  Cardiovascular: Negative  Endocrine: Negative  Musculoskeletal: Positive for arthralgias (hip pains)  Neurological: Negative  Physical Exam:  /70 (BP Location: Right arm, Patient Position: Sitting, Cuff Size: Adult)   Pulse 74   Ht 5' 11" (1 803 m)   Wt 125 kg (276 lb 1 6 oz)   SpO2 98%   BMI 38 51 kg/m²   Physical Exam  Constitutional:       General: He is not in acute distress  Appearance: He is well-developed  He is not diaphoretic  HENT:      Head: Normocephalic and atraumatic  Eyes:      General: No scleral icterus  Right eye: No discharge  Left eye: No discharge  Conjunctiva/sclera: Conjunctivae normal       Pupils: Pupils are equal, round, and reactive to light  Neck:      Musculoskeletal: Normal range of motion  Thyroid: No thyromegaly  Vascular: No JVD  Trachea: No tracheal deviation  Cardiovascular:      Rate and Rhythm: Normal rate and regular rhythm  Heart sounds: No murmur  No friction rub  Pulmonary:      Effort: Pulmonary effort is normal  No respiratory distress  Breath sounds: Normal breath sounds  No stridor  Abdominal:      General: Bowel sounds are normal  There is no distension  Palpations: Abdomen is soft        Tenderness: There is no abdominal tenderness  There is no guarding  Musculoskeletal: Normal range of motion  General: Swelling (Right lower extremity edema) present  No tenderness or deformity  Skin:     General: Skin is warm  Coloration: Skin is not pale  Findings: No erythema or rash

## 2021-06-04 ENCOUNTER — TELEPHONE (OUTPATIENT)
Dept: INTERNAL MEDICINE CLINIC | Age: 66
End: 2021-06-04

## 2021-06-04 NOTE — TELEPHONE ENCOUNTER
Dr Ellis Amin asked if we could place another Order for a CT scan  They want him to get another CT but asked if we could put the order in  It needs to be after August 11th

## 2021-06-10 DIAGNOSIS — R91.1 LEFT UPPER LOBE PULMONARY NODULE: ICD-10-CM

## 2021-06-10 DIAGNOSIS — R91.8 PULMONARY NODULES: Primary | ICD-10-CM

## 2021-06-10 DIAGNOSIS — R91.1 RIGHT UPPER LOBE PULMONARY NODULE: ICD-10-CM

## 2021-06-16 NOTE — PROGRESS NOTES
PT Discharge    Today's date: 2021  Patient name: Keshia Manjarrez  : 1955  MRN: [id number]  Referring provider: Amy Gardner, PT  Dx:   Encounter Diagnosis     ICD-10-CM    1  Chronic bilateral low back pain, unspecified whether sciatica present  M54 5 PT plan of care cert/re-cert    L45 19        Start Time: 1100  Stop Time: 1120  Total time in clinic (min): 20 minutes    Assessment/Plan  Pt has not been present since 2021  Pt's chart will be DC in compliance of facility policy as all Charts are DC within 30 days of last scheduled visit          Subjective    Objective

## 2021-07-21 ENCOUNTER — APPOINTMENT (OUTPATIENT)
Dept: LAB | Facility: CLINIC | Age: 66
End: 2021-07-21
Payer: MEDICARE

## 2021-07-21 DIAGNOSIS — I10 BENIGN ESSENTIAL HYPERTENSION: ICD-10-CM

## 2021-07-21 DIAGNOSIS — E78.5 HYPERLIPIDEMIA, UNSPECIFIED HYPERLIPIDEMIA TYPE: ICD-10-CM

## 2021-07-21 DIAGNOSIS — E11.59 TYPE 2 DIABETES MELLITUS WITH OTHER CIRCULATORY COMPLICATION, WITHOUT LONG-TERM CURRENT USE OF INSULIN (HCC): ICD-10-CM

## 2021-07-21 LAB
ALBUMIN SERPL BCP-MCNC: 4 G/DL (ref 3.5–5)
ALP SERPL-CCNC: 54 U/L (ref 46–116)
ALT SERPL W P-5'-P-CCNC: 29 U/L (ref 12–78)
ANION GAP SERPL CALCULATED.3IONS-SCNC: 8 MMOL/L (ref 4–13)
AST SERPL W P-5'-P-CCNC: 13 U/L (ref 5–45)
BILIRUB SERPL-MCNC: 0.88 MG/DL (ref 0.2–1)
BUN SERPL-MCNC: 20 MG/DL (ref 5–25)
CALCIUM SERPL-MCNC: 8.9 MG/DL (ref 8.3–10.1)
CHLORIDE SERPL-SCNC: 104 MMOL/L (ref 100–108)
CO2 SERPL-SCNC: 29 MMOL/L (ref 21–32)
CREAT SERPL-MCNC: 1.06 MG/DL (ref 0.6–1.3)
EST. AVERAGE GLUCOSE BLD GHB EST-MCNC: 174 MG/DL
GFR SERPL CREATININE-BSD FRML MDRD: 73 ML/MIN/1.73SQ M
GLUCOSE P FAST SERPL-MCNC: 193 MG/DL (ref 65–99)
HBA1C MFR BLD: 7.7 %
POTASSIUM SERPL-SCNC: 4.1 MMOL/L (ref 3.5–5.3)
PROT SERPL-MCNC: 7 G/DL (ref 6.4–8.2)
SODIUM SERPL-SCNC: 141 MMOL/L (ref 136–145)

## 2021-07-21 PROCEDURE — 80053 COMPREHEN METABOLIC PANEL: CPT

## 2021-07-21 PROCEDURE — 36415 COLL VENOUS BLD VENIPUNCTURE: CPT

## 2021-07-21 PROCEDURE — 83036 HEMOGLOBIN GLYCOSYLATED A1C: CPT

## 2021-07-29 ENCOUNTER — TELEPHONE (OUTPATIENT)
Dept: ADMINISTRATIVE | Facility: OTHER | Age: 66
End: 2021-07-29

## 2021-07-29 ENCOUNTER — OFFICE VISIT (OUTPATIENT)
Dept: INTERNAL MEDICINE CLINIC | Age: 66
End: 2021-07-29
Payer: MEDICARE

## 2021-07-29 VITALS
TEMPERATURE: 97.7 F | BODY MASS INDEX: 38.64 KG/M2 | SYSTOLIC BLOOD PRESSURE: 140 MMHG | WEIGHT: 276 LBS | HEART RATE: 92 BPM | DIASTOLIC BLOOD PRESSURE: 72 MMHG | HEIGHT: 71 IN | OXYGEN SATURATION: 98 %

## 2021-07-29 DIAGNOSIS — I25.10 CORONARY ARTERY DISEASE INVOLVING NATIVE HEART WITHOUT ANGINA PECTORIS, UNSPECIFIED VESSEL OR LESION TYPE: ICD-10-CM

## 2021-07-29 DIAGNOSIS — R91.8 PULMONARY NODULES: ICD-10-CM

## 2021-07-29 DIAGNOSIS — E11.59 TYPE 2 DIABETES MELLITUS WITH OTHER CIRCULATORY COMPLICATION, WITHOUT LONG-TERM CURRENT USE OF INSULIN (HCC): ICD-10-CM

## 2021-07-29 DIAGNOSIS — Z00.00 MEDICARE ANNUAL WELLNESS VISIT, SUBSEQUENT: ICD-10-CM

## 2021-07-29 DIAGNOSIS — G47.33 OSA (OBSTRUCTIVE SLEEP APNEA): ICD-10-CM

## 2021-07-29 DIAGNOSIS — M51.26 HERNIATION OF INTERVERTEBRAL DISC OF LUMBAR SPINE: ICD-10-CM

## 2021-07-29 DIAGNOSIS — E11.9 NON-INSULIN DEPENDENT TYPE 2 DIABETES MELLITUS (HCC): Primary | ICD-10-CM

## 2021-07-29 DIAGNOSIS — Z23 NEED FOR 23-POLYVALENT PNEUMOCOCCAL POLYSACCHARIDE VACCINE: ICD-10-CM

## 2021-07-29 DIAGNOSIS — E78.5 HYPERLIPIDEMIA, UNSPECIFIED HYPERLIPIDEMIA TYPE: ICD-10-CM

## 2021-07-29 PROCEDURE — 1123F ACP DISCUSS/DSCN MKR DOCD: CPT | Performed by: PHYSICIAN ASSISTANT

## 2021-07-29 PROCEDURE — 99214 OFFICE O/P EST MOD 30 MIN: CPT | Performed by: PHYSICIAN ASSISTANT

## 2021-07-29 PROCEDURE — G0438 PPPS, INITIAL VISIT: HCPCS | Performed by: PHYSICIAN ASSISTANT

## 2021-07-29 PROCEDURE — G0009 ADMIN PNEUMOCOCCAL VACCINE: HCPCS

## 2021-07-29 PROCEDURE — 90732 PPSV23 VACC 2 YRS+ SUBQ/IM: CPT

## 2021-07-29 NOTE — TELEPHONE ENCOUNTER
----- Message from Juliano Molina sent at 7/29/2021 11:31 AM EDT -----  Regarding: Diabetic eye Exam   07/29/21 11:32 AM    Hello, our patient Felipe Read has had Diabetic Eye Exam completed/performed  Please assist in updating the patient chart by making an External outreach [facility] facility located in [facility]  The date of service is 07/23/21      Thank you,  Juliano Molina  [address] [facility] [place]

## 2021-07-29 NOTE — PATIENT INSTRUCTIONS

## 2021-07-29 NOTE — PROGRESS NOTES
Assessment/Plan:         Diagnoses and all orders for this visit:    Non-insulin dependent type 2 diabetes mellitus ([facility] )  Comments:  not well controlled  abnl on eye exam   needs improvement of control, will refer to endocrine    BRENDA (obstructive sleep apnea)  -     Cpap New DME  -     Comprehensive metabolic panel; Future  -     CBC and differential; Future  -     HEMOGLOBIN A1C W/ EAG ESTIMATION; Future    Type 2 diabetes mellitus with other circulatory complication, without long-term current use of insulin ([facility] )  -     Ambulatory referral to Endocrinology; Future  -     Comprehensive metabolic panel; Future  -     CBC and differential; Future  -     HEMOGLOBIN A1C W/ EAG ESTIMATION; Future    Hyperlipidemia, unspecified hyperlipidemia type  Comments:  continue atorvastatin   Orders:  -     Comprehensive metabolic panel; Future  -     CBC and differential; Future  -     HEMOGLOBIN A1C W/ EAG ESTIMATION; Future    Pulmonary nodules  -     Comprehensive metabolic panel; Future  -     CBC and differential; Future  -     HEMOGLOBIN A1C W/ EAG ESTIMATION; Future    Herniation of intervertebral disc of lumbar spine  -     Comprehensive metabolic panel; Future  -     CBC and differential; Future  -     HEMOGLOBIN A1C W/ EAG ESTIMATION; Future    Coronary artery disease involving native heart without angina pectoris, unspecified vessel or lesion type  -     Comprehensive metabolic panel; Future  -     CBC and differential; Future  -     HEMOGLOBIN A1C W/ EAG ESTIMATION; Future          Subjective:      Patient ID: Denton Srinivasan is a 77 y o  male      76 y/o male with hx of dm, htn, venous insufficiency, cad, brenda  Dm not well controlled  States he is not drinking much water during the day   bs not well controlled hga1c 7 7   Pt traveling to [place] for 3 weeks - doesn't want to start a new medication for dm at this time due to hx of allergic reactions       Pt reports his cpap machine was part of a recall, he has stopped using this and returned to his "old one"        The following portions of the patient's history were reviewed and updated as appropriate: allergies, current medications, past family history, past medical history, past social history, past surgical history and problem list     Review of Systems   Constitutional: Negative for appetite change, chills, diaphoresis, fatigue and fever  HENT: Negative for congestion and sore throat  Eyes: Negative for pain and redness  Respiratory: Negative for cough, shortness of breath and wheezing  Cardiovascular: Positive for leg swelling  Negative for chest pain  Gastrointestinal: Negative for abdominal pain, constipation, diarrhea and nausea  Genitourinary: Negative for dysuria and frequency  Musculoskeletal: Positive for arthralgias (b/l hip pain )  Negative for gait problem and joint swelling  Skin: Negative for rash  Neurological: Negative for dizziness, light-headedness and headaches  Psychiatric/Behavioral: Negative for sleep disturbance  The patient is not nervous/anxious  Past Medical History:   Diagnosis Date    Arthritis     knee    Coronary artery disease     Diabetes mellitus ([facility] )     H/O angioplasty     Hypercholesteremia     Hypertension     Varicose veins of lower extremity          Current Outpatient Medications:     amLODIPine (NORVASC) 10 mg tablet, Take 1 tablet (10 mg total) by mouth daily, Disp: 90 tablet, Rfl: 2    ascorbic acid (VITAMIN C) 500 mg tablet, Take 500 mg by mouth daily, Disp: , Rfl:     aspirin (ECOTRIN LOW STRENGTH) 81 mg EC tablet, Take 81 mg by mouth daily  , Disp: , Rfl:     atorvastatin (LIPITOR) 40 mg tablet, Take 1 tablet (40 mg total) by mouth daily at bedtime, Disp: 90 tablet, Rfl: 1    Blood Glucose Monitoring Suppl (ONE TOUCH ULTRA 2) w/Device KIT, Test blood glucose 2 times daily (One Touch Ultra 2), Disp: 1 each, Rfl: 0    clopidogrel (Plavix) 75 mg tablet, Take 1 tablet (75 mg total) by mouth daily, Disp: 90 tablet, Rfl: 0    glimepiride (AMARYL) 4 mg tablet, Take 4 mg with breakfast and 8 mg in the evening daily  , Disp: 90 tablet, Rfl: 5    glucose blood (ONE TOUCH ULTRA TEST) test strip, Test blood glucose 2 times daily (One Touch Ultra Blue), Disp: 300 each, Rfl: 0    lisinopril-hydrochlorothiazide (PRINZIDE,ZESTORETIC) 20-12 5 MG per tablet, Take 2 tablets by mouth daily, Disp: 180 tablet, Rfl: 1    metFORMIN (GLUCOPHAGE) 1000 MG tablet, Take 2 tablets (2,000 mg total) by mouth daily after dinner, Disp: 180 tablet, Rfl: 1    Omega-3 Fatty Acids (FISH OIL PO), Take by mouth daily  , Disp: , Rfl:     ONETOUCH DELICA LANCETS 62H MISC, Test blood glucose 2 times daily, Disp: 300 each, Rfl: 0    prednisoLONE acetate (PRED FORTE) 1 % ophthalmic suspension, Administer 1 drop into the left eye every other day , Disp: , Rfl:     repaglinide (PRANDIN) 2 mg tablet, Take 1 tablet (2 mg total) by mouth 3 (three) times a day before meals, Disp: 270 tablet, Rfl: 1    sildenafil (REVATIO) 20 mg tablet, Take 1 tablet (20 mg total) by mouth as needed (erectile dysfunction), Disp: 90 tablet, Rfl: 1    tamsulosin (FLOMAX) 0 4 mg, Take 1 capsule (0 4 mg total) by mouth daily with dinner, Disp: 90 capsule, Rfl: 3    VITAMIN E PO, Take 1 capsule by mouth daily  , Disp: , Rfl:     Allergies   Allergen Reactions    Ciprofloxacin Swelling    Janumet [Sitagliptin-Metformin Hcl]      Elevated liver enzymes    Terazosin      Annotation - [id number]: Blood in sperm       Social History   Past Surgical History:   Procedure Laterality Date    CARDIAC CATHETERIZATION      CARDIAC SURGERY      CHOLECYSTECTOMY  01/01/2010    COLONOSCOPY      CORONARY ANGIOPLASTY WITH STENT PLACEMENT      EYE SURGERY Left     ocular lens implant    GALLBLADDER SURGERY      HERNIA REPAIR  01/01/2009    KNEE ARTHROSCOPY W/ MENISCAL REPAIR Right     KNEE SURGERY  09/10/2015    NC INCISE FINGER TENDON SHEATH Left 8/25/2016 Procedure: RING TRIGGER FINGER RELEASE ;  Surgeon: Eliana Martins MD;  [facility];  Service: Orthopedics    VARICOSE VEIN SURGERY      Ligation     Family History   Problem Relation Age of Onset    Cancer Mother     Coronary artery disease Mother     Heart disease Father     Early death Father     Coronary artery disease Father     Diabetes Father     Hypertension Father     Coronary artery disease Brother     Hyperlipidemia Brother     Hypertension Brother        Objective:  /72 (BP Location: Left arm, Patient Position: Sitting, Cuff Size: Large)   Pulse 92   Temp 97 7 °F (36 5 °C) (Temporal)   Ht 5' 11" (1 803 m)   Wt 125 kg (276 lb)   SpO2 98% Comment: room air  BMI 38 49 kg/m²        Physical Exam  Vitals reviewed  Constitutional:       Appearance: He is obese  HENT:      Head: Normocephalic  Right Ear: Tympanic membrane, ear canal and external ear normal       Left Ear: Tympanic membrane, ear canal and external ear normal    Eyes:      General:         Right eye: No discharge  Left eye: No discharge  Extraocular Movements: Extraocular movements intact  Conjunctiva/sclera: Conjunctivae normal       Pupils: Pupils are equal, round, and reactive to light  Cardiovascular:      Rate and Rhythm: Normal rate and regular rhythm  Pulmonary:      Effort: Pulmonary effort is normal       Breath sounds: No wheezing, rhonchi or rales  Abdominal:      General: Bowel sounds are normal  There is no distension  Tenderness: There is no abdominal tenderness  Musculoskeletal:         General: Normal range of motion  Cervical back: Normal range of motion  Right lower leg: No edema  Left lower leg: No edema  Lymphadenopathy:      Cervical: No cervical adenopathy  Skin:     General: Skin is warm  Findings: No erythema or rash  Neurological:      General: No focal deficit present        Mental Status: He is alert and oriented to person, place, and time     Psychiatric:         Mood and Affect: Mood normal          Behavior: Behavior normal

## 2021-07-29 NOTE — PROGRESS NOTES
Assessment and Plan:     Problem List Items Addressed This Visit        Endocrine    Diabetes mellitus with circulatory complication ([facility] )    Relevant Orders    Ambulatory referral to Endocrinology    Comprehensive metabolic panel    CBC and differential    HEMOGLOBIN A1C W/ EAG ESTIMATION       Cardiovascular and Mediastinum    CAD (coronary artery disease)    Relevant Orders    Comprehensive metabolic panel    CBC and differential    HEMOGLOBIN A1C W/ EAG ESTIMATION       Musculoskeletal and Integument    Herniation of intervertebral disc of lumbar spine    Relevant Orders    Comprehensive metabolic panel    CBC and differential    HEMOGLOBIN A1C W/ EAG ESTIMATION       Other    Hyperlipidemia    Relevant Orders    Comprehensive metabolic panel    CBC and differential    HEMOGLOBIN A1C W/ EAG ESTIMATION      Other Visit Diagnoses     Non-insulin dependent type 2 diabetes mellitus ([facility] )    -  Primary    not well controlled  abnl on eye exam   needs improvement of control, will refer to endocrine    BARBI (obstructive sleep apnea)        Relevant Orders    Cpap New DME    Comprehensive metabolic panel    CBC and differential    HEMOGLOBIN A1C W/ EAG ESTIMATION    Pulmonary nodules        Relevant Orders    Comprehensive metabolic panel    CBC and differential    HEMOGLOBIN A1C W/ EAG ESTIMATION    Medicare annual wellness visit, subsequent        pt to check at pharmacy regarding coverage for shingles vaccine           Preventive health issues were discussed with patient, and age appropriate screening tests were ordered as noted in patient's After Visit Summary  Personalized health advice and appropriate referrals for health education or preventive services given if needed, as noted in patient's After Visit Summary       History of Present Illness:     Patient presents for Medicare Annual Wellness visit    Patient Care Team:  Lawrence Brower PA-C as PCP - General (Physician Assistant)  MD Kerry Salazar Margeret Check, MD Hollace Arenas, MD Twanna Severe, MD (Ophthalmology)     Problem List:     Patient Active Problem List   Diagnosis    Abnormal MRI, lumbar spine    Benign essential hypertension    CAD (coronary artery disease)    Claustrophobia    Diabetes mellitus with circulatory complication ([address] )    Herniation of intervertebral disc of lumbar spine    Hyperlipidemia    Presbyopia    Primary osteoarthritis of left knee    Hypertension    Atherosclerosis of native coronary artery without angina pectoris    Class 2 severe obesity due to excess calories with serious comorbidity and body mass index (BMI) of 39 0 to 39 9 in adult ([address] )    Exercise-induced angina ([address] )    Platelets decreased ([address] )    Lung mass      Past Medical and Surgical History:     Past Medical History:   Diagnosis Date    Arthritis     knee    Coronary artery disease     Diabetes mellitus ([address] )     H/O angioplasty     Hypercholesteremia     Hypertension     Varicose veins of lower extremity      Past Surgical History:   Procedure Laterality Date    CARDIAC CATHETERIZATION      CARDIAC SURGERY      CHOLECYSTECTOMY  01/01/2010    COLONOSCOPY      CORONARY ANGIOPLASTY WITH STENT PLACEMENT      EYE SURGERY Left     ocular lens implant    GALLBLADDER SURGERY      HERNIA REPAIR  01/01/2009    KNEE ARTHROSCOPY W/ MENISCAL REPAIR Right     KNEE SURGERY  09/10/2015    CO INCISE FINGER TENDON SHEATH Left 8/25/2016    Procedure: RING TRIGGER FINGER RELEASE ;  Surgeon: Osman Torres MD;  [facility];  Service: Orthopedics    VARICOSE VEIN SURGERY      Ligation      Family History:     Family History   Problem Relation Age of Onset    Cancer Mother     Coronary artery disease Mother     Heart disease Father     Early death Father     Coronary artery disease Father     Diabetes Father     Hypertension Father     Coronary artery disease Brother     Hyperlipidemia Brother     Hypertension Brother Social History:     Social History     Socioeconomic History    Marital status: /Civil Union     Spouse name: None    Number of children: None    Years of education: None    Highest education level: None   Occupational History    None   Tobacco Use    Smoking status: Former Smoker     Packs/day: 2 00     Years: 15 00     Pack years: 30 00     Types: Cigarettes     Quit date: 1982     Years since quittin 6    Smokeless tobacco: Never Used   Vaping Use    Vaping Use: Never used   Substance and Sexual Activity    Alcohol use: Not Currently     Comment: social use    Drug use: No    Sexual activity: Yes   Other Topics Concern    None   Social History Narrative    None     Social Determinants of Health     Financial Resource Strain:     Difficulty of Paying Living Expenses:    Food Insecurity:     Worried About Running Out of Food in the Last Year:     Ran Out of Food in the Last Year:    Transportation Needs:     Lack of Transportation (Medical):  Lack of Transportation (Non-Medical):    Physical Activity:     Days of Exercise per Week:     Minutes of Exercise per Session:    Stress:     Feeling of Stress :    Social Connections:     Frequency of Communication with Friends and Family:     Frequency of Social Gatherings with Friends and Family:     Attends Church Services:     Active Member of Clubs or Organizations:     Attends Club or Organization Meetings:     Marital Status:    Intimate Partner Violence:     Fear of Current or Ex-Partner:     Emotionally Abused:     Physically Abused:     Sexually Abused:       Medications and Allergies:     Current Outpatient Medications   Medication Sig Dispense Refill    amLODIPine (NORVASC) 10 mg tablet Take 1 tablet (10 mg total) by mouth daily 90 tablet 2    ascorbic acid (VITAMIN C) 500 mg tablet Take 500 mg by mouth daily      aspirin (ECOTRIN LOW STRENGTH) 81 mg EC tablet Take 81 mg by mouth daily        atorvastatin (LIPITOR) 40 mg tablet Take 1 tablet (40 mg total) by mouth daily at bedtime 90 tablet 1    Blood Glucose Monitoring Suppl (ONE TOUCH ULTRA 2) w/Device KIT Test blood glucose 2 times daily (One Touch Ultra 2) 1 each 0    clopidogrel (Plavix) 75 mg tablet Take 1 tablet (75 mg total) by mouth daily 90 tablet 0    glimepiride (AMARYL) 4 mg tablet Take 4 mg with breakfast and 8 mg in the evening daily  90 tablet 5    glucose blood (ONE TOUCH ULTRA TEST) test strip Test blood glucose 2 times daily (One Touch Ultra Blue) 300 each 0    lisinopril-hydrochlorothiazide (PRINZIDE,ZESTORETIC) 20-12 5 MG per tablet Take 2 tablets by mouth daily 180 tablet 1    metFORMIN (GLUCOPHAGE) 1000 MG tablet Take 2 tablets (2,000 mg total) by mouth daily after dinner 180 tablet 1    Omega-3 Fatty Acids (FISH OIL PO) Take by mouth daily        ONETOUCH DELICA LANCETS 45Z MISC Test blood glucose 2 times daily 300 each 0    prednisoLONE acetate (PRED FORTE) 1 % ophthalmic suspension Administer 1 drop into the left eye every other day       repaglinide (PRANDIN) 2 mg tablet Take 1 tablet (2 mg total) by mouth 3 (three) times a day before meals 270 tablet 1    sildenafil (REVATIO) 20 mg tablet Take 1 tablet (20 mg total) by mouth as needed (erectile dysfunction) 90 tablet 1    tamsulosin (FLOMAX) 0 4 mg Take 1 capsule (0 4 mg total) by mouth daily with dinner 90 capsule 3    VITAMIN E PO Take 1 capsule by mouth daily         No current facility-administered medications for this visit       Allergies   Allergen Reactions    Ciprofloxacin Swelling    Janumet [Sitagliptin-Metformin Hcl]      Elevated liver enzymes    Terazosin      Annotation - [id number]: Blood in sperm      Immunizations:     Immunization History   Administered Date(s) Administered    INFLUENZA 09/24/2018    Influenza Quadrivalent Preservative Free 3 years and older IM 10/31/2017    Influenza, high dose seasonal 0 7 mL 09/23/2020    Influenza, seasonal, injectable 10/16/2019    Pneumococcal Conjugate 13-Valent 07/18/2019    SARS-CoV-2 / COVID-19 mRNA IM ([employer]) 02/15/2021, 03/09/2021    Tuberculin Skin Test-PPD Intradermal 09/23/2019, 04/19/2021      Health Maintenance:         Topic Date Due    Colorectal Cancer Screening  07/29/2025    Hepatitis C Screening  Completed         Topic Date Due    DTaP,Tdap,and Td Vaccines (1 - Tdap) Never done    Pneumococcal Vaccine: 65+ Years (1 of 2 - PPSV23) 09/12/2019    Influenza Vaccine (1) 09/01/2021      Medicare Health Risk Assessment:     /72 (BP Location: Left arm, Patient Position: Sitting, Cuff Size: Large)   Pulse 92   Temp 97 7 °F (36 5 °C) (Temporal)   Ht 5' 11" (1 803 m)   Wt 125 kg (276 lb)   SpO2 98% Comment: room air  BMI 38 49 kg/m²      Yudy Rojas is here for his Subsequent Wellness visit  Health Risk Assessment:   Patient rates overall health as good  Patient feels that their physical health rating is same  Patient is very satisfied with their life  Eyesight was rated as slightly worse  Hearing was rated as same  Patient feels that their emotional and mental health rating is same  Patients states they are never, rarely angry  Patient states they are sometimes unusually tired/fatigued  Pain experienced in the last 7 days has been some  Patient states that he has experienced no weight loss or gain in last 6 months  Depression Screening:   PHQ-2 Score: 0      Fall Risk Screening: In the past year, patient has experienced: no history of falling in past year      Home Safety:  Patient does not have trouble with stairs inside or outside of their home  Patient has working smoke alarms and has working carbon monoxide detector  Home safety hazards include: none  Nutrition:   Current diet is Regular  Medications:   Patient is currently taking over-the-counter supplements  OTC medications include: see medication list  Patient is able to manage medications  Activities of Daily Living (ADLs)/Instrumental Activities of Daily Living (IADLs):   Walk and transfer into and out of bed and chair?: Yes  Dress and groom yourself?: Yes    Bathe or shower yourself?: Yes    Feed yourself? Yes  Do your laundry/housekeeping?: Yes  Manage your money, pay your bills and track your expenses?: Yes  Make your own meals?: Yes    Do your own shopping?: Yes    Previous Hospitalizations:   Any hospitalizations or ED visits within the last 12 months?: No      Advance Care Planning:   Living will: Yes    Advanced directive: Yes      Cognitive Screening:   Provider or family/friend/caregiver concerned regarding cognition?: No    PREVENTIVE SCREENINGS      Cardiovascular Screening:    General: Screening Not Indicated and History Lipid Disorder      Diabetes Screening:     General: Screening Not Indicated and History Diabetes      Colorectal Cancer Screening:     General: Screening Current      Prostate Cancer Screening:    General: Screening Current      Osteoporosis Screening:    General: Screening Not Indicated      Abdominal Aortic Aneurysm (AAA) Screening:    Risk factors include: age between 73-69 yo and tobacco use        General: Screening Current      Lung Cancer Screening:     General: Screening Not Indicated      Hepatitis C Screening:    General: Screening Current    Screening, Brief Intervention, and Referral to Treatment (SBIRT)      Brief Intervention  Alcohol & drug use screenings were reviewed  No concerns regarding substance use disorder identified  Other Counseling Topics:   Regular weightbearing exercise         Emy Jones PA-C

## 2021-07-29 NOTE — LETTER
Diabetic Eye Exam Form    Date Requested: 21  Patient: Coby Lambert  Patient : 1955   Referring Provider: Toy Pascual PA-C    Dilated Retinal Exam, Optomap-Iris Exam, or Fundus Photography Done         Yes ([place] one above)         No     Date of Diabetic Eye Exam ______________________________  Left Eye      Exam did show retinopathy    Exam did not show retinopathy         Mild       Moderate       None       Proliferative       Severe     Right Eye     Exam did show retinopathy    Exam did not show retinopathy         Mild       Moderate       None       Proliferative       Severe     Comments __________________________________________________________    Practice Providing Exam ______________________________________________    Exam Performed By (print name) _______________________________________      Provider Signature ___________________________________________________      These reports are needed for  compliance    Please fax this completed form and a copy of the Diabetic Eye Exam report to our office located at [place] [address] as soon as possible to [phone] attention Yanira Sine: Phone [phone]    We thank you for your assistance in treating our mutual patient

## 2021-07-29 NOTE — TELEPHONE ENCOUNTER
Upon review of the In Basket request and the patient's chart, initial outreach has been made via fax, please see Contacts section for details       Thank you  Jordy Hastings

## 2021-07-29 NOTE — PROGRESS NOTES
Diabetic Foot Exam    Patient's shoes and socks removed  Right Foot/Ankle   Right Foot Inspection  Skin Exam: skin normal and skin intact no dry skin, no warmth, no callus, no erythema, no maceration, no abnormal color, no pre-ulcer, no ulcer and no callus                          Toe Exam: ROM and strength within normal limitsno swelling, no tenderness, erythema and  no right toe deformity  Sensory   Vibration: intact    Monofilament testing: diminished  Vascular    The right DP pulse is 2+  The right PT pulse is 2+  Left Foot/Ankle  Left Foot Inspection  Skin Exam: skin normal and skin intactno dry skin, no warmth, no erythema, no maceration, normal color, no pre-ulcer, no ulcer and no callus                         Toe Exam: ROM and strength within normal limitsno swelling, no tenderness, no erythema and no left toe deformity                   Sensory   Vibration: intact    Monofilament: diminished  Vascular    The left DP pulse is 2+  The left PT pulse is 2+  Assign Risk Category:  No deformity present; No loss of protective sensation;  No weak pulses       Risk: 1

## 2021-07-29 NOTE — LETTER
Diabetic Eye Exam Form    Date Requested: 21  Patient: Tiny March  Patient : 1955   Referring Provider: Dayanna Turk PA-C    Dilated Retinal Exam, Optomap-Iris Exam, or Fundus Photography Done         Yes ([place] one above)         No     Date of Diabetic Eye Exam ______________________________  Left Eye      Exam did show retinopathy    Exam did not show retinopathy         Mild       Moderate       None       Proliferative       Severe     Right Eye     Exam did show retinopathy    Exam did not show retinopathy         Mild       Moderate       None       Proliferative       Severe     Comments __________________________________________________________    Practice Providing Exam ______________________________________________    Exam Performed By (print name) _______________________________________      Provider Signature ___________________________________________________      These reports are needed for  compliance    Please fax this completed form and a copy of the Diabetic Eye Exam report to our office located at [place] [address] as soon as possible to [phone] ely Barrett Nett: Phone [phone]    We thank you for your assistance in treating our mutual patient

## 2021-07-31 DIAGNOSIS — I25.10 CORONARY ARTERY DISEASE INVOLVING NATIVE HEART WITHOUT ANGINA PECTORIS, UNSPECIFIED VESSEL OR LESION TYPE: ICD-10-CM

## 2021-07-31 DIAGNOSIS — I10 HYPERTENSION, UNSPECIFIED TYPE: ICD-10-CM

## 2021-08-02 RX ORDER — AMLODIPINE BESYLATE 10 MG/1
10 TABLET ORAL DAILY
Qty: 90 TABLET | Refills: 1 | Status: SHIPPED | OUTPATIENT
Start: 2021-08-02 | End: 2022-04-03 | Stop reason: SDUPTHER

## 2021-08-02 RX ORDER — CLOPIDOGREL BISULFATE 75 MG/1
75 TABLET ORAL DAILY
Qty: 90 TABLET | Refills: 1 | Status: SHIPPED | OUTPATIENT
Start: 2021-08-02 | End: 2022-02-21 | Stop reason: SDUPTHER

## 2021-08-02 NOTE — TELEPHONE ENCOUNTER
As a follow-up, a second attempt has been made for outreach via fax, please see Contacts section for details       Anitra  [phone]    Thank you  Shania Mantilla

## 2021-08-02 NOTE — TELEPHONE ENCOUNTER
Last CBC done 12/4/20 was WNL  CBC ordered to be done pior to upcoming OV  Last OV 7/29/21  Next OV 21/21/21  Please address interaction alert

## 2021-08-02 NOTE — TELEPHONE ENCOUNTER
Last O/V: 7/29/21  Next O/V: 12/1/21 Quality 110: Preventive Care And Screening: Influenza Immunization: Influenza Immunization Administered during Influenza season Detail Level: Detailed Quality 111:Pneumonia Vaccination Status For Older Adults: Pneumococcal Vaccination not Administered or Previously Received, Reason not Otherwise Specified

## 2021-08-06 NOTE — TELEPHONE ENCOUNTER
As a final attempt, a third outreach has been made via telephone call  Please see Contacts section for details  This encounter will be closed and completed by end of day  Should we receive the requested information because of previous outreach attempts, the requested patient's chart will be updated appropriately       Thank you  Shania Mantilla

## 2021-08-12 ENCOUNTER — HOSPITAL ENCOUNTER (OUTPATIENT)
Dept: CT IMAGING | Facility: HOSPITAL | Age: 66
Discharge: HOME | End: 2021-08-12
Payer: MEDICARE

## 2021-08-12 DIAGNOSIS — R91.1 RIGHT UPPER LOBE PULMONARY NODULE: ICD-10-CM

## 2021-08-12 DIAGNOSIS — R91.1 LEFT UPPER LOBE PULMONARY NODULE: ICD-10-CM

## 2021-08-12 DIAGNOSIS — R91.8 PULMONARY NODULES: ICD-10-CM

## 2021-08-12 PROCEDURE — G1004 CDSM NDSC: HCPCS

## 2021-08-12 PROCEDURE — 71250 CT THORAX DX C-: CPT

## 2021-08-20 ENCOUNTER — TELEPHONE (OUTPATIENT)
Dept: INTERNAL MEDICINE CLINIC | Age: 66
End: 2021-08-20

## 2021-08-20 NOTE — TELEPHONE ENCOUNTER
IMPRESSION:     Continued enlargement of somewhat worrisome left lower lobe pulmonary nodule which is now 17 x 10 mm  Cardiothoracic surgical consultation recommended  Please consider tissue sampling or resection of the finding for definitive diagnosis    A malignant   lesion is suspected      Other nodular findings are otherwise essentially stable      The study was marked in EPIC for significant notification

## 2021-08-20 NOTE — TELEPHONE ENCOUNTER
Patient has follow up with pulmonary next week, will forward results to provider, will also send as FYI to [facility]

## 2021-08-26 ENCOUNTER — OFFICE VISIT (OUTPATIENT)
Dept: PULMONOLOGY | Facility: CLINIC | Age: 66
End: 2021-08-26
Payer: MEDICARE

## 2021-08-26 ENCOUNTER — PREP FOR PROCEDURE (OUTPATIENT)
Dept: INTERVENTIONAL RADIOLOGY/VASCULAR | Facility: CLINIC | Age: 66
End: 2021-08-26

## 2021-08-26 VITALS
HEART RATE: 62 BPM | TEMPERATURE: 97.8 F | OXYGEN SATURATION: 99 % | DIASTOLIC BLOOD PRESSURE: 80 MMHG | HEIGHT: 71 IN | WEIGHT: 274.6 LBS | BODY MASS INDEX: 38.44 KG/M2 | SYSTOLIC BLOOD PRESSURE: 140 MMHG | RESPIRATION RATE: 18 BRPM

## 2021-08-26 DIAGNOSIS — R91.8 LUNG MASS: Primary | ICD-10-CM

## 2021-08-26 DIAGNOSIS — E13.9 DIABETES 1.5, MANAGED AS TYPE 2 (HCC): ICD-10-CM

## 2021-08-26 PROCEDURE — 99214 OFFICE O/P EST MOD 30 MIN: CPT | Performed by: INTERNAL MEDICINE

## 2021-08-26 RX ORDER — GLIMEPIRIDE 4 MG/1
TABLET ORAL
Qty: 90 TABLET | Refills: 0 | Status: CANCELLED | OUTPATIENT
Start: 2021-08-26

## 2021-08-26 RX ORDER — SODIUM CHLORIDE 9 MG/ML
75 INJECTION, SOLUTION INTRAVENOUS CONTINUOUS
Status: CANCELLED | OUTPATIENT
Start: 2021-08-26

## 2021-08-26 NOTE — PROGRESS NOTES
Assessment/Plan:    Lung mass    Although gin left upper lobe and right upper lobe lung lesions have been stable, his left lower lobe lesion is enlarging over the past 9 months  PET scan was negative although was too small at that time to confirm benign nature  Given its growth will start with a CT guided lung biopsy  He has a history of uveitis and what sounds like erythema nodosum through the years which could indicate that this is sarcoid nodule  Also present him at tumor Board  Diagnoses and all orders for this visit:    Lung mass  -     Ambulatory referral to Interventional Radiology; Future          Subjective:      Patient ID: Migue Steven is a 77 y o  male  Dimple Oro has no symptoms  He denies any shortness of breath or cough  He is here for follow-up of his CT scan     primary symptoms  Pertinent negatives include no chest pain, fever, headaches, myalgias or sore throat  The following portions of the patient's history were reviewed and updated as appropriate: allergies, current medications, past family history, past medical history, past social history, past surgical history and problem list     Review of Systems   Constitutional: Negative  Negative for appetite change and fever  HENT: Negative  Negative for ear pain, postnasal drip, rhinorrhea, sneezing, sore throat and trouble swallowing  Eyes: Negative  Respiratory: Negative for shortness of breath  Cardiovascular: Negative  Negative for chest pain  Gastrointestinal: Negative  Endocrine: Negative  Genitourinary: Negative  Musculoskeletal: Negative for myalgias  Allergic/Immunologic: Negative  Neurological: Negative  Negative for headaches  Hematological: Negative  Psychiatric/Behavioral: Negative            Objective:      /80 (BP Location: Right arm, Patient Position: Sitting, Cuff Size: Adult)   Pulse 62   Temp 97 8 °F (36 6 °C) (Tympanic)   Resp 18   Ht 5' 11" (1 803 m)   Wt 125 kg (274 lb 9 6 oz)   SpO2 99%   BMI 38 30 kg/m²          Physical Exam  Constitutional:       Appearance: He is well-developed  HENT:      Head: Normocephalic  Eyes:      Pupils: Pupils are equal, round, and reactive to light  Cardiovascular:      Rate and Rhythm: Normal rate  Pulmonary:      Effort: Pulmonary effort is normal  No respiratory distress  Breath sounds: No wheezing or rales  Abdominal:      Palpations: Abdomen is soft  Musculoskeletal:         General: Normal range of motion  Cervical back: Neck supple  Skin:     General: Skin is warm and dry  Neurological:      Mental Status: He is alert and oriented to person, place, and time           Answers for HPI/ROS submitted by the patient on 8/19/2021  Do you have shortness of breath that occurs with effort or exertion?: No  Do you have ear congestion?: No  Do you have heartburn?: No  Do you have fatigue?: No  Do you have nasal congestion?: No  Do you have shortness of breath when lying flat?: No  Do you have shortness of breath when you wake up?: No  Do you have sweats?: No  Have you experienced weight loss?: No

## 2021-08-26 NOTE — ASSESSMENT & PLAN NOTE
Although gin left upper lobe and right upper lobe lung lesions have been stable, his left lower lobe lesion is enlarging over the past 9 months  PET scan was negative although was too small at that time to confirm benign nature  Given its growth will start with a CT guided lung biopsy  He has a history of uveitis and what sounds like erythema nodosum through the years which could indicate that this is sarcoid nodule  Also present him at tumor Board

## 2021-08-27 ENCOUNTER — TELEPHONE (OUTPATIENT)
Dept: INTERNAL MEDICINE CLINIC | Facility: CLINIC | Age: 66
End: 2021-08-27

## 2021-08-27 ENCOUNTER — TELEPHONE (OUTPATIENT)
Dept: SURGICAL ONCOLOGY | Facility: CLINIC | Age: 66
End: 2021-08-27

## 2021-08-27 ENCOUNTER — TELEPHONE (OUTPATIENT)
Dept: HEMATOLOGY ONCOLOGY | Facility: CLINIC | Age: 66
End: 2021-08-27

## 2021-08-27 NOTE — TELEPHONE ENCOUNTER
----- Message from Lei Barlow PA-C sent at 8/25/2021  7:15 PM EDT -----  ALEXIA madrigal, can you look into this patients cpap machine   He states he has been using petey  Pt reports his machine broke and needs new set up with humidifier  We had ordered this 7/26/21 and he hasn't heard anything from them

## 2021-08-27 NOTE — TELEPHONE ENCOUNTER
Per [facility], they never received original order  Faxed sleep study, OV, facesheet and order to [facility] at [phone]   I will follow up and check status of order next week

## 2021-08-30 DIAGNOSIS — E13.9 DIABETES 1.5, MANAGED AS TYPE 2 (HCC): ICD-10-CM

## 2021-08-30 RX ORDER — GLIMEPIRIDE 4 MG/1
TABLET ORAL
Qty: 90 TABLET | Refills: 5 | Status: SHIPPED | OUTPATIENT
Start: 2021-08-30 | End: 2022-02-08 | Stop reason: SDUPTHER

## 2021-09-01 NOTE — PRE-PROCEDURE INSTRUCTIONS
Pre-procedure Instructions for Interventional Radiology  Yesi Andrew  [facility] [address] [phone]    You are scheduled for a/an Lung Mass Biopsy  On Weds 9/8/21  Your tentative arrival time is 0845  Short stay will notify you the day before your procedure with the exact arrival time and the location to arrive  To prepare for your procedure:  1  Please arrange for someone to drive you home after the procedure and stay with you until the next morning if you are instructed to do so  This is typically for patients receiving some type of sedative or anesthetic for the procedure  2  DO NOT EAT OR DRINK ANYTHING after midnight on the evening before your procedure including candy & gum   3  ONLY SIPS OF WATER with your medications are allowed on the morning of your procedure  4  TAKE ALL OF YOUR REGULAR MEDICATIONS THE MORNING OF YOUR PROCEDURE with sips of water! We may call you to stop some of your blood sugar, blood pressure and blood thinning medications depending on the procedure  Please take all of these medications unless we instruct you to stop them  5  If you have an allergy to x-ray dye, please contact Interventional Radiology for an x-ray dye preparation which usually consists of an oral steroid and Benadryl  The day of your procedure:  1  Bring a list of the medications you take at home  2  Bring medications you take for breathing problems (such as inhalers), medications for chest pain, or both  3  Bring a case for your glasses or contacts  4  Bring your insurance card and a form of photo ID   5  Please leave all valuables such as credit cards and jewelry at home  6  Report to the registration desk in the main lobby at the [facility]  Ask to be directed to [facility]  7  While your procedure is being performed, your family may wait in the Radiology Waiting Room on the 1st floor in Radiology    if they need to leave, they may provide a number to be called following the procedure  8  Be prepared to stay overnight just in case  Sometimes procedures will indicate the need for further observation or treatment  9  If you are scheduled for a follow-up visit with the Interventional Radiologist after your procedure, you will be called with a date and time  10  Covid vaccine completed 3/9/21    Special Instructions (Medications to stop taking before your procedure etc ): ASA and Plavix hold LD 9/3/21

## 2021-09-08 ENCOUNTER — HOSPITAL ENCOUNTER (OUTPATIENT)
Dept: RADIOLOGY | Facility: HOSPITAL | Age: 66
Discharge: HOME | End: 2021-09-08
Attending: STUDENT IN AN ORGANIZED HEALTH CARE EDUCATION/TRAINING PROGRAM | Admitting: STUDENT IN AN ORGANIZED HEALTH CARE EDUCATION/TRAINING PROGRAM
Payer: MEDICARE

## 2021-09-08 ENCOUNTER — TELEPHONE (OUTPATIENT)
Dept: UROLOGY | Facility: MEDICAL CENTER | Age: 66
End: 2021-09-08

## 2021-09-08 VITALS
RESPIRATION RATE: 16 BRPM | WEIGHT: 275 LBS | HEIGHT: 71 IN | HEART RATE: 61 BPM | OXYGEN SATURATION: 95 % | BODY MASS INDEX: 38.5 KG/M2 | TEMPERATURE: 98.2 F | SYSTOLIC BLOOD PRESSURE: 147 MMHG | DIASTOLIC BLOOD PRESSURE: 69 MMHG

## 2021-09-08 DIAGNOSIS — N40.0 BENIGN PROSTATIC HYPERPLASIA, UNSPECIFIED WHETHER LOWER URINARY TRACT SYMPTOMS PRESENT: Primary | ICD-10-CM

## 2021-09-08 DIAGNOSIS — R91.8 LUNG MASS: ICD-10-CM

## 2021-09-08 LAB
ERYTHROCYTE [DISTWIDTH] IN BLOOD BY AUTOMATED COUNT: 14.4 % (ref 11.6–15.1)
HCT VFR BLD AUTO: 44.8 % (ref 36.5–49.3)
HGB BLD-MCNC: 15.3 G/DL (ref 12–17)
INR PPP: 0.96 (ref 0.84–1.19)
MCH RBC QN AUTO: 31.6 PG (ref 26.8–34.3)
MCHC RBC AUTO-ENTMCNC: 34.2 G/DL (ref 31.4–37.4)
MCV RBC AUTO: 93 FL (ref 82–98)
PLATELET # BLD AUTO: 131 THOUSANDS/UL (ref 149–390)
PMV BLD AUTO: 10 FL (ref 8.9–12.7)
PROTHROMBIN TIME: 12.4 SECONDS (ref 11.6–14.5)
RBC # BLD AUTO: 4.84 MILLION/UL (ref 3.88–5.62)
WBC # BLD AUTO: 5.32 THOUSAND/UL (ref 4.31–10.16)

## 2021-09-08 PROCEDURE — 88305 TISSUE EXAM BY PATHOLOGIST: CPT | Performed by: PATHOLOGY

## 2021-09-08 PROCEDURE — 85027 COMPLETE CBC AUTOMATED: CPT | Performed by: STUDENT IN AN ORGANIZED HEALTH CARE EDUCATION/TRAINING PROGRAM

## 2021-09-08 PROCEDURE — 88342 IMHCHEM/IMCYTCHM 1ST ANTB: CPT | Performed by: PATHOLOGY

## 2021-09-08 PROCEDURE — 32408 CORE NDL BX LNG/MED PERQ: CPT | Performed by: RADIOLOGY

## 2021-09-08 PROCEDURE — 88377 M/PHMTRC ALYS ISHQUANT/SEMIQ: CPT

## 2021-09-08 PROCEDURE — 88341 IMHCHEM/IMCYTCHM EA ADD ANTB: CPT | Performed by: PATHOLOGY

## 2021-09-08 PROCEDURE — 88364 INSITU HYBRIDIZATION (FISH): CPT | Performed by: PATHOLOGY

## 2021-09-08 PROCEDURE — 88333 PATH CONSLTJ SURG CYTO XM 1: CPT | Performed by: PATHOLOGY

## 2021-09-08 PROCEDURE — 99153 MOD SED SAME PHYS/QHP EA: CPT

## 2021-09-08 PROCEDURE — 99152 MOD SED SAME PHYS/QHP 5/>YRS: CPT

## 2021-09-08 PROCEDURE — 88341 IMHCHEM/IMCYTCHM EA ADD ANTB: CPT

## 2021-09-08 PROCEDURE — 88365 INSITU HYBRIDIZATION (FISH): CPT | Performed by: PATHOLOGY

## 2021-09-08 PROCEDURE — 32408 CORE NDL BX LNG/MED PERQ: CPT

## 2021-09-08 PROCEDURE — 85610 PROTHROMBIN TIME: CPT | Performed by: STUDENT IN AN ORGANIZED HEALTH CARE EDUCATION/TRAINING PROGRAM

## 2021-09-08 PROCEDURE — 99152 MOD SED SAME PHYS/QHP 5/>YRS: CPT | Performed by: RADIOLOGY

## 2021-09-08 PROCEDURE — 88342 IMHCHEM/IMCYTCHM 1ST ANTB: CPT

## 2021-09-08 RX ORDER — ACETAMINOPHEN 325 MG/1
650 TABLET ORAL EVERY 4 HOURS PRN
Status: DISCONTINUED | OUTPATIENT
Start: 2021-09-08 | End: 2021-09-08 | Stop reason: HOSPADM

## 2021-09-08 RX ORDER — FENTANYL CITRATE 50 UG/ML
INJECTION, SOLUTION INTRAMUSCULAR; INTRAVENOUS CODE/TRAUMA/SEDATION MEDICATION
Status: COMPLETED | OUTPATIENT
Start: 2021-09-08 | End: 2021-09-08

## 2021-09-08 RX ORDER — MIDAZOLAM HYDROCHLORIDE 2 MG/2ML
INJECTION, SOLUTION INTRAMUSCULAR; INTRAVENOUS CODE/TRAUMA/SEDATION MEDICATION
Status: COMPLETED | OUTPATIENT
Start: 2021-09-08 | End: 2021-09-08

## 2021-09-08 RX ORDER — SODIUM CHLORIDE 9 MG/ML
75 INJECTION, SOLUTION INTRAVENOUS CONTINUOUS
Status: DISCONTINUED | OUTPATIENT
Start: 2021-09-08 | End: 2021-09-08 | Stop reason: HOSPADM

## 2021-09-08 RX ADMIN — FENTANYL CITRATE 50 MCG: 50 INJECTION INTRAMUSCULAR; INTRAVENOUS at 11:35

## 2021-09-08 RX ADMIN — FENTANYL CITRATE 50 MCG: 50 INJECTION INTRAMUSCULAR; INTRAVENOUS at 11:24

## 2021-09-08 RX ADMIN — MIDAZOLAM 1 MG: 1 INJECTION INTRAMUSCULAR; INTRAVENOUS at 11:35

## 2021-09-08 RX ADMIN — MIDAZOLAM 1 MG: 1 INJECTION INTRAMUSCULAR; INTRAVENOUS at 11:24

## 2021-09-08 RX ADMIN — SODIUM CHLORIDE 75 ML/HR: 0.9 INJECTION, SOLUTION INTRAVENOUS at 09:40

## 2021-09-08 NOTE — SEDATION DOCUMENTATION
LLL Lung Mass Biopsy completed by Dr Severa Linea without immediate complications  VSS  Denies pain, nausea,  or SOB  Report called to Radha Kohler in short stay

## 2021-09-08 NOTE — PROGRESS NOTES
H&P reviewed  There have been no interval changes since the time the H&P was written  /70 (BP Location: Right arm)   Pulse (!) 54   Temp 98 2 °F (36 8 °C) (Oral)   Resp 18   Ht 5' 11" (1 803 m)   Wt 125 kg (275 lb)   SpO2 96%   BMI 38 35 kg/m²     Prior imaging was reviewed  Enlarging small left lower lobe lung nodule  Referred for percutaneous biopsy  Procedure discussed and questions answered  Informed written consent was obtained      Ronal Casper MD

## 2021-09-08 NOTE — DISCHARGE INSTRUCTIONS
Needle Biopsy of the Lung    WHAT YOU NEED TO KNOW:  A needle biopsy of the lung is a procedure to remove cells or tissue from your lung  You may have a fine needle aspiration biopsy (FNAB), or a core needle biopsy (CNB)  A FNAB is used to remove cells through a thin needle  CNB uses a thicker needle to remove lung tissue  The samples are collected and tested for inflammation, infection, or cancer  DISCHARGE INSTRUCTIONS:   Resume your normal diet  Small sips of flat soda will help with nausea  Limit your activity for 24 hours  Wound Care:      - Remove band aid in 24 hours  Contact Interventional Radiology at [phone] [place] PATIENTS: Contact Interventional Radiology at [phone]) ([address]: Contact Interventional Radiology at [phone]) if any of the following occur:    - You have a fever greater than 101*    - You cough up large amounts of bright red blood     - You have chest pain with breathing    - You have shortness of breath    -You have persistent nausea and vomiting    - You have pus, redness or swelling around your biopsy site    - You have questions or concerns about your condition or careProcedural Sedation   WHAT YOU NEED TO KNOW:   Procedural sedation is medicine used during procedures to help you feel relaxed and calm  You will remember little to none of the procedure  After sedation you may feel tired, weak, or unsteady on your feet  You may also have trouble concentrating or short-term memory loss  These symptoms should go away in 24 hours or less  DISCHARGE INSTRUCTIONS:     Call 911 or have someone else call for any of the following:   · You have sudden trouble breathing      · You cannot be woken        Contact Interventional Radiology at [phone]   [place] PATIENTS: Contact Interventional Radiology at [phone]) [facility] PATIENTS: Contact Interventional Radiology at [phone]) if any of the following occur:     · You have a severe headache or dizziness      · Your heart is beating faster than usual     · You have a fever or chills      · Your skin is itchy, swollen, or you have a rash      · You have nausea or are vomiting for more than 8 hours after the procedure       · You have questions or concerns about your condition or care  Self-care:   · Have someone stay with you for 24 hours  This person can drive you to errands and help you do things around the house  This person can also watch for problems       · Rest and do quiet activities for 24 hours  Do not exercise, ride a bike, or play sports  Stand up slowly to prevent dizziness and falls  Take short walks around the house with another person  Slowly return to your usual activities the next day       · Do not drive or use dangerous machines or tools for 24 hours  You may injure yourself or others  Examples include a lawnmower, saw, or drill  Do not return to work for 24 hours if you use dangerous machines or tools for work       · Do not make important decisions for 24 hours  For example, do not sign important papers or invest money       · Drink liquids as directed  Liquids help flush the sedation medicine out of your body  Ask how much liquid to drink each day and which liquids are best for you       · Eat small, frequent meals to prevent nausea and vomiting  Start with clear liquids such as juice or broth  If you do not vomit after clear liquids, you can eat your usual foods       · Do not drink alcohol or take medicines that make you drowsy  This includes medicines that help you sleep and anxiety medicines  Ask your healthcare provider if it is safe for you to take pain medicine  Follow up with your healthcare provider as directed: Write down your questions so you remember to ask them during your visits

## 2021-09-08 NOTE — BRIEF OP NOTE (RAD/CATH)
INTERVENTIONAL RADIOLOGY PROCEDURE NOTE    Date: 9/8/2021    Procedure: IR BIOPSY LUNG    Preoperative diagnosis:   1  Lung mass         Postoperative diagnosis: Same  Surgeon: Frederick Torres MD     Assistant: None  No qualified resident was available  Blood loss:  None    Specimens:  2 passes 18 gauge core     Findings:  Left lower lobe lung biopsy  Atypical cells on touch prep  Complications: None immediate      Anesthesia: conscious sedation

## 2021-09-08 NOTE — TELEPHONE ENCOUNTER
Pt managed by [facility] ,pt calling concerned as he noticed blood in semen past 3 weekends please contact him directly home# [phone]

## 2021-09-09 ENCOUNTER — TELEPHONE (OUTPATIENT)
Dept: INTERNAL MEDICINE CLINIC | Age: 66
End: 2021-09-09

## 2021-09-09 NOTE — TELEPHONE ENCOUNTER
Pt called the office wanted me to pass a message to you  Stated he went for a biopsy of his lung, everything went well, and he just has to wait for the results

## 2021-09-13 ENCOUNTER — DOCUMENTATION (OUTPATIENT)
Dept: HEMATOLOGY ONCOLOGY | Facility: CLINIC | Age: 66
End: 2021-09-13

## 2021-09-13 NOTE — PROGRESS NOTES
THORACIC ONCOLOGY MULTIDISCIPLINARY CASE REVIEW    DATE:  9/13/2021    PRESENTING DOCTOR: Dr Sarah Mauro    DIAGNOSIS:  TBD  STAGING:     Coby Lambert is a 77 y o  male who was presented at the Thoracic Oncology Multidisciplinary Tumor Conference today  He follows with Pulmonology for an abnormal lung masses  His VERNON and RUL lung lesions have been stable, his LLL lesion is enlarging over the past 9 months  PET scan was negative although was too small at that time to confirm benign nature  Underwent CT guided lung biopsy 9/8/2021  PHYSICIAN RECOMMENDED PLAN: No action at this time  Wait for final results from [facility]  Imaging reviewed: 8/12/2021 CT chest-Continued enlargement of LLL pulmonary nodule which is now 17 x 10 mm    5/11/2021 CT chest-  12/11/2020 PET/CT-    Pathology reviewed: 9/8/2021 LLL mass-dense lymphomonocytic inflammatory infiltrate, favor pseudolymphoma rare glandular epithelial cells    PFT's reviewed: No    Future imaging: None at this time  Referrals: None at this time  Procedures: None at this time  Team agreed to plan  NCCN guidelines were readily available for review at this discussion    The final treatment plan will be left to the discretion of the patient and the treating physician  DISCLAIMERS:  TO THE TREATING PHYSICIAN:  This conference is a meeting of clinicians from various specialty areas who evaluate and discuss patients for whom a multidisciplinary treatment approach is being considered  Please note that the above opinion was a consensus of the conference attendees and is intended only to assist in quality care of the discussed patient  The responsibility for follow up on the input given during the conference, along with any final decisions regarding plan of care, is that of the patient and the patient's provider  Accordingly, appointments have only been recommended based on this information and have NOT been scheduled unless otherwise noted        TO THE PATIENT:  This summary is a brief record of major aspects of your cancer treatment  You may choose to share a copy with any of your doctors or nurses  However, this is not a detailed or comprehensive record of your care

## 2021-09-17 NOTE — TELEPHONE ENCOUNTER
Patient known to our OS location seen by [employer]  Called and spoke to patient  Patient stated that he had an episode of blood in his semen and slight discomfort  Patient stated it happened a few years ago and discussed with a provider that it could have been the medication he was on for his urinary symptoms (flomax) patient concerned if he should stop medication  Patient stated he thought maybe he had a stone and that's what caused the blood in semen and gave him an infection  Asked patient if he had any urinary symptoms, patient denies burning with urination, hematuria, trouble voiding  Informed patient if he had a stone there would be signs of blood in urine with other urinary symptoms  Patient scheduled in [facility] in October to further discuss options with medication

## 2021-09-20 NOTE — TELEPHONE ENCOUNTER
Spoke with patient and informed him to go for urine testing  He states he is currently in [facility] and will not be back until 10/9/21  Informed him the urine orders were placed in chart and he can go for testing when he returns home  Patient verbalized understanding

## 2021-09-20 NOTE — TELEPHONE ENCOUNTER
Recommend patient go for urine testing with urinalysis with microscopic and culture  This is typically a benign finding and likely secondary to BPH  Will further evaluate perform physical examination at upcoming office visit

## 2021-09-23 ENCOUNTER — TELEPHONE (OUTPATIENT)
Dept: PULMONOLOGY | Facility: CLINIC | Age: 66
End: 2021-09-23

## 2021-09-23 NOTE — TELEPHONE ENCOUNTER
Pt is calling back, he said he got a VM from Dr Patrick about scheduling another Biopsy test for his lung nodule  I don't see any comments in regards to this but he wanted me to give you a message back  Is this something we should get him set up for?  Please advise

## 2021-09-24 NOTE — TELEPHONE ENCOUNTER
I see in computer he is scheduled to see thoracic surgery on 10/12 for more tissue/removal   I believe he and I spoke about this can you make sure he is aware of this appointment

## 2021-10-10 DIAGNOSIS — I10 BENIGN ESSENTIAL HYPERTENSION: ICD-10-CM

## 2021-10-11 ENCOUNTER — APPOINTMENT (OUTPATIENT)
Dept: LAB | Age: 66
End: 2021-10-11
Payer: MEDICARE

## 2021-10-11 DIAGNOSIS — N39.41 URGE INCONTINENCE OF URINE: ICD-10-CM

## 2021-10-11 DIAGNOSIS — N40.0 BENIGN PROSTATIC HYPERPLASIA, UNSPECIFIED WHETHER LOWER URINARY TRACT SYMPTOMS PRESENT: ICD-10-CM

## 2021-10-11 LAB
BACTERIA UR QL AUTO: ABNORMAL /HPF
BILIRUB UR QL STRIP: NEGATIVE
CLARITY UR: ABNORMAL
COLOR UR: YELLOW
GLUCOSE UR STRIP-MCNC: ABNORMAL MG/DL
HGB UR QL STRIP.AUTO: NEGATIVE
HYALINE CASTS #/AREA URNS LPF: ABNORMAL /LPF
KETONES UR STRIP-MCNC: NEGATIVE MG/DL
LEUKOCYTE ESTERASE UR QL STRIP: NEGATIVE
NITRITE UR QL STRIP: NEGATIVE
NON-SQ EPI CELLS URNS QL MICRO: ABNORMAL /HPF
PH UR STRIP.AUTO: 6 [PH]
PROT UR STRIP-MCNC: ABNORMAL MG/DL
RBC #/AREA URNS AUTO: ABNORMAL /HPF
SP GR UR STRIP.AUTO: 1.03 (ref 1–1.03)
UROBILINOGEN UR QL STRIP.AUTO: 1 E.U./DL
WBC #/AREA URNS AUTO: ABNORMAL /HPF

## 2021-10-11 PROCEDURE — 81001 URINALYSIS AUTO W/SCOPE: CPT

## 2021-10-11 PROCEDURE — 87086 URINE CULTURE/COLONY COUNT: CPT

## 2021-10-12 ENCOUNTER — OFFICE VISIT (OUTPATIENT)
Dept: CARDIAC SURGERY | Facility: CLINIC | Age: 66
End: 2021-10-12
Payer: MEDICARE

## 2021-10-12 VITALS
WEIGHT: 276.24 LBS | TEMPERATURE: 99.5 F | DIASTOLIC BLOOD PRESSURE: 89 MMHG | HEART RATE: 58 BPM | HEIGHT: 71 IN | OXYGEN SATURATION: 97 % | SYSTOLIC BLOOD PRESSURE: 150 MMHG | BODY MASS INDEX: 38.67 KG/M2 | RESPIRATION RATE: 16 BRPM

## 2021-10-12 DIAGNOSIS — R91.8 LUNG MASS: Primary | ICD-10-CM

## 2021-10-12 DIAGNOSIS — R79.1 ABNORMAL BLOOD COAGULATION PROFILE: ICD-10-CM

## 2021-10-12 DIAGNOSIS — R91.1 LEFT LOWER LOBE PULMONARY NODULE: ICD-10-CM

## 2021-10-12 DIAGNOSIS — I10 BENIGN ESSENTIAL HYPERTENSION: ICD-10-CM

## 2021-10-12 PROCEDURE — 99205 OFFICE O/P NEW HI 60 MIN: CPT | Performed by: PHYSICIAN ASSISTANT

## 2021-10-12 RX ORDER — LISINOPRIL AND HYDROCHLOROTHIAZIDE 20; 12.5 MG/1; MG/1
2 TABLET ORAL DAILY
Qty: 180 TABLET | Refills: 1 | Status: SHIPPED | OUTPATIENT
Start: 2021-10-12 | End: 2022-04-27 | Stop reason: SDUPTHER

## 2021-10-12 RX ORDER — GABAPENTIN 300 MG/1
600 CAPSULE ORAL ONCE
Status: CANCELLED | OUTPATIENT
Start: 2021-10-12 | End: 2021-10-12

## 2021-10-12 RX ORDER — ACETAMINOPHEN 325 MG/1
975 TABLET ORAL ONCE
Status: CANCELLED | OUTPATIENT
Start: 2021-10-12 | End: 2021-10-12

## 2021-10-12 RX ORDER — LISINOPRIL AND HYDROCHLOROTHIAZIDE 20; 12.5 MG/1; MG/1
2 TABLET ORAL DAILY
Qty: 180 TABLET | Refills: 0 | Status: SHIPPED | OUTPATIENT
Start: 2021-10-12 | End: 2021-10-12 | Stop reason: SDUPTHER

## 2021-10-12 RX ORDER — TAMSULOSIN HYDROCHLORIDE 0.4 MG/1
0.4 CAPSULE ORAL
Qty: 90 CAPSULE | Refills: 3 | Status: SHIPPED | OUTPATIENT
Start: 2021-10-12

## 2021-10-12 RX ORDER — HEPARIN SODIUM 5000 [USP'U]/ML
5000 INJECTION, SOLUTION INTRAVENOUS; SUBCUTANEOUS
Status: CANCELLED | OUTPATIENT
Start: 2021-10-12 | End: 2021-10-13

## 2021-10-13 ENCOUNTER — OFFICE VISIT (OUTPATIENT)
Dept: UROLOGY | Facility: CLINIC | Age: 66
End: 2021-10-13
Payer: MEDICARE

## 2021-10-13 VITALS — HEIGHT: 71 IN | HEART RATE: 74 BPM | WEIGHT: 275 LBS | BODY MASS INDEX: 38.5 KG/M2

## 2021-10-13 DIAGNOSIS — Z12.5 SCREENING FOR PROSTATE CANCER: ICD-10-CM

## 2021-10-13 DIAGNOSIS — N52.9 ERECTILE DYSFUNCTION, UNSPECIFIED ERECTILE DYSFUNCTION TYPE: Primary | ICD-10-CM

## 2021-10-13 LAB — BACTERIA UR CULT: NORMAL

## 2021-10-13 PROCEDURE — 99213 OFFICE O/P EST LOW 20 MIN: CPT | Performed by: PHYSICIAN ASSISTANT

## 2021-10-13 RX ORDER — TADALAFIL 20 MG/1
20 TABLET ORAL AS NEEDED
Qty: 30 TABLET | Refills: 0 | Status: SHIPPED | OUTPATIENT
Start: 2021-10-13 | End: 2022-01-14 | Stop reason: SDUPTHER

## 2021-10-14 ENCOUNTER — CONSULT (OUTPATIENT)
Dept: ENDOCRINOLOGY | Facility: CLINIC | Age: 66
End: 2021-10-14
Payer: MEDICARE

## 2021-10-14 ENCOUNTER — LAB REQUISITION (OUTPATIENT)
Dept: LAB | Facility: HOSPITAL | Age: 66
End: 2021-10-14
Payer: MEDICARE

## 2021-10-14 ENCOUNTER — APPOINTMENT (OUTPATIENT)
Dept: LAB | Facility: HOSPITAL | Age: 66
End: 2021-10-14
Payer: MEDICARE

## 2021-10-14 ENCOUNTER — HOSPITAL ENCOUNTER (OUTPATIENT)
Dept: PULMONOLOGY | Facility: HOSPITAL | Age: 66
Discharge: HOME | End: 2021-10-14
Payer: MEDICARE

## 2021-10-14 ENCOUNTER — APPOINTMENT (OUTPATIENT)
Dept: LAB | Facility: CLINIC | Age: 66
End: 2021-10-14
Payer: MEDICARE

## 2021-10-14 ENCOUNTER — OFFICE VISIT (OUTPATIENT)
Dept: LAB | Facility: CLINIC | Age: 66
End: 2021-10-14
Payer: MEDICARE

## 2021-10-14 VITALS
HEIGHT: 71 IN | HEART RATE: 68 BPM | SYSTOLIC BLOOD PRESSURE: 140 MMHG | BODY MASS INDEX: 39.79 KG/M2 | DIASTOLIC BLOOD PRESSURE: 70 MMHG | WEIGHT: 284.25 LBS

## 2021-10-14 DIAGNOSIS — I25.10 CORONARY ARTERY DISEASE INVOLVING NATIVE HEART WITHOUT ANGINA PECTORIS, UNSPECIFIED VESSEL OR LESION TYPE: ICD-10-CM

## 2021-10-14 DIAGNOSIS — G47.33 OSA (OBSTRUCTIVE SLEEP APNEA): ICD-10-CM

## 2021-10-14 DIAGNOSIS — R91.8 PULMONARY NODULES: ICD-10-CM

## 2021-10-14 DIAGNOSIS — Z01.818 OTHER SPECIFIED PRE-OPERATIVE EXAMINATION: ICD-10-CM

## 2021-10-14 DIAGNOSIS — E78.5 HYPERLIPIDEMIA, UNSPECIFIED HYPERLIPIDEMIA TYPE: ICD-10-CM

## 2021-10-14 DIAGNOSIS — Z01.812 PRE-OPERATIVE LABORATORY EXAMINATION: ICD-10-CM

## 2021-10-14 DIAGNOSIS — Z01.818 ENCOUNTER FOR OTHER PREPROCEDURAL EXAMINATION: ICD-10-CM

## 2021-10-14 DIAGNOSIS — M51.26 HERNIATION OF INTERVERTEBRAL DISC OF LUMBAR SPINE: ICD-10-CM

## 2021-10-14 DIAGNOSIS — E11.59 TYPE 2 DIABETES MELLITUS WITH OTHER CIRCULATORY COMPLICATION, WITHOUT LONG-TERM CURRENT USE OF INSULIN (HCC): ICD-10-CM

## 2021-10-14 DIAGNOSIS — R91.1 LEFT LOWER LOBE PULMONARY NODULE: ICD-10-CM

## 2021-10-14 DIAGNOSIS — R79.1 ABNORMAL BLOOD COAGULATION PROFILE: ICD-10-CM

## 2021-10-14 LAB
ABO GROUP BLD: NORMAL
ANION GAP SERPL CALCULATED.3IONS-SCNC: 9 MMOL/L (ref 4–13)
APTT PPP: 27 SECONDS (ref 23–37)
ATRIAL RATE: 58 BPM
BLD GP AB SCN SERPL QL: NEGATIVE
BUN SERPL-MCNC: 20 MG/DL (ref 5–25)
CALCIUM SERPL-MCNC: 8.9 MG/DL (ref 8.3–10.1)
CHLORIDE SERPL-SCNC: 102 MMOL/L (ref 100–108)
CO2 SERPL-SCNC: 28 MMOL/L (ref 21–32)
CREAT SERPL-MCNC: 1.07 MG/DL (ref 0.6–1.3)
ERYTHROCYTE [DISTWIDTH] IN BLOOD BY AUTOMATED COUNT: 14.5 % (ref 11.6–15.1)
GFR SERPL CREATININE-BSD FRML MDRD: 72 ML/MIN/1.73SQ M
GLUCOSE P FAST SERPL-MCNC: 213 MG/DL (ref 65–99)
HCT VFR BLD AUTO: 44.8 % (ref 36.5–49.3)
HGB BLD-MCNC: 14.9 G/DL (ref 12–17)
MCH RBC QN AUTO: 31.3 PG (ref 26.8–34.3)
MCHC RBC AUTO-ENTMCNC: 33.3 G/DL (ref 31.4–37.4)
MCV RBC AUTO: 94 FL (ref 82–98)
P AXIS: 63 DEGREES
PLATELET # BLD AUTO: 120 THOUSANDS/UL (ref 149–390)
PMV BLD AUTO: 10.4 FL (ref 8.9–12.7)
POTASSIUM SERPL-SCNC: 3.5 MMOL/L (ref 3.5–5.3)
PR INTERVAL: 176 MS
QRS AXIS: 61 DEGREES
QRSD INTERVAL: 116 MS
QT INTERVAL: 458 MS
QTC INTERVAL: 449 MS
RBC # BLD AUTO: 4.76 MILLION/UL (ref 3.88–5.62)
RH BLD: POSITIVE
SL AMB POCT HEMOGLOBIN AIC: 7.8 (ref ?–6.5)
SODIUM SERPL-SCNC: 139 MMOL/L (ref 136–145)
SPECIMEN EXPIRATION DATE: NORMAL
T WAVE AXIS: 27 DEGREES
VENTRICULAR RATE: 58 BPM
WBC # BLD AUTO: 6.14 THOUSAND/UL (ref 4.31–10.16)

## 2021-10-14 PROCEDURE — 99204 OFFICE O/P NEW MOD 45 MIN: CPT | Performed by: INTERNAL MEDICINE

## 2021-10-14 PROCEDURE — 94726 PLETHYSMOGRAPHY LUNG VOLUMES: CPT

## 2021-10-14 PROCEDURE — 86901 BLOOD TYPING SEROLOGIC RH(D): CPT | Performed by: THORACIC SURGERY (CARDIOTHORACIC VASCULAR SURGERY)

## 2021-10-14 PROCEDURE — 94060 EVALUATION OF WHEEZING: CPT | Performed by: INTERNAL MEDICINE

## 2021-10-14 PROCEDURE — 94729 DIFFUSING CAPACITY: CPT | Performed by: INTERNAL MEDICINE

## 2021-10-14 PROCEDURE — 93005 ELECTROCARDIOGRAM TRACING: CPT

## 2021-10-14 PROCEDURE — 36415 COLL VENOUS BLD VENIPUNCTURE: CPT

## 2021-10-14 PROCEDURE — 94760 N-INVAS EAR/PLS OXIMETRY 1: CPT

## 2021-10-14 PROCEDURE — 94726 PLETHYSMOGRAPHY LUNG VOLUMES: CPT | Performed by: INTERNAL MEDICINE

## 2021-10-14 PROCEDURE — 85730 THROMBOPLASTIN TIME PARTIAL: CPT

## 2021-10-14 PROCEDURE — 83036 HEMOGLOBIN GLYCOSYLATED A1C: CPT | Performed by: INTERNAL MEDICINE

## 2021-10-14 PROCEDURE — 86850 RBC ANTIBODY SCREEN: CPT | Performed by: THORACIC SURGERY (CARDIOTHORACIC VASCULAR SURGERY)

## 2021-10-14 PROCEDURE — 93010 ELECTROCARDIOGRAM REPORT: CPT | Performed by: INTERNAL MEDICINE

## 2021-10-14 PROCEDURE — 86900 BLOOD TYPING SEROLOGIC ABO: CPT | Performed by: THORACIC SURGERY (CARDIOTHORACIC VASCULAR SURGERY)

## 2021-10-14 PROCEDURE — 85027 COMPLETE CBC AUTOMATED: CPT

## 2021-10-14 PROCEDURE — 80048 BASIC METABOLIC PNL TOTAL CA: CPT

## 2021-10-14 PROCEDURE — 94729 DIFFUSING CAPACITY: CPT

## 2021-10-14 PROCEDURE — 94060 EVALUATION OF WHEEZING: CPT

## 2021-10-14 RX ORDER — EMPAGLIFLOZIN 10 MG/1
10 TABLET, FILM COATED ORAL EVERY MORNING
Qty: 30 TABLET | Refills: 2 | Status: SHIPPED | OUTPATIENT
Start: 2021-10-14 | End: 2021-12-01 | Stop reason: ALTCHOICE

## 2021-10-14 RX ORDER — ALBUTEROL SULFATE 2.5 MG/3ML
2.5 SOLUTION RESPIRATORY (INHALATION) ONCE
Status: COMPLETED | OUTPATIENT
Start: 2021-10-14 | End: 2021-10-14

## 2021-10-14 RX ADMIN — ALBUTEROL SULFATE 2.5 MG: 2.5 SOLUTION RESPIRATORY (INHALATION) at 09:27

## 2021-10-15 ENCOUNTER — TELEPHONE (OUTPATIENT)
Dept: OTHER | Facility: OTHER | Age: 66
End: 2021-10-15

## 2021-10-16 ENCOUNTER — IMMUNIZATIONS (OUTPATIENT)
Dept: FAMILY MEDICINE CLINIC | Facility: HOSPITAL | Age: 66
End: 2021-10-16

## 2021-10-16 DIAGNOSIS — Z23 ENCOUNTER FOR IMMUNIZATION: Primary | ICD-10-CM

## 2021-10-16 PROCEDURE — 91300 SARS-COV-2 / COVID-19 MRNA VACCINE (PFIZER-BIONTECH) 30 MCG: CPT

## 2021-10-16 PROCEDURE — 0001A SARS-COV-2 / COVID-19 MRNA VACCINE (PFIZER-BIONTECH) 30 MCG: CPT

## 2021-10-18 ENCOUNTER — ANESTHESIA EVENT (OUTPATIENT)
Dept: PERIOP | Facility: HOSPITAL | Age: 66
DRG: 168 | End: 2021-10-18
Payer: MEDICARE

## 2021-10-21 ENCOUNTER — APPOINTMENT (INPATIENT)
Dept: RADIOLOGY | Facility: HOSPITAL | Age: 66
DRG: 168 | End: 2021-10-21
Payer: MEDICARE

## 2021-10-21 ENCOUNTER — HOSPITAL ENCOUNTER (INPATIENT)
Facility: HOSPITAL | Age: 66
LOS: 1 days | Discharge: HOME | DRG: 168 | End: 2021-10-22
Attending: THORACIC SURGERY (CARDIOTHORACIC VASCULAR SURGERY) | Admitting: THORACIC SURGERY (CARDIOTHORACIC VASCULAR SURGERY)
Payer: MEDICARE

## 2021-10-21 ENCOUNTER — ANESTHESIA (OUTPATIENT)
Dept: PERIOP | Facility: HOSPITAL | Age: 66
DRG: 168 | End: 2021-10-21
Payer: MEDICARE

## 2021-10-21 DIAGNOSIS — R91.1 LEFT LOWER LOBE PULMONARY NODULE: ICD-10-CM

## 2021-10-21 DIAGNOSIS — R91.8 LUNG MASS: Primary | ICD-10-CM

## 2021-10-21 LAB
ABO GROUP BLD: NORMAL
ANION GAP SERPL CALCULATED.3IONS-SCNC: 4 MMOL/L (ref 4–13)
BUN SERPL-MCNC: 19 MG/DL (ref 5–25)
CALCIUM SERPL-MCNC: 8.9 MG/DL (ref 8.3–10.1)
CHLORIDE SERPL-SCNC: 106 MMOL/L (ref 100–108)
CO2 SERPL-SCNC: 28 MMOL/L (ref 21–32)
CREAT SERPL-MCNC: 0.93 MG/DL (ref 0.6–1.3)
ERYTHROCYTE [DISTWIDTH] IN BLOOD BY AUTOMATED COUNT: 14.6 % (ref 11.6–15.1)
GFR SERPL CREATININE-BSD FRML MDRD: 85 ML/MIN/1.73SQ M
GLUCOSE SERPL-MCNC: 163 MG/DL (ref 65–140)
GLUCOSE SERPL-MCNC: 179 MG/DL (ref 65–140)
GLUCOSE SERPL-MCNC: 186 MG/DL (ref 65–140)
GLUCOSE SERPL-MCNC: 213 MG/DL (ref 65–140)
GLUCOSE SERPL-MCNC: 239 MG/DL (ref 65–140)
GLUCOSE SERPL-MCNC: 249 MG/DL (ref 65–140)
HCT VFR BLD AUTO: 42 % (ref 36.5–49.3)
HGB BLD-MCNC: 14.3 G/DL (ref 12–17)
MAGNESIUM SERPL-MCNC: 1.9 MG/DL (ref 1.6–2.6)
MCH RBC QN AUTO: 31.7 PG (ref 26.8–34.3)
MCHC RBC AUTO-ENTMCNC: 34 G/DL (ref 31.4–37.4)
MCV RBC AUTO: 93 FL (ref 82–98)
PLATELET # BLD AUTO: 132 THOUSANDS/UL (ref 149–390)
PMV BLD AUTO: 10 FL (ref 8.9–12.7)
POTASSIUM SERPL-SCNC: 3.6 MMOL/L (ref 3.5–5.3)
RBC # BLD AUTO: 4.51 MILLION/UL (ref 3.88–5.62)
RH BLD: POSITIVE
SODIUM SERPL-SCNC: 138 MMOL/L (ref 136–145)
WBC # BLD AUTO: 10.98 THOUSAND/UL (ref 4.31–10.16)

## 2021-10-21 PROCEDURE — U0003 INFECTIOUS AGENT DETECTION BY NUCLEIC ACID (DNA OR RNA); SEVERE ACUTE RESPIRATORY SYNDROME CORONAVIRUS 2 (SARS-COV-2) (CORONAVIRUS DISEASE [COVID-19]), AMPLIFIED PROBE TECHNIQUE, MAKING USE OF HIGH THROUGHPUT TECHNOLOGIES AS DESCRIBED BY CMS-2020-01-R: HCPCS | Performed by: THORACIC SURGERY (CARDIOTHORACIC VASCULAR SURGERY)

## 2021-10-21 PROCEDURE — 32666 THORACOSCOPY W/WEDGE RESECT: CPT | Performed by: THORACIC SURGERY (CARDIOTHORACIC VASCULAR SURGERY)

## 2021-10-21 PROCEDURE — 0241U HB NFCT DS VIR RESP RNA 4 TRGT: CPT | Performed by: THORACIC SURGERY (CARDIOTHORACIC VASCULAR SURGERY)

## 2021-10-21 PROCEDURE — C9290 INJ, BUPIVACAINE LIPOSOME: HCPCS | Performed by: THORACIC SURGERY (CARDIOTHORACIC VASCULAR SURGERY)

## 2021-10-21 PROCEDURE — 0BBJ4ZX EXCISION OF LEFT LOWER LUNG LOBE, PERCUTANEOUS ENDOSCOPIC APPROACH, DIAGNOSTIC: ICD-10-PCS | Performed by: THORACIC SURGERY (CARDIOTHORACIC VASCULAR SURGERY)

## 2021-10-21 PROCEDURE — 80048 BASIC METABOLIC PNL TOTAL CA: CPT | Performed by: PHYSICIAN ASSISTANT

## 2021-10-21 PROCEDURE — 83735 ASSAY OF MAGNESIUM: CPT | Performed by: PHYSICIAN ASSISTANT

## 2021-10-21 PROCEDURE — 88341 IMHCHEM/IMCYTCHM EA ADD ANTB: CPT | Performed by: PATHOLOGY

## 2021-10-21 PROCEDURE — 88307 TISSUE EXAM BY PATHOLOGIST: CPT | Performed by: PATHOLOGY

## 2021-10-21 PROCEDURE — 88305 TISSUE EXAM BY PATHOLOGIST: CPT | Performed by: PATHOLOGY

## 2021-10-21 PROCEDURE — 32674 THORACOSCOPY LYMPH NODE EXC: CPT | Performed by: THORACIC SURGERY (CARDIOTHORACIC VASCULAR SURGERY)

## 2021-10-21 PROCEDURE — 85027 COMPLETE CBC AUTOMATED: CPT | Performed by: PHYSICIAN ASSISTANT

## 2021-10-21 PROCEDURE — 07B74ZX EXCISION OF THORAX LYMPHATIC, PERCUTANEOUS ENDOSCOPIC APPROACH, DIAGNOSTIC: ICD-10-PCS | Performed by: THORACIC SURGERY (CARDIOTHORACIC VASCULAR SURGERY)

## 2021-10-21 PROCEDURE — 82948 REAGENT STRIP/BLOOD GLUCOSE: CPT

## 2021-10-21 PROCEDURE — 88309 TISSUE EXAM BY PATHOLOGIST: CPT | Performed by: PATHOLOGY

## 2021-10-21 PROCEDURE — U0005 INFEC AGEN DETEC AMPLI PROBE: HCPCS | Performed by: THORACIC SURGERY (CARDIOTHORACIC VASCULAR SURGERY)

## 2021-10-21 PROCEDURE — 71045 X-RAY EXAM CHEST 1 VIEW: CPT

## 2021-10-21 PROCEDURE — 88342 IMHCHEM/IMCYTCHM 1ST ANTB: CPT | Performed by: PATHOLOGY

## 2021-10-21 RX ORDER — ACETAMINOPHEN 325 MG/1
975 TABLET ORAL ONCE
Status: COMPLETED | OUTPATIENT
Start: 2021-10-21 | End: 2021-10-21

## 2021-10-21 RX ORDER — PANTOPRAZOLE SODIUM 40 MG/1
40 TABLET, DELAYED RELEASE ORAL
Status: DISCONTINUED | OUTPATIENT
Start: 2021-10-22 | End: 2021-10-22 | Stop reason: HOSPADM

## 2021-10-21 RX ORDER — DOCUSATE SODIUM 100 MG/1
100 CAPSULE, LIQUID FILLED ORAL 2 TIMES DAILY
Status: DISCONTINUED | OUTPATIENT
Start: 2021-10-21 | End: 2021-10-22 | Stop reason: HOSPADM

## 2021-10-21 RX ORDER — ALBUTEROL SULFATE 2.5 MG/3ML
2.5 SOLUTION RESPIRATORY (INHALATION) ONCE AS NEEDED
Status: DISCONTINUED | OUTPATIENT
Start: 2021-10-21 | End: 2021-10-21 | Stop reason: HOSPADM

## 2021-10-21 RX ORDER — ACETAMINOPHEN 325 MG/1
975 TABLET ORAL EVERY 6 HOURS
Status: DISCONTINUED | OUTPATIENT
Start: 2021-10-21 | End: 2021-10-22 | Stop reason: HOSPADM

## 2021-10-21 RX ORDER — SENNOSIDES 8.6 MG
1 TABLET ORAL DAILY
Status: DISCONTINUED | OUTPATIENT
Start: 2021-10-22 | End: 2021-10-22 | Stop reason: HOSPADM

## 2021-10-21 RX ORDER — FENTANYL CITRATE/PF 50 MCG/ML
25 SYRINGE (ML) INJECTION
Status: DISCONTINUED | OUTPATIENT
Start: 2021-10-21 | End: 2021-10-21 | Stop reason: HOSPADM

## 2021-10-21 RX ORDER — TAMSULOSIN HYDROCHLORIDE 0.4 MG/1
0.4 CAPSULE ORAL
Status: DISCONTINUED | OUTPATIENT
Start: 2021-10-21 | End: 2021-10-22 | Stop reason: HOSPADM

## 2021-10-21 RX ORDER — BUPIVACAINE HYDROCHLORIDE 2.5 MG/ML
INJECTION, SOLUTION EPIDURAL; INFILTRATION; INTRACAUDAL AS NEEDED
Status: DISCONTINUED | OUTPATIENT
Start: 2021-10-21 | End: 2021-10-21 | Stop reason: HOSPADM

## 2021-10-21 RX ORDER — OXYCODONE HYDROCHLORIDE 5 MG/1
5 TABLET ORAL EVERY 4 HOURS PRN
Status: DISCONTINUED | OUTPATIENT
Start: 2021-10-21 | End: 2021-10-22 | Stop reason: HOSPADM

## 2021-10-21 RX ORDER — MEPERIDINE HYDROCHLORIDE 25 MG/ML
12.5 INJECTION INTRAMUSCULAR; INTRAVENOUS; SUBCUTANEOUS
Status: DISCONTINUED | OUTPATIENT
Start: 2021-10-21 | End: 2021-10-21 | Stop reason: HOSPADM

## 2021-10-21 RX ORDER — PREDNISOLONE ACETATE 10 MG/ML
1 SUSPENSION/ DROPS OPHTHALMIC EVERY OTHER DAY
Status: DISCONTINUED | OUTPATIENT
Start: 2021-10-21 | End: 2021-10-22 | Stop reason: HOSPADM

## 2021-10-21 RX ORDER — HEPARIN SODIUM 5000 [USP'U]/ML
5000 INJECTION, SOLUTION INTRAVENOUS; SUBCUTANEOUS
Status: COMPLETED | OUTPATIENT
Start: 2021-10-21 | End: 2021-10-21

## 2021-10-21 RX ORDER — ONDANSETRON 2 MG/ML
4 INJECTION INTRAMUSCULAR; INTRAVENOUS ONCE AS NEEDED
Status: DISCONTINUED | OUTPATIENT
Start: 2021-10-21 | End: 2021-10-21 | Stop reason: HOSPADM

## 2021-10-21 RX ORDER — POLYETHYLENE GLYCOL 3350 17 G/17G
17 POWDER, FOR SOLUTION ORAL DAILY
Status: DISCONTINUED | OUTPATIENT
Start: 2021-10-21 | End: 2021-10-22 | Stop reason: HOSPADM

## 2021-10-21 RX ORDER — SODIUM CHLORIDE, SODIUM LACTATE, POTASSIUM CHLORIDE, CALCIUM CHLORIDE 600; 310; 30; 20 MG/100ML; MG/100ML; MG/100ML; MG/100ML
60 INJECTION, SOLUTION INTRAVENOUS CONTINUOUS
Status: DISCONTINUED | OUTPATIENT
Start: 2021-10-21 | End: 2021-10-22

## 2021-10-21 RX ORDER — HYDROMORPHONE HCL/PF 1 MG/ML
0.5 SYRINGE (ML) INJECTION EVERY 2 HOUR PRN
Status: DISCONTINUED | OUTPATIENT
Start: 2021-10-21 | End: 2021-10-22 | Stop reason: HOSPADM

## 2021-10-21 RX ORDER — ONDANSETRON 2 MG/ML
4 INJECTION INTRAMUSCULAR; INTRAVENOUS EVERY 6 HOURS PRN
Status: DISCONTINUED | OUTPATIENT
Start: 2021-10-21 | End: 2021-10-22 | Stop reason: HOSPADM

## 2021-10-21 RX ORDER — CEFAZOLIN SODIUM 1 G/3ML
INJECTION, POWDER, FOR SOLUTION INTRAMUSCULAR; INTRAVENOUS AS NEEDED
Status: DISCONTINUED | OUTPATIENT
Start: 2021-10-21 | End: 2021-10-21

## 2021-10-21 RX ORDER — GABAPENTIN 300 MG/1
600 CAPSULE ORAL ONCE
Status: COMPLETED | OUTPATIENT
Start: 2021-10-21 | End: 2021-10-21

## 2021-10-21 RX ORDER — LIDOCAINE HYDROCHLORIDE 10 MG/ML
INJECTION, SOLUTION EPIDURAL; INFILTRATION; INTRACAUDAL; PERINEURAL AS NEEDED
Status: DISCONTINUED | OUTPATIENT
Start: 2021-10-21 | End: 2021-10-21

## 2021-10-21 RX ORDER — SODIUM CHLORIDE, SODIUM LACTATE, POTASSIUM CHLORIDE, CALCIUM CHLORIDE 600; 310; 30; 20 MG/100ML; MG/100ML; MG/100ML; MG/100ML
125 INJECTION, SOLUTION INTRAVENOUS CONTINUOUS
Status: DISCONTINUED | OUTPATIENT
Start: 2021-10-21 | End: 2021-10-21

## 2021-10-21 RX ORDER — SODIUM CHLORIDE 9 MG/ML
INJECTION INTRAVENOUS AS NEEDED
Status: DISCONTINUED | OUTPATIENT
Start: 2021-10-21 | End: 2021-10-21 | Stop reason: HOSPADM

## 2021-10-21 RX ORDER — HYDROMORPHONE HCL/PF 1 MG/ML
0.5 SYRINGE (ML) INJECTION
Status: DISCONTINUED | OUTPATIENT
Start: 2021-10-21 | End: 2021-10-21 | Stop reason: HOSPADM

## 2021-10-21 RX ORDER — GABAPENTIN 300 MG/1
300 CAPSULE ORAL 3 TIMES DAILY
Status: DISCONTINUED | OUTPATIENT
Start: 2021-10-21 | End: 2021-10-22 | Stop reason: HOSPADM

## 2021-10-21 RX ORDER — MIDAZOLAM HYDROCHLORIDE 2 MG/2ML
INJECTION, SOLUTION INTRAMUSCULAR; INTRAVENOUS AS NEEDED
Status: DISCONTINUED | OUTPATIENT
Start: 2021-10-21 | End: 2021-10-21

## 2021-10-21 RX ORDER — ONDANSETRON 2 MG/ML
INJECTION INTRAMUSCULAR; INTRAVENOUS AS NEEDED
Status: DISCONTINUED | OUTPATIENT
Start: 2021-10-21 | End: 2021-10-21

## 2021-10-21 RX ORDER — ASPIRIN 81 MG/1
81 TABLET ORAL DAILY
Status: DISCONTINUED | OUTPATIENT
Start: 2021-10-22 | End: 2021-10-22 | Stop reason: HOSPADM

## 2021-10-21 RX ORDER — ROCURONIUM BROMIDE 10 MG/ML
INJECTION, SOLUTION INTRAVENOUS AS NEEDED
Status: DISCONTINUED | OUTPATIENT
Start: 2021-10-21 | End: 2021-10-21

## 2021-10-21 RX ORDER — PROMETHAZINE HYDROCHLORIDE 25 MG/ML
12.5 INJECTION, SOLUTION INTRAMUSCULAR; INTRAVENOUS ONCE AS NEEDED
Status: DISCONTINUED | OUTPATIENT
Start: 2021-10-21 | End: 2021-10-21 | Stop reason: HOSPADM

## 2021-10-21 RX ORDER — ATORVASTATIN CALCIUM 40 MG/1
40 TABLET, FILM COATED ORAL
Status: DISCONTINUED | OUTPATIENT
Start: 2021-10-21 | End: 2021-10-22 | Stop reason: HOSPADM

## 2021-10-21 RX ORDER — FENTANYL CITRATE 50 UG/ML
INJECTION, SOLUTION INTRAMUSCULAR; INTRAVENOUS AS NEEDED
Status: DISCONTINUED | OUTPATIENT
Start: 2021-10-21 | End: 2021-10-21

## 2021-10-21 RX ORDER — SODIUM CHLORIDE 9 MG/ML
INJECTION, SOLUTION INTRAVENOUS CONTINUOUS PRN
Status: DISCONTINUED | OUTPATIENT
Start: 2021-10-21 | End: 2021-10-21

## 2021-10-21 RX ORDER — PROPOFOL 10 MG/ML
INJECTION, EMULSION INTRAVENOUS AS NEEDED
Status: DISCONTINUED | OUTPATIENT
Start: 2021-10-21 | End: 2021-10-21

## 2021-10-21 RX ORDER — BISACODYL 10 MG
10 SUPPOSITORY, RECTAL RECTAL DAILY PRN
Status: DISCONTINUED | OUTPATIENT
Start: 2021-10-21 | End: 2021-10-22 | Stop reason: HOSPADM

## 2021-10-21 RX ADMIN — DOCUSATE SODIUM 100 MG: 100 CAPSULE ORAL at 18:48

## 2021-10-21 RX ADMIN — Medication 25 MCG: at 17:27

## 2021-10-21 RX ADMIN — ACETAMINOPHEN 975 MG: 325 TABLET, FILM COATED ORAL at 18:48

## 2021-10-21 RX ADMIN — ROCURONIUM BROMIDE 50 MG: 50 INJECTION, SOLUTION INTRAVENOUS at 15:33

## 2021-10-21 RX ADMIN — MIDAZOLAM 2 MG: 1 INJECTION INTRAMUSCULAR; INTRAVENOUS at 15:24

## 2021-10-21 RX ADMIN — FENTANYL CITRATE 100 MCG: 50 INJECTION INTRAMUSCULAR; INTRAVENOUS at 15:32

## 2021-10-21 RX ADMIN — ATORVASTATIN CALCIUM 40 MG: 40 TABLET, FILM COATED ORAL at 21:05

## 2021-10-21 RX ADMIN — GABAPENTIN 600 MG: 300 CAPSULE ORAL at 12:25

## 2021-10-21 RX ADMIN — GABAPENTIN 300 MG: 300 CAPSULE ORAL at 21:05

## 2021-10-21 RX ADMIN — OXYCODONE HYDROCHLORIDE 5 MG: 5 TABLET ORAL at 18:47

## 2021-10-21 RX ADMIN — ROCURONIUM BROMIDE 50 MG: 50 INJECTION, SOLUTION INTRAVENOUS at 15:57

## 2021-10-21 RX ADMIN — CEFAZOLIN 3000 MG: 1 INJECTION, POWDER, FOR SOLUTION INTRAMUSCULAR; INTRAVENOUS at 15:37

## 2021-10-21 RX ADMIN — TAMSULOSIN HYDROCHLORIDE 0.4 MG: 0.4 CAPSULE ORAL at 19:45

## 2021-10-21 RX ADMIN — HEPARIN SODIUM 5000 UNITS: 5000 INJECTION INTRAVENOUS; SUBCUTANEOUS at 12:44

## 2021-10-21 RX ADMIN — ONDANSETRON 4 MG: 2 INJECTION INTRAMUSCULAR; INTRAVENOUS at 16:51

## 2021-10-21 RX ADMIN — PROPOFOL 200 MG: 10 INJECTION, EMULSION INTRAVENOUS at 15:32

## 2021-10-21 RX ADMIN — LIDOCAINE HYDROCHLORIDE 25 MG: 10 INJECTION, SOLUTION EPIDURAL; INFILTRATION; INTRACAUDAL; PERINEURAL at 15:32

## 2021-10-21 RX ADMIN — INSULIN HUMAN 5 UNITS: 100 INJECTION, SOLUTION PARENTERAL at 12:48

## 2021-10-21 RX ADMIN — HYDROMORPHONE HYDROCHLORIDE 0.5 MG: 1 INJECTION, SOLUTION INTRAMUSCULAR; INTRAVENOUS; SUBCUTANEOUS at 21:04

## 2021-10-21 RX ADMIN — SUGAMMADEX 200 MG: 100 INJECTION, SOLUTION INTRAVENOUS at 16:56

## 2021-10-21 RX ADMIN — ACETAMINOPHEN 975 MG: 325 TABLET, FILM COATED ORAL at 12:25

## 2021-10-21 RX ADMIN — SODIUM CHLORIDE, SODIUM LACTATE, POTASSIUM CHLORIDE, AND CALCIUM CHLORIDE 60 ML/HR: .6; .31; .03; .02 INJECTION, SOLUTION INTRAVENOUS at 17:55

## 2021-10-21 RX ADMIN — SODIUM CHLORIDE, SODIUM LACTATE, POTASSIUM CHLORIDE, AND CALCIUM CHLORIDE 125 ML/HR: .6; .31; .03; .02 INJECTION, SOLUTION INTRAVENOUS at 12:35

## 2021-10-21 RX ADMIN — SODIUM CHLORIDE 2 UNITS/HR: 9 INJECTION, SOLUTION INTRAVENOUS at 15:46

## 2021-10-21 RX ADMIN — PREDNISOLONE ACETATE 1 DROP: 10 SUSPENSION/ DROPS OPHTHALMIC at 19:44

## 2021-10-21 RX ADMIN — SODIUM CHLORIDE: 9 INJECTION, SOLUTION INTRAVENOUS at 15:44

## 2021-10-22 ENCOUNTER — APPOINTMENT (INPATIENT)
Dept: RADIOLOGY | Facility: HOSPITAL | Age: 66
DRG: 168 | End: 2021-10-22
Payer: MEDICARE

## 2021-10-22 VITALS
OXYGEN SATURATION: 94 % | RESPIRATION RATE: 18 BRPM | TEMPERATURE: 99.5 F | DIASTOLIC BLOOD PRESSURE: 70 MMHG | BODY MASS INDEX: 38.4 KG/M2 | SYSTOLIC BLOOD PRESSURE: 153 MMHG | HEART RATE: 68 BPM | HEIGHT: 71 IN | WEIGHT: 274.25 LBS

## 2021-10-22 LAB
ANION GAP SERPL CALCULATED.3IONS-SCNC: 5 MMOL/L (ref 4–13)
BUN SERPL-MCNC: 16 MG/DL (ref 5–25)
CALCIUM SERPL-MCNC: 8.6 MG/DL (ref 8.3–10.1)
CHLORIDE SERPL-SCNC: 107 MMOL/L (ref 100–108)
CO2 SERPL-SCNC: 26 MMOL/L (ref 21–32)
CREAT SERPL-MCNC: 0.86 MG/DL (ref 0.6–1.3)
ERYTHROCYTE [DISTWIDTH] IN BLOOD BY AUTOMATED COUNT: 14.9 % (ref 11.6–15.1)
FLUAV RNA RESP QL NAA+PROBE: NEGATIVE
FLUBV RNA RESP QL NAA+PROBE: NEGATIVE
GFR SERPL CREATININE-BSD FRML MDRD: 90 ML/MIN/1.73SQ M
GLUCOSE SERPL-MCNC: 158 MG/DL (ref 65–140)
GLUCOSE SERPL-MCNC: 165 MG/DL (ref 65–140)
GLUCOSE SERPL-MCNC: 282 MG/DL (ref 65–140)
HCT VFR BLD AUTO: 39.5 % (ref 36.5–49.3)
HGB BLD-MCNC: 13.1 G/DL (ref 12–17)
MAGNESIUM SERPL-MCNC: 1.8 MG/DL (ref 1.6–2.6)
MCH RBC QN AUTO: 31.9 PG (ref 26.8–34.3)
MCHC RBC AUTO-ENTMCNC: 33.2 G/DL (ref 31.4–37.4)
MCV RBC AUTO: 96 FL (ref 82–98)
PLATELET # BLD AUTO: 116 THOUSANDS/UL (ref 149–390)
PMV BLD AUTO: 10.2 FL (ref 8.9–12.7)
POTASSIUM SERPL-SCNC: 3.5 MMOL/L (ref 3.5–5.3)
RBC # BLD AUTO: 4.11 MILLION/UL (ref 3.88–5.62)
RSV RNA RESP QL NAA+PROBE: NEGATIVE
SARS-COV-2 RNA RESP QL NAA+PROBE: NEGATIVE
SARS-COV-2 RNA RESP QL NAA+PROBE: NEGATIVE
SODIUM SERPL-SCNC: 138 MMOL/L (ref 136–145)
WBC # BLD AUTO: 7.3 THOUSAND/UL (ref 4.31–10.16)

## 2021-10-22 PROCEDURE — 80048 BASIC METABOLIC PNL TOTAL CA: CPT | Performed by: PHYSICIAN ASSISTANT

## 2021-10-22 PROCEDURE — 82948 REAGENT STRIP/BLOOD GLUCOSE: CPT

## 2021-10-22 PROCEDURE — 71046 X-RAY EXAM CHEST 2 VIEWS: CPT

## 2021-10-22 PROCEDURE — 85027 COMPLETE CBC AUTOMATED: CPT | Performed by: PHYSICIAN ASSISTANT

## 2021-10-22 PROCEDURE — 99024 POSTOP FOLLOW-UP VISIT: CPT | Performed by: PHYSICIAN ASSISTANT

## 2021-10-22 PROCEDURE — 99024 POSTOP FOLLOW-UP VISIT: CPT | Performed by: THORACIC SURGERY (CARDIOTHORACIC VASCULAR SURGERY)

## 2021-10-22 PROCEDURE — 83735 ASSAY OF MAGNESIUM: CPT | Performed by: PHYSICIAN ASSISTANT

## 2021-10-22 RX ORDER — OXYCODONE HYDROCHLORIDE 5 MG/1
5 TABLET ORAL EVERY 4 HOURS PRN
Qty: 30 TABLET | Refills: 0 | Status: SHIPPED | OUTPATIENT
Start: 2021-10-22 | End: 2021-11-01

## 2021-10-22 RX ORDER — ACETAMINOPHEN 325 MG/1
650 TABLET ORAL EVERY 6 HOURS
Qty: 56 TABLET | Refills: 0 | Status: SHIPPED | OUTPATIENT
Start: 2021-10-22 | End: 2021-10-29

## 2021-10-22 RX ORDER — MAGNESIUM SULFATE HEPTAHYDRATE 40 MG/ML
2 INJECTION, SOLUTION INTRAVENOUS ONCE
Status: COMPLETED | OUTPATIENT
Start: 2021-10-22 | End: 2021-10-22

## 2021-10-22 RX ORDER — DOCUSATE SODIUM 100 MG/1
100 CAPSULE, LIQUID FILLED ORAL 2 TIMES DAILY
Qty: 20 CAPSULE | Refills: 0 | Status: SHIPPED | OUTPATIENT
Start: 2021-10-22 | End: 2021-12-01 | Stop reason: ALTCHOICE

## 2021-10-22 RX ORDER — GABAPENTIN 300 MG/1
300 CAPSULE ORAL 3 TIMES DAILY
Qty: 63 CAPSULE | Refills: 0 | Status: SHIPPED | OUTPATIENT
Start: 2021-10-22 | End: 2021-12-01 | Stop reason: ALTCHOICE

## 2021-10-22 RX ORDER — POTASSIUM CHLORIDE 20 MEQ/1
40 TABLET, EXTENDED RELEASE ORAL ONCE
Status: COMPLETED | OUTPATIENT
Start: 2021-10-22 | End: 2021-10-22

## 2021-10-22 RX ADMIN — MAGNESIUM SULFATE HEPTAHYDRATE 2 G: 40 INJECTION, SOLUTION INTRAVENOUS at 11:11

## 2021-10-22 RX ADMIN — ACETAMINOPHEN 975 MG: 325 TABLET, FILM COATED ORAL at 00:15

## 2021-10-22 RX ADMIN — GABAPENTIN 300 MG: 300 CAPSULE ORAL at 09:12

## 2021-10-22 RX ADMIN — DOCUSATE SODIUM 100 MG: 100 CAPSULE ORAL at 09:12

## 2021-10-22 RX ADMIN — OXYCODONE HYDROCHLORIDE 5 MG: 5 TABLET ORAL at 05:42

## 2021-10-22 RX ADMIN — ACETAMINOPHEN 975 MG: 325 TABLET, FILM COATED ORAL at 06:12

## 2021-10-22 RX ADMIN — POLYETHYLENE GLYCOL 3350 17 G: 17 POWDER, FOR SOLUTION ORAL at 09:12

## 2021-10-22 RX ADMIN — POTASSIUM CHLORIDE 40 MEQ: 1500 TABLET, EXTENDED RELEASE ORAL at 11:13

## 2021-10-22 RX ADMIN — PANTOPRAZOLE SODIUM 40 MG: 40 TABLET, DELAYED RELEASE ORAL at 05:42

## 2021-10-22 RX ADMIN — OXYCODONE HYDROCHLORIDE 5 MG: 5 TABLET ORAL at 11:13

## 2021-10-22 RX ADMIN — ENOXAPARIN SODIUM 40 MG: 40 INJECTION SUBCUTANEOUS at 09:12

## 2021-10-22 RX ADMIN — STANDARDIZED SENNA CONCENTRATE 8.6 MG: 8.6 TABLET ORAL at 09:12

## 2021-10-22 RX ADMIN — ASPIRIN 81 MG: 81 TABLET, COATED ORAL at 09:12

## 2021-10-22 RX ADMIN — ACETAMINOPHEN 975 MG: 325 TABLET, FILM COATED ORAL at 13:25

## 2021-10-25 ENCOUNTER — TRANSITIONAL CARE MANAGEMENT (OUTPATIENT)
Dept: INTERNAL MEDICINE CLINIC | Facility: CLINIC | Age: 66
End: 2021-10-25

## 2021-10-28 ENCOUNTER — OFFICE VISIT (OUTPATIENT)
Dept: INTERNAL MEDICINE CLINIC | Age: 66
End: 2021-10-28
Payer: MEDICARE

## 2021-10-28 VITALS
SYSTOLIC BLOOD PRESSURE: 158 MMHG | TEMPERATURE: 98 F | DIASTOLIC BLOOD PRESSURE: 66 MMHG | WEIGHT: 274 LBS | HEART RATE: 70 BPM | OXYGEN SATURATION: 99 % | BODY MASS INDEX: 38.22 KG/M2

## 2021-10-28 DIAGNOSIS — G47.33 OSA (OBSTRUCTIVE SLEEP APNEA): ICD-10-CM

## 2021-10-28 DIAGNOSIS — I10 BENIGN ESSENTIAL HYPERTENSION: ICD-10-CM

## 2021-10-28 DIAGNOSIS — E78.5 HYPERLIPIDEMIA, UNSPECIFIED HYPERLIPIDEMIA TYPE: ICD-10-CM

## 2021-10-28 DIAGNOSIS — R91.8 LUNG MASS: Primary | ICD-10-CM

## 2021-10-28 PROCEDURE — 99496 TRANSJ CARE MGMT HIGH F2F 7D: CPT | Performed by: PHYSICIAN ASSISTANT

## 2021-11-06 ENCOUNTER — HOSPITAL ENCOUNTER (OUTPATIENT)
Dept: RADIOLOGY | Facility: HOSPITAL | Age: 66
Discharge: HOME | End: 2021-11-06
Payer: MEDICARE

## 2021-11-06 DIAGNOSIS — R91.8 LUNG MASS: ICD-10-CM

## 2021-11-06 PROCEDURE — 71046 X-RAY EXAM CHEST 2 VIEWS: CPT

## 2021-11-09 ENCOUNTER — OFFICE VISIT (OUTPATIENT)
Dept: CARDIAC SURGERY | Facility: CLINIC | Age: 66
End: 2021-11-09

## 2021-11-09 VITALS
SYSTOLIC BLOOD PRESSURE: 138 MMHG | HEART RATE: 58 BPM | HEIGHT: 71 IN | DIASTOLIC BLOOD PRESSURE: 74 MMHG | OXYGEN SATURATION: 97 % | TEMPERATURE: 97.8 F | BODY MASS INDEX: 37.99 KG/M2 | WEIGHT: 271.39 LBS | RESPIRATION RATE: 16 BRPM

## 2021-11-09 DIAGNOSIS — R91.8 LUNG MASS: Primary | ICD-10-CM

## 2021-11-09 PROCEDURE — 99024 POSTOP FOLLOW-UP VISIT: CPT | Performed by: THORACIC SURGERY (CARDIOTHORACIC VASCULAR SURGERY)

## 2021-11-15 ENCOUNTER — DOCUMENTATION (OUTPATIENT)
Dept: CARDIAC SURGERY | Facility: CLINIC | Age: 66
End: 2021-11-15

## 2021-11-15 ENCOUNTER — TELEPHONE (OUTPATIENT)
Dept: CARDIAC SURGERY | Facility: CLINIC | Age: 66
End: 2021-11-15

## 2021-11-17 ENCOUNTER — OFFICE VISIT (OUTPATIENT)
Dept: DIABETES SERVICES | Facility: CLINIC | Age: 66
End: 2021-11-17
Payer: MEDICARE

## 2021-11-17 VITALS — BODY MASS INDEX: 37.8 KG/M2 | WEIGHT: 271 LBS

## 2021-11-17 DIAGNOSIS — E11.59 TYPE 2 DIABETES MELLITUS WITH OTHER CIRCULATORY COMPLICATION, WITHOUT LONG-TERM CURRENT USE OF INSULIN (HCC): Primary | ICD-10-CM

## 2021-11-17 PROCEDURE — 97802 MEDICAL NUTRITION INDIV IN: CPT | Performed by: DIETITIAN, REGISTERED

## 2021-11-29 ENCOUNTER — APPOINTMENT (OUTPATIENT)
Dept: LAB | Facility: CLINIC | Age: 66
End: 2021-11-29
Payer: MEDICARE

## 2021-11-29 DIAGNOSIS — E11.59 TYPE 2 DIABETES MELLITUS WITH OTHER CIRCULATORY COMPLICATION, WITHOUT LONG-TERM CURRENT USE OF INSULIN (HCC): Primary | ICD-10-CM

## 2021-11-29 DIAGNOSIS — I10 BENIGN ESSENTIAL HYPERTENSION: ICD-10-CM

## 2021-11-29 DIAGNOSIS — E78.5 HYPERLIPIDEMIA, UNSPECIFIED HYPERLIPIDEMIA TYPE: ICD-10-CM

## 2021-11-29 DIAGNOSIS — E13.9 DIABETES 1.5, MANAGED AS TYPE 2 (HCC): ICD-10-CM

## 2021-11-29 DIAGNOSIS — E11.59 TYPE 2 DIABETES MELLITUS WITH OTHER CIRCULATORY COMPLICATION, WITHOUT LONG-TERM CURRENT USE OF INSULIN (HCC): ICD-10-CM

## 2021-11-29 LAB
ALBUMIN SERPL BCP-MCNC: 3.9 G/DL (ref 3.5–5)
ALP SERPL-CCNC: 67 U/L (ref 46–116)
ALT SERPL W P-5'-P-CCNC: 33 U/L (ref 12–78)
ANION GAP SERPL CALCULATED.3IONS-SCNC: 8 MMOL/L (ref 4–13)
AST SERPL W P-5'-P-CCNC: 17 U/L (ref 5–45)
BASOPHILS # BLD AUTO: 0.01 THOUSANDS/ΜL (ref 0–0.1)
BASOPHILS NFR BLD AUTO: 0 % (ref 0–1)
BILIRUB SERPL-MCNC: 0.74 MG/DL (ref 0.2–1)
BUN SERPL-MCNC: 20 MG/DL (ref 5–25)
CALCIUM SERPL-MCNC: 9 MG/DL (ref 8.3–10.1)
CHLORIDE SERPL-SCNC: 102 MMOL/L (ref 100–108)
CO2 SERPL-SCNC: 28 MMOL/L (ref 21–32)
CREAT SERPL-MCNC: 1.18 MG/DL (ref 0.6–1.3)
EOSINOPHIL # BLD AUTO: 0.06 THOUSAND/ΜL (ref 0–0.61)
EOSINOPHIL NFR BLD AUTO: 1 % (ref 0–6)
ERYTHROCYTE [DISTWIDTH] IN BLOOD BY AUTOMATED COUNT: 14.5 % (ref 11.6–15.1)
GFR SERPL CREATININE-BSD FRML MDRD: 64 ML/MIN/1.73SQ M
GLUCOSE P FAST SERPL-MCNC: 245 MG/DL (ref 65–99)
HCT VFR BLD AUTO: 45.7 % (ref 36.5–49.3)
HGB BLD-MCNC: 15.3 G/DL (ref 12–17)
IMM GRANULOCYTES # BLD AUTO: 0.01 THOUSAND/UL (ref 0–0.2)
IMM GRANULOCYTES NFR BLD AUTO: 0 % (ref 0–2)
LYMPHOCYTES # BLD AUTO: 1.03 THOUSANDS/ΜL (ref 0.6–4.47)
LYMPHOCYTES NFR BLD AUTO: 19 % (ref 14–44)
MCH RBC QN AUTO: 31.4 PG (ref 26.8–34.3)
MCHC RBC AUTO-ENTMCNC: 33.5 G/DL (ref 31.4–37.4)
MCV RBC AUTO: 94 FL (ref 82–98)
MONOCYTES # BLD AUTO: 0.4 THOUSAND/ΜL (ref 0.17–1.22)
MONOCYTES NFR BLD AUTO: 7 % (ref 4–12)
NEUTROPHILS # BLD AUTO: 3.93 THOUSANDS/ΜL (ref 1.85–7.62)
NEUTS SEG NFR BLD AUTO: 73 % (ref 43–75)
NRBC BLD AUTO-RTO: 0 /100 WBCS
PLATELET # BLD AUTO: 137 THOUSANDS/UL (ref 149–390)
PMV BLD AUTO: 10.3 FL (ref 8.9–12.7)
POTASSIUM SERPL-SCNC: 3.9 MMOL/L (ref 3.5–5.3)
PROT SERPL-MCNC: 7.1 G/DL (ref 6.4–8.2)
RBC # BLD AUTO: 4.87 MILLION/UL (ref 3.88–5.62)
SODIUM SERPL-SCNC: 138 MMOL/L (ref 136–145)
WBC # BLD AUTO: 5.44 THOUSAND/UL (ref 4.31–10.16)

## 2021-11-29 PROCEDURE — 36415 COLL VENOUS BLD VENIPUNCTURE: CPT

## 2021-11-29 PROCEDURE — 80053 COMPREHEN METABOLIC PANEL: CPT

## 2021-11-29 PROCEDURE — 83036 HEMOGLOBIN GLYCOSYLATED A1C: CPT

## 2021-11-29 PROCEDURE — 85025 COMPLETE CBC W/AUTO DIFF WBC: CPT

## 2021-11-30 DIAGNOSIS — E11.9 NON-INSULIN DEPENDENT TYPE 2 DIABETES MELLITUS (HCC): ICD-10-CM

## 2021-11-30 LAB
EST. AVERAGE GLUCOSE BLD GHB EST-MCNC: 157 MG/DL
HBA1C MFR BLD: 7.1 %

## 2021-12-01 ENCOUNTER — OFFICE VISIT (OUTPATIENT)
Dept: INTERNAL MEDICINE CLINIC | Age: 66
End: 2021-12-01
Payer: MEDICARE

## 2021-12-01 VITALS
BODY MASS INDEX: 37.74 KG/M2 | SYSTOLIC BLOOD PRESSURE: 120 MMHG | HEART RATE: 57 BPM | HEIGHT: 71 IN | WEIGHT: 269.6 LBS | TEMPERATURE: 97.4 F | DIASTOLIC BLOOD PRESSURE: 58 MMHG | OXYGEN SATURATION: 98 %

## 2021-12-01 DIAGNOSIS — D69.6 THROMBOCYTOPENIA (HCC): ICD-10-CM

## 2021-12-01 DIAGNOSIS — N40.0 BENIGN PROSTATIC HYPERPLASIA WITHOUT LOWER URINARY TRACT SYMPTOMS: ICD-10-CM

## 2021-12-01 DIAGNOSIS — E78.5 HYPERLIPIDEMIA, UNSPECIFIED HYPERLIPIDEMIA TYPE: ICD-10-CM

## 2021-12-01 DIAGNOSIS — R91.8 LUNG MASS: ICD-10-CM

## 2021-12-01 DIAGNOSIS — I10 BENIGN ESSENTIAL HYPERTENSION: Primary | ICD-10-CM

## 2021-12-01 PROCEDURE — 99214 OFFICE O/P EST MOD 30 MIN: CPT | Performed by: PHYSICIAN ASSISTANT

## 2021-12-05 ENCOUNTER — HOSPITAL ENCOUNTER (OUTPATIENT)
Dept: RADIOLOGY | Facility: HOSPITAL | Age: 66
Discharge: HOME | End: 2021-12-05
Payer: MEDICARE

## 2021-12-05 DIAGNOSIS — R91.8 LUNG MASS: ICD-10-CM

## 2021-12-05 PROCEDURE — 71046 X-RAY EXAM CHEST 2 VIEWS: CPT

## 2021-12-07 ENCOUNTER — OFFICE VISIT (OUTPATIENT)
Dept: CARDIAC SURGERY | Facility: CLINIC | Age: 66
End: 2021-12-07

## 2021-12-07 VITALS
HEART RATE: 58 BPM | TEMPERATURE: 97.6 F | HEIGHT: 71 IN | BODY MASS INDEX: 38.36 KG/M2 | WEIGHT: 274.03 LBS | SYSTOLIC BLOOD PRESSURE: 132 MMHG | RESPIRATION RATE: 16 BRPM | DIASTOLIC BLOOD PRESSURE: 74 MMHG

## 2021-12-07 DIAGNOSIS — R91.8 LUNG MASS: Primary | ICD-10-CM

## 2021-12-07 PROCEDURE — 99024 POSTOP FOLLOW-UP VISIT: CPT | Performed by: PHYSICIAN ASSISTANT

## 2021-12-15 DIAGNOSIS — E11.9 DIABETES MELLITUS TYPE 2, NONINSULIN DEPENDENT (HCC): ICD-10-CM

## 2021-12-15 DIAGNOSIS — E78.5 HYPERLIPIDEMIA, UNSPECIFIED HYPERLIPIDEMIA TYPE: ICD-10-CM

## 2021-12-15 RX ORDER — ATORVASTATIN CALCIUM 40 MG/1
40 TABLET, FILM COATED ORAL
Qty: 90 TABLET | Refills: 1 | Status: SHIPPED | OUTPATIENT
Start: 2021-12-15 | End: 2022-06-21 | Stop reason: SDUPTHER

## 2021-12-17 ENCOUNTER — OFFICE VISIT (OUTPATIENT)
Dept: ENDOCRINOLOGY | Facility: CLINIC | Age: 66
End: 2021-12-17
Payer: MEDICARE

## 2021-12-17 VITALS
DIASTOLIC BLOOD PRESSURE: 70 MMHG | HEIGHT: 71 IN | BODY MASS INDEX: 37.8 KG/M2 | HEART RATE: 64 BPM | WEIGHT: 270 LBS | SYSTOLIC BLOOD PRESSURE: 150 MMHG

## 2021-12-17 DIAGNOSIS — I10 BENIGN ESSENTIAL HYPERTENSION: ICD-10-CM

## 2021-12-17 DIAGNOSIS — E78.5 HYPERLIPIDEMIA, UNSPECIFIED HYPERLIPIDEMIA TYPE: ICD-10-CM

## 2021-12-17 DIAGNOSIS — E11.59 TYPE 2 DIABETES MELLITUS WITH OTHER CIRCULATORY COMPLICATION, WITHOUT LONG-TERM CURRENT USE OF INSULIN (HCC): Primary | ICD-10-CM

## 2021-12-17 PROCEDURE — 99214 OFFICE O/P EST MOD 30 MIN: CPT | Performed by: PHYSICIAN ASSISTANT

## 2022-01-05 ENCOUNTER — OFFICE VISIT (OUTPATIENT)
Dept: CARDIOLOGY CLINIC | Facility: CLINIC | Age: 67
End: 2022-01-05
Payer: MEDICARE

## 2022-01-05 VITALS
HEART RATE: 55 BPM | WEIGHT: 273.7 LBS | SYSTOLIC BLOOD PRESSURE: 142 MMHG | HEIGHT: 71 IN | BODY MASS INDEX: 38.32 KG/M2 | OXYGEN SATURATION: 96 % | DIASTOLIC BLOOD PRESSURE: 68 MMHG

## 2022-01-05 DIAGNOSIS — I10 BENIGN ESSENTIAL HYPERTENSION: ICD-10-CM

## 2022-01-05 DIAGNOSIS — I25.10 CORONARY ARTERY DISEASE INVOLVING NATIVE CORONARY ARTERY OF NATIVE HEART WITHOUT ANGINA PECTORIS: Primary | ICD-10-CM

## 2022-01-05 DIAGNOSIS — I10 HYPERTENSION, UNSPECIFIED TYPE: ICD-10-CM

## 2022-01-05 DIAGNOSIS — E78.5 HYPERLIPIDEMIA, UNSPECIFIED HYPERLIPIDEMIA TYPE: ICD-10-CM

## 2022-01-05 PROCEDURE — 99214 OFFICE O/P EST MOD 30 MIN: CPT | Performed by: INTERNAL MEDICINE

## 2022-01-05 NOTE — PROGRESS NOTES
Cardiology Follow Up    Janette Villela  1955  [id number]  [facility] [place]  [address] [address] [phone]    No diagnosis found  There are no diagnoses linked to this encounter  I had the pleasure of seeing Janette Villela for a follow up visit  INTERVAL HISTORY:  None    History of the presenting illness, Discussion/Summary and My Plan are as follows:::    He is a pleasant 70-year-old gentleman with a history of hypertension, diabetes and dyslipidemia  He also has coronary artery disease and has had multiple stenting procedures  Based on his last coronary angiography report from the [facility] in September 2016, left main was patent, LAD was patent including a drug-eluting stent-implanted in 2011  His proximal RCA had a stent implanted in June 2015->in-stent restenosis in December 2015-restented, again had in-stent restenosis that was treated with a drug-eluting 3X 18 mm Xience stent  This was done for anginal symptoms  To his knowledge, he has not had a myocardial infarction  Keeping busy at home taking care of his wife and painting at home  Sometimes walks the golf course - 9 holes and his own yard and oil - no change from before  No anginal symptoms at this time  He had vein stripping of his right lower extremity which has disproportionately more edema      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 but PET negative and has seen Throacic surgery  Plan:      Coronary artery disease: Status post [id number]-patent in 2016, status post proximal RCA stent in - stent - in-stent-last stenting-September 2016 with a Xience stent, (3 STENTS)   No anginal symptoms at this time  In definite Aspirin and Plavix, not on metoprolol but asymptomatic, heart rates often are bradycardic     Stress tests:   November 2019: Exercise time 7 minutes 30 seconds, no ischemia   December 2017:Exercise nuclear perfusion study, 7 minutes 31 seconds, resting hypertension with hypertensive response, no ischemia, no ECG changes, no chest pains     Dyslipidemia: on atorvastatin 40 mg,   Controlled, see below, continue to watch LDL    HTN: confirmed  will check bid at home and send me the log  Maxed out on amlodipine/lisinopril/ hydrochlorothiazide-Taking all at night     DM: As above  last A1c was 7 1     Chronic right more than left lower extremity edema: No need for Dopplers, had vein stripping for varicose veins in the past  Foot end elevation, sometimes uses compression stockings  No increase with Amlodipine use    Follow up in 6 months  Results for Luci Malone (MRN [id number]) as of 1/5/2022 14:14   Ref   Range 5/20/2021 11:05 7/21/2021 10:00 10/14/2021 13:13 11/29/2021 13:47   Cholesterol Latest Ref Range: 50 - 200 mg/dL 132      Triglycerides Latest Ref Range: <=150 mg/dL 162 (H)      HDL Latest Ref Range: >=40 mg/dL 40      LDL Calculated Latest Ref Range: 0 - 100 mg/dL 60      Hemoglobin A1C   7 7 (H) 7 8 (A) 7 1 (H)       Patient Active Problem List   Diagnosis    Abnormal MRI, lumbar spine    Benign essential hypertension    CAD (coronary artery disease)    Claustrophobia    Diabetes mellitus with circulatory complication (HCC)    Herniation of intervertebral disc of lumbar spine    Hyperlipidemia    Presbyopia    Primary osteoarthritis of left knee    Hypertension    Atherosclerosis of native coronary artery without angina pectoris    Class 2 severe obesity due to excess calories with serious comorbidity and body mass index (BMI) of 39 0 to 39 9 in adult (HCC)    Exercise-induced angina (HCC)    Platelets decreased (HCC)    Lung mass     Past Medical History:   Diagnosis Date    Arthritis     knee    Coronary artery disease     Diabetes mellitus ([facility] [address] )     H/O angioplasty     Hypercholesteremia     Hypertension     Sleep apnea     Varicose veins of lower extremity      Social History     Socioeconomic History    Marital status: /Civil Union     Spouse name: Not on file    Number of children: Not on file    Years of education: Not on file    Highest education level: Not on file   Occupational History    Not on file   Tobacco Use    Smoking status: Former Smoker     Packs/day: 2 00     Years: 15 00     Pack years: 30 00     Types: Cigarettes     Quit date: 1982     Years since quittin 0    Smokeless tobacco: Never Used   Vaping Use    Vaping Use: Never used   Substance and Sexual Activity    Alcohol use: Not Currently    Drug use: No    Sexual activity: Yes   Other Topics Concern    Not on file   Social History Narrative    Not on file     Social Determinants of Health     Financial Resource Strain: Not on file   Food Insecurity: Not on file   Transportation Needs: Not on file   Physical Activity: Not on file   Stress: Not on file   Social Connections: Not on file   Intimate Partner Violence: Not on file   Housing Stability: Not on file      Family History   Problem Relation Age of Onset    Cancer Mother     Coronary artery disease Mother     Heart disease Father     Early death Father     Coronary artery disease Father     Diabetes Father     Hypertension Father     Coronary artery disease Brother     Hyperlipidemia Brother     Hypertension Brother      Past Surgical History:   Procedure Laterality Date    CARDIAC CATHETERIZATION      CARDIAC SURGERY      CHOLECYSTECTOMY  2010    COLONOSCOPY      CORONARY ANGIOPLASTY WITH STENT PLACEMENT      EYE SURGERY Left     ocular lens implant    GALLBLADDER SURGERY      HERNIA REPAIR  2009    IR BIOPSY LUNG  2021    KNEE ARTHROSCOPY W/ MENISCAL REPAIR Right     KNEE SURGERY  09/10/2015    LUNG SEGMENTECTOMY Left 10/21/2021    Procedure: RESECTION WEDGE LEFT LOWER LOBE LUNG;  Surgeon: Curt Powell MD;  [facility];  Service: Thoracic    AZ BRONCHOSCOPY,DIAGNOSTIC N/A 10/21/2021    Procedure: BRONCHOSCOPY FLEXIBLE;  Surgeon: Melvin Prince MD;  [facility];  Service: Thoracic    AZ INCISE FINGER TENDON SHEATH Left 8/25/2016    Procedure: Dortuyet Safabien ;  Surgeon: Stephany Scruggs MD;  [facility];  Service: Orthopedics    AZ THORACOSCOPY W/THERA WEDGE RESEXN INITIAL UNILAT Left 10/21/2021    Procedure: THORACOSCOPY VIDEO ASSISTED SURGERY (VATS); Surgeon: Melvin Prince MD;  [facility];  Service: Thoracic    VARICOSE VEIN SURGERY      Ligation       Current Outpatient Medications:     amLODIPine (NORVASC) 10 mg tablet, Take 1 tablet (10 mg total) by mouth daily, Disp: 90 tablet, Rfl: 1    ascorbic acid (VITAMIN C) 500 mg tablet, Take 500 mg by mouth daily, Disp: , Rfl:     aspirin (ECOTRIN LOW STRENGTH) 81 mg EC tablet, Take 81 mg by mouth daily  , Disp: , Rfl:     atorvastatin (LIPITOR) 40 mg tablet, Take 1 tablet (40 mg total) by mouth daily at bedtime, Disp: 90 tablet, Rfl: 1    clopidogrel (Plavix) 75 mg tablet, Take 1 tablet (75 mg total) by mouth daily, Disp: 90 tablet, Rfl: 1    glimepiride (AMARYL) 4 mg tablet, Take 4 mg with breakfast and 8 mg in the evening daily  , Disp: 90 tablet, Rfl: 5    lisinopril-hydrochlorothiazide (PRINZIDE,ZESTORETIC) 20-12 5 MG per tablet, Take 2 tablets by mouth daily, Disp: 180 tablet, Rfl: 1    metFORMIN (GLUCOPHAGE) 1000 MG tablet, Take 2 tablets (2,000 mg total) by mouth daily after dinner, Disp: 180 tablet, Rfl: 1    Omega-3 Fatty Acids (FISH OIL PO), Take by mouth daily  , Disp: , Rfl:     prednisoLONE acetate (PRED FORTE) 1 % ophthalmic suspension, Administer 1 drop into the left eye every other day , Disp: , Rfl:     tadalafil (CIALIS) 20 MG tablet, Take 1 tablet (20 mg total) by mouth as needed for erectile dysfunction, Disp: 30 tablet, Rfl: 0    tamsulosin (FLOMAX) 0 4 mg, Take 1 capsule (0 4 mg total) by mouth daily with dinner, Disp: 90 capsule, Rfl: 3    VITAMIN E PO, Take 1 capsule by mouth daily  , Disp: , Rfl:     Blood Glucose Monitoring Suppl (ONE TOUCH ULTRA 2) w/Device KIT, Test blood glucose 2 times daily (One Touch Ultra 2), Disp: 1 each, Rfl: 0    glucose blood test strip, Test once daily (one touch ultra), Disp: 100 each, Rfl: 1    ONETOUCH DELICA LANCETS 25M MISC, Test blood glucose 2 times daily, Disp: 300 each, Rfl: 0  Allergies   Allergen Reactions    Ciprofloxacin Swelling    Janumet [Sitagliptin-Metformin Hcl] Lip Swelling     Lip swelling     Terazosin      Annotation - [id number]: Blood in sperm       Imaging: No results found  Review of Systems:  Review of Systems   Constitutional: Negative  HENT: Negative  Respiratory: Negative  Cardiovascular: Negative  Endocrine: Negative  Musculoskeletal: Positive for arthralgias (hip pains)  Neurological: Negative  Physical Exam:  /68 (BP Location: Right arm, Patient Position: Sitting, Cuff Size: Large)   Pulse 55   Ht 5' 11" (1 803 m)   Wt 124 kg (273 lb 11 2 oz)   SpO2 96%   BMI 38 17 kg/m²   Physical Exam  Constitutional:       General: He is not in acute distress  Appearance: He is well-developed  He is not diaphoretic  HENT:      Head: Normocephalic and atraumatic  Eyes:      General: No scleral icterus  Right eye: No discharge  Left eye: No discharge  Conjunctiva/sclera: Conjunctivae normal       Pupils: Pupils are equal, round, and reactive to light  Neck:      Thyroid: No thyromegaly  Vascular: No JVD  Trachea: No tracheal deviation  Cardiovascular:      Rate and Rhythm: Normal rate and regular rhythm  Heart sounds: No murmur heard  No friction rub  Pulmonary:      Effort: Pulmonary effort is normal  No respiratory distress  Breath sounds: Normal breath sounds  No stridor  Abdominal:      General: Bowel sounds are normal  There is no distension  Palpations: Abdomen is soft  Tenderness: There is no abdominal tenderness  There is no guarding  Musculoskeletal:         General: Swelling (Right lower extremity edema) present  No tenderness or deformity  Normal range of motion  Cervical back: Normal range of motion  Skin:     General: Skin is warm  Coloration: Skin is not pale  Findings: No erythema or rash

## 2022-01-11 ENCOUNTER — APPOINTMENT (OUTPATIENT)
Dept: LAB | Facility: CLINIC | Age: 67
End: 2022-01-11
Payer: MEDICARE

## 2022-01-11 DIAGNOSIS — Z12.5 SCREENING FOR PROSTATE CANCER: ICD-10-CM

## 2022-01-11 LAB — PSA SERPL-MCNC: 1.8 NG/ML (ref 0–4)

## 2022-01-11 PROCEDURE — G0103 PSA SCREENING: HCPCS

## 2022-01-11 PROCEDURE — 36415 COLL VENOUS BLD VENIPUNCTURE: CPT

## 2022-01-14 ENCOUNTER — OFFICE VISIT (OUTPATIENT)
Dept: UROLOGY | Facility: CLINIC | Age: 67
End: 2022-01-14
Payer: MEDICARE

## 2022-01-14 VITALS
SYSTOLIC BLOOD PRESSURE: 162 MMHG | HEART RATE: 55 BPM | WEIGHT: 273 LBS | BODY MASS INDEX: 38.22 KG/M2 | DIASTOLIC BLOOD PRESSURE: 84 MMHG | HEIGHT: 71 IN

## 2022-01-14 DIAGNOSIS — Z12.5 SCREENING FOR PROSTATE CANCER: ICD-10-CM

## 2022-01-14 DIAGNOSIS — N52.9 ERECTILE DYSFUNCTION, UNSPECIFIED ERECTILE DYSFUNCTION TYPE: ICD-10-CM

## 2022-01-14 DIAGNOSIS — N40.0 BENIGN PROSTATIC HYPERPLASIA, UNSPECIFIED WHETHER LOWER URINARY TRACT SYMPTOMS PRESENT: Primary | ICD-10-CM

## 2022-01-14 LAB
POST-VOID RESIDUAL VOLUME, ML POC: 55 ML
SL AMB  POCT GLUCOSE, UA: NORMAL
SL AMB LEUKOCYTE ESTERASE,UA: NORMAL
SL AMB POCT BILIRUBIN,UA: NORMAL
SL AMB POCT BLOOD,UA: NORMAL
SL AMB POCT CLARITY,UA: CLEAR
SL AMB POCT COLOR,UA: YELLOW
SL AMB POCT KETONES,UA: NORMAL
SL AMB POCT NITRITE,UA: NORMAL
SL AMB POCT PH,UA: 5
SL AMB POCT SPECIFIC GRAVITY,UA: 1.01
SL AMB POCT URINE PROTEIN: NORMAL
SL AMB POCT UROBILINOGEN: 0.2

## 2022-01-14 PROCEDURE — 81002 URINALYSIS NONAUTO W/O SCOPE: CPT | Performed by: PHYSICIAN ASSISTANT

## 2022-01-14 PROCEDURE — 99213 OFFICE O/P EST LOW 20 MIN: CPT | Performed by: PHYSICIAN ASSISTANT

## 2022-01-14 PROCEDURE — 51798 US URINE CAPACITY MEASURE: CPT | Performed by: PHYSICIAN ASSISTANT

## 2022-01-14 RX ORDER — TADALAFIL 20 MG/1
20 TABLET ORAL AS NEEDED
Qty: 30 TABLET | Refills: 2 | Status: SHIPPED | OUTPATIENT
Start: 2022-01-14

## 2022-01-14 NOTE — PROGRESS NOTES
1  Benign prostatic hyperplasia, unspecified whether lower urinary tract symptoms present  POCT Measure PVR    POCT urine dip   2  Erectile dysfunction, unspecified erectile dysfunction type  tadalafil (CIALIS) 20 MG tablet   3  Screening for prostate cancer  PSA, Total Screen         Assessment and plan:       1  BPH with LUTS  - continue tamsulosin daily    2  Prostate cancer screening  - recent normal ISAIAS with stable PSA  - follow-up 1 year PSA present    3  Erectile dysfunction  -continue sildenafil as needed    Latonya Fleming PA-C      Chief Complaint     Lower urinary tract symptoms    History of Present Illness     Lisa Tavarez is a 77 y o  male presenting for follow up lower urinary tract symptoms  Patient is shortly had noted some urinary hesitancy, weakened stream, postvoid dribbling, and sensation of incomplete bladder emptying  At his last visit he was started on tamsulosin  Patient has noticed improvement of his symptoms  Denies any adverse side effects  Overall happy with tamsulosin and wishes to continue  Patient's last PSA is 1 8 (1/11/22), previously 1 6 (9/25/2020), 2 3 (03/18/2019)  Medical comorbidities include diabetes, hypertension, angina, coronary artery disease, lumbosacral disc disease, obesity, hypertension  Patient is managed on sildenafil for erectile dysfunction  Transitioned to cialis at last visit  Patient had a previous PET scan (12/11/2020) with some activity around the area of the prostate  Urine dip leukocyte, nitrite, and blood negative  PVR 55mL  AUA SYMPTOM SCORE      Most Recent Value   AUA SYMPTOM SCORE    How often have you had a sensation of not emptying your bladder completely after you finished urinating? 2   How often have you had to urinate again less than two hours after you finished urinating? 3   How often have you found you stopped and started again several times when you urinate?  2   How often have you found it difficult to postpone urination? 0   How often have you had a weak urinary stream? 4   How often have you had to push or strain to begin urination? 1   How many times did you most typically get up to urinate from the time you went to bed at night until the time you got up in the morning? 1   Quality of Life: If you were to spend the rest of your life with your urinary condition just the way it is now, how would you feel about that? 3   AUA SYMPTOM SCORE 13          Laboratory     Lab Results   Component Value Date    CREATININE 1 18 11/29/2021       Lab Results   Component Value Date    PSA 1 8 01/11/2022    PSA 2 2 05/20/2021    PSA 1 6 09/25/2020       Review of Systems     Review of Systems   Constitutional: Negative for activity change, appetite change, chills, diaphoresis, fatigue, fever and unexpected weight change  Respiratory: Negative for chest tightness and shortness of breath  Cardiovascular: Negative for chest pain, palpitations and leg swelling  Gastrointestinal: Negative for abdominal distention, abdominal pain, constipation, diarrhea, nausea and vomiting  Genitourinary: Positive for decreased urine volume and difficulty urinating  Negative for dysuria, enuresis, flank pain, frequency, genital sores, hematuria and urgency  Musculoskeletal: Negative for back pain, gait problem and myalgias  Skin: Negative for color change, pallor, rash and wound  Psychiatric/Behavioral: Negative for behavioral problems  The patient is not nervous/anxious  Allergies     Allergies   Allergen Reactions    Ciprofloxacin Swelling    Janumet [Sitagliptin-Metformin Hcl] Lip Swelling     Lip swelling     Terazosin      Annotation - [id number]: Blood in sperm       Physical Exam     Physical Exam  Constitutional:       General: He is not in acute distress  Appearance: Normal appearance  He is normal weight  He is not ill-appearing  HENT:      Head: Normocephalic and atraumatic     Eyes:      General: Right eye: No discharge  Left eye: No discharge  Conjunctiva/sclera: Conjunctivae normal    Pulmonary:      Effort: Pulmonary effort is normal  No respiratory distress  Genitourinary:     Comments: Deferred rectal exam today as this was performed 3 months ago without abnormality  Skin:     General: Skin is warm and dry  Coloration: Skin is not pale  Neurological:      General: No focal deficit present  Mental Status: He is alert and oriented to person, place, and time  Psychiatric:         Mood and Affect: Mood normal          Behavior: Behavior normal          Thought Content: Thought content normal        Vital Signs     Vitals:    01/14/22 0935   BP: 162/84   Pulse: 55   Weight: 124 kg (273 lb)   Height: 5' 11" (1 803 m)         Current Medications       Current Outpatient Medications:     amLODIPine (NORVASC) 10 mg tablet, Take 1 tablet (10 mg total) by mouth daily, Disp: 90 tablet, Rfl: 1    ascorbic acid (VITAMIN C) 500 mg tablet, Take 500 mg by mouth daily, Disp: , Rfl:     aspirin (ECOTRIN LOW STRENGTH) 81 mg EC tablet, Take 81 mg by mouth daily  , Disp: , Rfl:     atorvastatin (LIPITOR) 40 mg tablet, Take 1 tablet (40 mg total) by mouth daily at bedtime, Disp: 90 tablet, Rfl: 1    Blood Glucose Monitoring Suppl (ONE TOUCH ULTRA 2) w/Device KIT, Test blood glucose 2 times daily (One Touch Ultra 2), Disp: 1 each, Rfl: 0    clopidogrel (Plavix) 75 mg tablet, Take 1 tablet (75 mg total) by mouth daily, Disp: 90 tablet, Rfl: 1    glimepiride (AMARYL) 4 mg tablet, Take 4 mg with breakfast and 8 mg in the evening daily  , Disp: 90 tablet, Rfl: 5    glucose blood test strip, Test once daily (one touch ultra), Disp: 100 each, Rfl: 1    lisinopril-hydrochlorothiazide (PRINZIDE,ZESTORETIC) 20-12 5 MG per tablet, Take 2 tablets by mouth daily, Disp: 180 tablet, Rfl: 1    metFORMIN (GLUCOPHAGE) 1000 MG tablet, Take 2 tablets (2,000 mg total) by mouth daily after dinner, Disp: 180 tablet, Rfl: 1    Omega-3 Fatty Acids (FISH OIL PO), Take by mouth daily  , Disp: , Rfl:     ONETOUCH DELICA LANCETS 39F MISC, Test blood glucose 2 times daily, Disp: 300 each, Rfl: 0    prednisoLONE acetate (PRED FORTE) 1 % ophthalmic suspension, Administer 1 drop into the left eye every other day , Disp: , Rfl:     tadalafil (CIALIS) 20 MG tablet, Take 1 tablet (20 mg total) by mouth as needed for erectile dysfunction, Disp: 30 tablet, Rfl: 2    tamsulosin (FLOMAX) 0 4 mg, Take 1 capsule (0 4 mg total) by mouth daily with dinner, Disp: 90 capsule, Rfl: 3    VITAMIN E PO, Take 1 capsule by mouth daily  , Disp: , Rfl:       Active Problems     Patient Active Problem List   Diagnosis    Abnormal MRI, lumbar spine    Benign essential hypertension    CAD (coronary artery disease)    Claustrophobia    Diabetes mellitus with circulatory complication (HCC)    Herniation of intervertebral disc of lumbar spine    Hyperlipidemia    Presbyopia    Primary osteoarthritis of left knee    Hypertension    Atherosclerosis of native coronary artery without angina pectoris    Class 2 severe obesity due to excess calories with serious comorbidity and body mass index (BMI) of 39 0 to 39 9 in adult (HCC)    Exercise-induced angina (HCC)    Platelets decreased (HCC)    Lung mass         Past Medical History     Past Medical History:   Diagnosis Date    Arthritis     knee    Coronary artery disease     Diabetes mellitus ([address] )     H/O angioplasty     Hypercholesteremia     Hypertension     Sleep apnea     Varicose veins of lower extremity          Surgical History     Past Surgical History:   Procedure Laterality Date    CARDIAC CATHETERIZATION      CARDIAC SURGERY      CHOLECYSTECTOMY  01/01/2010    COLONOSCOPY      CORONARY ANGIOPLASTY WITH STENT PLACEMENT      EYE SURGERY Left     ocular lens implant    GALLBLADDER SURGERY      HERNIA REPAIR  01/01/2009    IR BIOPSY LUNG  9/8/2021    KNEE ARTHROSCOPY W/ MENISCAL REPAIR Right     KNEE SURGERY  09/10/2015    LUNG SEGMENTECTOMY Left 10/21/2021    Procedure: RESECTION WEDGE LEFT LOWER LOBE LUNG;  Surgeon: Zain Hughes MD;  [facility];  Service: Thoracic    [address] N/A 10/21/2021    Procedure: Jevona Melissa;  Surgeon: Zain Hughes MD;  [facility];  Service: Thoracic    NE INCISE FINGER TENDON SHEATH Left 8/25/2016    Procedure: Juventino Slipper ;  Surgeon: García Mullen MD;  [facility];  Service: Orthopedics    NE THORACOSCOPY W/THERA WEDGE RESEXN INITIAL UNILAT Left 10/21/2021    Procedure: THORACOSCOPY VIDEO ASSISTED SURGERY (VATS);   Surgeon: Zain Hughes MD;  [facility];  Service: Thoracic    VARICOSE VEIN SURGERY      Ligation         Family History     Family History   Problem Relation Age of Onset   [de-identified] Cancer Mother     Coronary artery disease Mother     Heart disease Father     Early death Father     Coronary artery disease Father     Diabetes Father     Hypertension Father     Coronary artery disease Brother     Hyperlipidemia Brother     Hypertension Brother          Social History     Social History       Radiology

## 2022-01-18 ENCOUNTER — FOLLOW UP (OUTPATIENT)
Dept: URBAN - METROPOLITAN AREA CLINIC 6 | Facility: CLINIC | Age: 67
End: 2022-01-18

## 2022-01-18 DIAGNOSIS — D86.9: ICD-10-CM

## 2022-01-18 DIAGNOSIS — E11.3293: ICD-10-CM

## 2022-01-18 DIAGNOSIS — H25.811: ICD-10-CM

## 2022-01-18 DIAGNOSIS — Z96.1: ICD-10-CM

## 2022-01-18 PROCEDURE — 92012 INTRM OPH EXAM EST PATIENT: CPT

## 2022-01-18 ASSESSMENT — VISUAL ACUITY
OU_CC: J1
OS_CC: 20/30-1
OD_CC: 20/25

## 2022-01-18 ASSESSMENT — TONOMETRY
OS_IOP_MMHG: 17
OD_IOP_MMHG: 13

## 2022-02-08 DIAGNOSIS — E13.9 DIABETES 1.5, MANAGED AS TYPE 2 (HCC): ICD-10-CM

## 2022-02-08 RX ORDER — GLIMEPIRIDE 4 MG/1
TABLET ORAL
Qty: 90 TABLET | Refills: 5 | Status: SHIPPED | OUTPATIENT
Start: 2022-02-08 | End: 2022-03-18 | Stop reason: SDUPTHER

## 2022-02-18 DIAGNOSIS — I25.10 CORONARY ARTERY DISEASE INVOLVING NATIVE HEART WITHOUT ANGINA PECTORIS, UNSPECIFIED VESSEL OR LESION TYPE: ICD-10-CM

## 2022-02-18 DIAGNOSIS — E11.9 NON-INSULIN DEPENDENT TYPE 2 DIABETES MELLITUS (HCC): ICD-10-CM

## 2022-02-21 RX ORDER — CLOPIDOGREL BISULFATE 75 MG/1
75 TABLET ORAL DAILY
Qty: 90 TABLET | Refills: 1 | Status: SHIPPED | OUTPATIENT
Start: 2022-02-21

## 2022-02-21 NOTE — TELEPHONE ENCOUNTER
From: Renetta Valentin  To: Office of [facility]  Sent: 2/18/2022 8:30 PM EST  Subject: Medication Renewal Request    Refills have been requested for the following medications:    Other - metformin 1000 mg each 2 daily    Preferred pharmacy: [address] [place] [address]      Medication renewals requested in this message routed separately:     metFORMIN (GLUCOPHAGE) 1000 MG tablet [facility]]   Patient Comment: my chart still thinks im out of areas on some meds

## 2022-03-03 DIAGNOSIS — I10 HYPERTENSION, UNSPECIFIED TYPE: Primary | ICD-10-CM

## 2022-03-03 RX ORDER — SPIRONOLACTONE 25 MG/1
25 TABLET ORAL DAILY
Qty: 60 TABLET | Refills: 3 | Status: SHIPPED | OUTPATIENT
Start: 2022-03-03 | End: 2022-04-24 | Stop reason: SDUPTHER

## 2022-03-03 NOTE — PROGRESS NOTES
Blood pressures remain persistently elevated based on home log, in the 150- 840 range, diastolic in the 37-17  range    will add spironolactone 25 mg daily   he will continue his amlodipine, lisinopril/ hydrochlorothiazide which are already at maximum doses

## 2022-03-07 ENCOUNTER — TELEPHONE (OUTPATIENT)
Dept: CARDIOLOGY CLINIC | Facility: CLINIC | Age: 67
End: 2022-03-07

## 2022-03-07 NOTE — TELEPHONE ENCOUNTER
----- Message from Xavi Ac MD sent at 3/3/2022  1:42 PM EST -----      Please let him know that I have reviewed his home blood pressure log and will add spironolactone 25 mg daily, this should help better blood pressure control, no other changes to his medications  Check blood work- BMP -for kidney function and potassium in 1-2 weeks  This has also been ordered

## 2022-03-16 DIAGNOSIS — E13.9 DIABETES 1.5, MANAGED AS TYPE 2 (HCC): ICD-10-CM

## 2022-03-16 RX ORDER — GLIMEPIRIDE 4 MG/1
TABLET ORAL
Qty: 90 TABLET | Refills: 0 | Status: CANCELLED | OUTPATIENT
Start: 2022-03-16

## 2022-03-17 ENCOUNTER — APPOINTMENT (OUTPATIENT)
Dept: LAB | Facility: CLINIC | Age: 67
End: 2022-03-17
Payer: MEDICARE

## 2022-03-17 DIAGNOSIS — Z12.5 SCREENING FOR PROSTATE CANCER: ICD-10-CM

## 2022-03-17 DIAGNOSIS — E11.59 TYPE 2 DIABETES MELLITUS WITH OTHER CIRCULATORY COMPLICATION, WITHOUT LONG-TERM CURRENT USE OF INSULIN (HCC): ICD-10-CM

## 2022-03-17 LAB
ANION GAP SERPL CALCULATED.3IONS-SCNC: 9 MMOL/L (ref 4–13)
BUN SERPL-MCNC: 24 MG/DL (ref 5–25)
CALCIUM SERPL-MCNC: 9.2 MG/DL (ref 8.3–10.1)
CHLORIDE SERPL-SCNC: 99 MMOL/L (ref 100–108)
CO2 SERPL-SCNC: 27 MMOL/L (ref 21–32)
CREAT SERPL-MCNC: 1.06 MG/DL (ref 0.6–1.3)
CREAT UR-MCNC: 27.1 MG/DL
EST. AVERAGE GLUCOSE BLD GHB EST-MCNC: 154 MG/DL
GFR SERPL CREATININE-BSD FRML MDRD: 72 ML/MIN/1.73SQ M
GLUCOSE P FAST SERPL-MCNC: 209 MG/DL (ref 65–99)
HBA1C MFR BLD: 7 %
MICROALBUMIN UR-MCNC: 5.6 MG/L (ref 0–20)
MICROALBUMIN/CREAT 24H UR: 21 MG/G CREATININE (ref 0–30)
POTASSIUM SERPL-SCNC: 4.2 MMOL/L (ref 3.5–5.3)
SODIUM SERPL-SCNC: 135 MMOL/L (ref 136–145)

## 2022-03-17 PROCEDURE — 36415 COLL VENOUS BLD VENIPUNCTURE: CPT

## 2022-03-17 PROCEDURE — 80048 BASIC METABOLIC PNL TOTAL CA: CPT

## 2022-03-17 PROCEDURE — 82043 UR ALBUMIN QUANTITATIVE: CPT

## 2022-03-17 PROCEDURE — 83036 HEMOGLOBIN GLYCOSYLATED A1C: CPT

## 2022-03-17 PROCEDURE — 82570 ASSAY OF URINE CREATININE: CPT

## 2022-03-18 DIAGNOSIS — E13.9 DIABETES 1.5, MANAGED AS TYPE 2 (HCC): ICD-10-CM

## 2022-03-18 RX ORDER — GLIMEPIRIDE 4 MG/1
TABLET ORAL
Qty: 270 TABLET | Refills: 1 | Status: SHIPPED | OUTPATIENT
Start: 2022-03-18

## 2022-03-18 NOTE — TELEPHONE ENCOUNTER
Last office visit w/ encounter dept  12/1/2021 Alexi Gomez PA-C    Next appt w/ encounter dept  4/6/2022 Alexi Gomez PA-C

## 2022-03-24 ENCOUNTER — OFFICE VISIT (OUTPATIENT)
Dept: ENDOCRINOLOGY | Facility: CLINIC | Age: 67
End: 2022-03-24
Payer: MEDICARE

## 2022-03-24 VITALS
DIASTOLIC BLOOD PRESSURE: 96 MMHG | HEART RATE: 68 BPM | HEIGHT: 71 IN | SYSTOLIC BLOOD PRESSURE: 152 MMHG | WEIGHT: 275.6 LBS | BODY MASS INDEX: 38.58 KG/M2

## 2022-03-24 DIAGNOSIS — E11.59 TYPE 2 DIABETES MELLITUS WITH OTHER CIRCULATORY COMPLICATION, WITHOUT LONG-TERM CURRENT USE OF INSULIN (HCC): Primary | ICD-10-CM

## 2022-03-24 PROCEDURE — 99213 OFFICE O/P EST LOW 20 MIN: CPT | Performed by: INTERNAL MEDICINE

## 2022-03-24 NOTE — PROGRESS NOTES
Established Patient Progress Note      Chief Complaint   Patient presents with    Diabetes Type 2        History of Present Illness:   Thao Dias is a 79 y o  male with a history of type 2 diabetes without long term use of insulin for approx 5-10  Years, seen in f/u  He was last in the office in Dec 2021 with Dheeraj Borjas PAC  Reports complications of microalbuminuria and has CAD  He also had lung mass wedge resection in 2021, without evidence of malignancy  Denies recent severe hypoglycemic or severe hyperglycemic episodes  Denies any issues with his current regimen  home glucose monitoring: are performed regularly once per day  Jardiance prescribed at last visit but not started due to cost  He wanted to see a dietician, but has difficulties following meal plan due to working night shift  He looked into Trulicity and Ozempic, but both are too expensive  His Jardiance was also too expensive  He believes that all branded medications are too expensive  Home blood glucose readings:   Infrequently checks, but recall fasting bgs of 160 and 203    Current regimen:   Glimepiride 4mg breakfast, 8mg dinner  Metformin 2000mg daily at night    Last Eye Exam: note in chart from 10/2020, no retinopathy  Recalls seen [facility] a few months ago     Last Foot Exam: July 2022    Has hypertension: Taking amlodipine, lisinopril, carvedilol, spironolactone, HCTZ  Has hyperlipidemia: Taking atorvastatin   Follows with cardiology, Dr Jacob Rosales, for hx CAD with prior stents        Patient Active Problem List   Diagnosis    Abnormal MRI, lumbar spine    Benign essential hypertension    CAD (coronary artery disease)    Claustrophobia    Diabetes mellitus with circulatory complication ([address] )    Herniation of intervertebral disc of lumbar spine    Hyperlipidemia    Presbyopia    Primary osteoarthritis of left knee    Hypertension    Atherosclerosis of native coronary artery without angina pectoris    Class 2 severe obesity due to excess calories with serious comorbidity and body mass index (BMI) of 39 0 to 39 9 in adult ([facility] [address]    Exercise-induced angina ([facility] [address] [facility]    Platelets decreased ([facility] [address] )    Lung mass      Past Medical History:   Diagnosis Date    Arthritis     knee    Coronary artery disease     Diabetes mellitus ([facility] [address] [facility]     H/O angioplasty     Hypercholesteremia     Hypertension     Sleep apnea     Varicose veins of lower extremity       Past Surgical History:   Procedure Laterality Date    CARDIAC CATHETERIZATION      CARDIAC SURGERY      CHOLECYSTECTOMY  01/01/2010    COLONOSCOPY      CORONARY ANGIOPLASTY WITH STENT PLACEMENT      EYE SURGERY Left     ocular lens implant    GALLBLADDER SURGERY      HERNIA REPAIR  01/01/2009    IR BIOPSY LUNG  9/8/2021    KNEE ARTHROSCOPY W/ MENISCAL REPAIR Right     KNEE SURGERY  09/10/2015    LUNG SEGMENTECTOMY Left 10/21/2021    Procedure: RESECTION WEDGE LEFT LOWER LOBE LUNG;  Surgeon: Maria Elena Hanna MD;  [facility];  Service: Thoracic    [address] N/A 10/21/2021    Procedure: BRONCHOSCOPY FLEXIBLE;  Surgeon: Maria Elena Hanna MD;  [facility];  Service: Thoracic    NV INCISE FINGER TENDON SHEATH Left 8/25/2016    Procedure: Lida Pulido ;  Surgeon: Robby Schuler MD;  [facility];  Service: Orthopedics    NV THORACOSCOPY W/THERA WEDGE RESEXN INITIAL UNILAT Left 10/21/2021    Procedure: THORACOSCOPY VIDEO ASSISTED SURGERY (VATS);   Surgeon: Maria Elena Hanna MD;  [facility];  Service: Thoracic    VARICOSE VEIN SURGERY      Ligation      Family History   Problem Relation Age of Onset   Tamia Mare Cancer Mother     Coronary artery disease Mother     Heart disease Father     Early death Father     Coronary artery disease Father     Diabetes Father     Hypertension Father     Coronary artery disease Brother     Hyperlipidemia Brother     Hypertension Brother      Social History Tobacco Use    Smoking status: Former Smoker     Packs/day: 2 00     Years: 15 00     Pack years: 30 00     Types: Cigarettes     Quit date: 1982     Years since quittin 2    Smokeless tobacco: Never Used   Substance Use Topics    Alcohol use: Not Currently     Allergies   Allergen Reactions    Ciprofloxacin Swelling    Janumet [Sitagliptin-Metformin Hcl] Lip Swelling     Lip swelling     Terazosin      Annotation - [id number]: Blood in sperm         Current Outpatient Medications:     amLODIPine (NORVASC) 10 mg tablet, Take 1 tablet (10 mg total) by mouth daily, Disp: 90 tablet, Rfl: 1    ascorbic acid (VITAMIN C) 500 mg tablet, Take 500 mg by mouth daily, Disp: , Rfl:     aspirin (ECOTRIN LOW STRENGTH) 81 mg EC tablet, Take 81 mg by mouth daily  , Disp: , Rfl:     atorvastatin (LIPITOR) 40 mg tablet, Take 1 tablet (40 mg total) by mouth daily at bedtime, Disp: 90 tablet, Rfl: 1    Blood Glucose Monitoring Suppl (ONE TOUCH ULTRA 2) w/Device KIT, Test blood glucose 2 times daily (One Touch Ultra 2), Disp: 1 each, Rfl: 0    clopidogrel (Plavix) 75 mg tablet, Take 1 tablet (75 mg total) by mouth daily, Disp: 90 tablet, Rfl: 1    glimepiride (AMARYL) 4 mg tablet, Take 4 mg with breakfast and 8 mg in the evening daily  , Disp: 270 tablet, Rfl: 1    glucose blood test strip, Test once daily (one touch ultra), Disp: 100 each, Rfl: 1    lisinopril-hydrochlorothiazide (PRINZIDE,ZESTORETIC) 20-12 5 MG per tablet, Take 2 tablets by mouth daily, Disp: 180 tablet, Rfl: 1    metFORMIN (GLUCOPHAGE) 1000 MG tablet, Take 2 tablets (2,000 mg total) by mouth daily after dinner, Disp: 180 tablet, Rfl: 1    Omega-3 Fatty Acids (FISH OIL PO), Take by mouth daily  , Disp: , Rfl:     ONETOUCH DELICA LANCETS 16R MISC, Test blood glucose 2 times daily, Disp: 300 each, Rfl: 0    prednisoLONE acetate (PRED FORTE) 1 % ophthalmic suspension, Administer 1 drop into the left eye every other day , Disp: , Rfl:    spironolactone (ALDACTONE) 25 mg tablet, Take 1 tablet (25 mg total) by mouth daily, Disp: 60 tablet, Rfl: 3    tadalafil (CIALIS) 20 MG tablet, Take 1 tablet (20 mg total) by mouth as needed for erectile dysfunction, Disp: 30 tablet, Rfl: 2    tamsulosin (FLOMAX) 0 4 mg, Take 1 capsule (0 4 mg total) by mouth daily with dinner, Disp: 90 capsule, Rfl: 3    VITAMIN E PO, Take 1 capsule by mouth daily  , Disp: , Rfl:     Review of Systems   Constitutional: Negative for fatigue  HENT: Negative for trouble swallowing and voice change  Eyes: Negative for visual disturbance  Respiratory: Negative for choking and shortness of breath  Cardiovascular: Negative for chest pain and palpitations  Gastrointestinal: Negative for abdominal pain, constipation and diarrhea  Endocrine: Positive for polydipsia  Negative for polyphagia and polyuria  Genitourinary: Negative for frequency  Musculoskeletal: Negative for myalgias  Psychiatric/Behavioral: Negative for sleep disturbance  All other systems reviewed and are negative  Physical Exam:  Body mass index is 38 44 kg/m²  /96   Pulse 68   Ht 5' 11" (1 803 m)   Wt 125 kg (275 lb 9 6 oz)   BMI 38 44 kg/m²    Wt Readings from Last 3 Encounters:   03/24/22 125 kg (275 lb 9 6 oz)   01/14/22 124 kg (273 lb)   01/05/22 124 kg (273 lb 11 2 oz)         Physical Exam   Gen: appears well-developed and well-nourished  No apparent distress  Head: Normocephalic and atraumatic  Eyes: no stare or proptosis, no periorbital edema  E/N/M mask in place, hearing grossly intact  Neck: range of motion nl  Pulmonary/Chest: breathing  comfortably, no accessory muscle use, effort normal    Musculoskeletal: moves all extremities, gait nl  Neurological: alert and oriented to person, place, and time   No upper ext tremor appreciated  Skin: does not appear diaphoretic, no facial plethora  Psychiatric: normal mood and affect; behavior is normal; no gross lapses in memory, answer questions appropriately      Labs:   Lab Results   Component Value Date    HGBA1C 7 0 (H) 03/17/2022    HGBA1C 7 1 (H) 11/29/2021    HGBA1C 7 8 (A) 10/14/2021     Lab Results   Component Value Date    CREATININE 1 06 03/17/2022    CREATININE 1 18 11/29/2021    CREATININE 0 86 10/22/2021    BUN 24 03/17/2022     08/29/2015    K 4 2 03/17/2022    CL 99 (L) 03/17/2022    CO2 27 03/17/2022     eGFR   Date Value Ref Range Status   03/17/2022 72 ml/min/1 73sq m Final     Lab Results   Component Value Date    HDL 40 05/20/2021    TRIG 162 (H) 05/20/2021     Lab Results   Component Value Date    ALT 33 11/29/2021    AST 17 11/29/2021    ALKPHOS 67 11/29/2021     Lab Results   Component Value Date    [id number] 0 878 02/21/2020     No results found for: FREETCourtney, TSI    Impression & Plan:    Problem List Items Addressed This Visit        Endocrine    Diabetes mellitus with circulatory complication ([facility] [address] ) - Primary          No orders of the defined types were placed in this encounter  There are no Patient Instructions on file for this visit  1  T2DM: Currently, branded medical options are too expensive  At this time, he will continue metformin 2g daily and glimepiride 4mg bf and 8mg dinner  There is no inducation for insulin and other meds are cost-prohibitive  I recommend that he resume care with his PCP, Ernst Castro Pac, as we have nothing additional to add to his care at this time  Counseled on diet changes and decrease in portion sizes  He is welcome to return if the clinical situation changes          Goldie Garcia MD

## 2022-04-03 DIAGNOSIS — I10 HYPERTENSION, UNSPECIFIED TYPE: ICD-10-CM

## 2022-04-04 RX ORDER — AMLODIPINE BESYLATE 10 MG/1
10 TABLET ORAL DAILY
Qty: 90 TABLET | Refills: 0 | Status: SHIPPED | OUTPATIENT
Start: 2022-04-04 | End: 2022-07-07 | Stop reason: SDUPTHER

## 2022-04-06 ENCOUNTER — OFFICE VISIT (OUTPATIENT)
Dept: INTERNAL MEDICINE CLINIC | Age: 67
End: 2022-04-06
Payer: MEDICARE

## 2022-04-06 VITALS
HEART RATE: 58 BPM | TEMPERATURE: 98.2 F | SYSTOLIC BLOOD PRESSURE: 128 MMHG | BODY MASS INDEX: 37.94 KG/M2 | DIASTOLIC BLOOD PRESSURE: 70 MMHG | OXYGEN SATURATION: 97 % | WEIGHT: 271 LBS | HEIGHT: 71 IN

## 2022-04-06 DIAGNOSIS — C78.02 SECONDARY MALIGNANT NEOPLASM OF LEFT LUNG (HCC): ICD-10-CM

## 2022-04-06 DIAGNOSIS — D69.6 THROMBOCYTOPENIA (HCC): ICD-10-CM

## 2022-04-06 DIAGNOSIS — E66.01 CLASS 2 SEVERE OBESITY DUE TO EXCESS CALORIES WITH SERIOUS COMORBIDITY AND BODY MASS INDEX (BMI) OF 39.0 TO 39.9 IN ADULT (HCC): ICD-10-CM

## 2022-04-06 DIAGNOSIS — E78.5 HYPERLIPIDEMIA, UNSPECIFIED HYPERLIPIDEMIA TYPE: ICD-10-CM

## 2022-04-06 DIAGNOSIS — L98.9 SKIN LESION OF SCALP: ICD-10-CM

## 2022-04-06 DIAGNOSIS — I10 BENIGN ESSENTIAL HYPERTENSION: Primary | ICD-10-CM

## 2022-04-06 PROCEDURE — 99214 OFFICE O/P EST MOD 30 MIN: CPT | Performed by: PHYSICIAN ASSISTANT

## 2022-04-06 NOTE — PROGRESS NOTES
Assessment/Plan:       Diagnoses and all orders for this visit:    Benign essential hypertension  Comments:  well controlled     Skin lesion of scalp  -     Ambulatory Referral to Dermatology; Future    Secondary malignant neoplasm of left lung ([address] )  Comments:  awaiting f/u CT (scheduled june)    Class 2 severe obesity due to excess calories with serious comorbidity and body mass index (BMI) of 39 0 to 39 9 in adult (HCC)    Thrombocytopenia (HCC)    Hyperlipidemia, unspecified hyperlipidemia type  Comments:  continue atorvastatin         BMI Counseling: Body mass index is 37 8 kg/m²  The BMI is above normal  Nutrition recommendations include decreasing portion sizes, encouraging healthy choices of fruits and vegetables, consuming healthier snacks and increasing intake of lean protein  Exercise recommendations include exercising 3-5 times per week  Rationale for BMI follow-up plan is due to patient being overweight or obese  Depression Screening and Follow-up Plan: Patient was screened for depression during today's encounter  They screened negative with a PHQ-2 score of 0  Pt will schedule covid booster    Subjective:      Patient ID: Lulú Reyes is a 79 y o  male      80 y/o male with hx of DM, htn, hld, cad presents for f/u   -180s at home, hga1c improved from previous - now 7  Pt is unable to afford new agents due to prescription coverage   He saw endocrine and was not given any other recommendations     Lung mass - pt scheduled for f/u CT in June and has f/u with thoracic sx at that time  He opted to hold off on second opinion     Pt is golfing and keeping active at home       The following portions of the patient's history were reviewed and updated as appropriate: allergies, current medications, past family history, past medical history, past social history, past surgical history and problem list     Review of Systems   Constitutional: Negative for activity change, appetite change, chills, diaphoresis, fatigue and fever  HENT: Negative for congestion and sore throat  Respiratory: Negative for cough and shortness of breath  Cardiovascular: Negative for chest pain and leg swelling  Gastrointestinal: Negative for abdominal pain, constipation, diarrhea and nausea  Musculoskeletal: Positive for arthralgias  Negative for gait problem  Skin: Negative for rash  Neurological: Negative for dizziness, light-headedness and headaches  Psychiatric/Behavioral: Negative for sleep disturbance  The patient is not nervous/anxious  Past Medical History:   Diagnosis Date    Arthritis     knee    Coronary artery disease     Diabetes mellitus ([facility] [address]     H/O angioplasty     Hypercholesteremia     Hypertension     Sleep apnea     Varicose veins of lower extremity          Current Outpatient Medications:     amLODIPine (NORVASC) 10 mg tablet, Take 1 tablet (10 mg total) by mouth daily, Disp: 90 tablet, Rfl: 0    ascorbic acid (VITAMIN C) 500 mg tablet, Take 500 mg by mouth daily, Disp: , Rfl:     aspirin (ECOTRIN LOW STRENGTH) 81 mg EC tablet, Take 81 mg by mouth daily  , Disp: , Rfl:     atorvastatin (LIPITOR) 40 mg tablet, Take 1 tablet (40 mg total) by mouth daily at bedtime, Disp: 90 tablet, Rfl: 1    Blood Glucose Monitoring Suppl (ONE TOUCH ULTRA 2) w/Device KIT, Test blood glucose 2 times daily (One Touch Ultra 2), Disp: 1 each, Rfl: 0    clopidogrel (Plavix) 75 mg tablet, Take 1 tablet (75 mg total) by mouth daily, Disp: 90 tablet, Rfl: 1    glimepiride (AMARYL) 4 mg tablet, Take 4 mg with breakfast and 8 mg in the evening daily  , Disp: 270 tablet, Rfl: 1    glucose blood test strip, Test once daily (one touch ultra), Disp: 100 each, Rfl: 1    lisinopril-hydrochlorothiazide (PRINZIDE,ZESTORETIC) 20-12 5 MG per tablet, Take 2 tablets by mouth daily, Disp: 180 tablet, Rfl: 1    metFORMIN (GLUCOPHAGE) 1000 MG tablet, Take 2 tablets (2,000 mg total) by mouth daily after dinner, Disp: 180 tablet, Rfl: 1    Omega-3 Fatty Acids (FISH OIL PO), Take by mouth daily  , Disp: , Rfl:     ONETOUCH DELICA LANCETS 43O MISC, Test blood glucose 2 times daily, Disp: 300 each, Rfl: 0    prednisoLONE acetate (PRED FORTE) 1 % ophthalmic suspension, Administer 1 drop into the left eye every other day , Disp: , Rfl:     spironolactone (ALDACTONE) 25 mg tablet, Take 1 tablet (25 mg total) by mouth daily, Disp: 60 tablet, Rfl: 3    tadalafil (CIALIS) 20 MG tablet, Take 1 tablet (20 mg total) by mouth as needed for erectile dysfunction, Disp: 30 tablet, Rfl: 2    tamsulosin (FLOMAX) 0 4 mg, Take 1 capsule (0 4 mg total) by mouth daily with dinner, Disp: 90 capsule, Rfl: 3    VITAMIN E PO, Take 1 capsule by mouth daily  , Disp: , Rfl:     Allergies   Allergen Reactions    Ciprofloxacin Swelling    Janumet [Sitagliptin-Metformin Hcl] Lip Swelling     Lip swelling     Terazosin      Annotation - [id number]: Blood in sperm       Social History   Past Surgical History:   Procedure Laterality Date    CARDIAC CATHETERIZATION      CARDIAC SURGERY      CHOLECYSTECTOMY  01/01/2010    COLONOSCOPY      CORONARY ANGIOPLASTY WITH STENT PLACEMENT      EYE SURGERY Left     ocular lens implant    GALLBLADDER SURGERY      HERNIA REPAIR  01/01/2009    IR BIOPSY LUNG  9/8/2021    KNEE ARTHROSCOPY W/ MENISCAL REPAIR Right     KNEE SURGERY  09/10/2015    LUNG SEGMENTECTOMY Left 10/21/2021    Procedure: RESECTION WEDGE LEFT LOWER LOBE LUNG;  Surgeon: Matilde Jenkins MD;  [facility];  Service: Thoracic    CT [address] N/A 10/21/2021    Procedure: BRONCHOSCOPY FLEXIBLE;  Surgeon: Matilde Jenkins MD;  [facility];  Service: Thoracic    CT INCISE FINGER TENDON SHEATH Left 8/25/2016    Procedure: [address] ;  Surgeon: Ladi Meléndez MD;  [facility];  Service: Orthopedics    CT THORACOSCOPY W/THERA WEDGE RESEXN INITIAL UNILAT Left 10/21/2021 Procedure: THORACOSCOPY VIDEO ASSISTED SURGERY (VATS); Surgeon: Jacinta Rhodes MD;  [facility];  Service: Thoracic    VARICOSE VEIN SURGERY      Ligation     Family History   Problem Relation Age of Onset   Noelle Benitez Cancer Mother     Coronary artery disease Mother     Heart disease Father     Early death Father     Coronary artery disease Father     Diabetes Father     Hypertension Father     Coronary artery disease Brother     Hyperlipidemia Brother     Hypertension Brother        Objective:  /70   Pulse 58   Temp 98 2 °F (36 8 °C) (Temporal)   Ht 5' 11" (1 803 m)   Wt 123 kg (271 lb)   SpO2 97%   BMI 37 80 kg/m²        Physical Exam  Vitals reviewed  Constitutional:       General: He is not in acute distress  HENT:      Head: Normocephalic and atraumatic  Right Ear: Tympanic membrane and ear canal normal       Left Ear: Tympanic membrane and ear canal normal    Eyes:      Extraocular Movements: Extraocular movements intact  Conjunctiva/sclera: Conjunctivae normal       Pupils: Pupils are equal, round, and reactive to light  Cardiovascular:      Rate and Rhythm: Normal rate and regular rhythm  Pulmonary:      Effort: Pulmonary effort is normal       Breath sounds: No wheezing or rales  Abdominal:      General: Bowel sounds are normal  There is no distension  Musculoskeletal:      Cervical back: Normal range of motion  Right lower leg: No edema  Left lower leg: No edema  Lymphadenopathy:      Cervical: No cervical adenopathy  Skin:     General: Skin is warm  Findings: Rash (scalp erythematous plaques) present  Neurological:      Mental Status: He is alert     Psychiatric:         Mood and Affect: Mood normal          Behavior: Behavior normal

## 2022-04-16 ENCOUNTER — IMMUNIZATIONS (OUTPATIENT)
Dept: FAMILY MEDICINE CLINIC | Facility: HOSPITAL | Age: 67
End: 2022-04-16

## 2022-04-16 PROCEDURE — 91305 COVID-19 PFIZER VACC TRIS-SUCROSE GRAY CAP 0.3 ML: CPT

## 2022-04-16 PROCEDURE — 0054A COVID-19 PFIZER VACC TRIS-SUCROSE GRAY CAP 0.3 ML: CPT

## 2022-04-24 DIAGNOSIS — I10 HYPERTENSION, UNSPECIFIED TYPE: ICD-10-CM

## 2022-04-24 DIAGNOSIS — I10 BENIGN ESSENTIAL HYPERTENSION: ICD-10-CM

## 2022-04-25 NOTE — TELEPHONE ENCOUNTER
Requested medication(s) are due for refill today: Yes  Patient has already received a courtesy refill: No  Other reason request has been forwarded to provider:   Cardiovascular:  ACEI + Diuretic Combos Failed 04/24/2022 09:22 PM   Protocol Details  Na in normal range and within 360 days

## 2022-04-26 DIAGNOSIS — I10 BENIGN ESSENTIAL HYPERTENSION: ICD-10-CM

## 2022-04-26 DIAGNOSIS — I10 HYPERTENSION, UNSPECIFIED TYPE: ICD-10-CM

## 2022-04-26 RX ORDER — SPIRONOLACTONE 25 MG/1
25 TABLET ORAL DAILY
Qty: 60 TABLET | Refills: 0 | OUTPATIENT
Start: 2022-04-26

## 2022-04-26 RX ORDER — LISINOPRIL AND HYDROCHLOROTHIAZIDE 20; 12.5 MG/1; MG/1
2 TABLET ORAL DAILY
Qty: 180 TABLET | Refills: 0 | OUTPATIENT
Start: 2022-04-26

## 2022-04-27 ENCOUNTER — CONSULT (OUTPATIENT)
Dept: DERMATOLOGY | Facility: CLINIC | Age: 67
End: 2022-04-27
Payer: MEDICARE

## 2022-04-27 VITALS — BODY MASS INDEX: 38.12 KG/M2 | HEIGHT: 71 IN | WEIGHT: 272.27 LBS

## 2022-04-27 DIAGNOSIS — Z12.83 SCREENING FOR MALIGNANT NEOPLASM OF SKIN: ICD-10-CM

## 2022-04-27 DIAGNOSIS — I10 BENIGN ESSENTIAL HYPERTENSION: ICD-10-CM

## 2022-04-27 DIAGNOSIS — L57.8 ACTINIC SKIN DAMAGE: ICD-10-CM

## 2022-04-27 DIAGNOSIS — L57.0 ACTINIC KERATOSES: Primary | ICD-10-CM

## 2022-04-27 PROCEDURE — 99204 OFFICE O/P NEW MOD 45 MIN: CPT | Performed by: DERMATOLOGY

## 2022-04-27 RX ORDER — LISINOPRIL AND HYDROCHLOROTHIAZIDE 20; 12.5 MG/1; MG/1
2 TABLET ORAL DAILY
Qty: 180 TABLET | Refills: 3 | Status: SHIPPED | OUTPATIENT
Start: 2022-04-27

## 2022-04-27 RX ORDER — SPIRONOLACTONE 25 MG/1
25 TABLET ORAL DAILY
Qty: 90 TABLET | Refills: 0 | Status: SHIPPED | OUTPATIENT
Start: 2022-04-27 | End: 2022-05-25 | Stop reason: SDUPTHER

## 2022-04-27 RX ORDER — LISINOPRIL AND HYDROCHLOROTHIAZIDE 20; 12.5 MG/1; MG/1
2 TABLET ORAL DAILY
Qty: 180 TABLET | Refills: 0 | Status: SHIPPED | OUTPATIENT
Start: 2022-04-27 | End: 2022-04-27 | Stop reason: SDUPTHER

## 2022-04-27 RX ORDER — FLUOROURACIL 50 MG/G
CREAM TOPICAL
Qty: 40 G | Refills: 0 | Status: SHIPPED | OUTPATIENT
Start: 2022-04-27 | End: 2022-07-13

## 2022-04-27 NOTE — PROGRESS NOTES
[facility] Note     Patient Name: Sherita Segundo  Encounter Date: 4/27/2022     Have you been cared for by a [facility] Dermatologist in the last 3 years and, if so, which one? No    · Have you traveled outside of the [address] [place] [address] in the past 3 months or outside of the [facility] area in the last 2 weeks? No     May we call your Preferred Phone number to discuss your specific medical information? Yes     May we leave a detailed message that includes your specific medical information? Yes      Today's Chief Concerns:   Concern #1:  Skin lesions    Concern #2:      Past Medical History:  Have you personally ever had or currently have any of the following? · Skin cancer (such as Melanoma, Basal Cell Carcinoma, Squamous Cell Carcinoma? (If Yes, please provide more detail)- No  · Eczema: No  · Psoriasis: No  · HIV/AIDS: No  · Hepatitis B or C: No  · Tuberculosis: No  · Systemic Immunosuppression such as Diabetes, Biologic or Immunotherapy, Chemotherapy, Organ Transplantation, Bone Marrow Transplantation (If YES, please provide more detail): No  · Radiation Treatment (If YES, please provide more detail): No  Any other major medical conditions/concerns? 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 Retired      What are your hobbies/past-times? N/a     Family History:  Have any of your "first degree relatives" (parent, brother, sister, or child) had any of the following       · Skin cancer such as Melanoma or Merkel Cell Carcinoma or Pancreatic Cancer? YES, melanoma of the eye for brother   · Eczema, Asthma, Hay Fever or Seasonal Allergies: YES, son has history of seasonal allergies   · Psoriasis or Psoriatic Arthritis: No  · Do any other medical conditions seem to run in your family?   If Yes, what condition and which relatives? YES, mother has history of lung cancer and history of heart disease for father     Current Medications:   (please update all dermatological medications before printing patient's AVS!)      Current Outpatient Medications:     amLODIPine (NORVASC) 10 mg tablet, Take 1 tablet (10 mg total) by mouth daily, Disp: 90 tablet, Rfl: 0    ascorbic acid (VITAMIN C) 500 mg tablet, Take 500 mg by mouth daily, Disp: , Rfl:     aspirin (ECOTRIN LOW STRENGTH) 81 mg EC tablet, Take 81 mg by mouth daily  , Disp: , Rfl:     atorvastatin (LIPITOR) 40 mg tablet, Take 1 tablet (40 mg total) by mouth daily at bedtime, Disp: 90 tablet, Rfl: 1    Blood Glucose Monitoring Suppl (ONE TOUCH ULTRA 2) w/Device KIT, Test blood glucose 2 times daily (One Touch Ultra 2), Disp: 1 each, Rfl: 0    clopidogrel (Plavix) 75 mg tablet, Take 1 tablet (75 mg total) by mouth daily, Disp: 90 tablet, Rfl: 1    glimepiride (AMARYL) 4 mg tablet, Take 4 mg with breakfast and 8 mg in the evening daily  , Disp: 270 tablet, Rfl: 1    lisinopril-hydrochlorothiazide (PRINZIDE,ZESTORETIC) 20-12 5 MG per tablet, Take 2 tablets by mouth daily, Disp: 180 tablet, Rfl: 1    metFORMIN (GLUCOPHAGE) 1000 MG tablet, Take 2 tablets (2,000 mg total) by mouth daily after dinner, Disp: 180 tablet, Rfl: 1    Omega-3 Fatty Acids (FISH OIL PO), Take by mouth daily  , Disp: , Rfl:     prednisoLONE acetate (PRED FORTE) 1 % ophthalmic suspension, Administer 1 drop into the left eye every other day , Disp: , Rfl:     spironolactone (ALDACTONE) 25 mg tablet, Take 1 tablet (25 mg total) by mouth daily, Disp: 60 tablet, Rfl: 3    tadalafil (CIALIS) 20 MG tablet, Take 1 tablet (20 mg total) by mouth as needed for erectile dysfunction, Disp: 30 tablet, Rfl: 2    tamsulosin (FLOMAX) 0 4 mg, Take 1 capsule (0 4 mg total) by mouth daily with dinner, Disp: 90 capsule, Rfl: 3    VITAMIN E PO, Take 1 capsule by mouth daily  , Disp: , Rfl:     glucose blood test strip, Test once daily (one touch ultra), Disp: 100 each, Rfl: 1    ONETOUCH DELICA LANCETS 69K MISC, Test blood glucose 2 times daily, Disp: 300 each, Rfl: 0      Review of Systems:  Have you recently had or currently have any of the following? If YES, what are you doing for the problem? · Fever, chills or unintended weight loss: No  · Sudden loss or change in your vision: No  · Nausea, vomiting or blood in your stool: No  · Painful or swollen joints: YES, arthritis on the hips   · Wheezing or cough: No  · Changing mole or non-healing wound: No  · Nosebleeds: YES,   · Excessive sweating: No  · Easy or prolonged bleeding? YES, on blood thinner and Asprin    · Over the last 2 weeks, how often have you been bothered by the following problems? · Taking little interest or pleasure in doing things: 1 - Not at All  · Feeling down, depressed, or hopeless: 1 - Not at All  · Rapid heartbeat with epinephrine:  No    · FEMALES ONLY:    · Are you pregnant or planning to become pregnant? No  · Are you currently or planning to be nursing or breast feeding? No    · Any known allergies? Allergies   Allergen Reactions    Ciprofloxacin Swelling    Janumet [Sitagliptin-Metformin Hcl] Lip Swelling     Lip swelling     Terazosin      Annotation - [id number]: Blood in sperm         Physical Exam:     Was a chaperone (Derm Clinical Assistant) present throughout the entire Physical Exam? Yes     Did the Dermatology Team specifically  the patient on the importance of a Full Skin Exam to be sure that nothing is missed clinically?  Yes}  o Did the patient ultimately request or accept a Full Skin Exam?  Yes  o Did the patient specifically refuse to have the areas "under-the-bra" examined by the Dermatologist? No  o Did the patient specifically refuse to have the areas "under-the-underwear" examined by the Dermatologist? No    CONSTITUTIONAL:   Vitals:    04/27/22 1246   Weight: 124 kg (272 lb 4 3 oz)   Height: 5' 11" (1 803 m) PSYCH: Normal mood and affect  EYES: Normal conjunctiva  ENT: Normal lips and oral mucosa  CARDIOVASCULAR: No edema  RESPIRATORY: Normal respirations  HEME/LYMPH/IMMUNO:  No regional lymphadenopathy except as noted below in "ASSESSMENT AND PLAN BY DIAGNOSIS"    SKIN:  FULL ORGAN SYSTEM EXAM   Hair, Scalp, Ears, Face Normal except as noted below in Assessment   Neck, Cervical Chain Nodes Normal except as noted below in Assessment   Right Arm/Hand/Fingers Normal except as noted below in Assessment   Left Arm/Hand/Fingers Normal except as noted below in Assessment   Chest/Breasts/Axillae Viewed areas Normal except as noted below in Assessment   Abdomen, Umbilicus Normal except as noted below in Assessment   Back/Spine Normal except as noted below in Assessment   Groin/Genitalia/Buttocks NOT EXAMINED   Right Leg, Foot, Toes Normal except as noted below in Assessment   Left Leg, Foot, Toes Normal except as noted below in Assessment        Assessment and Plan by Diagnosis:    History of Present Condition:     Duration:  How long has this been an issue for you?    o  years    Location Affected:  Where on the body is this affecting you?    o  on scalp    Quality:  Is there any bleeding, pain, itch, burning/irritation, or redness associated with the skin lesion? o  scaly    Severity:  Describe any bleeding, pain, itch, burning/irritation, or redness on a scale of 1 to 10 (with 10 being the worst)  o  0   Timing:  Does this condition seem to be there pretty constantly or do you notice it more at specific times throughout the day?     o  comes and goes    Context:  Have you ever noticed that this condition seems to be associated with specific activities you do?    o  no   Modifying Factors:    o Anything that seems to make the condition worse?    -  no  o What have you tried to do to make the condition better?    -  heavy moisturizer    Associated Signs and Symptoms:  Does this skin lesion seem to be associated with any of the following:  o  SL AMB DERM SIGNS AND SYMPTOMS: Crusting       1  SKIN EXAM; SCREENING FOR CANCER   Physical Exam:   Anatomic Location Affected: Full body skin check    Morphological Description:  Normal appearing skin upon examination    Pertinent Positives:   Pertinent Negatives: Additional History of Present Condition:  Patient is here to get a full body skin check and to look at some scaly lesions of the scalp  Patient notes no personal history of skin cancer but notes his brother had history of melanoma  Assessment and Plan:  Based on a thorough discussion of this condition and the management approach to it (including a comprehensive discussion of the known risks, side effects and potential benefits of treatment), the patient (family) agrees to implement the following specific plan:   Start using sun protective clothing like long sleeves, long pants, a hat of 6 inches in diameter, sun glasses and an SPF 50+ daily when outside   Continue monitoring skin for any abnormal changes of the skin   Follow as needed for a full body skin check  2  ACTINIC KERATOSIS    Physical Exam:   Anatomic Location Affected:  Scalp and left ear    Morphological Description:  scaly crusted red plaques     Additional History of Present Condition:  Present for years  Assessment and Plan:  Based on a thorough discussion of this condition and the management approach to it (including a comprehensive discussion of the known risks, side effects and potential benefits of treatment), the patient (family) agrees to implement the following specific plan:     Start using prescribed fluorouracil (Efudex) 5% cream  Apply sparingly to scaly crusted areas of the scalp and left are twice a day for 4 weeks   Recommend patient to follow up in 2 months after finishing treatment to re check scalp  WILL CAUSE IRRITATION      Actinic keratoses are very common on sites repeatedly exposed to the sun, especially the backs of the hands and the face, most often affecting the ears, nose, cheeks, upper lip, vermilion of the lower lip, temples, forehead and balding scalp  In severely chronically sun-damaged individuals, they may also be found on the upper trunk, upper and lower limbs, and dorsum of feet  We discussed the theoretical premalignant (pre-cancerous) nature and etiology of these growths  We discussed the prevailing notion that actinic keratoses are a reflection of abnormal skin cell development due to DNA damage by short wavelength UVB  They are more likely to appear if the immune function is poor, due to aging, recent sun exposure, predisposing disease or certain drugs  We discussed that the main concern is that actinic keratoses may predispose to squamous cell carcinoma  It is rare for a solitary actinic keratosis to evolve to squamous cell carcinoma (SCC), but the risk of SCC occurring at some stage in a patient with more than 10 actinic keratoses is thought to be about 10 to 15%  A tender, thickened, ulcerated or enlarging actinic keratosis is suspicious of SCC  Actinic keratoses may be prevented by strict sun protection  If already present, keratoses may improve with a very high sun protection factor (50+) broad-spectrum sunscreen applied at least daily to affected areas, year-round  We recommend that UPF-rated clothing and hats and sunglasses be worn whenever possible and that a sunscreen-moisturizer combination product such as Neutrogena Daily Defense be applied at least three times a day      We performed a thorough discussion of treatment options and specific risk/benefits/alternatives including but not limited to medical field treatment with medications such as the following:     Topical field area medications such as 5-fluorouracil or Aldara (specifically, the trouble with long-term compliance, blistering and local skin reaction versus the convenience of at-home therapy and that field therapy gets what is not yet seen)   Cryotherapy (specifically, local pain, scarring, dyspigmentation, blistering, possible superinfection, and treats only what we see versus directed treatment today)      Scribe Attestation    I,:  Jaki Moeller MA am acting as a scribe while in the presence of the attending physician :       I,:  Jaskaran Baron MD personally performed the services described in this documentation    as scribed in my presence :

## 2022-04-27 NOTE — PATIENT INSTRUCTIONS
1  SKIN EXAM; SCREENING FOR CANCER        Assessment and Plan:  Based on a thorough discussion of this condition and the management approach to it (including a comprehensive discussion of the known risks, side effects and potential benefits of treatment), the patient (family) agrees to implement the following specific plan:   Start using sun protective clothing like long sleeves, long pants, a hat of 6 inches in diameter, sun glasses and an SPF 50+ daily when outside   Continue monitoring skin for any abnormal changes of the skin  Skin Cancer Prevention   WHAT YOU NEED TO KNOW:   Skin cancer is the most common type of cancer  Anyone can get skin cancer  Your risk is increased if you have light colored hair, skin, or eyes  Tanning, a sunburn, or a lot of sun exposure can also increase your risk  DISCHARGE INSTRUCTIONS:   General skin cancer prevention guidelines:   · Avoid sun exposure between 10am and 4pm   The sun is most intense during the middle of the day  · Sit in the shade if you are outside  Sit under an umbrella or sun shelter  · Do not use tanning beds  Tanning beds are not safer than a tan directly from the sun  · Get your skin checked at least once a year for cancer  Ask your healthcare provider if you have any questions  Use sunscreen correctly:   · Use a broad spectrum sunscreen with at least SPF 30  Apply it 20 minutes before you go outside  Use sunscreen on cloudy days as well  Apply sunscreen at least every 2 hours and after you swim or sweat  Apply to your ears, scalp, back of your hands, and the tops of your feet  These areas are easily forgotten  Use a lip balm that contains at least SPF 30  · Check the expiration date on your sunscreen  Sunscreens are not as effective after 3 years  Store sunscreen in a cool place  Sunscreen can  sooner if kept in a hot environment      Wear protective clothing outside:   · Wear long-sleeved shirts and long pants or skirts  Choose dark colors and fabric with a tight weave  Some clothes have an ultraviolet protection factor (UPF) built in  Wear clothes with a UPF of 40 or higher  · Wear a hat with a wide brim all the way around  The wide brim shades your face, ears, and the back of your neck  If possible, choose a dark-colored hat  · Wear large-framed sunglasses  Sunglasses protect your eyes  Sunglasses decrease your risk for cataracts and other eye damage caused by the sun  Sunglasses that wrap around the sides of your face work best to keep out the sun  © Copyright [employer] 2022 Information is for End User's use only and may not be sold, redistributed or otherwise used for commercial purposes  All illustrations and images included in CareNotes® are the copyrighted property of [employer]  or [employer]   The above information is an  only  It is not intended as medical advice for individual conditions or treatments  Talk to your doctor, nurse or pharmacist before following any medical regimen to see if it is safe and effective for you  2  ACTINIC KERATOSIS        Assessment and Plan:  Based on a thorough discussion of this condition and the management approach to it (including a comprehensive discussion of the known risks, side effects and potential benefits of treatment), the patient (family) agrees to implement the following specific plan:     Start using prescribed fluorouracil (Efudex) 5% cream  Apply sparingly to scaly crusted areas of the scalp and left are twice a day for 5 weeks straight   Recommend patient to follow up in 2 months after finishing treatment  Actinic keratoses are very common on sites repeatedly exposed to the sun, especially the backs of the hands and the face, most often affecting the ears, nose, cheeks, upper lip, vermilion of the lower lip, temples, forehead and balding scalp   In severely chronically sun-damaged individuals, they may also be found on the upper trunk, upper and lower limbs, and dorsum of feet  We discussed the theoretical premalignant (pre-cancerous) nature and etiology of these growths  We discussed the prevailing notion that actinic keratoses are a reflection of abnormal skin cell development due to DNA damage by short wavelength UVB  They are more likely to appear if the immune function is poor, due to aging, recent sun exposure, predisposing disease or certain drugs  We discussed that the main concern is that actinic keratoses may predispose to squamous cell carcinoma  It is rare for a solitary actinic keratosis to evolve to squamous cell carcinoma (SCC), but the risk of SCC occurring at some stage in a patient with more than 10 actinic keratoses is thought to be about 10 to 15%  A tender, thickened, ulcerated or enlarging actinic keratosis is suspicious of SCC  Actinic keratoses may be prevented by strict sun protection  If already present, keratoses may improve with a very high sun protection factor (50+) broad-spectrum sunscreen applied at least daily to affected areas, year-round  We recommend that UPF-rated clothing and hats and sunglasses be worn whenever possible and that a sunscreen-moisturizer combination product such as Neutrogena Daily Defense be applied at least three times a day  We performed a thorough discussion of treatment options and specific risk/benefits/alternatives including but not limited to medical field treatment with medications such as the following:     Topical field area medications such as 5-fluorouracil or Aldara (specifically, the trouble with long-term compliance, blistering and local skin reaction versus the convenience of at-home therapy and that field therapy gets what is not yet seen)   Cryotherapy (specifically, local pain, scarring, dyspigmentation, blistering, possible superinfection, and treats only what we see versus directed treatment today)

## 2022-04-27 NOTE — TELEPHONE ENCOUNTER
Requested medication(s) are due for refill today: Yes  Patient has already received a courtesy refill: NO  Other reason request has been forwarded to provider: failed, NA low at 135

## 2022-05-12 ENCOUNTER — TELEPHONE (OUTPATIENT)
Dept: CARDIOLOGY CLINIC | Facility: CLINIC | Age: 67
End: 2022-05-12

## 2022-05-12 NOTE — TELEPHONE ENCOUNTER
Marilee Lewis sent a message thru [address]  Dr Lennox Rue replied that he should double his spironolactone and advise us how his b/p is doing then  I jannet/Akbar and advised, he said he understood  He'll call tomorrow if no better, or notify us Monday if he needs the medication refilled because it works at the higher dose

## 2022-05-13 ENCOUNTER — TELEPHONE (OUTPATIENT)
Dept: CARDIOLOGY CLINIC | Facility: CLINIC | Age: 67
End: 2022-05-13

## 2022-05-13 NOTE — TELEPHONE ENCOUNTER
P/C with the increased from 25 mg to 50 mg l seems a bite better  /72 HR 72, 152/80,160/70   Denies any dizziness,lightheadness, no edema, no sob    Will call Monday or Tuesday with additional bp reading

## 2022-05-23 ENCOUNTER — TELEPHONE (OUTPATIENT)
Dept: CARDIOLOGY CLINIC | Facility: CLINIC | Age: 67
End: 2022-05-23

## 2022-05-23 NOTE — TELEPHONE ENCOUNTER
P/c'd, he said his SBP has averaged 149 for the last week after increasing Aldactone to 50 mg qd from 25 mg  It does occasionally go up to 160      Taking: Lisinopril-HCTZ  40-25 mg qd                Aldactone 50 mg qd                Norvasc 10 mg qd      Please advise

## 2022-05-25 ENCOUNTER — TELEPHONE (OUTPATIENT)
Dept: CARDIOLOGY CLINIC | Facility: CLINIC | Age: 67
End: 2022-05-25

## 2022-05-25 DIAGNOSIS — I10 HYPERTENSION, UNSPECIFIED TYPE: ICD-10-CM

## 2022-05-25 RX ORDER — SPIRONOLACTONE 25 MG/1
100 TABLET ORAL DAILY
Qty: 90 TABLET | Refills: 3 | Status: SHIPPED | OUTPATIENT
Start: 2022-05-25 | End: 2022-06-02 | Stop reason: SDUPTHER

## 2022-05-25 NOTE — TELEPHONE ENCOUNTER
Increase spironolactone to 100 mg daily for persistently elevated blood pressure     BMP in 1-2 week    Message text     Per YENNI Bundy/maria antonia for Davonte Reich advising to return call

## 2022-05-26 NOTE — TELEPHONE ENCOUNTER
Davonte Reich called back  I reviewed all the telephone calls and told him we are waiting for Dr Mendez Pryor to approve the Spironolactone at 100 mg with Lisinopril (contraindication)  He said  Okay, for now he is taking 50 mg only  He did not take his b/p yet today  Please advise if okay?

## 2022-06-02 ENCOUNTER — HOSPITAL ENCOUNTER (OUTPATIENT)
Dept: CT IMAGING | Facility: HOSPITAL | Age: 67
Discharge: HOME | End: 2022-06-02
Payer: MEDICARE

## 2022-06-02 DIAGNOSIS — R91.8 LUNG MASS: ICD-10-CM

## 2022-06-02 DIAGNOSIS — I10 HYPERTENSION, UNSPECIFIED TYPE: ICD-10-CM

## 2022-06-02 PROCEDURE — 71250 CT THORAX DX C-: CPT

## 2022-06-02 PROCEDURE — G1004 CDSM NDSC: HCPCS

## 2022-06-02 RX ORDER — SPIRONOLACTONE 100 MG/1
100 TABLET, FILM COATED ORAL DAILY
Qty: 90 TABLET | Refills: 3 | Status: SHIPPED | OUTPATIENT
Start: 2022-06-02

## 2022-06-02 NOTE — TELEPHONE ENCOUNTER
Requested medication(s) are due for refill today: Yes  Patient has already received a courtesy refill: No  Other reason request has been forwarded to provider: Previous script was TAKE 4 tablets of 25 mg qd, but was only sent as 90 tablets    Dosage corrected to 100 mg

## 2022-06-07 ENCOUNTER — OFFICE VISIT (OUTPATIENT)
Dept: CARDIAC SURGERY | Facility: CLINIC | Age: 67
End: 2022-06-07
Payer: MEDICARE

## 2022-06-07 VITALS
SYSTOLIC BLOOD PRESSURE: 140 MMHG | OXYGEN SATURATION: 96 % | TEMPERATURE: 97.3 F | WEIGHT: 268.96 LBS | DIASTOLIC BLOOD PRESSURE: 63 MMHG | HEART RATE: 50 BPM | RESPIRATION RATE: 17 BRPM | BODY MASS INDEX: 37.65 KG/M2 | HEIGHT: 71 IN

## 2022-06-07 DIAGNOSIS — C78.02 SECONDARY MALIGNANT NEOPLASM OF LEFT LUNG (HCC): Primary | ICD-10-CM

## 2022-06-07 DIAGNOSIS — K76.9 LIVER LESION: ICD-10-CM

## 2022-06-07 PROCEDURE — 99214 OFFICE O/P EST MOD 30 MIN: CPT | Performed by: THORACIC SURGERY (CARDIOTHORACIC VASCULAR SURGERY)

## 2022-06-07 NOTE — PROGRESS NOTES
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  His recent CT scan demonstrates increase in size of a left upper lobe ground glass opacity, which is still purely ground glass  We will continue to watch this and he will return in 6 months with a CT scan for surveillance  He does have a liver lesion for which an MRI is recommended  We will obtain this and call him with the results  He is in agreement with this plan  Diagnoses and all orders for this visit:    Secondary malignant neoplasm of left lung ([address] )  -     CT chest wo contrast; Future    Liver lesion  -     MRI abdomen w wo contrast; Future          Thoracic History   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  Procedure: Flexible bronchoscopy, left thoracoscopic therapeutic lower lobe wedge resection, MLND 10/21/21  Pathology:  A   Lung, left lower lobe, wedge resection:     - 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, 1,7 cm, completely excised      - Focal atypical adenomatous hyperplasia      - Emphysematous changes      - See note  Was sent to [facility] for second opinion  B   Pleural lipoma:     - Mature adipose tissue consistent with a lipoma  C   Lymph node, level 5:     - Single lymph node; negative for malignancy (0/1)  D   Lymph node, level 9:     - Single lymph node; negative for malignancy (0/1)          Subjective:    Patient ID: Smiley Hampton is a 79 y o  male  ecog 0  HPI   Mr Berna Mitchell is a 79year old man who underwent a left thoracoscopic therapeutic lower lobe wedge resection on 10/21/21 for an atypical spindle cell proliferation without a definitive diagnosis  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   However, his PET scan does not reveal any primary site  He presents today for a cancer surveillance visit after CT scan 6/2/22  This demonstrates a left upper lobe ground glass opacity, increased some 1 6x0 8cm to 2 1x1 1cm, without a solid component  There are stable right sided 5mm and small ground glass nodules  There are two solid 8mm and 5mm nodules in the right lung, stable  There is a 2 2cm area of low attenuation in the liver  On discussion,     The following portions of the patient's history were reviewed and updated as appropriate: allergies, current medications, past family history, past medical history, past social history, past surgical history and problem list     Review of Systems   Constitutional: Negative  Eyes: Negative  Respiratory: Negative  Cardiovascular: Negative  Gastrointestinal: Negative  Musculoskeletal: Negative  Skin: Negative  Neurological: Negative  Hematological: Negative  Psychiatric/Behavioral: Negative  Objective:   Physical Exam  Vitals reviewed  Constitutional:       General: He is not in acute distress  Appearance: Normal appearance  He is well-developed  He is not diaphoretic  HENT:      Head: Normocephalic and atraumatic  Mouth/Throat:      Comments: Masked   Eyes:      General: No scleral icterus  Extraocular Movements: Extraocular movements intact  Neck:      Trachea: No tracheal deviation  Cardiovascular:      Rate and Rhythm: Normal rate and regular rhythm  Pulses: Normal pulses  Heart sounds: Normal heart sounds  No murmur heard  Pulmonary:      Effort: Pulmonary effort is normal  No respiratory distress  Breath sounds: Normal breath sounds  No wheezing  Abdominal:      General: Bowel sounds are normal  There is no distension  Palpations: Abdomen is soft  Musculoskeletal:         General: Normal range of motion  Cervical back: Normal range of motion and neck supple  Right lower leg: No edema        Left lower leg: No edema  Lymphadenopathy:      Cervical: No cervical adenopathy  Skin:     General: Skin is warm and dry  Findings: No erythema  Neurological:      Mental Status: He is alert and oriented to person, place, and time  Cranial Nerves: No cranial nerve deficit  Psychiatric:         Mood and Affect: Mood normal          Behavior: Behavior normal          Thought Content: Thought content normal      /63   Pulse (!) 50   Temp (!) 97 3 °F (36 3 °C)   Resp 17   Ht 5' 11" (1 803 m)   Wt 122 kg (268 lb 15 4 oz)   SpO2 96%   BMI 37 51 kg/m²       CT chest wo contrast    Result Date: 6/6/2022  Narrative CT CHEST WITHOUT IV CONTRAST INDICATION:   R91 8: Other nonspecific abnormal finding of lung field  Left lower lobe wedge resection on 10/21/2021 for metastatic malignant spindle cell neoplasm  Primary site unknown  COMPARISON:  Chest radiograph from 12/5/2021, chest CT from 8/12/2021 and 11/24/2020  TECHNIQUE: Chest CT without intravenous contrast   Axial, sagittal, coronal 2D reformats and coronal MIPS from source data  Radiation dose length product (DLP):  727 mGy-cm   Radiation dose exposure minimized using iterative reconstruction and automated exposure control  FINDINGS: LUNGS:  Nothing to indicate recurrent tumor  Increase in size of the left upper lobe paramediastinal groundglass opacity since August 2021 from 1 6 x 0 8 cm to 2 1 x 1 1 cm (3/40)  No solid component  8 x 5 mm smoothly marginated solid nodule in the posterior segment right upper lobe (3/32) and 5 x 4 mm solid nodule in the right lower lobe (3/64), stable since November 2020  5 mm and smaller groundglass nodules in the posterior segment right upper lobe (3/40) and right lower lobe (3/76, 3/92), stable since November 2020  AIRWAYS: No significant filling defects  PLEURA:  Unremarkable  HEART/GREAT VESSELS:  Normal heart size  Severe coronary artery calcification indicating atherosclerotic heart disease  MEDIASTINUM AND OLIVIA:  Unremarkable  CHEST WALL AND LOWER NECK: Unremarkable  UPPER ABDOMEN: 2 2 x 1 9 cm low-attenuation lesion in the liver adjacent to the falciform ligament (2/57), visible in August 2021, not visible in November 2020  Diffuse hepatic steatosis  Cholecystectomy  Persistent splenomegaly at 17 cm  OSSEOUS STRUCTURES: Mild degenerative disease in the spine  Impression Nothing to indicate recurrent tumor  Slight enlargement in left upper lobe paramediastinal groundglass opacity since August 2021 with no solid component, possibly an adenocarcinoma spectrum lesion  Stable solid and groundglass nodules in the right lung since November 2020  2 2 x 1 9 cm low-attenuation in the liver, visible in August 2021, not visible in November 2020  This is of uncertain etiology  Recommend further evaluation with liver MRI  This study was marked for significant notification and follow-up    Workstation performed: [id number]

## 2022-06-07 NOTE — ASSESSMENT & PLAN NOTE
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

## 2022-06-09 ENCOUNTER — APPOINTMENT (OUTPATIENT)
Dept: LAB | Facility: CLINIC | Age: 67
End: 2022-06-09
Payer: MEDICARE

## 2022-06-09 DIAGNOSIS — I10 HYPERTENSION, UNSPECIFIED TYPE: ICD-10-CM

## 2022-06-09 DIAGNOSIS — D69.6 THROMBOCYTOPENIA (HCC): ICD-10-CM

## 2022-06-09 LAB
ANION GAP SERPL CALCULATED.3IONS-SCNC: 6 MMOL/L (ref 4–13)
BASOPHILS # BLD AUTO: 0.03 THOUSANDS/ΜL (ref 0–0.1)
BASOPHILS NFR BLD AUTO: 0 % (ref 0–1)
BUN SERPL-MCNC: 29 MG/DL (ref 5–25)
CALCIUM SERPL-MCNC: 9.5 MG/DL (ref 8.4–10.2)
CHLORIDE SERPL-SCNC: 99 MMOL/L (ref 96–108)
CO2 SERPL-SCNC: 27 MMOL/L (ref 21–32)
CREAT SERPL-MCNC: 1.08 MG/DL (ref 0.6–1.3)
EOSINOPHIL # BLD AUTO: 0.09 THOUSAND/ΜL (ref 0–0.61)
EOSINOPHIL NFR BLD AUTO: 1 % (ref 0–6)
ERYTHROCYTE [DISTWIDTH] IN BLOOD BY AUTOMATED COUNT: 14.9 % (ref 11.6–15.1)
GFR SERPL CREATININE-BSD FRML MDRD: 70 ML/MIN/1.73SQ M
GLUCOSE P FAST SERPL-MCNC: 237 MG/DL (ref 65–99)
HCT VFR BLD AUTO: 43.4 % (ref 36.5–49.3)
HGB BLD-MCNC: 14.7 G/DL (ref 12–17)
IMM GRANULOCYTES # BLD AUTO: 0.05 THOUSAND/UL (ref 0–0.2)
IMM GRANULOCYTES NFR BLD AUTO: 1 % (ref 0–2)
LYMPHOCYTES # BLD AUTO: 1.37 THOUSANDS/ΜL (ref 0.6–4.47)
LYMPHOCYTES NFR BLD AUTO: 20 % (ref 14–44)
MCH RBC QN AUTO: 32 PG (ref 26.8–34.3)
MCHC RBC AUTO-ENTMCNC: 33.9 G/DL (ref 31.4–37.4)
MCV RBC AUTO: 95 FL (ref 82–98)
MONOCYTES # BLD AUTO: 0.5 THOUSAND/ΜL (ref 0.17–1.22)
MONOCYTES NFR BLD AUTO: 7 % (ref 4–12)
NEUTROPHILS # BLD AUTO: 4.76 THOUSANDS/ΜL (ref 1.85–7.62)
NEUTS SEG NFR BLD AUTO: 71 % (ref 43–75)
NRBC BLD AUTO-RTO: 0 /100 WBCS
PLATELET # BLD AUTO: 160 THOUSANDS/UL (ref 149–390)
PMV BLD AUTO: 9.8 FL (ref 8.9–12.7)
POTASSIUM SERPL-SCNC: 5 MMOL/L (ref 3.5–5.3)
RBC # BLD AUTO: 4.59 MILLION/UL (ref 3.88–5.62)
SODIUM SERPL-SCNC: 132 MMOL/L (ref 135–147)
WBC # BLD AUTO: 6.8 THOUSAND/UL (ref 4.31–10.16)

## 2022-06-09 PROCEDURE — 85025 COMPLETE CBC W/AUTO DIFF WBC: CPT

## 2022-06-09 PROCEDURE — 80048 BASIC METABOLIC PNL TOTAL CA: CPT

## 2022-06-09 PROCEDURE — 36415 COLL VENOUS BLD VENIPUNCTURE: CPT

## 2022-06-13 ENCOUNTER — TELEPHONE (OUTPATIENT)
Dept: CARDIOLOGY CLINIC | Facility: CLINIC | Age: 67
End: 2022-06-13

## 2022-06-13 NOTE — TELEPHONE ENCOUNTER
----- Message from Alex Alejandro MD sent at 6/10/2022  5:39 PM EDT -----  Please let the pt know that the blood work was stable, no changes at this time

## 2022-06-13 NOTE — TELEPHONE ENCOUNTER
"Chief Complaint   Patient presents with   â¢ Follow-up     burning urination       HISTORY OF PRESENT ILLNESS:  The patient is an 80year-old gentleman with a very long-standing history of irritative voiding symptoms including nocturia as well as daytime urinary frequency. He has previously been on trials of medications that include Detrol, Flomax, Avodart, Proscar and Vesicare, none of which seemed to help. He subsequently underwent photo selective vaporization of the prostate in August, 2016 and it sounds as if this really never helped. Currently he has nocturia up to 8 times a night last 6 months. He also has daytime urinary frequency which has been getting worse about every 2 hours. He denies any urinary incontinence. He also complains of some dysuria with burning at the urethral meatus. He has been using an antifungal cream for athlete's foot with moderate improvement. PHYSICAL EXAM:  On digital rectal exam, prostate is moderately enlarged but soft in consistency without nodules. Glans penis shows some redness of the glans penis, he has not had circumcision. Postvoid bladder scan shows no residual urine. Urinalysis is pending. ASSESSMENT AND PLAN:  The patient may have a yeast infection of the glans penis. I gave him instructions on use of Monistat. He continues to have irritative voiding symptoms as he has had 4 years and unfortunately has failed various and sundry medications including several anticholinergics. I explained that usually we then considering possible use of intravesical Botox injections versus InterStim. He does not seem to be motivated to move in that direction as says ""maybe we should just leave it\"". I have encouraged him to contact me if he decides he would like to proceed in any of these. We will call him with the results of the urinalysis in the near future. He will be using Monistat for the balanitis yeast infection.  We might consider a cystoscopic examination in " Called pt, left a vm with blod work results per Cezar Worthington  To call our office with any questions  the future depending upon his subsequent course.

## 2022-06-21 DIAGNOSIS — E78.5 HYPERLIPIDEMIA, UNSPECIFIED HYPERLIPIDEMIA TYPE: ICD-10-CM

## 2022-06-21 RX ORDER — ATORVASTATIN CALCIUM 40 MG/1
40 TABLET, FILM COATED ORAL
Qty: 90 TABLET | Refills: 1 | Status: SHIPPED | OUTPATIENT
Start: 2022-06-21

## 2022-06-30 ENCOUNTER — OFFICE VISIT (OUTPATIENT)
Dept: DERMATOLOGY | Facility: CLINIC | Age: 67
End: 2022-06-30
Payer: MEDICARE

## 2022-06-30 VITALS — TEMPERATURE: 96.6 F | HEIGHT: 71 IN | BODY MASS INDEX: 38.02 KG/M2 | WEIGHT: 271.6 LBS

## 2022-06-30 DIAGNOSIS — L57.8 ACTINIC SKIN DAMAGE: ICD-10-CM

## 2022-06-30 DIAGNOSIS — L57.0 KERATOSIS, ACTINIC: Primary | ICD-10-CM

## 2022-06-30 PROCEDURE — 17000 DESTRUCT PREMALG LESION: CPT | Performed by: DERMATOLOGY

## 2022-06-30 NOTE — PATIENT INSTRUCTIONS
Assessment and Plan:  Based on a thorough discussion of this condition and the management approach to it (including a comprehensive discussion of the known risks, side effects and potential benefits of treatment), the patient (family) agrees to implement the following specific plan:    Liquid nitrogen was applied for 10-12 seconds to the skin lesion and the expected blistering or scabbing reaction explained  Do not pick at the area  Patient reminded to expect hypopigmented scars from the procedure  Return if lesion fails to fully resolve  Actinic keratoses are very common on sites repeatedly exposed to the sun, especially the backs of the hands and the face  They are considered precancers and have a low risk of turning into squamous cell carcinoma  It is rare for a solitary actinic keratosis to evolve into a squamous cell carcinoma (SCC), but the risk is 10-15% when more than 10 actinic keratoses are present  A tender, thickened, ulcerated or enlarging actinic keratosis is suspicious of SCC  Actinic keratoses may be prevented by strict sun protection  If already present, keratoses may improve with a very high sun protection factor (50+) broad-spectrum sunscreen applied at least daily to affected areas, year-round  We recommend that sun protective clothing and hats and sunglasses be worn whenever possible    Note that you can make you own UPF 30 rate clothing using just your own washing machine with a product called sun guard    There are several different options for treating actinic keratoses      Cryotherapy - good for single spots but treats only what we see versus a field treatment      Today we opted to treat your actinic keratoses with Cryotherapy

## 2022-06-30 NOTE — PROGRESS NOTES
[facility] Follow Up Note    Patient Name: Sam Robertson  Encounter Date: 06/30/2022    Today's Chief Concerns:  Abby Tidwell Concern #1:  FU      Current Medications:    Current Outpatient Medications:     amLODIPine (NORVASC) 10 mg tablet, Take 1 tablet (10 mg total) by mouth daily, Disp: 90 tablet, Rfl: 0    ascorbic acid (VITAMIN C) 500 mg tablet, Take 500 mg by mouth daily, Disp: , Rfl:     aspirin (ECOTRIN LOW STRENGTH) 81 mg EC tablet, Take 81 mg by mouth daily  , Disp: , Rfl:     atorvastatin (LIPITOR) 40 mg tablet, Take 1 tablet (40 mg total) by mouth daily at bedtime, Disp: 90 tablet, Rfl: 1    Blood Glucose Monitoring Suppl (ONE TOUCH ULTRA 2) w/Device KIT, Test blood glucose 2 times daily (One Touch Ultra 2), Disp: 1 each, Rfl: 0    clopidogrel (Plavix) 75 mg tablet, Take 1 tablet (75 mg total) by mouth daily, Disp: 90 tablet, Rfl: 1    glimepiride (AMARYL) 4 mg tablet, Take 4 mg with breakfast and 8 mg in the evening daily  , Disp: 270 tablet, Rfl: 1    glucose blood test strip, Test once daily (one touch ultra), Disp: 100 each, Rfl: 1    lisinopril-hydrochlorothiazide (PRINZIDE,ZESTORETIC) 20-12 5 MG per tablet, Take 2 tablets by mouth daily, Disp: 180 tablet, Rfl: 3    metFORMIN (GLUCOPHAGE) 1000 MG tablet, Take 2 tablets (2,000 mg total) by mouth daily after dinner, Disp: 180 tablet, Rfl: 1    Omega-3 Fatty Acids (FISH OIL PO), Take by mouth daily  , Disp: , Rfl:     ONETOUCH DELICA LANCETS 00I MISC, Test blood glucose 2 times daily, Disp: 300 each, Rfl: 0    prednisoLONE acetate (PRED FORTE) 1 % ophthalmic suspension, Administer 1 drop into the left eye every other day , Disp: , Rfl:     spironolactone (ALDACTONE) 100 mg tablet, Take 1 tablet (100 mg total) by mouth daily, Disp: 90 tablet, Rfl: 3    tadalafil (CIALIS) 20 MG tablet, Take 1 tablet (20 mg total) by mouth as needed for erectile dysfunction, Disp: 30 tablet, Rfl: 2    tamsulosin (FLOMAX) 0 4 mg, Take 1 capsule (0 4 mg total) by mouth daily with dinner, Disp: 90 capsule, Rfl: 3    VITAMIN E PO, Take 1 capsule by mouth daily  , Disp: , Rfl:     fluorouracil (EFUDEX) 5 % cream, Apply sparingly to scaly areas of scalp and ear twice a day for 4 weeks  (Patient not taking: Reported on 6/30/2022), Disp: 40 g, Rfl: 0    CONSTITUTIONAL:   Vitals:    06/30/22 1056   Temp: (!) 96 6 °F (35 9 °C)   TempSrc: Temporal   Weight: 123 kg (271 lb 9 6 oz)   Height: 5' 11" (1 803 m)           Specific Alerts:    Have you been seen by a [facility] Dermatologist in the last 3 years? YES    Are you pregnant or planning to become pregnant? N/A    Are you currently or planning to be nursing or breast feeding? N/A    Allergies   Allergen Reactions    Ciprofloxacin Swelling    Janumet [Sitagliptin-Metformin Hcl] Lip Swelling     Lip swelling     Terazosin      Annotation - [id number]: Blood in sperm       May we call your Preferred Phone number to discuss your specific medical information? YES    May we leave a detailed message that includes your specific medical information? YES    Have you traveled outside of the [facility] in the past 3 months? No    Do you currently have a pacemaker or defibrillator? No    Do you have any artificial heart valves, joints, plates, screws, rods, stents, pins, etc? No      Do you require any medications prior to a surgical procedure? No      Are you taking any medications that cause you to bleed more easily ("blood thinners") YES    Have you ever experienced a rapid heartbeat with epinephrine? No      [facility] can help with wrinkles, "laugh lines," facial volume loss, "double chin," "love handles," age spots, and more  Are you interested in learning today about some of the skin enhancement procedures that we offer? (If Yes, please provide more detail) No    Review of Systems:  Have you recently had or currently have any of the following?     · Fever or chills: No  · Night Sweats: No  · Headaches: No  · Weight Gain: No  · Weight Loss: No  · Blurry Vision: No  · Nausea: No  · Vomiting: No  · Diarrhea: No  · Blood in Stool: No  · Abdominal Pain: No  · Itchy Skin: YES  · Painful Joints: YES  · Swollen Joints: No  · Muscle Pain: No  · Irregular Mole: No  · Sun Burn: No  · Dry Skin: No  · Skin Color Changes: No  · Scar or Keloid: No  · Cold Sores/Fever Blisters: No  · Bacterial Infections/MRSA: No  · Anxiety: No  · Depression: No  · Suicidal or Homicidal Thoughts: No      PSYCH: Normal mood and affect  EYES: Normal conjunctiva  ENT: Normal lips and oral mucosa  CARDIOVASCULAR: No edema  RESPIRATORY: Normal respirations  HEME/LYMPH/IMMUNO:  No regional lymphadenopathy except as noted below in ASSESSMENT AND PLAN BY DIAGNOSIS    ACTINIC KERATOSIS    Physical Exam:   Anatomic Location Affected:  Scalp and ears   Morphological Description:  Scaly pink patches scalp, mild scaling of the ears    Additional History of Present Condition:  Located on the scalp and ears  Patient was treated for 4 weeks  Denies any problems with treatment  Much improved  Assessment and Plan:  Based on a thorough discussion of this condition and the management approach to it (including a comprehensive discussion of the known risks, side effects and potential benefits of treatment), the patient (family) agrees to implement the following specific plan:     Liquid nitrogen was applied for 10-12 seconds to the skin lesion and the expected blistering or scabbing reaction explained  Do not pick at the area  Patient reminded to expect hypopigmented scars from the procedure  Return if lesion fails to fully resolve  Actinic keratoses are very common on sites repeatedly exposed to the sun, especially the backs of the hands and the face  They are considered precancers and have a low risk of turning into squamous cell carcinoma   It is rare for a solitary actinic keratosis to evolve into a squamous cell carcinoma (SCC), but the risk is 10-15% when more than 10 actinic keratoses are present  A tender, thickened, ulcerated or enlarging actinic keratosis is suspicious of SCC  Actinic keratoses may be prevented by strict sun protection  If already present, keratoses may improve with a very high sun protection factor (50+) broad-spectrum sunscreen applied at least daily to affected areas, year-round  We recommend that sun protective clothing and hats and sunglasses be worn whenever possible  Note that you can make you own UPF 30 rate clothing using just your own washing machine with a product called sun guard    There are several different options for treating actinic keratoses      Cryotherapy - good for single spots but treats only what we see versus a field treatment      Today we opted to treat your actinic keratoses with Cryotherapy    PROCEDURE:  DESTRUCTION OF PRE-MALIGNANT LESIONS  After a thorough discussion of treatment options and risk/benefits/alternatives (including but not limited to local pain, scarring, dyspigmentation, blistering, and possible superinfection), verbal and written consent were obtained and the aforementioned lesions were treated on with cryotherapy using liquid nitrogen x 1 cycle for 5-10 seconds   TOTAL NUMBER of 1 pre-malignant lesions were treated today on the ANATOMIC LOCATION: left anti-helix  The patient tolerated the procedure well, and after-care instructions were provided                    René Lawrence MD  Scribe Attestation    I,:  Nora Brewer am acting as a scribe while in the presence of the attending physician :       I,:  Minna Gould MD personally performed the services described in this documentation    as scribed in my presence :

## 2022-07-07 DIAGNOSIS — I10 HYPERTENSION, UNSPECIFIED TYPE: ICD-10-CM

## 2022-07-08 RX ORDER — AMLODIPINE BESYLATE 10 MG/1
10 TABLET ORAL DAILY
Qty: 90 TABLET | Refills: 1 | Status: SHIPPED | OUTPATIENT
Start: 2022-07-08

## 2022-07-13 ENCOUNTER — OFFICE VISIT (OUTPATIENT)
Dept: INTERNAL MEDICINE CLINIC | Age: 67
End: 2022-07-13
Payer: MEDICARE

## 2022-07-13 ENCOUNTER — HOSPITAL ENCOUNTER (OUTPATIENT)
Dept: RADIOLOGY | Facility: HOSPITAL | Age: 67
Discharge: HOME | End: 2022-07-13
Payer: MEDICARE

## 2022-07-13 VITALS
BODY MASS INDEX: 37.94 KG/M2 | DIASTOLIC BLOOD PRESSURE: 68 MMHG | HEART RATE: 65 BPM | TEMPERATURE: 98.6 F | OXYGEN SATURATION: 97 % | SYSTOLIC BLOOD PRESSURE: 126 MMHG | HEIGHT: 71 IN | WEIGHT: 271 LBS

## 2022-07-13 DIAGNOSIS — M25.531 ACUTE PAIN OF RIGHT WRIST: Primary | ICD-10-CM

## 2022-07-13 DIAGNOSIS — M25.531 ACUTE PAIN OF RIGHT WRIST: ICD-10-CM

## 2022-07-13 DIAGNOSIS — E11.59 TYPE 2 DIABETES MELLITUS WITH OTHER CIRCULATORY COMPLICATION, WITHOUT LONG-TERM CURRENT USE OF INSULIN (HCC): ICD-10-CM

## 2022-07-13 PROCEDURE — 99213 OFFICE O/P EST LOW 20 MIN: CPT | Performed by: PHYSICIAN ASSISTANT

## 2022-07-13 PROCEDURE — 73110 X-RAY EXAM OF WRIST: CPT

## 2022-07-13 NOTE — PROGRESS NOTES
Diabetic Foot Exam    Patient's shoes and socks removed  Right Foot/Ankle   Right Foot Inspection  Skin Exam: skin normal, skin intact and dry skin  No warmth, no callus, no erythema, no maceration, no abnormal color, no pre-ulcer, no ulcer and no callus  Toe Exam: ROM and strength within normal limits  No swelling and erythema    Sensory   Vibration: intact  Monofilament testing: intact    Vascular  The right DP pulse is 2+  The right PT pulse is 2+  Left Foot/Ankle  Left Foot Inspection  Skin Exam: skin normal, skin intact and dry skin  No warmth, no erythema, no maceration, normal color, no pre-ulcer, no ulcer and no callus  Toe Exam: ROM and strength within normal limits  No swelling and no erythema  Sensory   Vibration: intact  Monofilament testing: intact    Vascular  The left DP pulse is 2+  The left PT pulse is 2+       Assign Risk Category  No deformity present  No loss of protective sensation  No weak pulses  Risk: 0

## 2022-07-13 NOTE — PROGRESS NOTES
Assessment/Plan:         Diagnoses and all orders for this visit:    Acute pain of right wrist  Comments:  ice prn  tylenol prn  recommend wrist brace /ace wrap if golfing  check xray    Orders:  -     XR wrist 3+ vw right; Future  -     Ambulatory Referral to Orthopedic Surgery; Future    Type 2 diabetes mellitus with other circulatory complication, without long-term current use of insulin ([facility] )  Comments:  foot exam completed  Orders:  -     Ambulatory Referral to Ophthalmology; Future          Subjective:      Patient ID: Shaina Pascual is a 79 y o  male  C/o R wrist pain - was out golfing Monday and after this he noticed a swelling/lump - painful on wrist   He states it is tender to touch and does cause pain into fingers at times  No numbness or tingling       The following portions of the patient's history were reviewed and updated as appropriate: allergies, current medications, past family history, past medical history, past social history, past surgical history and problem list     Review of Systems   Constitutional: Negative for appetite change, chills, diaphoresis, fatigue and fever  HENT: Negative for congestion  Eyes: Negative for pain and redness  Respiratory: Negative for cough and shortness of breath  Cardiovascular: Negative for chest pain and leg swelling  Gastrointestinal: Negative for abdominal pain, diarrhea and nausea  Musculoskeletal: Positive for arthralgias  Negative for gait problem  Skin: Negative for rash  Neurological: Negative for dizziness and headaches  Psychiatric/Behavioral: Negative for sleep disturbance  The patient is not nervous/anxious            Past Medical History:   Diagnosis Date    Allergic     Arthritis     knee    Coronary artery disease     Diabetes mellitus ([facility] )     H/O angioplasty     Hypercholesteremia     Hypertension     Shingles     Skin tag     Sleep apnea     Varicose veins of lower extremity          Current Outpatient Medications:     amLODIPine (NORVASC) 10 mg tablet, Take 1 tablet (10 mg total) by mouth daily, Disp: 90 tablet, Rfl: 1    ascorbic acid (VITAMIN C) 500 mg tablet, Take 500 mg by mouth daily, Disp: , Rfl:     aspirin (ECOTRIN LOW STRENGTH) 81 mg EC tablet, Take 81 mg by mouth daily  , Disp: , Rfl:     atorvastatin (LIPITOR) 40 mg tablet, Take 1 tablet (40 mg total) by mouth daily at bedtime, Disp: 90 tablet, Rfl: 1    Blood Glucose Monitoring Suppl (ONE TOUCH ULTRA 2) w/Device KIT, Test blood glucose 2 times daily (One Touch Ultra 2), Disp: 1 each, Rfl: 0    clopidogrel (Plavix) 75 mg tablet, Take 1 tablet (75 mg total) by mouth daily, Disp: 90 tablet, Rfl: 1    glimepiride (AMARYL) 4 mg tablet, Take 4 mg with breakfast and 8 mg in the evening daily  , Disp: 270 tablet, Rfl: 1    glucose blood test strip, Test once daily (one touch ultra), Disp: 100 each, Rfl: 1    lisinopril-hydrochlorothiazide (PRINZIDE,ZESTORETIC) 20-12 5 MG per tablet, Take 2 tablets by mouth daily, Disp: 180 tablet, Rfl: 3    metFORMIN (GLUCOPHAGE) 1000 MG tablet, Take 2 tablets (2,000 mg total) by mouth daily after dinner, Disp: 180 tablet, Rfl: 1    Omega-3 Fatty Acids (FISH OIL PO), Take by mouth daily  , Disp: , Rfl:     ONETOUCH DELICA LANCETS 65G MISC, Test blood glucose 2 times daily, Disp: 300 each, Rfl: 0    prednisoLONE acetate (PRED FORTE) 1 % ophthalmic suspension, Administer 1 drop into the left eye every other day , Disp: , Rfl:     spironolactone (ALDACTONE) 100 mg tablet, Take 1 tablet (100 mg total) by mouth daily, Disp: 90 tablet, Rfl: 3    tadalafil (CIALIS) 20 MG tablet, Take 1 tablet (20 mg total) by mouth as needed for erectile dysfunction, Disp: 30 tablet, Rfl: 2    tamsulosin (FLOMAX) 0 4 mg, Take 1 capsule (0 4 mg total) by mouth daily with dinner, Disp: 90 capsule, Rfl: 3    VITAMIN E PO, Take 1 capsule by mouth daily  , Disp: , Rfl:     Allergies   Allergen Reactions    Ciprofloxacin Swelling    Janumet [Sitagliptin-Metformin Hcl] Lip Swelling     Lip swelling     Terazosin      Annotation - [id number]: Blood in sperm       Social History   Past Surgical History:   Procedure Laterality Date    CARDIAC CATHETERIZATION      CARDIAC SURGERY      CHOLECYSTECTOMY  01/01/2010    COLONOSCOPY      CORONARY ANGIOPLASTY WITH STENT PLACEMENT      EYE SURGERY Left     ocular lens implant    GALLBLADDER SURGERY      HERNIA REPAIR  01/01/2009    IR BIOPSY LUNG  9/8/2021    KNEE ARTHROSCOPY W/ MENISCAL REPAIR Right     KNEE SURGERY  09/10/2015    LUNG SEGMENTECTOMY Left 10/21/2021    Procedure: RESECTION WEDGE LEFT LOWER LOBE LUNG;  Surgeon: Rohan Arzola MD;  [facility];  Service: Thoracic    IN [address] N/A 10/21/2021    Procedure: [place];  Surgeon: Rohan Arzola MD;  [facility];  Service: Thoracic    IN INCISE FINGER TENDON SHEATH Left 8/25/2016    Procedure: RING TRIGGER FINGER RELEASE ;  Surgeon: Finn Delgado MD;  [facility];  Service: Orthopedics    IN THORACOSCOPY W/THERA WEDGE RESEXN INITIAL UNILAT Left 10/21/2021    Procedure: THORACOSCOPY VIDEO ASSISTED SURGERY (VATS); Surgeon: Rohan Arzola MD;  [facility];  Service: Thoracic    VARICOSE VEIN SURGERY      Ligation     Family History   Problem Relation Age of Onset   Bharati Pall Cancer Mother     Coronary artery disease Mother     Arthritis Mother     Heart disease Father     Early death Father     Coronary artery disease Father     Diabetes Father     Hypertension Father     Coronary artery disease Brother     Hyperlipidemia Brother     Hypertension Brother     Acne Brother        Objective:  /68 (BP Location: Left arm, Patient Position: Sitting, Cuff Size: Standard)   Pulse 65   Temp 98 6 °F (37 °C) (Temporal)   Ht 5' 11" (1 803 m)   Wt 123 kg (271 lb)   SpO2 97%   BMI 37 80 kg/m²        Physical Exam  Vitals reviewed     Constitutional: General: He is not in acute distress  Eyes:      Extraocular Movements: Extraocular movements intact  Conjunctiva/sclera: Conjunctivae normal       Pupils: Pupils are equal, round, and reactive to light  Cardiovascular:      Rate and Rhythm: Normal rate and regular rhythm  Pulmonary:      Effort: Pulmonary effort is normal  No respiratory distress  Breath sounds: Normal breath sounds  No wheezing or rales  Musculoskeletal:         General: Tenderness (R wrist) and deformity (R wrist - medial small firm cyst ) present  Normal range of motion  Right lower leg: No edema  Left lower leg: No edema  Skin:     Findings: No erythema or rash  Neurological:      General: No focal deficit present  Mental Status: He is alert and oriented to person, place, and time     Psychiatric:         Mood and Affect: Mood normal

## 2022-07-19 ENCOUNTER — FOLLOW UP (OUTPATIENT)
Dept: URBAN - METROPOLITAN AREA CLINIC 6 | Facility: CLINIC | Age: 67
End: 2022-07-19

## 2022-07-19 DIAGNOSIS — D86.9: ICD-10-CM

## 2022-07-19 DIAGNOSIS — H20.022: ICD-10-CM

## 2022-07-19 DIAGNOSIS — H04.123: ICD-10-CM

## 2022-07-19 DIAGNOSIS — Z96.1: ICD-10-CM

## 2022-07-19 DIAGNOSIS — E11.3293: ICD-10-CM

## 2022-07-19 DIAGNOSIS — H25.811: ICD-10-CM

## 2022-07-19 LAB
LEFT EYE DIABETIC RETINOPATHY: NORMAL
RIGHT EYE DIABETIC RETINOPATHY: NORMAL

## 2022-07-19 PROCEDURE — 92014 COMPRE OPH EXAM EST PT 1/>: CPT

## 2022-07-19 PROCEDURE — 92134 CPTRZ OPH DX IMG PST SGM RTA: CPT

## 2022-07-19 ASSESSMENT — VISUAL ACUITY
OD_CC: 20/20-1
OU_CC: J1+
OS_CC: 20/25
OU_CC: 20/20-1

## 2022-07-19 ASSESSMENT — TONOMETRY
OS_IOP_MMHG: 19
OD_IOP_MMHG: 15

## 2022-07-20 ENCOUNTER — HOSPITAL ENCOUNTER (OUTPATIENT)
Dept: MRI IMAGING | Facility: HOSPITAL | Age: 67
Discharge: HOME | End: 2022-07-20
Payer: MEDICARE

## 2022-07-20 DIAGNOSIS — K76.9 LIVER LESION: ICD-10-CM

## 2022-07-20 PROCEDURE — 74183 MRI ABD W/O CNTR FLWD CNTR: CPT

## 2022-07-20 PROCEDURE — G1004 CDSM NDSC: HCPCS

## 2022-07-20 PROCEDURE — A9585 GADOBUTROL INJECTION: HCPCS | Performed by: PHYSICIAN ASSISTANT

## 2022-07-20 RX ADMIN — GADOBUTROL 12 ML: 604.72 INJECTION INTRAVENOUS at 18:27

## 2022-07-25 ENCOUNTER — TELEPHONE (OUTPATIENT)
Dept: CARDIAC SURGERY | Facility: CLINIC | Age: 67
End: 2022-07-25

## 2022-08-06 DIAGNOSIS — E11.9 NON-INSULIN DEPENDENT TYPE 2 DIABETES MELLITUS (HCC): ICD-10-CM

## 2022-08-11 ENCOUNTER — OFFICE VISIT (OUTPATIENT)
Dept: INTERNAL MEDICINE CLINIC | Age: 67
End: 2022-08-11
Payer: MEDICARE

## 2022-08-11 ENCOUNTER — TELEPHONE (OUTPATIENT)
Dept: ADMINISTRATIVE | Facility: OTHER | Age: 67
End: 2022-08-11

## 2022-08-11 VITALS
DIASTOLIC BLOOD PRESSURE: 68 MMHG | TEMPERATURE: 97.4 F | WEIGHT: 273 LBS | SYSTOLIC BLOOD PRESSURE: 118 MMHG | BODY MASS INDEX: 38.22 KG/M2 | HEIGHT: 71 IN | HEART RATE: 63 BPM | OXYGEN SATURATION: 97 %

## 2022-08-11 DIAGNOSIS — I10 HYPERTENSION, UNSPECIFIED TYPE: ICD-10-CM

## 2022-08-11 DIAGNOSIS — E11.59 TYPE 2 DIABETES MELLITUS WITH OTHER CIRCULATORY COMPLICATION, WITHOUT LONG-TERM CURRENT USE OF INSULIN (HCC): Primary | ICD-10-CM

## 2022-08-11 DIAGNOSIS — E78.5 HYPERLIPIDEMIA, UNSPECIFIED HYPERLIPIDEMIA TYPE: ICD-10-CM

## 2022-08-11 DIAGNOSIS — R25.2 CRAMPING OF HANDS: ICD-10-CM

## 2022-08-11 DIAGNOSIS — Z00.00 MEDICARE ANNUAL WELLNESS VISIT, SUBSEQUENT: ICD-10-CM

## 2022-08-11 DIAGNOSIS — D69.6 THROMBOCYTOPENIA (HCC): ICD-10-CM

## 2022-08-11 PROBLEM — F40.240 CLAUSTROPHOBIA: Status: RESOLVED | Noted: 2017-09-06 | Resolved: 2022-08-11

## 2022-08-11 PROCEDURE — 99214 OFFICE O/P EST MOD 30 MIN: CPT | Performed by: PHYSICIAN ASSISTANT

## 2022-08-11 PROCEDURE — G0439 PPPS, SUBSEQ VISIT: HCPCS | Performed by: PHYSICIAN ASSISTANT

## 2022-08-11 NOTE — TELEPHONE ENCOUNTER
----- Message from Courtney Jones sent at 8/11/2022 11:29 AM EDT -----  08/11/22 11:30 AM    Hello, our patient Tangela Pringle has had Diabetic Eye Exam completed/performed  Please assist in updating the patient chart by making an External outreach to [employer] located in  [place], [place]  The date of service is 7/19/22 with Dr Hussein Hernandez      Thank you,  Munira Garnica  [address] [facility]

## 2022-08-11 NOTE — PROGRESS NOTES
Assessment and Plan:     Problem List Items Addressed This Visit        Endocrine    Diabetes mellitus with circulatory complication ([facility] [address] ) - Primary    Relevant Orders    CBC and differential    Comprehensive metabolic panel    TSH, 3rd generation    Magnesium    HEMOGLOBIN A1C W/ EAG ESTIMATION       Cardiovascular and Mediastinum    Hypertension    Relevant Orders    CBC and differential    Comprehensive metabolic panel    TSH, 3rd generation    Magnesium    HEMOGLOBIN A1C W/ EAG ESTIMATION       Other    Hyperlipidemia    Relevant Orders    CBC and differential    Comprehensive metabolic panel    TSH, 3rd generation    Magnesium    HEMOGLOBIN A1C W/ EAG ESTIMATION      Other Visit Diagnoses     Thrombocytopenia (HCC)        Relevant Orders    CBC and differential    Cramping of hands        increase fluids  check labs - may continue mag supplement otc for now    Relevant Orders    Comprehensive metabolic panel    Magnesium    Medicare annual wellness visit, subsequent        reviewed labs and immunizations      discussed getting a second opinion for lung mass - pt will consider [facility]   BMI Counseling: Body mass index is 38 08 kg/m²  The BMI is above normal  Nutrition recommendations include decreasing portion sizes, encouraging healthy choices of fruits and vegetables, consuming healthier snacks, limiting drinks that contain sugar and increasing intake of lean protein  Exercise recommendations include exercising 3-5 times per week  Rationale for BMI follow-up plan is due to patient being overweight or obese  Preventive health issues were discussed with patient, and age appropriate screening tests were ordered as noted in patient's After Visit Summary  Personalized health advice and appropriate referrals for health education or preventive services given if needed, as noted in patient's After Visit Summary       History of Present Illness:     Patient presents for a Medicare Wellness Visit    78 y/o male with hx of htn, hld, dm, cad, presents for awv and f/u  Due for labs - will order today       Pt has f/u CT scheduled 12/1/22 and f/u with cardiothoracic surgery 12/6/22   At this time they are opting to watch this   Pt has considered getting a second opinion at [place] [id number] but has not scheduled anything at this time   Pt also had an MRI of the ab due to abnl on CT - area was not concerning, appeared to be hepatic steatosis        Patient Care Team:  Berny Paul PA-C as PCP - General (Physician Assistant)  MD Ryan Ovalle MD Evonne Maes, MD Nalani Dingwall, MD (Endocrinology)  Nicolas Harley DO (Ophthalmology)     Review of Systems:     Review of Systems   Constitutional: Negative for activity change, appetite change, chills, diaphoresis, fatigue and fever  HENT: Negative for congestion and sore throat  Eyes: Negative for redness  Respiratory: Negative for cough, shortness of breath and wheezing  Cardiovascular: Negative for chest pain and leg swelling  Gastrointestinal: Negative for abdominal pain, constipation, diarrhea and nausea  Musculoskeletal: Positive for myalgias  Negative for arthralgias, back pain and gait problem  Skin: Negative for rash  Neurological: Negative for dizziness, light-headedness and headaches  Psychiatric/Behavioral: Negative for sleep disturbance  The patient is not nervous/anxious           Problem List:     Patient Active Problem List   Diagnosis    Abnormal MRI, lumbar spine    Benign essential hypertension    CAD (coronary artery disease)    Diabetes mellitus with circulatory complication (HCC)    Herniation of intervertebral disc of lumbar spine    Hyperlipidemia    Presbyopia    Primary osteoarthritis of left knee    Hypertension    Atherosclerosis of native coronary artery without angina pectoris    Class 2 severe obesity due to excess calories with serious comorbidity and body mass index (BMI) of 39 0 to 39 9 in adult ([facility]    Exercise-induced angina (HCC)    Platelets decreased (HCC)    Lung mass    Secondary malignant neoplasm of left lung (HCC)    Liver lesion      Past Medical and Surgical History:     Past Medical History:   Diagnosis Date    Allergic     Arthritis     knee    Coronary artery disease     Diabetes mellitus (HCC)     GERD (gastroesophageal reflux disease)     H/O angioplasty     Hypercholesteremia     Hypertension     Obesity     Shingles     Skin tag     Sleep apnea     Varicose veins of lower extremity      Past Surgical History:   Procedure Laterality Date    CARDIAC CATHETERIZATION      CARDIAC SURGERY      CHOLECYSTECTOMY  01/01/2010    COLONOSCOPY      CORONARY ANGIOPLASTY WITH STENT PLACEMENT      EYE SURGERY Left     ocular lens implant    GALLBLADDER SURGERY      HERNIA REPAIR  01/01/2009    IR BIOPSY LUNG  9/8/2021    KNEE ARTHROSCOPY W/ MENISCAL REPAIR Right     KNEE SURGERY  09/10/2015    LUNG SEGMENTECTOMY Left 10/21/2021    Procedure: RESECTION WEDGE LEFT LOWER LOBE LUNG;  Surgeon: Carlos Turk MD;  [facility];  Service: Thoracic    [place] [address] N/A 10/21/2021    Procedure: BRONCHOSCOPY FLEXIBLE;  Surgeon: Carlos Turk MD;  [facility];  Service: Thoracic    [place] INCISE FINGER TENDON SHEATH Left 8/25/2016    Procedure: Jacinta Patel ;  Surgeon: Pedro Magaña MD;  [facility];  Service: Orthopedics    [place] THORACOSCOPY W/THERA WEDGE RESEXN INITIAL UNILAT Left 10/21/2021    Procedure: THORACOSCOPY VIDEO ASSISTED SURGERY (VATS);   Surgeon: Carlos Turk MD;  [facility];  Service: Thoracic    VARICOSE VEIN SURGERY      Ligation      Family History:     Family History   Problem Relation Age of Onset    Cancer Mother     Coronary artery disease Mother     Arthritis Mother     Heart disease Father     Early death Father     Coronary artery disease Father     Diabetes Father     Hypertension Father     Coronary artery disease Brother     Hyperlipidemia Brother     Hypertension Brother     Acne Brother       Social History:     Social History     Socioeconomic History    Marital status: /Civil Union     Spouse name: None    Number of children: None    Years of education: None    Highest education level: None   Occupational History    None   Tobacco Use    Smoking status: Former Smoker     Packs/day: 2 00     Years: 15 00     Pack years: 30 00     Types: Cigarettes     Quit date: 1982     Years since quittin 6    Smokeless tobacco: Never Used   Vaping Use    Vaping Use: Never used   Substance and Sexual Activity    Alcohol use: Not Currently    Drug use: No    Sexual activity: Not Currently     Partners: Female   Other Topics Concern    None   Social History Narrative    None     Social Determinants of Health     Financial Resource Strain: Low Risk     Difficulty of Paying Living Expenses: Not very hard   Food Insecurity: Not on file   Transportation Needs: No Transportation Needs    Lack of Transportation (Medical): No    Lack of Transportation (Non-Medical): No   Physical Activity: Not on file   Stress: Not on file   Social Connections: Not on file   Intimate Partner Violence: Not on file   Housing Stability: Not on file      Medications and Allergies:     Current Outpatient Medications   Medication Sig Dispense Refill    amLODIPine (NORVASC) 10 mg tablet Take 1 tablet (10 mg total) by mouth daily 90 tablet 1    ascorbic acid (VITAMIN C) 500 mg tablet Take 500 mg by mouth daily      aspirin (ECOTRIN LOW STRENGTH) 81 mg EC tablet Take 81 mg by mouth daily        atorvastatin (LIPITOR) 40 mg tablet Take 1 tablet (40 mg total) by mouth daily at bedtime 90 tablet 1    Blood Glucose Monitoring Suppl (ONE TOUCH ULTRA 2) w/Device KIT Test blood glucose 2 times daily (One Touch Ultra 2) 1 each 0    clopidogrel (Plavix) 75 mg tablet Take 1 tablet (75 mg total) by mouth daily 90 tablet 1    glimepiride (AMARYL) 4 mg tablet Take 4 mg with breakfast and 8 mg in the evening daily  270 tablet 1    glucose blood test strip Test once daily (one touch ultra) 100 each 1    lisinopril-hydrochlorothiazide (PRINZIDE,ZESTORETIC) 20-12 5 MG per tablet Take 2 tablets by mouth daily 180 tablet 3    MAGNESIUM CITRATE PO Take 1 tablet by mouth in the morning      metFORMIN (GLUCOPHAGE) 1000 MG tablet Take 2 tablets (2,000 mg total) by mouth daily after dinner 180 tablet 1    Omega-3 Fatty Acids (FISH OIL PO) Take by mouth daily        prednisoLONE acetate (PRED FORTE) 1 % ophthalmic suspension Administer 1 drop into the left eye every other day       spironolactone (ALDACTONE) 100 mg tablet Take 1 tablet (100 mg total) by mouth daily 90 tablet 3    tadalafil (CIALIS) 20 MG tablet Take 1 tablet (20 mg total) by mouth as needed for erectile dysfunction 30 tablet 2    tamsulosin (FLOMAX) 0 4 mg Take 1 capsule (0 4 mg total) by mouth daily with dinner 90 capsule 3    VITAMIN E PO Take 1 capsule by mouth daily        ONETOUCH DELICA LANCETS 71F MISC Test blood glucose 2 times daily 300 each 0     No current facility-administered medications for this visit       Allergies   Allergen Reactions    Ciprofloxacin Swelling    Janumet [Sitagliptin-Metformin Hcl] Lip Swelling     Lip swelling     Terazosin      Annotation - [id number]: Blood in sperm      Immunizations:     Immunization History   Administered Date(s) Administered    COVID-19 PFIZER VACCINE 0 3 ML IM 02/15/2021, 03/09/2021, 10/16/2021    COVID-19 Pfizer vac (Jason-sucrose, gray cap) 12 yr+ IM 04/16/2022    INFLUENZA 09/24/2018, 09/10/2021    Influenza Quadrivalent Preservative Free 3 years and older IM 10/31/2017    Influenza, high dose seasonal 0 7 mL 09/23/2020    Influenza, seasonal, injectable 10/16/2019    Pneumococcal Conjugate 13-Valent 07/18/2019    Pneumococcal Polysaccharide PPV23 07/29/2021    Tuberculin Skin Test-PPD Intradermal 09/23/2019, 04/19/2021      Health Maintenance:         Topic Date Due    Colorectal Cancer Screening  07/29/2025    Hepatitis C Screening  Completed         Topic Date Due    Influenza Vaccine (1) 09/01/2022      Medicare Screening Tests and Risk Assessments:     Kami Belle is here for his Subsequent Wellness visit  Last Medicare Wellness visit information reviewed, patient interviewed and updates made to the record today  Health Risk Assessment:   Patient rates overall health as good  Patient feels that their physical health rating is same  Patient is very satisfied with their life  Eyesight was rated as same  Hearing was rated as same  Patient feels that their emotional and mental health rating is same  Patients states they are never, rarely angry  Patient states they are sometimes unusually tired/fatigued  Pain experienced in the last 7 days has been some  Patient's pain rating has been 3/10  Patient states that he has experienced no weight loss or gain in last 6 months  Fall Risk Screening: In the past year, patient has experienced: no history of falling in past year      Home Safety:  Patient does not have trouble with stairs inside or outside of their home  Patient has working smoke alarms and has working carbon monoxide detector  Home safety hazards include: none  Nutrition:   Current diet is Regular, No Added Salt and Limited junk food  Medications:   Patient is currently taking over-the-counter supplements  OTC medications include: Vit  E, fish oil, asprin, magnesium    Patient is able to manage medications  Activities of Daily Living (ADLs)/Instrumental Activities of Daily Living (IADLs):   Walk and transfer into and out of bed and chair?: Yes  Dress and groom yourself?: Yes    Bathe or shower yourself?: Yes    Feed yourself?  Yes  Do your laundry/housekeeping?: Yes  Manage your money, pay your bills and track your expenses?: Yes  Make your own meals?: Yes    Do your own shopping?: Yes    Durable Medical Equipment Suppliers  [place] [employer]    Previous Hospitalizations:   Any hospitalizations or ED visits within the last 12 months?: Yes    How many hospitalizations have you had in the last year?: 1-2    Advance Care Planning:   Living will: Yes    Durable POA for healthcare: No    Advanced directive: No      Cognitive Screening:   Provider or family/friend/caregiver concerned regarding cognition?: No    PREVENTIVE SCREENINGS      Cardiovascular Screening:    General: Screening Not Indicated and History Lipid Disorder      Diabetes Screening:     General: Screening Not Indicated and History Diabetes      Colorectal Cancer Screening:     General: Screening Current      Prostate Cancer Screening:    General: Screening Current      Osteoporosis Screening:    General: Screening Not Indicated      Abdominal Aortic Aneurysm (AAA) Screening:    Risk factors include: age between 73-69 yo and tobacco use        General: Screening Current      Lung Cancer Screening:     General: Screening Not Indicated and History Lung Cancer      Hepatitis C Screening:    General: Screening Current      Preventive Screening Comments: MRI abdomen  negative for AAA    Screening, Brief Intervention, and Referral to Treatment (SBIRT)    Screening  Typical number of drinks in a day: 0  Typical number of drinks in a week: 0  Interpretation: Low risk drinking behavior      AUDIT-C Screenin) How often did you have a drink containing alcohol in the past year? monthly or less  2) How many drinks did you have on a typical day when you were drinking in the past year? 0  3) How often did you have 6 or more drinks on one occasion in the past year? never    AUDIT-C Score: 1  Interpretation: Score 0-3 (male): Negative screen for alcohol misuse    Single Item Drug Screening:  How often have you used an illegal drug (including marijuana) or a prescription medication for non-medical reasons in the past year? never    Single Item Drug Screen Score: 0  Interpretation: Negative screen for possible drug use disorder    Other Counseling Topics:   Calcium and vitamin D intake and regular weightbearing exercise  No exam data present     Physical Exam:     /68 (BP Location: Left arm, Patient Position: Sitting, Cuff Size: Large)   Pulse 63   Temp (!) 97 4 °F (36 3 °C) (Temporal)   Ht 5' 11" (1 803 m)   Wt 124 kg (273 lb)   SpO2 97% Comment: room air  BMI 38 08 kg/m²     Physical Exam  Vitals reviewed  Constitutional:       General: He is not in acute distress  HENT:      Head: Normocephalic and atraumatic  Right Ear: Tympanic membrane, ear canal and external ear normal       Left Ear: Tympanic membrane, ear canal and external ear normal    Eyes:      General:         Right eye: No discharge  Left eye: No discharge  Extraocular Movements: Extraocular movements intact  Conjunctiva/sclera: Conjunctivae normal       Pupils: Pupils are equal, round, and reactive to light  Cardiovascular:      Rate and Rhythm: Normal rate and regular rhythm  Pulmonary:      Effort: Pulmonary effort is normal  No respiratory distress  Breath sounds: Normal breath sounds  No wheezing, rhonchi or rales  Musculoskeletal:         General: Normal range of motion  Right lower leg: No edema  Left lower leg: No edema  Skin:     Findings: Rash (hem staining) present  Neurological:      General: No focal deficit present  Mental Status: He is alert and oriented to person, place, and time     Psychiatric:         Mood and Affect: Mood normal          Behavior: Behavior normal           Sofia Lomeli PA-C

## 2022-08-11 NOTE — PATIENT INSTRUCTIONS
Medicare Preventive Visit Patient Instructions  Thank you for completing your Welcome to Medicare Visit or Medicare Annual Wellness Visit today  Your next wellness visit will be due in one year (8/12/2023)  The screening/preventive services that you may require over the next 5-10 years are detailed below  Some tests may not apply to you based off risk factors and/or age  Screening tests ordered at today's visit but not completed yet may show as past due  Also, please note that scanned in results may not display below  Preventive Screenings:  Service Recommendations Previous Testing/Comments   Colorectal Cancer Screening  · Colonoscopy    · Fecal Occult Blood Test (FOBT)/Fecal Immunochemical Test (FIT)  · Fecal DNA/Cologuard Test  · Flexible Sigmoidoscopy Age: 39-70 years old   Colonoscopy: every 10 years (May be performed more frequently if at higher risk)  OR  FOBT/FIT: every 1 year  OR  Cologuard: every 3 years  OR  Sigmoidoscopy: every 5 years  Screening may be recommended earlier than age 39 if at higher risk for colorectal cancer  Also, an individualized decision between you and your healthcare provider will decide whether screening between the ages of 74-80 would be appropriate   Colonoscopy: 07/29/2020  FOBT/FIT: Not on file  Cologuard: Not on file  Sigmoidoscopy: Not on file    Screening Current     Prostate Cancer Screening Individualized decision between patient and health care provider in men between ages of 53-78   Medicare will cover every 12 months beginning on the day after your 50th birthday PSA: 1 8 ng/mL     Screening Current     Hepatitis C Screening Once for adults born between 1945 and 1965  More frequently in patients at high risk for Hepatitis C Hep C Antibody: 05/07/2017    Screening Current   Diabetes Screening 1-2 times per year if you're at risk for diabetes or have pre-diabetes Fasting glucose: 237 mg/dL (6/9/2022)  A1C: 7 0 % (3/17/2022)  Screening Not Indicated  History Diabetes Cholesterol Screening Once every 5 years if you don't have a lipid disorder  May order more often based on risk factors  Lipid panel: 05/20/2021  Screening Not Indicated  History Lipid Disorder      Other Preventive Screenings Covered by Medicare:  1  Abdominal Aortic Aneurysm (AAA) Screening: covered once if your at risk  You're considered to be at risk if you have a family history of AAA or a male between the age of 73-68 who smoking at least 100 cigarettes in your lifetime  2  Lung Cancer Screening: covers low dose CT scan once per year if you meet all of the following conditions: (1) Age 50-69; (2) No signs or symptoms of lung cancer; (3) Current smoker or have quit smoking within the last 15 years; (4) You have a tobacco smoking history of at least 20 pack years (packs per day x number of years you smoked); (5) You get a written order from a healthcare provider  3  Glaucoma Screening: covered annually if you're considered high risk: (1) You have diabetes OR (2) Family history of glaucoma OR (3)  aged 48 and older OR (3)  American aged 72 and older  3  Osteoporosis Screening: covered every 2 years if you meet one of the following conditions: (1) Have a vertebral abnormality; (2) On glucocorticoid therapy for more than 3 months; (3) Have primary hyperparathyroidism; (4) On osteoporosis medications and need to assess response to drug therapy  5  HIV Screening: covered annually if you're between the age of 12-76  Also covered annually if you are younger than 13 and older than 72 with risk factors for HIV infection  For pregnant patients, it is covered up to 3 times per pregnancy      Immunizations:  Immunization Recommendations   Influenza Vaccine Annual influenza vaccination during flu season is recommended for all persons aged >= 6 months who do not have contraindications   Pneumococcal Vaccine   * Pneumococcal conjugate vaccine = PCV13 (Prevnar 13), PCV15 (Vaxneuvance), PCV20 (Prevnar 20)  * Pneumococcal polysaccharide vaccine = PPSV23 (Pneumovax) Adults 2364 years old: 1-3 doses may be recommended based on certain risk factors  Adults 72 years old: 1-2 doses may be recommended based off what pneumonia vaccine you previously received   Hepatitis B Vaccine 3 dose series if at intermediate or high risk (ex: diabetes, end stage renal disease, liver disease)   Tetanus (Td) Vaccine - COST NOT COVERED BY MEDICARE PART B Following completion of primary series, a booster dose should be given every 10 years to maintain immunity against tetanus  Td may also be given as tetanus wound prophylaxis  Tdap Vaccine - COST NOT COVERED BY MEDICARE PART B Recommended at least once for all adults  For pregnant patients, recommended with each pregnancy  Shingles Vaccine (Shingrix) - COST NOT COVERED BY MEDICARE PART B  2 shot series recommended in those aged 48 and above     Health Maintenance Due:      Topic Date Due    Colorectal Cancer Screening  07/29/2025    Hepatitis C Screening  Completed     Immunizations Due:      Topic Date Due    Influenza Vaccine (1) 09/01/2022     Advance Directives   What are advance directives? Advance directives are legal documents that state your wishes and plans for medical care  These plans are made ahead of time in case you lose your ability to make decisions for yourself  Advance directives can apply to any medical decision, such as the treatments you want, and if you want to donate organs  What are the types of advance directives? There are many types of advance directives, and each state has rules about how to use them  You may choose a combination of any of the following:  · Living will: This is a written record of the treatment you want  You can also choose which treatments you do not want, which to limit, and which to stop at a certain time  This includes surgery, medicine, IV fluid, and tube feedings  · Durable power of  for healthcare [place] [facility]):   This is a written record that states who you want to make healthcare choices for you when you are unable to make them for yourself  This person, called a proxy, is usually a family member or a friend  You may choose more than 1 proxy  · Do not resuscitate (DNR) order:  A DNR order is used in case your heart stops beating or you stop breathing  It is a request not to have certain forms of treatment, such as CPR  A DNR order may be included in other types of advance directives  · Medical directive: This covers the care that you want if you are in a coma, near death, or unable to make decisions for yourself  You can list the treatments you want for each condition  Treatment may include pain medicine, surgery, blood transfusions, dialysis, IV or tube feedings, and a ventilator (breathing machine)  · Values history: This document has questions about your views, beliefs, and how you feel and think about life  This information can help others choose the care that you would choose  Why are advance directives important? An advance directive helps you control your care  Although spoken wishes may be used, it is better to have your wishes written down  Spoken wishes can be misunderstood, or not followed  Treatments may be given even if you do not want them  An advance directive may make it easier for your family to make difficult choices about your care  Weight Management   Why it is important to manage your weight:  Being overweight increases your risk of health conditions such as heart disease, high blood pressure, type 2 diabetes, and certain types of cancer  It can also increase your risk for osteoarthritis, sleep apnea, and other respiratory problems  Aim for a slow, steady weight loss  Even a small amount of weight loss can lower your risk of health problems  How to lose weight safely:  A safe and healthy way to lose weight is to eat fewer calories and get regular exercise   You can lose up about 1 pound a week by decreasing the number of calories you eat by 500 calories each day  Healthy meal plan for weight management:  A healthy meal plan includes a variety of foods, contains fewer calories, and helps you stay healthy  A healthy meal plan includes the following:  · Eat whole-grain foods more often  A healthy meal plan should contain fiber  Fiber is the part of grains, fruits, and vegetables that is not broken down by your body  Whole-grain foods are healthy and provide extra fiber in your diet  Some examples of whole-grain foods are whole-wheat breads and pastas, oatmeal, brown rice, and bulgur  · Eat a variety of vegetables every day  Include dark, leafy greens such as spinach, kale, jamaal greens, and mustard greens  Eat yellow and orange vegetables such as carrots, sweet potatoes, and winter squash  · Eat a variety of fruits every day  Choose fresh or canned fruit (canned in its own juice or light syrup) instead of juice  Fruit juice has very little or no fiber  · Eat low-fat dairy foods  Drink fat-free (skim) milk or 1% milk  Eat fat-free yogurt and low-fat cottage cheese  Try low-fat cheeses such as mozzarella and other reduced-fat cheeses  · Choose meat and other protein foods that are low in fat  Choose beans or other legumes such as split peas or lentils  Choose fish, skinless poultry (chicken or turkey), or lean cuts of red meat (beef or pork)  Before you cook meat or poultry, cut off any visible fat  · Use less fat and oil  Try baking foods instead of frying them  Add less fat, such as margarine, sour cream, regular salad dressing and mayonnaise to foods  Eat fewer high-fat foods  Some examples of high-fat foods include french fries, doughnuts, ice cream, and cakes  · Eat fewer sweets  Limit foods and drinks that are high in sugar  This includes candy, cookies, regular soda, and sweetened drinks  Exercise:  Exercise at least 30 minutes per day on most days of the week   Some examples of exercise include walking, biking, dancing, and swimming  You can also fit in more physical activity by taking the stairs instead of the elevator or parking farther away from stores  Ask your healthcare provider about the best exercise plan for you  © Copyright [employer] 2018 Information is for End User's use only and may not be sold, redistributed or otherwise used for commercial purposes   All illustrations and images included in CareNotes® are the copyrighted property of A D A M , Inc  or [address] [place] [address]

## 2022-08-11 NOTE — LETTER
Diabetic Eye Exam Form    Date Requested: 08/15/22  Patient: Lucas Diamond  Patient : 1955   Referring Provider: Jose Turk PA-C    DIABETIC Eye Exam Date _______________________________    Type of Exam MUST be documented for Diabetic Eye Exams  Please CHECK ONE  Retinal Exam       Dilated Retinal Exam       OCT       Optomap-Iris Exam      Fundus Photography     Left Eye - Please check Retinopathy AND Type or No Retinopathy      Exam did show retinopathy    Exam did not show retinopathy         Mild     Proliferative           Moderate    Severe            None         Right Eye - Please check Retinopathy AND Type or No Retinopathy     Exam did show retinopathy    Exam did not show retinopathy         Mild     Proliferative        Moderate    Severe        None       Comments DOS: 2022    Practice Providing Exam ______________________________________________    Exam Performed By (print name) _______________________________________      Provider Signature ___________________________________________________    These reports are needed for  compliance  Please fax this completed form and a copy of the Diabetic Eye Exam report to our office located at [place] [address] as soon as possible via [phone] ely [place]: Phone [phone]  We thank you for your assistance in treating our mutual patient

## 2022-08-15 NOTE — TELEPHONE ENCOUNTER
Upon review of the In Basket request and the patient's chart, initial outreach has been made via fax, please see Contacts section for details       Thank you  Yunier Dowd

## 2022-08-23 NOTE — TELEPHONE ENCOUNTER
Upon review of the In Basket request we were able to note that no further action is required  The patient chart is up to date as a result of a previous request       Any additional questions or concerns should be emailed to the Practice Liaisons via [email or web address] email, please do not reply via In Basket      Thank you  Leonardo Egan

## 2022-08-31 ENCOUNTER — OFFICE VISIT (OUTPATIENT)
Dept: CARDIOLOGY CLINIC | Facility: CLINIC | Age: 67
End: 2022-08-31
Payer: MEDICARE

## 2022-08-31 VITALS
WEIGHT: 271.7 LBS | DIASTOLIC BLOOD PRESSURE: 70 MMHG | SYSTOLIC BLOOD PRESSURE: 120 MMHG | HEART RATE: 82 BPM | OXYGEN SATURATION: 96 % | BODY MASS INDEX: 37.89 KG/M2

## 2022-08-31 DIAGNOSIS — I10 HYPERTENSION, UNSPECIFIED TYPE: ICD-10-CM

## 2022-08-31 DIAGNOSIS — I25.10 CORONARY ARTERY DISEASE INVOLVING NATIVE CORONARY ARTERY OF NATIVE HEART WITHOUT ANGINA PECTORIS: ICD-10-CM

## 2022-08-31 DIAGNOSIS — I10 BENIGN ESSENTIAL HYPERTENSION: Primary | ICD-10-CM

## 2022-08-31 DIAGNOSIS — E78.5 HYPERLIPIDEMIA, UNSPECIFIED HYPERLIPIDEMIA TYPE: ICD-10-CM

## 2022-08-31 PROCEDURE — 99214 OFFICE O/P EST MOD 30 MIN: CPT | Performed by: INTERNAL MEDICINE

## 2022-08-31 NOTE — PATIENT INSTRUCTIONS
Take these meds in the morning:  Lisinopril/HCTZ - both pills (40/25)   Spironolactone 100 mg  ASA and Plavix    Take these meds in the evening:  Amlodipine 10 mg  Atorvastatin 40 mg     This might you sleep better, not having to go to the restroom night

## 2022-08-31 NOTE — PROGRESS NOTES
Cardiology Follow Up    Yobani Borjas  1955  [id number]  [facility]  [address] [address] [phone]    No diagnosis found  There are no diagnoses linked to this encounter  I had the pleasure of seeing Yobani Borjas for a follow up visit  INTERVAL HISTORY:  None    History of the presenting illness, Discussion/Summary and My Plan are as follows:::    He is a pleasant 15-year-old gentleman with a history of hypertension, diabetes and dyslipidemia  He also has coronary artery disease and has had multiple stenting procedures  Based on his last coronary angiography report from the [facility] in September 2016, left main was patent, LAD was patent including a drug-eluting stent-implanted in 2011  His proximal RCA had a stent implanted in June 2015->in-stent restenosis in December 2015-restented, again had in-stent restenosis that was treated with a drug-eluting 3X 18 mm Xience stent  This was done for anginal symptoms  To his knowledge, he has not had a myocardial infarction  Keeping busy at home taking care of his wife and painting at home  Plays golf, mostly rides, walking limited by arthritis  No anginal symptoms at this time  He had vein stripping of his right lower extremity which has disproportionately more edema      Sept-Oct 2021 - 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 but PET negative and has seen Throacic surgery  Plan:      Coronary artery disease: Status post [id number]-patent in 2016, status post proximal RCA stent in - stent - in-stent-last stenting-September 2016 with a Xience stent, (3 STENTS)   No anginal symptoms at this time  Indefinite Aspirin and Plavix, not on metoprolol but asymptomatic, heart rates often are bradycardic     Stress tests:   November 2019: Exercise time 7 minutes 30 seconds, no ischemia   December 2017:Exercise nuclear perfusion study, 7 minutes 31 seconds, resting hypertension with hypertensive response, no ischemia, no ECG changes, no chest pains  More exercise is needed    Dyslipidemia: on atorvastatin 40 mg,   Controlled, see below, continue to watch LDL    HTN: Since his last visit, spironolactone, now at 100 mg, was added to his amlodipine and lisinopril/ hydrochlorothiazide already at maximum doses, he takes spironolactone and amlodipine in the morning and lisinopril/ hydrochlorothiazide at night,  Needs to urinate twice at night, will switch his meds so he takes spironolactone and lisinopril all in the morning and amlodipine in the evening     DM: As above  last A1c was 7 - March 2022     Chronic right more than left lower extremity edema: No need for Dopplers, had vein stripping for varicose veins in the past  Foot end elevation, sometimes uses compression stockings  No increase with Amlodipine use    Follow up in 6 months        Latest Reference Range & Units 05/20/21 11:05   Cholesterol 50 - 200 mg/dL 132   Triglycerides <=150 mg/dL 162 (H)   HDL >=40 mg/dL 40   LDL Calculated 0 - 100 mg/dL 60   (H): Data is abnormally high     Latest Reference Range & Units 03/17/22 11:37   Hemoglobin A1C  7 0 (H)   (H): Data is abnormally high    Patient Active Problem List   Diagnosis    Abnormal MRI, lumbar spine    Benign essential hypertension    CAD (coronary artery disease)    Diabetes mellitus with circulatory complication (HCC)    Herniation of intervertebral disc of lumbar spine    Hyperlipidemia    Presbyopia    Primary osteoarthritis of left knee    Hypertension    Atherosclerosis of native coronary artery without angina pectoris    Class 2 severe obesity due to excess calories with serious comorbidity and body mass index (BMI) of 39 0 to 39 9 in adult ([facility] [address] )    Exercise-induced angina (HCC)    Platelets decreased (HCC)    Lung mass    Secondary malignant neoplasm of left lung ([facility] [address] )    Liver lesion     Past Medical History:   Diagnosis Date    Allergic     Arthritis     knee    Coronary artery disease     Diabetes mellitus (HCC)     GERD (gastroesophageal reflux disease)     H/O angioplasty     Hypercholesteremia     Hypertension     Obesity     Shingles     Skin tag     Sleep apnea     Varicose veins of lower extremity      Social History     Socioeconomic History    Marital status: /Civil Union     Spouse name: Not on file    Number of children: Not on file    Years of education: Not on file    Highest education level: Not on file   Occupational History    Not on file   Tobacco Use    Smoking status: Former Smoker     Packs/day: 2 00     Years: 15 00     Pack years: 30 00     Types: Cigarettes     Quit date: 1982     Years since quittin 6    Smokeless tobacco: Never Used   Vaping Use    Vaping Use: Never used   Substance and Sexual Activity    Alcohol use: Not Currently    Drug use: No    Sexual activity: Not Currently     Partners: Female   Other Topics Concern    Not on file   Social History Narrative    Not on file     Social Determinants of Health     Financial Resource Strain: Low Risk     Difficulty of Paying Living Expenses: Not very hard   Food Insecurity: Not on file   Transportation Needs: No Transportation Needs    Lack of Transportation (Medical): No    Lack of Transportation (Non-Medical):  No   Physical Activity: Not on file   Stress: Not on file   Social Connections: Not on file   Intimate Partner Violence: Not on file   Housing Stability: Not on file      Family History   Problem Relation Age of Onset    Cancer Mother     Coronary artery disease Mother     Arthritis Mother     Heart disease Father     Early death Father     Coronary artery disease Father     Diabetes Father     Hypertension Father     Coronary artery disease Brother     Hyperlipidemia Brother     Hypertension Brother     Acne Brother      Past Surgical History:   Procedure Laterality Date    CARDIAC CATHETERIZATION      CARDIAC SURGERY      CHOLECYSTECTOMY  01/01/2010    COLONOSCOPY      CORONARY ANGIOPLASTY WITH STENT PLACEMENT      EYE SURGERY Left     ocular lens implant    GALLBLADDER SURGERY      HERNIA REPAIR  01/01/2009    IR BIOPSY LUNG  9/8/2021    KNEE ARTHROSCOPY W/ MENISCAL REPAIR Right     KNEE SURGERY  09/10/2015    LUNG SEGMENTECTOMY Left 10/21/2021    Procedure: RESECTION WEDGE LEFT LOWER LOBE LUNG;  Surgeon: Rohan Arzola MD;  [facility];  Service: Thoracic    [address] N/A 10/21/2021    Procedure: [place];  Surgeon: Rohan Arzola MD;  [facility];  Service: Thoracic    MT INCISE FINGER TENDON SHEATH Left 8/25/2016    Procedure: [address] ;  Surgeon: Finn Delgado MD;  [facility];  Service: Orthopedics    MT THORACOSCOPY W/THERA WEDGE RESEXN INITIAL UNILAT Left 10/21/2021    Procedure: THORACOSCOPY VIDEO ASSISTED SURGERY (VATS); Surgeon: Rohan Arzola MD;  [facility];  Service: Thoracic    VARICOSE VEIN SURGERY      Ligation       Current Outpatient Medications:     amLODIPine (NORVASC) 10 mg tablet, Take 1 tablet (10 mg total) by mouth daily, Disp: 90 tablet, Rfl: 1    ascorbic acid (VITAMIN C) 500 mg tablet, Take 500 mg by mouth daily, Disp: , Rfl:     aspirin (ECOTRIN LOW STRENGTH) 81 mg EC tablet, Take 81 mg by mouth daily  , Disp: , Rfl:     atorvastatin (LIPITOR) 40 mg tablet, Take 1 tablet (40 mg total) by mouth daily at bedtime, Disp: 90 tablet, Rfl: 1    Blood Glucose Monitoring Suppl (ONE TOUCH ULTRA 2) w/Device KIT, Test blood glucose 2 times daily (One Touch Ultra 2), Disp: 1 each, Rfl: 0    clopidogrel (Plavix) 75 mg tablet, Take 1 tablet (75 mg total) by mouth daily, Disp: 90 tablet, Rfl: 1    glimepiride (AMARYL) 4 mg tablet, Take 4 mg with breakfast and 8 mg in the evening daily  , Disp: 270 tablet, Rfl: 1    glucose blood test strip, Test once daily (one touch ultra), Disp: 100 each, Rfl: 1    lisinopril-hydrochlorothiazide (PRINZIDE,ZESTORETIC) 20-12 5 MG per tablet, Take 2 tablets by mouth daily, Disp: 180 tablet, Rfl: 3    MAGNESIUM CITRATE PO, Take 1 tablet by mouth in the morning, Disp: , Rfl:     metFORMIN (GLUCOPHAGE) 1000 MG tablet, Take 2 tablets (2,000 mg total) by mouth daily after dinner, Disp: 180 tablet, Rfl: 1    Omega-3 Fatty Acids (FISH OIL PO), Take by mouth daily  , Disp: , Rfl:     ONETOUCH DELICA LANCETS 45K MISC, Test blood glucose 2 times daily, Disp: 300 each, Rfl: 0    prednisoLONE acetate (PRED FORTE) 1 % ophthalmic suspension, Administer 1 drop into the left eye every other day , Disp: , Rfl:     spironolactone (ALDACTONE) 100 mg tablet, Take 1 tablet (100 mg total) by mouth daily, Disp: 90 tablet, Rfl: 3    tadalafil (CIALIS) 20 MG tablet, Take 1 tablet (20 mg total) by mouth as needed for erectile dysfunction, Disp: 30 tablet, Rfl: 2    tamsulosin (FLOMAX) 0 4 mg, Take 1 capsule (0 4 mg total) by mouth daily with dinner, Disp: 90 capsule, Rfl: 3    VITAMIN E PO, Take 1 capsule by mouth daily  , Disp: , Rfl:   Allergies   Allergen Reactions    Ciprofloxacin Swelling    Janumet [Sitagliptin-Metformin Hcl] Lip Swelling     Lip swelling     Terazosin      Annotation - [id number]: Blood in sperm       Imaging: No results found  Review of Systems:  Review of Systems   Constitutional: Negative  HENT: Negative  Respiratory: Negative  Cardiovascular: Negative  Endocrine: Negative  Musculoskeletal: Positive for arthralgias (hip pains)  Neurological: Negative  Physical Exam:  /70 (BP Location: Right arm, Patient Position: Sitting, Cuff Size: Extra-Large)   Pulse 82   Wt 123 kg (271 lb 11 2 oz)   SpO2 96%   BMI 37 89 kg/m²   Physical Exam  Constitutional:       General: He is not in acute distress  Appearance: He is well-developed   He is not diaphoretic  HENT:      Head: Normocephalic and atraumatic  Eyes:      General: No scleral icterus  Right eye: No discharge  Left eye: No discharge  Conjunctiva/sclera: Conjunctivae normal       Pupils: Pupils are equal, round, and reactive to light  Neck:      Thyroid: No thyromegaly  Vascular: No JVD  Trachea: No tracheal deviation  Cardiovascular:      Rate and Rhythm: Normal rate and regular rhythm  Heart sounds: No murmur heard  No friction rub  Pulmonary:      Effort: Pulmonary effort is normal  No respiratory distress  Breath sounds: Normal breath sounds  No stridor  Abdominal:      General: Bowel sounds are normal  There is no distension  Palpations: Abdomen is soft  Tenderness: There is no abdominal tenderness  There is no guarding  Musculoskeletal:         General: Swelling (Right lower extremity edema) present  No tenderness or deformity  Normal range of motion  Cervical back: Normal range of motion  Skin:     General: Skin is warm  Coloration: Skin is not pale  Findings: No erythema or rash

## 2022-09-07 ENCOUNTER — OFFICE VISIT (OUTPATIENT)
Dept: OBGYN CLINIC | Facility: CLINIC | Age: 67
End: 2022-09-07
Payer: MEDICARE

## 2022-09-07 VITALS
HEART RATE: 70 BPM | WEIGHT: 271 LBS | DIASTOLIC BLOOD PRESSURE: 72 MMHG | HEIGHT: 71 IN | SYSTOLIC BLOOD PRESSURE: 112 MMHG | BODY MASS INDEX: 37.94 KG/M2

## 2022-09-07 DIAGNOSIS — M25.531 ACUTE PAIN OF RIGHT WRIST: ICD-10-CM

## 2022-09-07 PROCEDURE — 99203 OFFICE O/P NEW LOW 30 MIN: CPT | Performed by: SURGERY

## 2022-09-07 NOTE — PROGRESS NOTES
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, previously associated with de Quervain which has since resolved and a left volar wrist mass, likely ganglion cyst      It was dicussed with Erich Emery that the wrist mass to his right wrist is generally associated with De Quervain's tenosynovitis  He was also experiencing dorsal radial sensory nerve branch irritation  As symptoms have improved, he was advised to monitor his symptoms  The etiology of a volar ganglion cyst was discussed along with treatment options  Again as this is not painful for him, I would recommend monitoring his symptoms  If pain occurs a ultrasound guided aspiration may be performed vs surgical excision  Follow up in the office as needed if symptoms worsen or fail to improve  The patient verbalized understanding of exam findings and treatment plan  We engaged in the shared decision-making process and treatment options were discussed at length with the patient  Surgical and conservative management discussed today along with risks and benefits  Diagnoses and all orders for this visit:    Acute pain of right wrist  Comments:  ice prn  tylenol prn  recommend wrist brace /ace wrap if golfing  check xray    Orders:  -     Ambulatory Referral to Orthopedic Surgery      Follow Up:  Return if symptoms worsen or fail to improve  To Do Next Visit:  Re-evaluation of current issue      General Discussions:  Ganglion Cysts: The anatomy and physiology of the ganglion was discussed with the patient today in the office  Fluid leaking out of the joint surface typically creates a small sac, which can enlarge and cause pain or limitation of motion    Treatment options include observation, aspiration, or surgical incision were discussed with the patient today  Observation typically lead to resolution and approximately 10% of patients, aspiration results in resolution of approximately 50% of patients, and surgical excision leads to resolution in approximately 97% of patients  After discussion with the patient today, the patient voiced understanding of all treatment options  ____________________________________________________________________________________________________________________________________________      CHIEF COMPLAINT:  Chief Complaint   Patient presents with    Right Wrist - Pain, Mass    Left Wrist - Mass       SUBJECTIVE:  Shaina Pascual is a 79y o  year old ambidextrous male who presents to the office today for bilateral wrist masses  I am seeing Dana Pearce in consultation at the request of [employer] AD[employer]ANA Pearce notes a mass to the radial aspect of his left wrist  The mass has been present for a few months  He notes at fist he was experiencing some numbness from the radial aspect of his wrist into his thumb  He notes that this has since resolved  He first noticed the mass when he was golfing  He also notes a left volar wrist mass which has been present for aprox  1 month  He denies any pain associated with this mass  He feels the left wrist mass has grown and the right wrist mass has decreased in size        Pain/symptom timing:  Worse during the day when active  Pain/symptom context:  Worse with activites and work  Pain/symptom modifying factors:  Rest makes better, activities make worse  Pain/symptom associated signs/symptoms: none    Prior treatment   · NSAIDsNo   · Injections No   · Bracing/Orthotics No    Physical Therapy No     I have personally reviewed all the relevant PMH, PSH, SH, FH, Medications and allergies      PAST MEDICAL HISTORY:  Past Medical History:   Diagnosis Date    Allergic     Arthritis     knee    Coronary artery disease     Diabetes mellitus ([address] )     GERD (gastroesophageal reflux disease)  H/O angioplasty     Hypercholesteremia     Hypertension     Obesity     Shingles     Skin tag     Sleep apnea     Varicose veins of lower extremity        PAST SURGICAL HISTORY:  Past Surgical History:   Procedure Laterality Date    CARDIAC CATHETERIZATION      CARDIAC SURGERY      CHOLECYSTECTOMY  2010    COLONOSCOPY      CORONARY ANGIOPLASTY WITH STENT PLACEMENT      EYE SURGERY Left     ocular lens implant    GALLBLADDER SURGERY      HERNIA REPAIR  2009    IR BIOPSY LUNG  2021    KNEE ARTHROSCOPY W/ MENISCAL REPAIR Right     KNEE SURGERY  09/10/2015    LUNG SEGMENTECTOMY Left 10/21/2021    Procedure: RESECTION WEDGE LEFT LOWER LOBE LUNG;  Surgeon: Marisa Brooks MD;  [facility];  Service: Thoracic    [address] N/A 10/21/2021    Procedure: BRONCHOSCOPY FLEXIBLE;  Surgeon: Marisa Brooks MD;  [facility];  Service: Thoracic    UT INCISE FINGER TENDON SHEATH Left 2016    Procedure: RING TRIGGER FINGER RELEASE ;  Surgeon: Maxine Carroll MD;  [facility];  Service: Orthopedics    UT THORACOSCOPY W/THERA WEDGE RESEXN INITIAL UNILAT Left 10/21/2021    Procedure: THORACOSCOPY VIDEO ASSISTED SURGERY (VATS);   Surgeon: Marisa Brooks MD;  [facility];  Service: Thoracic    VARICOSE VEIN SURGERY      Ligation       FAMILY HISTORY:  Family History   Problem Relation Age of Onset   Wash Caller Cancer Mother     Coronary artery disease Mother     Arthritis Mother     Heart disease Father     Early death Father     Coronary artery disease Father     Diabetes Father     Hypertension Father     Coronary artery disease Brother     Hyperlipidemia Brother     Hypertension Brother     Acne Brother        SOCIAL HISTORY:  Social History     Tobacco Use    Smoking status: Former Smoker     Packs/day: 2 00     Years: 15 00     Pack years: 30 00     Types: Cigarettes     Quit date: 1982     Years since quittin 7  Smokeless tobacco: Never Used   Vaping Use    Vaping Use: Never used   Substance Use Topics    Alcohol use: Not Currently    Drug use: No       MEDICATIONS:    Current Outpatient Medications:     amLODIPine (NORVASC) 10 mg tablet, Take 1 tablet (10 mg total) by mouth daily, Disp: 90 tablet, Rfl: 1    ascorbic acid (VITAMIN C) 500 mg tablet, Take 500 mg by mouth daily, Disp: , Rfl:     aspirin (ECOTRIN LOW STRENGTH) 81 mg EC tablet, Take 81 mg by mouth daily  , Disp: , Rfl:     atorvastatin (LIPITOR) 40 mg tablet, Take 1 tablet (40 mg total) by mouth daily at bedtime, Disp: 90 tablet, Rfl: 1    Blood Glucose Monitoring Suppl (ONE TOUCH ULTRA 2) w/Device KIT, Test blood glucose 2 times daily (One Touch Ultra 2), Disp: 1 each, Rfl: 0    clopidogrel (Plavix) 75 mg tablet, Take 1 tablet (75 mg total) by mouth daily, Disp: 90 tablet, Rfl: 1    glimepiride (AMARYL) 4 mg tablet, Take 4 mg with breakfast and 8 mg in the evening daily  , Disp: 270 tablet, Rfl: 1    glucose blood test strip, Test once daily (one touch ultra), Disp: 100 each, Rfl: 1    lisinopril-hydrochlorothiazide (PRINZIDE,ZESTORETIC) 20-12 5 MG per tablet, Take 2 tablets by mouth daily, Disp: 180 tablet, Rfl: 3    MAGNESIUM CITRATE PO, Take 1 tablet by mouth in the morning, Disp: , Rfl:     metFORMIN (GLUCOPHAGE) 1000 MG tablet, Take 2 tablets (2,000 mg total) by mouth daily after dinner, Disp: 180 tablet, Rfl: 1    Omega-3 Fatty Acids (FISH OIL PO), Take by mouth daily  , Disp: , Rfl:     ONETOUCH DELICA LANCETS 00I MISC, Test blood glucose 2 times daily, Disp: 300 each, Rfl: 0    prednisoLONE acetate (PRED FORTE) 1 % ophthalmic suspension, Administer 1 drop into the left eye every other day , Disp: , Rfl:     spironolactone (ALDACTONE) 100 mg tablet, Take 1 tablet (100 mg total) by mouth daily, Disp: 90 tablet, Rfl: 3    tadalafil (CIALIS) 20 MG tablet, Take 1 tablet (20 mg total) by mouth as needed for erectile dysfunction, Disp: 30 tablet, Rfl: 2    tamsulosin (FLOMAX) 0 4 mg, Take 1 capsule (0 4 mg total) by mouth daily with dinner, Disp: 90 capsule, Rfl: 3    VITAMIN E PO, Take 1 capsule by mouth daily  , Disp: , Rfl:     ALLERGIES:  Allergies   Allergen Reactions    Ciprofloxacin Swelling    Janumet [Sitagliptin-Metformin Hcl] Lip Swelling     Lip swelling     Terazosin      Annotation - [id number]: Blood in sperm           REVIEW OF SYSTEMS:  Review of Systems   Constitutional: Negative for chills, fever and unexpected weight change  HENT: Negative for hearing loss, nosebleeds and sore throat  Eyes: Negative for pain, redness and visual disturbance  Respiratory: Negative for cough, shortness of breath and wheezing  Cardiovascular: Negative for chest pain, palpitations and leg swelling  Gastrointestinal: Negative for abdominal pain, nausea and vomiting  Endocrine: Negative for polydipsia and polyuria  Genitourinary: Negative for difficulty urinating and hematuria  Musculoskeletal: Negative for arthralgias, joint swelling and myalgias  Skin: Negative for rash and wound  Neurological: Negative for dizziness, numbness and headaches  Psychiatric/Behavioral: Negative for decreased concentration, dysphoric mood and suicidal ideas  The patient is not nervous/anxious          VITALS:  Vitals:    09/07/22 1356   BP: 112/72   Pulse: 70       LABS:  HgA1c:   Lab Results   Component Value Date    HGBA1C 7 0 (H) 03/17/2022     BMP:   Lab Results   Component Value Date    GLUCOSE 135 08/29/2015    CALCIUM 9 5 06/09/2022     08/29/2015    K 5 0 06/09/2022    CO2 27 06/09/2022    CL 99 06/09/2022    BUN 29 (H) 06/09/2022    CREATININE 1 08 06/09/2022       _____________________________________________________  PHYSICAL EXAMINATION:  General: well developed and well nourished, alert, oriented times 3 and appears comfortable  Psychiatric: Normal  HEENT: Normocephalic, Atraumatic Trachea Midline, No torticollis  Pulmonary: No audible wheezing or respiratory distress   Abdomen/GI: Non tender, non distended   Cardiovascular: No pitting edema, 2+ radial pulse   Skin: No Erythema, No Lacerations, No Fluctuation, No Ulcerations  Neurovascular: Sensation Intact to the Median, Ulnar, Radial Nerve, Motor Intact to the Median, Ulnar, Radial Nerve and Pulses Intact  Musculoskeletal: Normal, except as noted in detailed exam and in HPI        MUSCULOSKELETAL EXAMINATION:    Alessio Diaz Tenosynovitis Exam:  right side  Negative tender to palpation over 1st dorsal extensor compartment   Positive palpable nodule (retinacular cyst)  Negative crepitus over 1st dorsal extensor compartment   Negative Finkelstein's test    Negative pain with resisted abduction of the thumb    Left wrist:   No erythema, ecchymosis or edema  Volar radial ganglion cyst   Cyst is non tender to palpation and well circumscribed   Normal wrist ROM   Full composite fist   2+ radial pulse     ___________________________________________________  STUDIES REVIEWED:  I have personally reviewed AP lateral and oblique radiographs of right wrist   which demonstrate no acute fracture dislocation no calcification in the region of the mass           PROCEDURES PERFORMED:  Procedures  No Procedures performed today    _____________________________________________________      Lani Hill    I,:  Pavithra Rdz am acting as a scribe while in the presence of the attending physician :       I,:  Chris Orozco MD personally performed the services described in this documentation    as scribed in my presence :

## 2022-09-11 DIAGNOSIS — N39.41 URGE INCONTINENCE OF URINE: ICD-10-CM

## 2022-09-11 DIAGNOSIS — E13.9 DIABETES 1.5, MANAGED AS TYPE 2 (HCC): ICD-10-CM

## 2022-09-11 DIAGNOSIS — I25.10 CORONARY ARTERY DISEASE INVOLVING NATIVE HEART WITHOUT ANGINA PECTORIS, UNSPECIFIED VESSEL OR LESION TYPE: ICD-10-CM

## 2022-09-12 RX ORDER — GLIMEPIRIDE 4 MG/1
TABLET ORAL
Qty: 270 TABLET | Refills: 0 | Status: SHIPPED | OUTPATIENT
Start: 2022-09-12

## 2022-09-12 RX ORDER — CLOPIDOGREL BISULFATE 75 MG/1
75 TABLET ORAL DAILY
Qty: 90 TABLET | Refills: 0 | Status: SHIPPED | OUTPATIENT
Start: 2022-09-12

## 2022-09-12 RX ORDER — TAMSULOSIN HYDROCHLORIDE 0.4 MG/1
0.4 CAPSULE ORAL
Qty: 90 CAPSULE | Refills: 0 | Status: SHIPPED | OUTPATIENT
Start: 2022-09-12

## 2022-12-01 ENCOUNTER — HOSPITAL ENCOUNTER (OUTPATIENT)
Dept: CT IMAGING | Facility: HOSPITAL | Age: 67
Discharge: HOME | End: 2022-12-01

## 2022-12-01 DIAGNOSIS — C78.02 SECONDARY MALIGNANT NEOPLASM OF LEFT LUNG (HCC): ICD-10-CM

## 2022-12-06 ENCOUNTER — OFFICE VISIT (OUTPATIENT)
Dept: CARDIAC SURGERY | Facility: CLINIC | Age: 67
End: 2022-12-06

## 2022-12-06 VITALS
HEART RATE: 78 BPM | OXYGEN SATURATION: 98 % | DIASTOLIC BLOOD PRESSURE: 82 MMHG | HEIGHT: 71 IN | RESPIRATION RATE: 17 BRPM | WEIGHT: 271.61 LBS | SYSTOLIC BLOOD PRESSURE: 141 MMHG | BODY MASS INDEX: 38.02 KG/M2 | TEMPERATURE: 98.1 F

## 2022-12-06 DIAGNOSIS — C78.02 SECONDARY MALIGNANT NEOPLASM OF LEFT LUNG (HCC): ICD-10-CM

## 2022-12-06 DIAGNOSIS — R91.8 LUNG MASS: Primary | ICD-10-CM

## 2022-12-06 NOTE — ASSESSMENT & PLAN NOTE
Mr Nicolas Cochran is stable from a thoracic surgery standpoint  His most recent CT scan reveals stability of his smaller nodules and slight increase in size of the left upper lobe ggo, without any visible solid component  We will continue routine surveillance and have him return in 6 mo with a new CT scan  He is in agreement with the plan  He will speak with Dr Frantz Chambers regarding the CT scan results in reference to his atherosclerotic disease

## 2022-12-06 NOTE — PROGRESS NOTES
Assessment/Plan:    Secondary malignant neoplasm of left lung [facility])  Mr Margaret Angel is stable from a thoracic surgery standpoint  His most recent CT scan reveals stability of his smaller nodules and slight increase in size of the left upper lobe ggo, without any visible solid component  We will continue routine surveillance and have him return in 6 mo with a new CT scan  He is in agreement with the plan  He will speak with Dr Elaina Key regarding the CT scan results in reference to his atherosclerotic disease  Diagnoses and all orders for this visit:    Lung mass  -     CT chest wo contrast; Future    Secondary malignant neoplasm of left lung (HCC)      Thoracic History    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  Procedure: Flexible bronchoscopy, left thoracoscopic therapeutic lower lobe wedge resection, MLND 10/21/21  Pathology:  A   Lung, left lower lobe, wedge resection:     - 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, 1,7 cm, completely excised      - Focal atypical adenomatous hyperplasia      - Emphysematous changes      - See note  Was sent to [facility] for second opinion  B   Pleural lipoma:     - Mature adipose tissue consistent with a lipoma  C   Lymph node, level 5:     - Single lymph node; negative for malignancy (0/1)  D   Lymph node, level 9:     - Single lymph node; negative for malignancy (0/1)           Cancer Staging   No matching staging information was found for the patient  Oncology History    No history exists  Patient ID: Dimas Marin is a 79 y o  male  ECOG 1   HPI    Mr Margaret Angel is a 78 yo gentleman who underwent a left thoracoscopic therapeutic lower lobe wedge resection on 10/21/21 for a typical spindle cell proliferation without a definitive diagnosis  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   He was last seen on 6/7/22 at which point he had an increased left upper lobe ggo with no solid component, 2 stable right sided 8 and 5 mm nodules, and a 2 2 cm area of low attenuation in the liver  He returns today with a CT scan from 12/1/22  This revealed a slight increase in the left upper lobe ggo, now measuring 1 8 x 1 0 cm  The remaining nodules within the right upper, right lower lobes have been stable, measuring 5-8 mm  On discussion, he has been having some gas pains  He had a cholecystectomy 12 yrs ago  He just re-started the cholestyramine  He denies fever, chills, cough, shortness of breath, or new bone pains  He has no recent illnesses  The patient inquired about the severe coronary artery calcification indicating atherosclerotic heart disease, seen on the CT scan  The following portions of the patient's history were reviewed and updated as appropriate: allergies, current medications, past family history, past medical history, past social history, past surgical history and problem list       Review of Systems      Objective:   Physical Exam  Vitals reviewed  Constitutional:       General: He is not in acute distress  Appearance: Normal appearance  He is not toxic-appearing  HENT:      Head: Normocephalic and atraumatic  Eyes:      General: No scleral icterus  Extraocular Movements: Extraocular movements intact  Comments: glasses   Cardiovascular:      Rate and Rhythm: Normal rate and regular rhythm  Heart sounds: Normal heart sounds  No murmur heard  Pulmonary:      Effort: Pulmonary effort is normal  No respiratory distress  Breath sounds: Normal breath sounds  No wheezing  Abdominal:      General: Bowel sounds are normal  There is distension  Palpations: Abdomen is soft  Musculoskeletal:      Cervical back: Normal range of motion and neck supple  Lymphadenopathy:      Cervical: No cervical adenopathy  Skin:     General: Skin is warm and dry  Comments: Left thoracoscopic incisions well healed      Neurological:      Mental Status: He is alert and oriented to person, place, and time  Motor: No weakness  Psychiatric:         Mood and Affect: Mood normal          Behavior: Behavior normal          Thought Content: Thought content normal      /82 (BP Location: Left arm, Patient Position: Sitting, Cuff Size: Large)   Pulse 78   Temp 98 1 °F (36 7 °C) (Temporal)   Resp 17   Ht 5' 11" (1 803 m)   Wt 123 kg (271 lb 9 7 oz)   SpO2 98%   BMI 37 88 kg/m²       CT chest wo contrast    Result Date: 12/5/2022  Narrative CT CHEST WITHOUT IV CONTRAST INDICATION:   C78 02: Secondary malignant neoplasm of left lung  Per my review of the medical record, the patient had a left lower lobe wedge resection on 10/21/2021 with pathology showing metastatic malignant spindle cell neoplasm with no primary  COMPARISON:  Chest CT 6/2/2022, 8/12/2021, and 11/24/2020  TECHNIQUE: Chest CT without intravenous contrast   Axial, sagittal, coronal 2D reformats and coronal MIPS from source data  Radiation dose length product (DLP):  641 mGy-cm   Radiation dose exposure minimized using iterative reconstruction and automated exposure control  FINDINGS: LUNGS:  Slight increase in size of the left upper lobe paramediastinal groundglass opacity from 1 6 x 1 0 cm to 1 8 x 1 0 cm (coronal series 601/116; axial series 3/48)  8mm solid right upper lobe nodule and 5 mm solid right lower lobe nodule (3/36, 65) and 5 mm and smaller groundglass nodules in the posterior segment right upper lobe and right lower lobe (3/45, 75, 89) stable since November 2020  AIRWAYS: No significant filling defects  PLEURA:  Unremarkable  HEART/GREAT VESSELS:  Normal heart size  Severe coronary artery calcification indicating atherosclerotic heart disease  MEDIASTINUM AND OLIVIA:  Unremarkable  CHEST WALL AND LOWER NECK: Unremarkable  UPPER ABDOMEN:  Low-attenuation lesion in the liver shown on MRI to be focal steatosis (2/62)  Cholecystectomy  Persistent splenomegaly 16 cm   OSSEOUS STRUCTURES: Mild degenerative disease in the spine  Healed sternal body fracture  Impression Slight increase in size of left upper lobe groundglass nodule since June 2022  Stable 8 mm and smaller solid and groundglass right lung nodules since November 2020    Workstation performed: [id number]

## 2022-12-15 DIAGNOSIS — N39.41 URGE INCONTINENCE OF URINE: ICD-10-CM

## 2022-12-15 DIAGNOSIS — E78.5 HYPERLIPIDEMIA, UNSPECIFIED HYPERLIPIDEMIA TYPE: ICD-10-CM

## 2022-12-15 DIAGNOSIS — I25.10 CORONARY ARTERY DISEASE INVOLVING NATIVE HEART WITHOUT ANGINA PECTORIS, UNSPECIFIED VESSEL OR LESION TYPE: ICD-10-CM

## 2022-12-15 DIAGNOSIS — E13.9 DIABETES 1.5, MANAGED AS TYPE 2 (HCC): ICD-10-CM

## 2022-12-16 RX ORDER — ATORVASTATIN CALCIUM 40 MG/1
40 TABLET, FILM COATED ORAL
Qty: 90 TABLET | Refills: 0 | Status: SHIPPED | OUTPATIENT
Start: 2022-12-16

## 2022-12-16 RX ORDER — CLOPIDOGREL BISULFATE 75 MG/1
75 TABLET ORAL DAILY
Qty: 90 TABLET | Refills: 0 | Status: SHIPPED | OUTPATIENT
Start: 2022-12-16

## 2022-12-16 RX ORDER — TAMSULOSIN HYDROCHLORIDE 0.4 MG/1
0.4 CAPSULE ORAL
Qty: 90 CAPSULE | Refills: 0 | Status: SHIPPED | OUTPATIENT
Start: 2022-12-16

## 2022-12-16 RX ORDER — GLIMEPIRIDE 4 MG/1
TABLET ORAL
Qty: 270 TABLET | Refills: 0 | Status: SHIPPED | OUTPATIENT
Start: 2022-12-16

## 2023-01-04 ENCOUNTER — APPOINTMENT (OUTPATIENT)
Dept: LAB | Facility: CLINIC | Age: 68
End: 2023-01-04

## 2023-01-04 DIAGNOSIS — I10 HYPERTENSION, UNSPECIFIED TYPE: ICD-10-CM

## 2023-01-04 DIAGNOSIS — E11.59 TYPE 2 DIABETES MELLITUS WITH OTHER CIRCULATORY COMPLICATION, WITHOUT LONG-TERM CURRENT USE OF INSULIN (HCC): ICD-10-CM

## 2023-01-04 DIAGNOSIS — D69.6 THROMBOCYTOPENIA (HCC): ICD-10-CM

## 2023-01-04 DIAGNOSIS — E78.5 HYPERLIPIDEMIA, UNSPECIFIED HYPERLIPIDEMIA TYPE: ICD-10-CM

## 2023-01-04 DIAGNOSIS — R25.2 CRAMPING OF HANDS: ICD-10-CM

## 2023-01-04 LAB
ALBUMIN SERPL BCP-MCNC: 4.5 G/DL (ref 3.5–5)
ALP SERPL-CCNC: 46 U/L (ref 34–104)
ALT SERPL W P-5'-P-CCNC: 18 U/L (ref 7–52)
ANION GAP SERPL CALCULATED.3IONS-SCNC: 3 MMOL/L (ref 4–13)
AST SERPL W P-5'-P-CCNC: 13 U/L (ref 13–39)
BASOPHILS # BLD AUTO: 0.03 THOUSANDS/ÂΜL (ref 0–0.1)
BASOPHILS NFR BLD AUTO: 1 % (ref 0–1)
BILIRUB SERPL-MCNC: 0.57 MG/DL (ref 0.2–1)
BUN SERPL-MCNC: 27 MG/DL (ref 5–25)
CALCIUM SERPL-MCNC: 9.7 MG/DL (ref 8.4–10.2)
CHLORIDE SERPL-SCNC: 105 MMOL/L (ref 96–108)
CO2 SERPL-SCNC: 28 MMOL/L (ref 21–32)
CREAT SERPL-MCNC: 1.32 MG/DL (ref 0.6–1.3)
EOSINOPHIL # BLD AUTO: 0.08 THOUSAND/ÂΜL (ref 0–0.61)
EOSINOPHIL NFR BLD AUTO: 1 % (ref 0–6)
ERYTHROCYTE [DISTWIDTH] IN BLOOD BY AUTOMATED COUNT: 15.3 % (ref 11.6–15.1)
GFR SERPL CREATININE-BSD FRML MDRD: 55 ML/MIN/1.73SQ M
GLUCOSE P FAST SERPL-MCNC: 134 MG/DL (ref 65–99)
HCT VFR BLD AUTO: 41.2 % (ref 36.5–49.3)
HGB BLD-MCNC: 13.5 G/DL (ref 12–17)
IMM GRANULOCYTES # BLD AUTO: 0.05 THOUSAND/UL (ref 0–0.2)
IMM GRANULOCYTES NFR BLD AUTO: 1 % (ref 0–2)
LYMPHOCYTES # BLD AUTO: 1.74 THOUSANDS/ÂΜL (ref 0.6–4.47)
LYMPHOCYTES NFR BLD AUTO: 29 % (ref 14–44)
MAGNESIUM SERPL-MCNC: 2 MG/DL (ref 1.9–2.7)
MCH RBC QN AUTO: 31.7 PG (ref 26.8–34.3)
MCHC RBC AUTO-ENTMCNC: 32.8 G/DL (ref 31.4–37.4)
MCV RBC AUTO: 97 FL (ref 82–98)
MONOCYTES # BLD AUTO: 0.46 THOUSAND/ÂΜL (ref 0.17–1.22)
MONOCYTES NFR BLD AUTO: 8 % (ref 4–12)
NEUTROPHILS # BLD AUTO: 3.61 THOUSANDS/ÂΜL (ref 1.85–7.62)
NEUTS SEG NFR BLD AUTO: 60 % (ref 43–75)
NRBC BLD AUTO-RTO: 0 /100 WBCS
PLATELET # BLD AUTO: 141 THOUSANDS/UL (ref 149–390)
PMV BLD AUTO: 9.8 FL (ref 8.9–12.7)
POTASSIUM SERPL-SCNC: 4.9 MMOL/L (ref 3.5–5.3)
PROT SERPL-MCNC: 7.2 G/DL (ref 6.4–8.4)
PSA SERPL-MCNC: 1.3 NG/ML (ref 0–4)
RBC # BLD AUTO: 4.26 MILLION/UL (ref 3.88–5.62)
SODIUM SERPL-SCNC: 136 MMOL/L (ref 135–147)
TSH SERPL DL<=0.05 MIU/L-ACNC: 2.86 UIU/ML (ref 0.45–4.5)
WBC # BLD AUTO: 5.97 THOUSAND/UL (ref 4.31–10.16)

## 2023-01-06 LAB
EST. AVERAGE GLUCOSE BLD GHB EST-MCNC: 148 MG/DL
HBA1C MFR BLD: 6.8 %

## 2023-01-11 ENCOUNTER — OFFICE VISIT (OUTPATIENT)
Dept: INTERNAL MEDICINE CLINIC | Age: 68
End: 2023-01-11

## 2023-01-11 VITALS
HEART RATE: 54 BPM | OXYGEN SATURATION: 98 % | DIASTOLIC BLOOD PRESSURE: 68 MMHG | WEIGHT: 275 LBS | TEMPERATURE: 97.6 F | SYSTOLIC BLOOD PRESSURE: 122 MMHG | BODY MASS INDEX: 38.5 KG/M2 | HEIGHT: 71 IN

## 2023-01-11 DIAGNOSIS — D69.6 THROMBOCYTOPENIA (HCC): ICD-10-CM

## 2023-01-11 DIAGNOSIS — I10 HYPERTENSION, UNSPECIFIED TYPE: ICD-10-CM

## 2023-01-11 DIAGNOSIS — R79.89 ELEVATED SERUM CREATININE: ICD-10-CM

## 2023-01-11 DIAGNOSIS — E78.5 HYPERLIPIDEMIA, UNSPECIFIED HYPERLIPIDEMIA TYPE: ICD-10-CM

## 2023-01-11 DIAGNOSIS — N18.31 STAGE 3A CHRONIC KIDNEY DISEASE (HCC): ICD-10-CM

## 2023-01-11 DIAGNOSIS — E11.9 NON-INSULIN DEPENDENT TYPE 2 DIABETES MELLITUS (HCC): ICD-10-CM

## 2023-01-11 DIAGNOSIS — R10.11 RUQ PAIN: Primary | ICD-10-CM

## 2023-01-11 DIAGNOSIS — C78.02 SECONDARY MALIGNANT NEOPLASM OF LEFT LUNG (HCC): ICD-10-CM

## 2023-01-11 DIAGNOSIS — E66.01 CLASS 2 SEVERE OBESITY DUE TO EXCESS CALORIES WITH SERIOUS COMORBIDITY AND BODY MASS INDEX (BMI) OF 39.0 TO 39.9 IN ADULT (HCC): ICD-10-CM

## 2023-01-11 RX ORDER — AMLODIPINE BESYLATE 10 MG/1
10 TABLET ORAL DAILY
Qty: 90 TABLET | Refills: 1 | Status: SHIPPED | OUTPATIENT
Start: 2023-01-11

## 2023-01-11 RX ORDER — ATORVASTATIN CALCIUM 40 MG/1
40 TABLET, FILM COATED ORAL
Qty: 90 TABLET | Refills: 1 | Status: SHIPPED | OUTPATIENT
Start: 2023-01-11

## 2023-01-11 NOTE — PROGRESS NOTES
Assessment/Plan:         Diagnoses and all orders for this visit:    RUQ pain  -     US right upper quadrant; Future    Hypertension, unspecified type  Comments:  controlled  limit salt intake  Orders:  -     amLODIPine (NORVASC) 10 mg tablet; Take 1 tablet (10 mg total) by mouth daily  -     Comprehensive metabolic panel; Future    Hyperlipidemia, unspecified hyperlipidemia type  Comments:  continue atorvastatin   Orders:  -     atorvastatin (LIPITOR) 40 mg tablet; Take 1 tablet (40 mg total) by mouth daily at bedtime  -     Comprehensive metabolic panel; Future    Non-insulin dependent type 2 diabetes mellitus ([facility] [address] )  Comments:  improved hga1c 6 8  discussed increased exercise with goal 30 min / day     Orders:  -     metFORMIN (GLUCOPHAGE) 1000 MG tablet; Take 2 tablets (2,000 mg total) by mouth daily after dinner  -     Comprehensive metabolic panel; Future    Stage 3a chronic kidney disease (HCC)  -     Comprehensive metabolic panel; Future    Secondary malignant neoplasm of left lung (HCC)    Class 2 severe obesity due to excess calories with serious comorbidity and body mass index (BMI) of 39 0 to 39 9 in adult (HCC)    Thrombocytopenia (HCC)    Elevated serum creatinine  Comments:  increase water intake   repeat cr in 4-6 weeks          discussed shingles vaccine, pt will schedule this   Pt had covid booster (3 weeks ago)     Subjective:      Patient ID: Albert El is a 79 y o  male      78 y/o male with hx of htn, dm, hld, brenda, ckd, cad presents f/u for the same    Pt c/o ruq pain - intermittent for the past 4 months  Seems to be worse with gas/ bloating at times   He takes cholestyramine daily which has helped stools    Pt has apt scheduled with urology for f/u  He reports continued urinary frequency at night  Doesn't feel flomax is helpful   Pt reports when he had a BM this week he had some blood on the urethra which he relates to bearing down for BM          The following portions of the patient's history were reviewed and updated as appropriate: allergies, current medications, past family history, past medical history, past social history, past surgical history and problem list     Review of Systems   Constitutional: Negative for activity change, appetite change, chills, diaphoresis, fatigue and fever  HENT: Negative for congestion and sore throat  Eyes: Negative for pain and redness  Respiratory: Negative for cough, shortness of breath and wheezing  Cardiovascular: Negative for chest pain and leg swelling  Gastrointestinal: Positive for abdominal pain (RUQ - intermittent )  Negative for constipation, diarrhea and nausea  Musculoskeletal: Negative for arthralgias, back pain and gait problem  Neurological: Negative for dizziness, weakness, light-headedness and headaches  Hematological: Does not bruise/bleed easily  Psychiatric/Behavioral: Negative for sleep disturbance  The patient is not nervous/anxious            Past Medical History:   Diagnosis Date   • Allergic    • Arthritis     knee   • Coronary artery disease    • Diabetes mellitus (HCC)    • GERD (gastroesophageal reflux disease)    • H/O angioplasty    • Hypercholesteremia    • Hypertension    • Obesity    • Shingles    • Skin tag    • Sleep apnea    • Varicose veins of lower extremity          Current Outpatient Medications:   •  amLODIPine (NORVASC) 10 mg tablet, Take 1 tablet (10 mg total) by mouth daily, Disp: 90 tablet, Rfl: 1  •  ascorbic acid (VITAMIN C) 500 mg tablet, Take 500 mg by mouth daily, Disp: , Rfl:   •  aspirin (ECOTRIN LOW STRENGTH) 81 mg EC tablet, Take 81 mg by mouth daily Pt takes 3 per week, Disp: , Rfl:   •  atorvastatin (LIPITOR) 40 mg tablet, Take 1 tablet (40 mg total) by mouth daily at bedtime, Disp: 90 tablet, Rfl: 1  •  Blood Glucose Monitoring Suppl (ONE TOUCH ULTRA 2) w/Device KIT, Test blood glucose 2 times daily (One Touch Ultra 2), Disp: 1 each, Rfl: 0  •  cholestyramine (QUESTRAN) 4 g packet, Take 1 packet (4 g total) by mouth 2 (two) times a day with meals, Disp: 60 each, Rfl: 3  •  clopidogrel (Plavix) 75 mg tablet, Take 1 tablet (75 mg total) by mouth daily, Disp: 90 tablet, Rfl: 0  •  glimepiride (AMARYL) 4 mg tablet, Take 4 mg with breakfast and 8 mg in the evening daily  , Disp: 270 tablet, Rfl: 0  •  glucose blood test strip, Test once daily (one touch ultra), Disp: 100 each, Rfl: 1  •  lisinopril-hydrochlorothiazide (PRINZIDE,ZESTORETIC) 20-12 5 MG per tablet, Take 2 tablets by mouth daily, Disp: 180 tablet, Rfl: 3  •  MAGNESIUM CITRATE PO, Take 1 tablet by mouth in the morning, Disp: , Rfl:   •  metFORMIN (GLUCOPHAGE) 1000 MG tablet, Take 2 tablets (2,000 mg total) by mouth daily after dinner, Disp: 180 tablet, Rfl: 1  •  Omega-3 Fatty Acids (FISH OIL PO), Take by mouth daily  , Disp: , Rfl:   •  ONETOUCH DELICA LANCETS D MISC, Test blood glucose 2 times daily, Disp: 300 each, Rfl: 0  •  prednisoLONE acetate (PRED FORTE) 1 % ophthalmic suspension, Administer 1 drop into the left eye every other day , Disp: , Rfl:   •  spironolactone (ALDACTONE) 100 mg tablet, Take 1 tablet (100 mg total) by mouth daily, Disp: 90 tablet, Rfl: 3  •  tadalafil (CIALIS) 20 MG tablet, Take 1 tablet (20 mg total) by mouth as needed for erectile dysfunction, Disp: 30 tablet, Rfl: 2  •  tamsulosin (FLOMAX) 0 4 mg, Take 1 capsule (0 4 mg total) by mouth daily with dinner, Disp: 90 capsule, Rfl: 0  •  VITAMIN E PO, Take 1 capsule by mouth daily  , Disp: , Rfl:     Allergies   Allergen Reactions   • Ciprofloxacin Swelling   • Janumet [Sitagliptin-Metformin Hcl] Lip Swelling     Lip swelling    • Terazosin      Annotation - [id number]: Blood in sperm       Social History   Past Surgical History:   Procedure Laterality Date   • CARDIAC CATHETERIZATION     • CARDIAC SURGERY     • CHOLECYSTECTOMY  01/01/2010   • COLONOSCOPY     • CORONARY ANGIOPLASTY WITH STENT PLACEMENT     • EYE SURGERY Left     ocular lens implant   • GALLBLADDER SURGERY     • HERNIA REPAIR  01/01/2009   • IR BIOPSY LUNG  9/8/2021   • KNEE ARTHROSCOPY W/ MENISCAL REPAIR Right    • KNEE SURGERY  09/10/2015   • LUNG SEGMENTECTOMY Left 10/21/2021    Procedure: RESECTION WEDGE LEFT LOWER LOBE LUNG;  Surgeon: Peña Barron MD;  [facility];  Service: Thoracic   • ID [id number] INCL FLUOR GDNCE DX W/CELL WASHG 100 [facility] N/A 10/21/2021    Procedure: Lucius Interiano;  Surgeon: Peña Barron MD;  [facility];  Service: Thoracic   • ID TENDON SHEATH INCISION Left 8/25/2016    Procedure: Carol Later ;  Surgeon: Fritz Rogers MD;  [facility];  Service: Orthopedics   • ID THORACOSCOPY W/THERA WEDGE RESEXN INITIAL UNILAT Left 10/21/2021    Procedure: THORACOSCOPY VIDEO ASSISTED SURGERY (VATS); Surgeon: Peña Barron MD;  [facility];  Service: Thoracic   • VARICOSE VEIN SURGERY      Ligation     Family History   Problem Relation Age of Onset   • Cancer Mother    • Coronary artery disease Mother    • Arthritis Mother    • Heart disease Father    • Early death Father    • Coronary artery disease Father    • Diabetes Father    • Hypertension Father    • Coronary artery disease Brother    • Hyperlipidemia Brother    • Hypertension Brother    • Acne Brother        Objective:  /68 (BP Location: Left arm, Patient Position: Sitting, Cuff Size: Large)   Pulse (!) 54   Temp 97 6 °F (36 4 °C) (Temporal)   Ht 5' 11" (1 803 m)   Wt 125 kg (275 lb)   SpO2 98%   BMI 38 35 kg/m²        Physical Exam  Vitals reviewed  Constitutional:       General: He is not in acute distress  HENT:      Head: Normocephalic and atraumatic  Right Ear: Tympanic membrane, ear canal and external ear normal  There is no impacted cerumen  Left Ear: Tympanic membrane, ear canal and external ear normal  There is no impacted cerumen  Nose: Nose normal    Eyes:      General:         Right eye: No discharge           Left eye: No discharge  Extraocular Movements: Extraocular movements intact  Conjunctiva/sclera: Conjunctivae normal       Pupils: Pupils are equal, round, and reactive to light  Cardiovascular:      Rate and Rhythm: Normal rate and regular rhythm  Pulmonary:      Effort: Pulmonary effort is normal  No respiratory distress  Breath sounds: Normal breath sounds  No wheezing or rales  Abdominal:      General: Bowel sounds are normal  There is no distension  Tenderness: There is no abdominal tenderness  There is no guarding  Musculoskeletal:         General: Normal range of motion  Cervical back: Normal range of motion  Right lower leg: No edema  Left lower leg: No edema  Lymphadenopathy:      Cervical: No cervical adenopathy  Skin:     General: Skin is warm  Neurological:      General: No focal deficit present  Mental Status: He is alert and oriented to person, place, and time     Psychiatric:         Mood and Affect: Mood normal          Behavior: Behavior normal

## 2023-01-13 ENCOUNTER — HOSPITAL ENCOUNTER (OUTPATIENT)
Dept: ULTRASOUND IMAGING | Facility: HOSPITAL | Age: 68
Discharge: HOME | End: 2023-01-13

## 2023-01-13 DIAGNOSIS — R10.11 RUQ PAIN: ICD-10-CM

## 2023-01-17 ENCOUNTER — OFFICE VISIT (OUTPATIENT)
Dept: UROLOGY | Facility: CLINIC | Age: 68
End: 2023-01-17

## 2023-01-17 VITALS
DIASTOLIC BLOOD PRESSURE: 80 MMHG | OXYGEN SATURATION: 99 % | HEART RATE: 57 BPM | WEIGHT: 284.2 LBS | HEIGHT: 71 IN | SYSTOLIC BLOOD PRESSURE: 120 MMHG | BODY MASS INDEX: 39.79 KG/M2

## 2023-01-17 DIAGNOSIS — R31.0 GROSS HEMATURIA: Primary | ICD-10-CM

## 2023-01-17 NOTE — PROGRESS NOTES
1  Gross hematuria  CT renal protocol    Basic metabolic panel    Urinalysis with microscopic    Cytology, urine            Assessment and plan:       1  BPH with LUTS  - continue tamsulosin daily  - some progressive symptoms - will perform cystoscopy and TRUS on return for evaluation of CAMPBELL    2  Gross hematuria  - resolved  - repeat UA with micro, urine culture, urine cytology  - CT urogram and cystoscopy    3  Prostate cancer screening  - PSA stable    3  Erectile dysfunction  -continue sildenafil as needed    [id number]      Chief Complaint     Lower urinary tract symptoms    History of Present Illness     Jessie Molina is a 79 y o  male presenting for follow up lower urinary tract symptoms  Patient has been managed on tamsulosin monotherapy for the past few years  He initially had symptom improvement  Has been having osme progressive symptoms over the past year including nocturia, urinary hesitancy, some sensation of incomplete bladder emptying  Last month, patient report an episode of severe constipation  Within the next day he had an episode of self resolving hematuria  He did have 1 episode of painful hematospermia since subsiding  No current dysuria  Some right flank pain  No nausea/vomiting  Does report previous cystoscopy 20+ years ago with outside urologist      Patient's last PSA is 1 3 (1/4/23), previously 1 8 (1/11/22), 1 6 (9/25/2020), 2 3 (03/18/2019)  Medical comorbidities include diabetes, hypertension, angina, coronary artery disease, lumbosacral disc disease, obesity, hypertension  Patient is managed on sildenafil for erectile dysfunction  Transitioned to cialis at last visit  Patient had a previous PET scan (12/11/2020) with some activity around the area of the prostate      AUA SYMPTOM SCORE    Flowsheet Row Most Recent Value   AUA SYMPTOM SCORE    How often have you had a sensation of not emptying your bladder completely after you finished urinating? 1 (P)     How often have you had to urinate again less than two hours after you finished urinating? 2 (P)     How often have you found you stopped and started again several times when you urinate? 1 (P)     How often have you found it difficult to postpone urination? 0 (P)     How often have you had a weak urinary stream? 2 (P)     How often have you had to push or strain to begin urination? 0 (P)     How many times did you most typically get up to urinate from the time you went to bed at night until the time you got up in the morning? 2 (P)     Quality of Life: If you were to spend the rest of your life with your urinary condition just the way it is now, how would you feel about that? 3 (P)     AUA SYMPTOM SCORE 8 (P)               Laboratory     Lab Results   Component Value Date    CREATININE 1 32 (H) 01/04/2023       Lab Results   Component Value Date    PSA 1 3 01/04/2023    PSA 1 8 01/11/2022    PSA 2 2 05/20/2021       Review of Systems     Review of Systems   Constitutional: Negative for activity change, appetite change, chills, diaphoresis, fatigue, fever and unexpected weight change  Respiratory: Negative for chest tightness and shortness of breath  Cardiovascular: Negative for chest pain, palpitations and leg swelling  Gastrointestinal: Negative for abdominal distention, abdominal pain, constipation, diarrhea, nausea and vomiting  Genitourinary: Positive for decreased urine volume, difficulty urinating and hematuria  Negative for dysuria, enuresis, flank pain, frequency, genital sores and urgency  Musculoskeletal: Negative for back pain, gait problem and myalgias  Skin: Negative for color change, pallor, rash and wound  Psychiatric/Behavioral: Negative for behavioral problems  The patient is not nervous/anxious                Allergies     Allergies   Allergen Reactions   • Ciprofloxacin Swelling   • Janumet [Sitagliptin-Metformin Hcl] Lip Swelling     Lip swelling    • Terazosin      Annotation - [id number]: Blood in sperm       Physical Exam     Physical Exam  Constitutional:       General: He is not in acute distress  Appearance: Normal appearance  He is obese  He is not ill-appearing  HENT:      Head: Normocephalic and atraumatic  Eyes:      General:         Right eye: No discharge  Left eye: No discharge  Conjunctiva/sclera: Conjunctivae normal    Pulmonary:      Effort: Pulmonary effort is normal  No respiratory distress  Abdominal:      General: There is no distension  Palpations: There is no mass  Tenderness: There is no abdominal tenderness  Hernia: No hernia is present  There is no hernia in the left inguinal area or right inguinal area  Genitourinary:     Penis: Normal and circumcised  No phimosis, paraphimosis, hypospadias or erythema  Testes:         Right: Mass or tenderness not present  Left: Mass or tenderness not present  Epididymis:      Right: Normal  Not inflamed  Left: Normal  Not inflamed  Skin:     General: Skin is warm and dry  Coloration: Skin is not pale  Neurological:      General: No focal deficit present  Mental Status: He is alert and oriented to person, place, and time  Psychiatric:         Mood and Affect: Mood normal          Behavior: Behavior normal          Thought Content:  Thought content normal        Vital Signs     Vitals:    01/17/23 1050   BP: 120/80   BP Location: Left arm   Patient Position: Sitting   Cuff Size: Standard   Pulse: 57   SpO2: 99%   Weight: 129 kg (284 lb 3 2 oz)   Height: 5' 11" (1 803 m)         Current Medications       Current Outpatient Medications:   •  amLODIPine (NORVASC) 10 mg tablet, Take 1 tablet (10 mg total) by mouth daily, Disp: 90 tablet, Rfl: 1  •  ascorbic acid (VITAMIN C) 500 mg tablet, Take 500 mg by mouth daily, Disp: , Rfl:   •  aspirin (ECOTRIN LOW STRENGTH) 81 mg EC tablet, Take 81 mg by mouth daily Pt takes 3 per week, Disp: , Rfl:   •  atorvastatin (LIPITOR) 40 mg tablet, Take 1 tablet (40 mg total) by mouth daily at bedtime, Disp: 90 tablet, Rfl: 1  •  Blood Glucose Monitoring Suppl (ONE TOUCH ULTRA 2) w/Device KIT, Test blood glucose 2 times daily (One Touch Ultra 2), Disp: 1 each, Rfl: 0  •  cholestyramine (QUESTRAN) 4 g packet, Take 1 packet (4 g total) by mouth 2 (two) times a day with meals, Disp: 60 each, Rfl: 3  •  clopidogrel (Plavix) 75 mg tablet, Take 1 tablet (75 mg total) by mouth daily, Disp: 90 tablet, Rfl: 0  •  glimepiride (AMARYL) 4 mg tablet, Take 4 mg with breakfast and 8 mg in the evening daily  , Disp: 270 tablet, Rfl: 0  •  glucose blood test strip, Test once daily (one touch ultra), Disp: 100 each, Rfl: 1  •  lisinopril-hydrochlorothiazide (PRINZIDE,ZESTORETIC) 20-12 5 MG per tablet, Take 2 tablets by mouth daily, Disp: 180 tablet, Rfl: 3  •  MAGNESIUM CITRATE PO, Take 1 tablet by mouth in the morning, Disp: , Rfl:   •  metFORMIN (GLUCOPHAGE) 1000 MG tablet, Take 2 tablets (2,000 mg total) by mouth daily after dinner, Disp: 180 tablet, Rfl: 1  •  Omega-3 Fatty Acids (FISH OIL PO), Take by mouth daily  , Disp: , Rfl:   •  ONETOUCH DELICA LANCETS 13L MISC, Test blood glucose 2 times daily, Disp: 300 each, Rfl: 0  •  prednisoLONE acetate (PRED FORTE) 1 % ophthalmic suspension, Administer 1 drop into the left eye every other day , Disp: , Rfl:   •  spironolactone (ALDACTONE) 100 mg tablet, Take 1 tablet (100 mg total) by mouth daily, Disp: 90 tablet, Rfl: 3  •  tadalafil (CIALIS) 20 MG tablet, Take 1 tablet (20 mg total) by mouth as needed for erectile dysfunction, Disp: 30 tablet, Rfl: 2  •  tamsulosin (FLOMAX) 0 4 mg, Take 1 capsule (0 4 mg total) by mouth daily with dinner, Disp: 90 capsule, Rfl: 0  •  VITAMIN E PO, Take 1 capsule by mouth daily  , Disp: , Rfl:       Active Problems     Patient Active Problem List   Diagnosis   • Abnormal MRI, lumbar spine   • Benign essential hypertension   • CAD (coronary artery disease)   • Diabetes mellitus with circulatory complication (HCC)   • Herniation of intervertebral disc of lumbar spine   • Hyperlipidemia   • Presbyopia   • Primary osteoarthritis of left knee   • Hypertension   • Atherosclerosis of native coronary artery without angina pectoris   • Class 2 severe obesity due to excess calories with serious comorbidity and body mass index (BMI) of 39 0 to 39 9 in adult [facility])   • Exercise-induced angina (HCC)   • Platelets decreased (HCC)   • Lung mass   • Secondary malignant neoplasm of left lung (HCC)   • Liver lesion   • Stage 3a chronic kidney disease (HCC)         Past Medical History     Past Medical History:   Diagnosis Date   • Allergic    • Arthritis     knee   • Coronary artery disease    • Diabetes mellitus ([address] )    • GERD (gastroesophageal reflux disease)    • H/O angioplasty    • Hypercholesteremia    • Hypertension    • Obesity    • Shingles    • Skin tag    • Sleep apnea    • Varicose veins of lower extremity          Surgical History     Past Surgical History:   Procedure Laterality Date   • CARDIAC CATHETERIZATION     • CARDIAC SURGERY     • CHOLECYSTECTOMY  01/01/2010   • COLONOSCOPY     • CORONARY ANGIOPLASTY WITH STENT PLACEMENT     • EYE SURGERY Left     ocular lens implant   • GALLBLADDER SURGERY     • HERNIA REPAIR  01/01/2009   • IR BIOPSY LUNG  9/8/2021   • KNEE ARTHROSCOPY W/ MENISCAL REPAIR Right    • KNEE SURGERY  09/10/2015   • LUNG SEGMENTECTOMY Left 10/21/2021    Procedure: RESECTION WEDGE LEFT LOWER LOBE LUNG;  Surgeon: Shy Mckeon MD;  [facility];  Service: Thoracic   • [place] [address] INCL FLUOR GDNCE DX W/CELL WASHG 100 [facility] N/A 10/21/2021    Procedure: Edmund Buchanan;  Surgeon: Shy Mckeon MD;  [facility];  Service: Thoracic   • CA TENDON SHEATH INCISION Left 8/25/2016    Procedure: RING TRIGGER FINGER RELEASE ;  Surgeon: Swathi Moscoso MD;  [facility];  Service: Orthopedics   • CA THORACOSCOPY W/THERA WEDGE RESEXN INITIAL UNILAT Left 10/21/2021    Procedure: THORACOSCOPY VIDEO ASSISTED SURGERY (VATS);   Surgeon: Jae Myers MD;  [facility];  Service: Thoracic   • VARICOSE VEIN SURGERY      Ligation         Family History     Family History   Problem Relation Age of Onset   • Cancer Mother    • Coronary artery disease Mother    • Arthritis Mother    • Heart disease Father    • Early death Father    • Coronary artery disease Father    • Diabetes Father    • Hypertension Father    • Coronary artery disease Brother    • Hyperlipidemia Brother    • Hypertension Brother    • Acne Brother          Social History     Social History       Radiology

## 2023-01-18 ENCOUNTER — APPOINTMENT (OUTPATIENT)
Dept: LAB | Facility: CLINIC | Age: 68
End: 2023-01-18

## 2023-01-18 DIAGNOSIS — R31.0 GROSS HEMATURIA: ICD-10-CM

## 2023-01-18 LAB
ANION GAP SERPL CALCULATED.3IONS-SCNC: 5 MMOL/L (ref 4–13)
BACTERIA UR QL AUTO: NORMAL /HPF
BILIRUB UR QL STRIP: NEGATIVE
BUN SERPL-MCNC: 18 MG/DL (ref 5–25)
CALCIUM SERPL-MCNC: 9.5 MG/DL (ref 8.4–10.2)
CHLORIDE SERPL-SCNC: 104 MMOL/L (ref 96–108)
CLARITY UR: CLEAR
CO2 SERPL-SCNC: 28 MMOL/L (ref 21–32)
COLOR UR: NORMAL
CREAT SERPL-MCNC: 1.03 MG/DL (ref 0.6–1.3)
GFR SERPL CREATININE-BSD FRML MDRD: 74 ML/MIN/1.73SQ M
GLUCOSE P FAST SERPL-MCNC: 159 MG/DL (ref 65–99)
GLUCOSE UR STRIP-MCNC: NEGATIVE MG/DL
HGB UR QL STRIP.AUTO: NEGATIVE
KETONES UR STRIP-MCNC: NEGATIVE MG/DL
LEUKOCYTE ESTERASE UR QL STRIP: NEGATIVE
NITRITE UR QL STRIP: NEGATIVE
NON-SQ EPI CELLS URNS QL MICRO: NORMAL /HPF
PH UR STRIP.AUTO: 5.5 [PH]
POTASSIUM SERPL-SCNC: 4.8 MMOL/L (ref 3.5–5.3)
PROT UR STRIP-MCNC: NEGATIVE MG/DL
RBC #/AREA URNS AUTO: NORMAL /HPF
SODIUM SERPL-SCNC: 137 MMOL/L (ref 135–147)
SP GR UR STRIP.AUTO: 1.01 (ref 1–1.03)
UROBILINOGEN UR STRIP-ACNC: <2 MG/DL
WBC #/AREA URNS AUTO: NORMAL /HPF

## 2023-01-24 ENCOUNTER — HOSPITAL ENCOUNTER (OUTPATIENT)
Dept: CT IMAGING | Facility: HOSPITAL | Age: 68
Discharge: HOME | End: 2023-01-24

## 2023-01-24 DIAGNOSIS — R31.0 GROSS HEMATURIA: ICD-10-CM

## 2023-01-24 RX ADMIN — IOHEXOL 100 ML: 350 INJECTION, SOLUTION INTRAVENOUS at 18:11

## 2023-02-07 ENCOUNTER — TELEPHONE (OUTPATIENT)
Dept: UROLOGY | Facility: AMBULATORY SURGERY CENTER | Age: 68
End: 2023-02-07

## 2023-02-07 DIAGNOSIS — E11.9 NON-INSULIN DEPENDENT TYPE 2 DIABETES MELLITUS (HCC): ICD-10-CM

## 2023-02-07 NOTE — TELEPHONE ENCOUNTER
Pt called and wanted to know if he should have a f/u since he had the CT scan and he hasn't heard anything back  He is still having pain and he doesn't know what he should do for that pain  Pt can be reached at [phone] leave a message if he doesn't answer

## 2023-02-07 NOTE — TELEPHONE ENCOUNTER
Patient seen in January by Trevor Calles-  Assessment and plan:      1  BPH with LUTS  - continue tamsulosin daily  - some progressive symptoms - will perform cystoscopy and TRUS on return for evaluation of CAMPBELL     2  Gross hematuria  - resolved  - repeat UA with micro, urine culture, urine cytology  - CT urogram and cystoscopy     3  Prostate cancer screening  - PSA stable     3   Erectile dysfunction  -continue sildenafil as needed      CT completed please review

## 2023-02-07 NOTE — TELEPHONE ENCOUNTER
Spoke with patient and informed him of Stuart's message  Patient had many questions about his ongoing symptoms, problems after sexual activity, pain in testicle and urethra  He requested an appt to discuss all of this prior to his cysto in March  He is reluctant to do cysto  Appt scheduled for patient 2/10/23

## 2023-02-10 ENCOUNTER — OFFICE VISIT (OUTPATIENT)
Dept: UROLOGY | Facility: CLINIC | Age: 68
End: 2023-02-10

## 2023-02-10 VITALS
HEIGHT: 71 IN | DIASTOLIC BLOOD PRESSURE: 60 MMHG | BODY MASS INDEX: 38.08 KG/M2 | WEIGHT: 272 LBS | SYSTOLIC BLOOD PRESSURE: 120 MMHG

## 2023-02-10 DIAGNOSIS — N40.0 BENIGN PROSTATIC HYPERPLASIA, UNSPECIFIED WHETHER LOWER URINARY TRACT SYMPTOMS PRESENT: Primary | ICD-10-CM

## 2023-02-10 NOTE — PROGRESS NOTES
UROLOGY PROGRESS NOTE   Patient Identifiers: Keven Callahan (MRN [id number])  Date of Service: 2/10/2023    Subjective:   58-year-old man seen for gross hematuria and hematospermia last month  CT urogram was unremarkable for cause of hematuria  He does have some urethral discomfort  Urine cytology also was negative  He is scheduled for cystoscopy in March  His last PSA was 1 8  CT does show the prostate protruding into the bladder with thickened bladder wall  He has been maintained on tamsulosin      Reason for visit: BPH and gross hematuria follow-up    Objective:     VITALS:    Vitals:    02/10/23 1323   BP: 120/60     AUA SYMPTOM SCORE    Flowsheet Row Most Recent Value   AUA SYMPTOM SCORE    How often have you had a sensation of not emptying your bladder completely after you finished urinating? 2 (P)     How often have you had to urinate again less than two hours after you finished urinating? 2 (P)     How often have you found you stopped and started again several times when you urinate? 1 (P)     How often have you found it difficult to postpone urination? 1 (P)     How often have you had a weak urinary stream? 2 (P)     How often have you had to push or strain to begin urination? 0 (P)     How many times did you most typically get up to urinate from the time you went to bed at night until the time you got up in the morning? 2 (P)     Quality of Life: If you were to spend the rest of your life with your urinary condition just the way it is now, how would you feel about that? 3 (P)     AUA SYMPTOM SCORE 10 (P)             LABS:  Lab Results   Component Value Date    HGB 13 5 01/04/2023    HCT 41 2 01/04/2023    WBC 5 97 01/04/2023     (L) 01/04/2023   ]    Lab Results   Component Value Date     08/29/2015    K 4 8 01/18/2023     01/18/2023    CO2 28 01/18/2023    BUN 18 01/18/2023    CREATININE 1 03 01/18/2023    CALCIUM 9 5 01/18/2023    GLUCOSE 135 08/29/2015   ]        INPATIENT MEDS:    Current Outpatient Medications:   •  amLODIPine (NORVASC) 10 mg tablet, Take 1 tablet (10 mg total) by mouth daily, Disp: 90 tablet, Rfl: 1  •  ascorbic acid (VITAMIN C) 500 mg tablet, Take 500 mg by mouth daily, Disp: , Rfl:   •  aspirin (ECOTRIN LOW STRENGTH) 81 mg EC tablet, Take 81 mg by mouth daily Pt takes 3 per week, Disp: , Rfl:   •  atorvastatin (LIPITOR) 40 mg tablet, Take 1 tablet (40 mg total) by mouth daily at bedtime, Disp: 90 tablet, Rfl: 1  •  Blood Glucose Monitoring Suppl (ONE TOUCH ULTRA 2) w/Device KIT, Test blood glucose 2 times daily (One Touch Ultra 2), Disp: 1 each, Rfl: 0  •  cholestyramine (QUESTRAN) 4 g packet, Take 1 packet (4 g total) by mouth 2 (two) times a day with meals, Disp: 60 each, Rfl: 3  •  clopidogrel (Plavix) 75 mg tablet, Take 1 tablet (75 mg total) by mouth daily, Disp: 90 tablet, Rfl: 0  •  glimepiride (AMARYL) 4 mg tablet, Take 4 mg with breakfast and 8 mg in the evening daily  , Disp: 270 tablet, Rfl: 0  •  glucose blood test strip, Test once daily (one touch ultra), Disp: 100 each, Rfl: 1  •  lisinopril-hydrochlorothiazide (PRINZIDE,ZESTORETIC) 20-12 5 MG per tablet, Take 2 tablets by mouth daily, Disp: 180 tablet, Rfl: 3  •  MAGNESIUM CITRATE PO, Take 1 tablet by mouth in the morning, Disp: , Rfl:   •  metFORMIN (GLUCOPHAGE) 1000 MG tablet, Take 2 tablets (2,000 mg total) by mouth daily after dinner, Disp: 180 tablet, Rfl: 1  •  Omega-3 Fatty Acids (FISH OIL PO), Take by mouth daily  , Disp: , Rfl:   •  ONETOUCH DELICA LANCETS 91N MISC, Test blood glucose 2 times daily, Disp: 300 each, Rfl: 0  •  prednisoLONE acetate (PRED FORTE) 1 % ophthalmic suspension, Administer 1 drop into the left eye every other day , Disp: , Rfl:   •  spironolactone (ALDACTONE) 100 mg tablet, Take 1 tablet (100 mg total) by mouth daily, Disp: 90 tablet, Rfl: 3  •  tadalafil (CIALIS) 20 MG tablet, Take 1 tablet (20 mg total) by mouth as needed for erectile dysfunction, Disp: 30 tablet, Rfl: 2  •  tamsulosin (FLOMAX) 0 4 mg, Take 1 capsule (0 4 mg total) by mouth daily with dinner, Disp: 90 capsule, Rfl: 0  •  VITAMIN E PO, Take 1 capsule by mouth daily  , Disp: , Rfl:       Physical Exam:   /60 (BP Location: Left arm, Patient Position: Sitting, Cuff Size: Large)   Ht 5' 11" (1 803 m)   Wt 123 kg (272 lb)   BMI 37 94 kg/m²   GEN: no acute distress    RESP: breathing comfortably with no accessory muscle use    ABD: soft, non-tender, non-distended   INCISION:    EXT: no significant peripheral edema   (Male): Penis circumcised, phallus normal, meatus patent  Testicles descended into scrotum bilaterally without masses nor tenderness  No inguinal hernias bilaterally  ISAIAS: Prostate is enlarged at 45 grams  The prostate is not boggy  The prostate is not tender  No nodules noted      RADIOLOGY:   CT ABDOMEN AND PELVIS WITH AND WITHOUT IV CONTRAST   IMPRESSION:     1  No definite renal mass, urothelial lesion, nephrolithiasis or obstructive uropathy  2   Enlarged prostate with median lobe hyperplasia and nodularity protruding into the base of the urinary bladder      Assessment:   #1    BPH    Plan:   -Reviewed all questions with the patient and answered concerns  -We will proceed with cystoscopy as scheduled  -  -

## 2023-02-15 ENCOUNTER — APPOINTMENT (OUTPATIENT)
Dept: LAB | Facility: CLINIC | Age: 68
End: 2023-02-15

## 2023-02-15 DIAGNOSIS — E11.9 NON-INSULIN DEPENDENT TYPE 2 DIABETES MELLITUS (HCC): ICD-10-CM

## 2023-02-15 DIAGNOSIS — E78.5 HYPERLIPIDEMIA, UNSPECIFIED HYPERLIPIDEMIA TYPE: ICD-10-CM

## 2023-02-15 DIAGNOSIS — I10 HYPERTENSION, UNSPECIFIED TYPE: ICD-10-CM

## 2023-02-15 DIAGNOSIS — N18.31 STAGE 3A CHRONIC KIDNEY DISEASE (HCC): ICD-10-CM

## 2023-02-15 LAB
ALBUMIN SERPL BCP-MCNC: 4.5 G/DL (ref 3.5–5)
ALP SERPL-CCNC: 49 U/L (ref 34–104)
ALT SERPL W P-5'-P-CCNC: 15 U/L (ref 7–52)
ANION GAP SERPL CALCULATED.3IONS-SCNC: 8 MMOL/L (ref 4–13)
AST SERPL W P-5'-P-CCNC: 12 U/L (ref 13–39)
BILIRUB SERPL-MCNC: 0.63 MG/DL (ref 0.2–1)
BUN SERPL-MCNC: 30 MG/DL (ref 5–25)
CALCIUM SERPL-MCNC: 10.2 MG/DL (ref 8.4–10.2)
CHLORIDE SERPL-SCNC: 100 MMOL/L (ref 96–108)
CO2 SERPL-SCNC: 27 MMOL/L (ref 21–32)
CREAT SERPL-MCNC: 1.3 MG/DL (ref 0.6–1.3)
GFR SERPL CREATININE-BSD FRML MDRD: 56 ML/MIN/1.73SQ M
GLUCOSE P FAST SERPL-MCNC: 172 MG/DL (ref 65–99)
POTASSIUM SERPL-SCNC: 4.5 MMOL/L (ref 3.5–5.3)
PROT SERPL-MCNC: 7.2 G/DL (ref 6.4–8.4)
SODIUM SERPL-SCNC: 135 MMOL/L (ref 135–147)

## 2023-03-16 ENCOUNTER — OFFICE VISIT (OUTPATIENT)
Dept: CARDIOLOGY CLINIC | Facility: CLINIC | Age: 68
End: 2023-03-16

## 2023-03-16 VITALS
HEART RATE: 65 BPM | DIASTOLIC BLOOD PRESSURE: 66 MMHG | SYSTOLIC BLOOD PRESSURE: 126 MMHG | WEIGHT: 273.5 LBS | BODY MASS INDEX: 38.15 KG/M2 | OXYGEN SATURATION: 95 %

## 2023-03-16 DIAGNOSIS — I10 BENIGN ESSENTIAL HYPERTENSION: Primary | ICD-10-CM

## 2023-03-16 DIAGNOSIS — I25.10 CORONARY ARTERY DISEASE INVOLVING NATIVE CORONARY ARTERY OF NATIVE HEART WITHOUT ANGINA PECTORIS: ICD-10-CM

## 2023-03-16 DIAGNOSIS — E78.5 HYPERLIPIDEMIA, UNSPECIFIED HYPERLIPIDEMIA TYPE: ICD-10-CM

## 2023-03-16 NOTE — PROGRESS NOTES
Cardiology Follow Up    Leonetta Koyanagi  1955  [id number]  [facility]  [address]  [address]  [phone]  [phone]    1  Benign essential hypertension        2  Coronary artery disease involving native coronary artery of native heart without angina pectoris  Lipid Panel with Direct LDL reflex      3  Hyperlipidemia, unspecified hyperlipidemia type            Diagnoses and all orders for this visit:    Benign essential hypertension    Coronary artery disease involving native coronary artery of native heart without angina pectoris  -     Lipid Panel with Direct LDL reflex; Future    Hyperlipidemia, unspecified hyperlipidemia type      I had the pleasure of seeing Leonetta Koyanagi for a follow up visit  INTERVAL HISTORY:  None    History of the presenting illness, Discussion/Summary and My Plan are as follows:::    He is a pleasant 78-year-old gentleman with a history of hypertension, diabetes and dyslipidemia  He also has coronary artery disease and has had multiple stenting procedures  Based on his last coronary angiography report from the 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 but PET negative and has seen Throacic surgery - last in Dec 2022    Keeping busy at home taking care of his wife and the home and golfing when warmer  Plays golf, mostly rides, walking limited by arthritis, cuts his grass at home  No anginal symptoms at this time   He had vein stripping of his right lower extremity which has disproportionately more edema  Plan:      Coronary artery disease: no H/O MI,  Status post [id number]-patent in 2016, status post proximal RCA stent in - stent - in-stent-last stenting-September 2016 with a Xience stent, (3 STENTS)  No PCI since 2016  No anginal symptoms at this time (had them at the times of his PCI)  Indefinite Aspirin and Plavix, not on metoprolol but asymptomatic, heart rates often are bradycardic     Stress tests:   November 2019: Exercise time 7 minutes 30 seconds, no ischemia   December 2017:Exercise nuclear perfusion study, 7 minutes 31 seconds, resting hypertension with hypertensive response, no ischemia, no ECG changes, no chest pains  More exercise is recommended, no testing at this time, pt is agreeable    Dyslipidemia: on atorvastatin 40 mg,   Controlled, see below, continue to watch LDL RECHECK    HTN: controlled, on multiple meds     DM: As above  last A1c was 6 8 in Jan 2023     Chronic right more than left lower extremity edema: No need for Dopplers, had vein stripping for varicose veins in the past  Foot end elevation, sometimes uses compression stockings  No increase with Amlodipine use    Follow up in 6 months        Latest Reference Range & Units 05/20/21 11:05   Cholesterol 50 - 200 mg/dL 132   Triglycerides <=150 mg/dL 162 (H)   HDL >=40 mg/dL 40   LDL Calculated 0 - 100 mg/dL 60   (H): Data is abnormally high     Latest Reference Range & Units 03/17/22 11:37   Hemoglobin A1C  7 0 (H)   (H): Data is abnormally high    Patient Active Problem List   Diagnosis   • Abnormal MRI, lumbar spine   • Benign essential hypertension   • CAD (coronary artery disease)   • Diabetes mellitus with circulatory complication (HCC)   • Herniation of intervertebral disc of lumbar spine   • Hyperlipidemia   • Presbyopia   • Primary osteoarthritis of left knee   • Hypertension   • Atherosclerosis of native coronary artery without angina pectoris • Class 2 severe obesity due to excess calories with serious comorbidity and body mass index (BMI) of 39 0 to 39 9 in adult [facility])   • Exercise-induced angina (HCC)   • Platelets decreased (HCC)   • Lung mass   • Secondary malignant neoplasm of left lung (HCC)   • Liver lesion   • Stage 3a chronic kidney disease (HCC)     Past Medical History:   Diagnosis Date   • Allergic    • Arthritis     knee   • Coronary artery disease    • Diabetes mellitus ([facility] [address] )    • Erectile dysfunction 2017   • GERD (gastroesophageal reflux disease)    • H/O angioplasty    • Hypercholesteremia    • Hypertension    • Interstitial cystitis    • Obesity    • Shingles    • Skin tag    • Sleep apnea    • Varicose veins of lower extremity      Social History     Socioeconomic History   • Marital status: /Civil Union     Spouse name: Not on file   • Number of children: Not on file   • Years of education: Not on file   • Highest education level: Not on file   Occupational History   • Not on file   Tobacco Use   • Smoking status: Former     Packs/day: 2 00     Years: 15 00     Pack years: 30 00     Types: Cigarettes     Quit date: 1982     Years since quittin 2   • Smokeless tobacco: Never   Vaping Use   • Vaping Use: Never used   Substance and Sexual Activity   • Alcohol use: Not Currently   • Drug use: Never   • Sexual activity: Not Currently     Partners: Female   Other Topics Concern   • Not on file   Social History Narrative   • Not on file     Social Determinants of Health     Financial Resource Strain: Low Risk    • Difficulty of Paying Living Expenses: Not very hard   Food Insecurity: Not on file   Transportation Needs: No Transportation Needs   • Lack of Transportation (Medical): No   • Lack of Transportation (Non-Medical):  No   Physical Activity: Not on file   Stress: Not on file   Social Connections: Not on file   Intimate Partner Violence: Not on file   Housing Stability: Not on file      Family History   Problem Relation Age of Onset   • Cancer Mother    • Coronary artery disease Mother    • Arthritis Mother    • Heart disease Father    • Early death Father    • Coronary artery disease Father    • Diabetes Father    • Hypertension Father    • Coronary artery disease Brother    • Hyperlipidemia Brother    • Hypertension Brother    • Acne Brother      Past Surgical History:   Procedure Laterality Date   • CARDIAC CATHETERIZATION     • CARDIAC SURGERY     • CHOLECYSTECTOMY  01/01/2010   • COLONOSCOPY     • CORONARY ANGIOPLASTY WITH STENT PLACEMENT     • EYE SURGERY Left     ocular lens implant   • GALLBLADDER SURGERY     • HERNIA REPAIR  01/01/2009   • IR BIOPSY LUNG  9/8/2021   • KNEE ARTHROSCOPY W/ MENISCAL REPAIR Right    • KNEE SURGERY  09/10/2015   • LUNG SEGMENTECTOMY Left 10/21/2021    Procedure: RESECTION WEDGE LEFT LOWER LOBE LUNG;  Surgeon: Mickey Acosta MD;  Location: [facility];  Service: Thoracic   • MD [address] INCL FLUOR GDNCE DX W/CELL WASHG [facility] N/A 10/21/2021    Procedure: Benoit Andrea;  Surgeon: Mickey Acosta MD;  Location: [facility];  Service: Thoracic   • MD TENDON SHEATH INCISION Left 8/25/2016    Procedure: RING TRIGGER FINGER RELEASE ;  Surgeon: Melany Sexton MD;  Location: [facility];  Service: Orthopedics   • MD THORACOSCOPY W/THERA WEDGE RESEXN INITIAL UNILAT Left 10/21/2021    Procedure: THORACOSCOPY VIDEO ASSISTED SURGERY (VATS);   Surgeon: Mickey Acosta MD;  [facility]: [facility];  Service: Thoracic   • VARICOSE VEIN SURGERY      Ligation       Current Outpatient Medications:   •  amLODIPine (NORVASC) 10 mg tablet, Take 1 tablet (10 mg total) by mouth daily, Disp: 90 tablet, Rfl: 1  •  ascorbic acid (VITAMIN C) 500 mg tablet, Take 500 mg by mouth daily, Disp: , Rfl:   •  aspirin (ECOTRIN LOW STRENGTH) 81 mg EC tablet, Take 81 mg by mouth daily Pt takes 3 per week, Disp: , Rfl:   •  atorvastatin (LIPITOR) 40 mg tablet, Take 1 tablet (40 mg total) by mouth daily at bedtime, Disp: 90 tablet, Rfl: 1  •  Blood Glucose Monitoring Suppl (ONE TOUCH ULTRA 2) w/Device KIT, Test blood glucose 2 times daily (One Touch Ultra 2), Disp: 1 each, Rfl: 0  •  cholestyramine (QUESTRAN) 4 g packet, Take 1 packet (4 g total) by mouth 2 (two) times a day with meals, Disp: 60 each, Rfl: 3  •  clopidogrel (Plavix) 75 mg tablet, Take 1 tablet (75 mg total) by mouth daily, Disp: 90 tablet, Rfl: 0  •  glimepiride (AMARYL) 4 mg tablet, Take 4 mg with breakfast and 8 mg in the evening daily  , Disp: 270 tablet, Rfl: 0  •  glucose blood test strip, Test once daily (one touch ultra), Disp: 100 each, Rfl: 1  •  lisinopril-hydrochlorothiazide (PRINZIDE,ZESTORETIC) 20-12 5 MG per tablet, Take 2 tablets by mouth daily, Disp: 180 tablet, Rfl: 3  •  MAGNESIUM CITRATE PO, Take 1 tablet by mouth in the morning, Disp: , Rfl:   •  metFORMIN (GLUCOPHAGE) 1000 MG tablet, Take 2 tablets (2,000 mg total) by mouth daily after dinner, Disp: 180 tablet, Rfl: 1  •  Omega-3 Fatty Acids (FISH OIL PO), Take by mouth daily  , Disp: , Rfl:   •  ONETOUCH DELICA LANCETS 40E MISC, Test blood glucose 2 times daily, Disp: 300 each, Rfl: 0  •  prednisoLONE acetate (PRED FORTE) 1 % ophthalmic suspension, Administer 1 drop into the left eye every other day , Disp: , Rfl:   •  spironolactone (ALDACTONE) 100 mg tablet, Take 1 tablet (100 mg total) by mouth daily, Disp: 90 tablet, Rfl: 3  •  tadalafil (CIALIS) 20 MG tablet, Take 1 tablet (20 mg total) by mouth as needed for erectile dysfunction, Disp: 30 tablet, Rfl: 2  •  tamsulosin (FLOMAX) 0 4 mg, Take 1 capsule (0 4 mg total) by mouth daily with dinner, Disp: 90 capsule, Rfl: 0  •  VITAMIN E PO, Take 1 capsule by mouth daily  , Disp: , Rfl:   Allergies   Allergen Reactions   • Ciprofloxacin Swelling   • Janumet [Sitagliptin-Metformin Hcl] Lip Swelling     Lip swelling    • Terazosin      [facility] [id number]: Blood in sperm       Imaging: No results found      Review of Systems:  Review of Systems   Constitutional: Negative  HENT: Negative  Respiratory: Negative  Cardiovascular: Negative  Endocrine: Negative  Musculoskeletal: Positive for arthralgias (hip pains)  Neurological: Negative  Physical Exam:  /66 (BP Location: Right arm, Patient Position: Sitting, Cuff Size: Extra-Large)   Pulse 65   Wt 124 kg (273 lb 8 oz)   SpO2 95%   BMI 38 15 kg/m²   Physical Exam  Constitutional:       General: He is not in acute distress  Appearance: He is well-developed  He is not diaphoretic  HENT:      Head: Normocephalic and atraumatic  Eyes:      General: No scleral icterus  Right eye: No discharge  Left eye: No discharge  Conjunctiva/sclera: Conjunctivae normal       Pupils: Pupils are equal, round, and reactive to light  Neck:      Thyroid: No thyromegaly  Vascular: No JVD  Trachea: No tracheal deviation  Cardiovascular:      Rate and Rhythm: Normal rate and regular rhythm  Heart sounds: No murmur heard  No friction rub  Pulmonary:      Effort: Pulmonary effort is normal  No respiratory distress  Breath sounds: Normal breath sounds  No stridor  Abdominal:      General: Bowel sounds are normal  There is no distension  Palpations: Abdomen is soft  Tenderness: There is no abdominal tenderness  There is no guarding  Musculoskeletal:         General: Swelling (Right lower extremity edema) present  No tenderness or deformity  Normal range of motion  Cervical back: Normal range of motion  Skin:     General: Skin is warm  Coloration: Skin is not pale  Findings: No erythema or rash

## 2023-03-20 DIAGNOSIS — E13.9 DIABETES 1.5, MANAGED AS TYPE 2 (HCC): ICD-10-CM

## 2023-03-20 DIAGNOSIS — N39.41 URGE INCONTINENCE OF URINE: ICD-10-CM

## 2023-03-20 DIAGNOSIS — E78.5 HYPERLIPIDEMIA, UNSPECIFIED HYPERLIPIDEMIA TYPE: ICD-10-CM

## 2023-03-20 RX ORDER — ATORVASTATIN CALCIUM 40 MG/1
40 TABLET, FILM COATED ORAL
Qty: 90 TABLET | Refills: 0 | Status: CANCELLED | OUTPATIENT
Start: 2023-03-20

## 2023-03-21 RX ORDER — GLIMEPIRIDE 4 MG/1
TABLET ORAL
Qty: 270 TABLET | Refills: 1 | Status: SHIPPED | OUTPATIENT
Start: 2023-03-21

## 2023-03-21 RX ORDER — TAMSULOSIN HYDROCHLORIDE 0.4 MG/1
0.4 CAPSULE ORAL
Qty: 90 CAPSULE | Refills: 1 | Status: SHIPPED | OUTPATIENT
Start: 2023-03-21

## 2023-04-18 ENCOUNTER — FOLLOW UP (OUTPATIENT)
Dept: URBAN - METROPOLITAN AREA CLINIC 6 | Facility: CLINIC | Age: 68
End: 2023-04-18

## 2023-04-18 DIAGNOSIS — H25.811: ICD-10-CM

## 2023-04-18 DIAGNOSIS — D31.32: ICD-10-CM

## 2023-04-18 DIAGNOSIS — H04.123: ICD-10-CM

## 2023-04-18 DIAGNOSIS — E11.3293: ICD-10-CM

## 2023-04-18 DIAGNOSIS — H20.022: ICD-10-CM

## 2023-04-18 DIAGNOSIS — Z96.1: ICD-10-CM

## 2023-04-18 DIAGNOSIS — D31.31: ICD-10-CM

## 2023-04-18 DIAGNOSIS — D86.9: ICD-10-CM

## 2023-04-18 LAB
LEFT EYE DIABETIC RETINOPATHY: POSITIVE
RIGHT EYE DIABETIC RETINOPATHY: POSITIVE
SEVERITY (EYE EXAM): NORMAL

## 2023-04-18 PROCEDURE — 92134 CPTRZ OPH DX IMG PST SGM RTA: CPT

## 2023-04-18 PROCEDURE — 92014 COMPRE OPH EXAM EST PT 1/>: CPT

## 2023-04-18 ASSESSMENT — TONOMETRY
OD_IOP_MMHG: 16
OS_IOP_MMHG: 18

## 2023-04-18 ASSESSMENT — VISUAL ACUITY
OS_CC: 20/25-2
OS_CC: J1+
OD_CC: 20/20
OD_CC: J1+

## 2023-06-01 DIAGNOSIS — I10 HYPERTENSION, UNSPECIFIED TYPE: ICD-10-CM

## 2023-06-01 RX ORDER — SPIRONOLACTONE 100 MG/1
100 TABLET, FILM COATED ORAL DAILY
Qty: 90 TABLET | Refills: 0 | Status: SHIPPED | OUTPATIENT
Start: 2023-06-01

## 2023-06-05 ENCOUNTER — RA CDI HCC (OUTPATIENT)
Dept: OTHER | Facility: HOSPITAL | Age: 68
End: 2023-06-05

## 2023-06-05 NOTE — PROGRESS NOTES
E11 22   [id number]   [facility]  coding opportunities          Chart Reviewed number of suggestions sent to Provider: 2     Patients Insurance     Medicare Insurance: [employer]

## 2023-06-06 ENCOUNTER — HOSPITAL ENCOUNTER (OUTPATIENT)
Dept: CT IMAGING | Facility: HOSPITAL | Age: 68
Discharge: HOME | End: 2023-06-06
Payer: MEDICARE

## 2023-06-06 DIAGNOSIS — R91.8 LUNG MASS: ICD-10-CM

## 2023-06-06 PROCEDURE — G1004 CDSM NDSC: HCPCS

## 2023-06-06 PROCEDURE — 71250 CT THORAX DX C-: CPT

## 2023-06-09 ENCOUNTER — APPOINTMENT (OUTPATIENT)
Dept: LAB | Facility: CLINIC | Age: 68
End: 2023-06-09
Payer: MEDICARE

## 2023-06-09 DIAGNOSIS — I25.10 CORONARY ARTERY DISEASE INVOLVING NATIVE CORONARY ARTERY OF NATIVE HEART WITHOUT ANGINA PECTORIS: ICD-10-CM

## 2023-06-09 DIAGNOSIS — E78.5 HYPERLIPIDEMIA, UNSPECIFIED HYPERLIPIDEMIA TYPE: ICD-10-CM

## 2023-06-09 DIAGNOSIS — I25.10 CORONARY ARTERY DISEASE INVOLVING NATIVE CORONARY ARTERY OF NATIVE HEART WITHOUT ANGINA PECTORIS: Primary | ICD-10-CM

## 2023-06-09 LAB
CHOLEST SERPL-MCNC: 135 MG/DL
HDLC SERPL-MCNC: 42 MG/DL
LDLC SERPL CALC-MCNC: 38 MG/DL (ref 0–100)
TRIGL SERPL-MCNC: 275 MG/DL

## 2023-06-09 PROCEDURE — 36415 COLL VENOUS BLD VENIPUNCTURE: CPT

## 2023-06-09 PROCEDURE — 80061 LIPID PANEL: CPT

## 2023-06-13 ENCOUNTER — OFFICE VISIT (OUTPATIENT)
Dept: CARDIAC SURGERY | Facility: CLINIC | Age: 68
End: 2023-06-13
Payer: MEDICARE

## 2023-06-13 VITALS
SYSTOLIC BLOOD PRESSURE: 139 MMHG | HEIGHT: 71 IN | HEART RATE: 68 BPM | RESPIRATION RATE: 17 BRPM | OXYGEN SATURATION: 98 % | BODY MASS INDEX: 38.43 KG/M2 | DIASTOLIC BLOOD PRESSURE: 79 MMHG | TEMPERATURE: 97.8 F | WEIGHT: 274.47 LBS

## 2023-06-13 DIAGNOSIS — C78.02 SECONDARY MALIGNANT NEOPLASM OF LEFT LUNG (HCC): Primary | ICD-10-CM

## 2023-06-13 PROBLEM — R91.8 LUNG MASS: Status: RESOLVED | Noted: 2020-12-15 | Resolved: 2023-06-13

## 2023-06-13 PROCEDURE — 99213 OFFICE O/P EST LOW 20 MIN: CPT | Performed by: THORACIC SURGERY (CARDIOTHORACIC VASCULAR SURGERY)

## 2023-06-13 NOTE — PROGRESS NOTES
Assessment/Plan:    Secondary malignant neoplasm of left lung ([address] )  Diogenes Rivera is stable from a thoracic surgery standpoint  His most recent CT scan reveals an increase in size of the left upper lobe ggo, but still with no solid component  His remaining small nodules have been stable since 2020  We will have him return in 6 mo with a new CT scan for continued surveillance  He is in agreement with the plan  Diagnoses and all orders for this visit:    Secondary malignant neoplasm of left lung ([address] )  -     CT chest wo contrast; Future          Thoracic History   Cancer Staging   No matching staging information was found for the patient  Oncology History    No history exists  Patient ID: Betzy Del Cid is a 76 y o  male  ECOG 0  HPI     Mr Annika Smith is a 75 yo gentleman who underwent a 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  He was last seen on 12/6/22 at which point his CT scan revealed a slight increase in the left upper lobe ggo, measuring 1 8 x 1 0 cm  His remaining nodules were stable, all measuring 5-8 mm  He returns today, 1 5 years out from surgery with a CT from 6/6/23  The left upper lobe GGO now measures 2 2 x 1 2 cm without a solid component  There are smaller nodules measuring up to 8 mm in the right upper and lower lobes, which have been stable since 11/20  On discussion, he feels pretty good  He denies fever, chills, cough, PND, shortness of breath, or new bone pains  He was a smoker until he was 27 yo  The following portions of the patient's history were reviewed and updated as appropriate: allergies, current medications, past family history, past medical history, past social history, past surgical history and problem list     Review of Systems      Objective:   Physical Exam  Vitals reviewed  Constitutional:       General: He is not in acute distress  Appearance: Normal appearance  He is not toxic-appearing  "  HENT:      Head: Normocephalic and atraumatic  Eyes:      General: No scleral icterus  Extraocular Movements: Extraocular movements intact  Comments: glasses   Cardiovascular:      Rate and Rhythm: Normal rate and regular rhythm  Heart sounds: Normal heart sounds  No murmur heard  Pulmonary:      Effort: Pulmonary effort is normal  No respiratory distress  Breath sounds: Normal breath sounds  No wheezing  Abdominal:      General: Bowel sounds are normal       Palpations: Abdomen is soft  Musculoskeletal:      Cervical back: Normal range of motion and neck supple  Lymphadenopathy:      Cervical: No cervical adenopathy  Skin:     General: Skin is warm and dry  Neurological:      Mental Status: He is alert and oriented to person, place, and time  Motor: No weakness  Psychiatric:         Mood and Affect: Mood normal          Behavior: Behavior normal          Thought Content: Thought content normal      /79 (BP Location: Left arm, Patient Position: Sitting, Cuff Size: Standard)   Pulse 68   Temp 97 8 °F (36 6 °C) (Temporal)   Resp 17   Ht 5' 11\" (1 803 m)   Wt 124 kg (274 lb 7 6 oz)   SpO2 98%   BMI 38 28 kg/m²     CT chest wo contrast    Result Date: 6/9/2023  Narrative CT CHEST WITHOUT IV CONTRAST INDICATION:   R91 8: Other nonspecific abnormal finding of lung field  Left lower lobe 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 on 10/21/2021  COMPARISON: Chest CT  12/1/2022, 6/2/2022, 8/12/2021, 11/24/2020  TECHNIQUE: Chest CT without intravenous contrast   Axial, sagittal, coronal 2D reformats and coronal MIPS from source data  Radiation dose length product (DLP):  724 mGy-cm   Radiation dose exposure minimized using iterative reconstruction and automated exposure control  FINDINGS: LUNGS: Increased in left upper lobe paramediastinal groundglass nodule from December 2022 from 1 8 x 1 0 cm to 2 2 x 1 2 cm (3/86), with no solid component   8 mm solid right " upper lobe nodule (3/77), 5 mm solid right lower lobe nodule (3/145), and 5 mm and smaller groundglass nodules in the posterior segment right upper lobe (3/93), right lower lobe (3/158, 186), stable since November 2020  Left lower lobe wedge resection with mild left lower lobe scar  AIRWAYS: No significant filling defects  PLEURA:  Unremarkable  HEART/GREAT VESSELS: Normal heart size  Moderate coronary artery calcification indicating atherosclerotic heart disease  Pulmonary artery enlargement  MEDIASTINUM AND OLIVIA:  Unremarkable  CHEST WALL AND LOWER NECK: Unremarkable  UPPER ABDOMEN: 2 0 cm low-attenuation lesion in the medial segment left hepatic lobe shown on MRI from July 2022 to be focal steatosis (2/118)  Persistent splenomegaly at 16 cm  Cholecystectomy  OSSEOUS STRUCTURES: Mild degenerative disease in the spine  Impression Nothing indicate local tumor recurrence  Continued enlargement of left upper lobe groundglass nodule with no solid component, question adenocarcinoma spectrum lesion  Several additional 8 mm and smaller solid and groundglass nodules stable since November 2020   Workstation performed: [id number]

## 2023-06-13 NOTE — ASSESSMENT & PLAN NOTE
Michael Gonzales is stable from a thoracic surgery standpoint  His most recent CT scan reveals an increase in size of the left upper lobe ggo, but still with no solid component  His remaining small nodules have been stable since 2020  We will have him return in 6 mo with a new CT scan for continued surveillance  He is in agreement with the plan

## 2023-06-14 ENCOUNTER — OFFICE VISIT (OUTPATIENT)
Dept: INTERNAL MEDICINE CLINIC | Age: 68
End: 2023-06-14
Payer: MEDICARE

## 2023-06-14 ENCOUNTER — TELEPHONE (OUTPATIENT)
Dept: CARDIOLOGY CLINIC | Facility: CLINIC | Age: 68
End: 2023-06-14

## 2023-06-14 VITALS
BODY MASS INDEX: 38.5 KG/M2 | HEART RATE: 62 BPM | SYSTOLIC BLOOD PRESSURE: 128 MMHG | OXYGEN SATURATION: 98 % | WEIGHT: 275 LBS | HEIGHT: 71 IN | TEMPERATURE: 97.7 F | DIASTOLIC BLOOD PRESSURE: 80 MMHG

## 2023-06-14 DIAGNOSIS — N40.1 BENIGN PROSTATIC HYPERPLASIA WITH NOCTURIA: ICD-10-CM

## 2023-06-14 DIAGNOSIS — I10 HYPERTENSION, UNSPECIFIED TYPE: ICD-10-CM

## 2023-06-14 DIAGNOSIS — E11.59 TYPE 2 DIABETES MELLITUS WITH OTHER CIRCULATORY COMPLICATION, WITHOUT LONG-TERM CURRENT USE OF INSULIN (HCC): ICD-10-CM

## 2023-06-14 DIAGNOSIS — C78.02 SECONDARY MALIGNANT NEOPLASM OF LEFT LUNG (HCC): ICD-10-CM

## 2023-06-14 DIAGNOSIS — E78.5 HYPERLIPIDEMIA, UNSPECIFIED HYPERLIPIDEMIA TYPE: ICD-10-CM

## 2023-06-14 DIAGNOSIS — I10 BENIGN ESSENTIAL HYPERTENSION: Primary | ICD-10-CM

## 2023-06-14 DIAGNOSIS — R35.1 BENIGN PROSTATIC HYPERPLASIA WITH NOCTURIA: ICD-10-CM

## 2023-06-14 DIAGNOSIS — I20.8 EXERCISE-INDUCED ANGINA (HCC): ICD-10-CM

## 2023-06-14 DIAGNOSIS — E11.9 NON-INSULIN DEPENDENT TYPE 2 DIABETES MELLITUS (HCC): ICD-10-CM

## 2023-06-14 PROCEDURE — 99214 OFFICE O/P EST MOD 30 MIN: CPT | Performed by: PHYSICIAN ASSISTANT

## 2023-06-14 RX ORDER — ATORVASTATIN CALCIUM 40 MG/1
40 TABLET, FILM COATED ORAL
Qty: 90 TABLET | Refills: 1 | Status: SHIPPED | OUTPATIENT
Start: 2023-06-14

## 2023-06-14 RX ORDER — AMLODIPINE BESYLATE 10 MG/1
10 TABLET ORAL DAILY
Qty: 90 TABLET | Refills: 1 | Status: SHIPPED | OUTPATIENT
Start: 2023-06-14

## 2023-06-14 NOTE — PROGRESS NOTES
Assessment/Plan:         Diagnoses and all orders for this visit:    Benign essential hypertension  Comments:  well controlled     Non-insulin dependent type 2 diabetes mellitus ([facility] [address] )  Comments:  improved hga1c 6 8  discussed increased exercise with goal 30 min / day     Orders:  -     metFORMIN (GLUCOPHAGE) 1000 MG tablet; Take 2 tablets (2,000 mg total) by mouth daily after dinner  -     Albumin / creatinine urine ratio; Future  -     CBC and differential; Future  -     Comprehensive metabolic panel; Future  -     HEMOGLOBIN A1C W/ EAG ESTIMATION; Future    Hyperlipidemia, unspecified hyperlipidemia type  Comments:  continue atorvastatin   Orders:  -     atorvastatin (LIPITOR) 40 mg tablet; Take 1 tablet (40 mg total) by mouth daily at bedtime  -     CBC and differential; Future  -     Comprehensive metabolic panel; Future  -     HEMOGLOBIN A1C W/ EAG ESTIMATION; Future    Hypertension, unspecified type  Comments:  controlled  limit salt intake  Orders:  -     amLODIPine (NORVASC) 10 mg tablet; Take 1 tablet (10 mg total) by mouth daily  -     CBC and differential; Future  -     Comprehensive metabolic panel; Future  -     HEMOGLOBIN A1C W/ EAG ESTIMATION; Future    Type 2 diabetes mellitus with other circulatory complication, without long-term current use of insulin (HCC)  Comments:  due for hga1c ordered today    Exercise-induced angina (HCC)    Secondary malignant neoplasm of left lung (HCC)    Benign prostatic hyperplasia with nocturia  Comments:  pt wishes second opinon, Dr Ondina Powell given          BMI Counseling: Body mass index is 38 35 kg/m²  The BMI is above normal  Nutrition recommendations include encouraging healthy choices of fruits and vegetables and limiting drinks that contain sugar  Exercise recommendations include exercising 3-5 times per week  Rationale for BMI follow-up plan is due to patient being overweight or obese  Subjective:      Patient ID: Victorina Perez is a 76 y o  male     77 y/o male with pmh sig for dm, htn, hld, bph presents for f/u    Pt is s/p 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  He has been following with cardiothoracic sx since that time and has had surveillance CT every 6 months with no sig change in size of nodules  PET negative  DM - pt does not watch at home   Due for hga1c   Will do urine in office today       The following portions of the patient's history were reviewed and updated as appropriate: allergies, current medications, past family history, past medical history, past social history, past surgical history and problem list     Review of Systems   Constitutional: Negative for activity change, appetite change, chills, diaphoresis, fatigue and fever  HENT: Negative for congestion and sore throat  Eyes: Negative for pain and visual disturbance  Respiratory: Negative for cough, shortness of breath and wheezing  Cardiovascular: Negative for chest pain, palpitations and leg swelling  Gastrointestinal: Negative for abdominal pain, constipation, diarrhea and nausea  Genitourinary: Positive for frequency (night time frequency )  Negative for flank pain, penile swelling and testicular pain  Musculoskeletal: Negative for arthralgias, back pain and gait problem  Skin: Negative for rash  Neurological: Negative for dizziness, light-headedness and headaches  Psychiatric/Behavioral: Negative for sleep disturbance  The patient is not nervous/anxious            Past Medical History:   Diagnosis Date   • Allergic    • Arthritis     knee   • Coronary artery disease    • Diabetes mellitus ([facility] [address] )    • Erectile dysfunction 2017   • GERD (gastroesophageal reflux disease)    • H/O angioplasty    • Hypercholesteremia    • Hypertension    • Interstitial cystitis    • Obesity    • Shingles    • Skin tag    • Sleep apnea    • Varicose veins of lower extremity          Current Outpatient Medications:   •  amLODIPine (NORVASC) 10 mg tablet, Take 1 tablet (10 mg total) by mouth daily, Disp: 90 tablet, Rfl: 1  •  ascorbic acid (VITAMIN C) 500 mg tablet, Take 500 mg by mouth daily, Disp: , Rfl:   •  aspirin (ECOTRIN LOW STRENGTH) 81 mg EC tablet, Take 81 mg by mouth daily Pt takes 3 per week, Disp: , Rfl:   •  atorvastatin (LIPITOR) 40 mg tablet, Take 1 tablet (40 mg total) by mouth daily at bedtime, Disp: 90 tablet, Rfl: 1  •  Blood Glucose Monitoring Suppl (ONE TOUCH ULTRA 2) w/Device KIT, Test blood glucose 2 times daily (One Touch Ultra 2), Disp: 1 each, Rfl: 0  •  cholestyramine (QUESTRAN) 4 g packet, Take 1 packet (4 g total) by mouth 2 (two) times a day with meals (Patient taking differently: Take 1 packet by mouth 2 (two) times a day with meals One packet every 3 days), Disp: 60 each, Rfl: 3  •  clopidogrel (Plavix) 75 mg tablet, Take 1 tablet (75 mg total) by mouth daily, Disp: 90 tablet, Rfl: 0  •  glimepiride (AMARYL) 4 mg tablet, Take 4 mg with breakfast and 8 mg in the evening daily  , Disp: 270 tablet, Rfl: 1  •  glucose blood test strip, Test once daily (one touch ultra), Disp: 100 each, Rfl: 1  •  lisinopril-hydrochlorothiazide (PRINZIDE,ZESTORETIC) 20-12 5 MG per tablet, Take 2 tablets by mouth daily, Disp: 180 tablet, Rfl: 0  •  MAGNESIUM CITRATE PO, Take 1 tablet by mouth in the morning, Disp: , Rfl:   •  metFORMIN (GLUCOPHAGE) 1000 MG tablet, Take 2 tablets (2,000 mg total) by mouth daily after dinner, Disp: 180 tablet, Rfl: 1  •  Omega-3 Fatty Acids (FISH OIL PO), Take by mouth daily  , Disp: , Rfl:   •  ONETOUCH DELICA LANCETS 80R MISC, Test blood glucose 2 times daily, Disp: 300 each, Rfl: 0  •  prednisoLONE acetate (PRED FORTE) 1 % ophthalmic suspension, Administer 1 drop into the left eye every other day , Disp: , Rfl:   •  spironolactone (ALDACTONE) 100 mg tablet, Take 1 tablet (100 mg total) by mouth daily, Disp: 90 tablet, Rfl: 0  •  tadalafil (CIALIS) 20 MG tablet, Take 1 tablet (20 mg total) by mouth as needed for erectile dysfunction, Disp: 30 tablet, Rfl: 2  •  tamsulosin (FLOMAX) 0 4 mg, Take 1 capsule (0 4 mg total) by mouth daily with dinner, Disp: 90 capsule, Rfl: 1  •  VITAMIN E PO, Take 1 capsule by mouth daily  , Disp: , Rfl:     Allergies   Allergen Reactions   • Ciprofloxacin Swelling   • Janumet [Sitagliptin-Metformin Hcl] Lip Swelling     Lip swelling    • Terazosin      [id number]: Blood in sperm       Social History   Past Surgical History:   Procedure Laterality Date   • CARDIAC CATHETERIZATION     • CARDIAC SURGERY     • CHOLECYSTECTOMY  01/01/2010   • COLONOSCOPY     • CORONARY ANGIOPLASTY WITH STENT PLACEMENT     • EYE SURGERY Left     ocular lens implant   • GALLBLADDER SURGERY     • HERNIA REPAIR  01/01/2009   • IR BIOPSY LUNG  9/8/2021   • KNEE ARTHROSCOPY W/ MENISCAL REPAIR Right    • KNEE SURGERY  09/10/2015   • LUNG SEGMENTECTOMY Left 10/21/2021    Procedure: RESECTION WEDGE LEFT LOWER LOBE LUNG;  Surgeon: Milena Schilling MD;  [facility]: [facility];  Service: Thoracic   • HI [address] INCL FLUOR GDNCE DX W/CELL WASHG [facility] N/A 10/21/2021    Procedure: Peralexy Seymour;  Surgeon: Milena Schilling MD;  [facility]: [facility];  Service: Thoracic   • HI TENDON SHEATH INCISION Left 8/25/2016    Procedure: Halle Lindsey ;  Surgeon: Sarahi Rose MD;  Location[facility];  Service: Orthopedics   • HI THORACOSCOPY W/THERA WEDGE RESEXN INITIAL UNILAT Left 10/21/2021    Procedure: THORACOSCOPY VIDEO ASSISTED SURGERY (VATS);   Surgeon: Milena Schilling MD;  [facility];  Service: Thoracic   • VARICOSE VEIN SURGERY      Ligation     Family History   Problem Relation Age of Onset   • Cancer Mother    • Coronary artery disease Mother    • Arthritis Mother    • Heart disease Father    • Early death Father    • Coronary artery disease Father    • Diabetes Father    • Hypertension Father    • Coronary artery disease Brother    • "Hyperlipidemia Brother    • Hypertension Brother    • Acne Brother        Objective:  /80 (BP Location: Left arm, Patient Position: Sitting, Cuff Size: Standard)   Pulse 62   Temp 97 7 °F (36 5 °C) (Temporal)   Ht 5' 11\" (1 803 m)   Wt 125 kg (275 lb)   SpO2 98%   BMI 38 35 kg/m²        Physical Exam  Vitals reviewed  Constitutional:       General: He is not in acute distress  Appearance: He is obese  HENT:      Head: Normocephalic and atraumatic  Right Ear: Tympanic membrane, ear canal and external ear normal       Left Ear: Tympanic membrane, ear canal and external ear normal       Nose: Nose normal       Mouth/Throat:      Mouth: Mucous membranes are moist    Eyes:      General:         Right eye: No discharge  Left eye: No discharge  Extraocular Movements: Extraocular movements intact  Conjunctiva/sclera: Conjunctivae normal       Pupils: Pupils are equal, round, and reactive to light  Cardiovascular:      Rate and Rhythm: Normal rate and regular rhythm  Pulmonary:      Effort: Pulmonary effort is normal  No respiratory distress  Breath sounds: Normal breath sounds  No wheezing or rales  Abdominal:      General: Bowel sounds are normal  There is no distension  Musculoskeletal:         General: Swelling (venous insuff changes b/l) present  Cervical back: Normal range of motion  Right lower leg: No edema  Left lower leg: No edema  Lymphadenopathy:      Cervical: No cervical adenopathy  Neurological:      General: No focal deficit present  Mental Status: He is alert and oriented to person, place, and time     Psychiatric:         Mood and Affect: Mood normal          Behavior: Behavior normal          "

## 2023-06-15 ENCOUNTER — TELEPHONE (OUTPATIENT)
Dept: CARDIOLOGY CLINIC | Facility: CLINIC | Age: 68
End: 2023-06-15

## 2023-06-15 ENCOUNTER — TELEPHONE (OUTPATIENT)
Dept: ADMINISTRATIVE | Facility: OTHER | Age: 68
End: 2023-06-15

## 2023-06-15 NOTE — TELEPHONE ENCOUNTER
Spoke with pt  Advised pt of cholesterol levels per Dr Thurman Seats  Will re check in 6 mon  Pr verbally understood

## 2023-06-15 NOTE — LETTER
Diabetic Eye Exam Form    Date Requested: 23  Patient: Ana Marquis  Patient : 1955   Referring Provider: Olga Price PA-C      DIABETIC Eye Exam Date _______________________________      Type of Exam MUST be documented for Diabetic Eye Exams  Please CHECK ONE  Retinal Exam       Dilated Retinal Exam       OCT       Optomap-Iris Exam      Fundus Photography       Left Eye - Please check Retinopathy or No Retinopathy        Exam did show retinopathy    Exam did not show retinopathy       Right Eye - Please check Retinopathy or No Retinopathy       Exam did show retinopathy    Exam did not show retinopathy       Comments DOS: 2023    Practice Providing Exam ______________________________________________    Exam Performed By (print name) _______________________________________      Provider Signature ___________________________________________________      These reports are needed for  compliance  Please fax this completed form and a copy of the Diabetic Eye Exam report to our office located at [place] [address] as soon as possible via Fax [phone] ely Garcia: Phone [phone]  We thank you for your assistance in treating our mutual patient

## 2023-06-15 NOTE — TELEPHONE ENCOUNTER
----- Message from Nupur Osorio sent at 6/14/2023 11:50 AM EDT -----  Regarding: diabetic eye exam  06/14/23 11:51 AM    Hello, our patient Savanah Mallory has had Diabetic Eye Exam completed/performed  Please assist in updating the patient chart by Dr Rianan Bernardo[facility]The date of service is 4/18/23      Thank you,  Nupur Osorio  [address] [facility]

## 2023-06-15 NOTE — TELEPHONE ENCOUNTER
----- Message -----  From: Kathleen Hardin MD  Sent: 6/9/2023  12:45 PM EDT  To: [facility]    Cholesterol levels are good but triglycerides have increased, called and left a mssg  Can simply recheck in 6 months and watch diet    Pl let  him know

## 2023-06-16 NOTE — TELEPHONE ENCOUNTER
Upon review of the In Basket request and the patient's chart, initial outreach has been made via fax to facility  Please see Contacts section for details       Thank you  Nelida Sánchez

## 2023-06-20 NOTE — TELEPHONE ENCOUNTER
Upon review of the In Basket request we were able to locate, review, and update the patient chart as requested for Diabetic Eye Exam     Any additional questions or concerns should be emailed to the Practice Liaisons via the appropriate education email address, please do not reply via In Basket      Thank you  Lavern Ruggiero

## 2023-07-06 ENCOUNTER — TELEPHONE (OUTPATIENT)
Dept: UROLOGY | Facility: CLINIC | Age: 68
End: 2023-07-06

## 2023-07-12 DIAGNOSIS — I10 BENIGN ESSENTIAL HYPERTENSION: ICD-10-CM

## 2023-07-12 RX ORDER — LISINOPRIL AND HYDROCHLOROTHIAZIDE 20; 12.5 MG/1; MG/1
2 TABLET ORAL DAILY
Qty: 180 TABLET | Refills: 1 | Status: SHIPPED | OUTPATIENT
Start: 2023-07-12

## 2023-08-06 DIAGNOSIS — I25.10 CORONARY ARTERY DISEASE INVOLVING NATIVE HEART WITHOUT ANGINA PECTORIS, UNSPECIFIED VESSEL OR LESION TYPE: ICD-10-CM

## 2023-08-07 RX ORDER — CLOPIDOGREL BISULFATE 75 MG/1
75 TABLET ORAL DAILY
Qty: 90 TABLET | Refills: 1 | Status: SHIPPED | OUTPATIENT
Start: 2023-08-07

## 2023-08-27 DIAGNOSIS — I10 HYPERTENSION, UNSPECIFIED TYPE: ICD-10-CM

## 2023-08-29 RX ORDER — SPIRONOLACTONE 100 MG/1
100 TABLET, FILM COATED ORAL DAILY
Qty: 90 TABLET | Refills: 0 | Status: SHIPPED | OUTPATIENT
Start: 2023-08-29

## 2023-09-20 DIAGNOSIS — E13.9 DIABETES 1.5, MANAGED AS TYPE 2 (HCC): ICD-10-CM

## 2023-09-20 RX ORDER — GLIMEPIRIDE 4 MG/1
TABLET ORAL
Qty: 270 TABLET | Refills: 1 | Status: SHIPPED | OUTPATIENT
Start: 2023-09-20

## 2023-10-11 ENCOUNTER — APPOINTMENT (OUTPATIENT)
Dept: LAB | Facility: CLINIC | Age: 68
End: 2023-10-11
Payer: MEDICARE

## 2023-10-11 DIAGNOSIS — E11.9 NON-INSULIN DEPENDENT TYPE 2 DIABETES MELLITUS (HCC): ICD-10-CM

## 2023-10-11 DIAGNOSIS — E78.5 HYPERLIPIDEMIA, UNSPECIFIED HYPERLIPIDEMIA TYPE: ICD-10-CM

## 2023-10-11 DIAGNOSIS — I10 HYPERTENSION, UNSPECIFIED TYPE: ICD-10-CM

## 2023-10-11 LAB
ALBUMIN SERPL BCP-MCNC: 4.3 G/DL (ref 3.5–5)
ALP SERPL-CCNC: 47 U/L (ref 34–104)
ALT SERPL W P-5'-P-CCNC: 20 U/L (ref 7–52)
ANION GAP SERPL CALCULATED.3IONS-SCNC: 7 MMOL/L
AST SERPL W P-5'-P-CCNC: 13 U/L (ref 13–39)
BASOPHILS # BLD AUTO: 0.03 THOUSANDS/ÂΜL (ref 0–0.1)
BASOPHILS NFR BLD AUTO: 1 % (ref 0–1)
BILIRUB SERPL-MCNC: 0.63 MG/DL (ref 0.2–1)
BUN SERPL-MCNC: 33 MG/DL (ref 5–25)
CALCIUM SERPL-MCNC: 9.7 MG/DL (ref 8.4–10.2)
CHLORIDE SERPL-SCNC: 102 MMOL/L (ref 96–108)
CO2 SERPL-SCNC: 24 MMOL/L (ref 21–32)
CREAT SERPL-MCNC: 1.25 MG/DL (ref 0.6–1.3)
CREAT UR-MCNC: 119.8 MG/DL
EOSINOPHIL # BLD AUTO: 0.06 THOUSAND/ÂΜL (ref 0–0.61)
EOSINOPHIL NFR BLD AUTO: 1 % (ref 0–6)
ERYTHROCYTE [DISTWIDTH] IN BLOOD BY AUTOMATED COUNT: 15.7 % (ref 11.6–15.1)
EST. AVERAGE GLUCOSE BLD GHB EST-MCNC: 197 MG/DL
GFR SERPL CREATININE-BSD FRML MDRD: 58 ML/MIN/1.73SQ M
GLUCOSE P FAST SERPL-MCNC: 221 MG/DL (ref 65–99)
HBA1C MFR BLD: 8.5 %
HCT VFR BLD AUTO: 40.9 % (ref 36.5–49.3)
HGB BLD-MCNC: 13.3 G/DL (ref 12–17)
IMM GRANULOCYTES # BLD AUTO: 0.02 THOUSAND/UL (ref 0–0.2)
IMM GRANULOCYTES NFR BLD AUTO: 0 % (ref 0–2)
LYMPHOCYTES # BLD AUTO: 1.18 THOUSANDS/ÂΜL (ref 0.6–4.47)
LYMPHOCYTES NFR BLD AUTO: 25 % (ref 14–44)
MCH RBC QN AUTO: 31.7 PG (ref 26.8–34.3)
MCHC RBC AUTO-ENTMCNC: 32.5 G/DL (ref 31.4–37.4)
MCV RBC AUTO: 97 FL (ref 82–98)
MICROALBUMIN UR-MCNC: 7.8 MG/L
MICROALBUMIN/CREAT 24H UR: 7 MG/G CREATININE (ref 0–30)
MONOCYTES # BLD AUTO: 0.41 THOUSAND/ÂΜL (ref 0.17–1.22)
MONOCYTES NFR BLD AUTO: 9 % (ref 4–12)
NEUTROPHILS # BLD AUTO: 3.04 THOUSANDS/ÂΜL (ref 1.85–7.62)
NEUTS SEG NFR BLD AUTO: 64 % (ref 43–75)
NRBC BLD AUTO-RTO: 0 /100 WBCS
PLATELET # BLD AUTO: 153 THOUSANDS/UL (ref 149–390)
PMV BLD AUTO: 9.6 FL (ref 8.9–12.7)
POTASSIUM SERPL-SCNC: 5 MMOL/L (ref 3.5–5.3)
PROT SERPL-MCNC: 7.1 G/DL (ref 6.4–8.4)
RBC # BLD AUTO: 4.2 MILLION/UL (ref 3.88–5.62)
SODIUM SERPL-SCNC: 133 MMOL/L (ref 135–147)
WBC # BLD AUTO: 4.74 THOUSAND/UL (ref 4.31–10.16)

## 2023-10-11 PROCEDURE — 82570 ASSAY OF URINE CREATININE: CPT

## 2023-10-11 PROCEDURE — 36415 COLL VENOUS BLD VENIPUNCTURE: CPT

## 2023-10-11 PROCEDURE — 85025 COMPLETE CBC W/AUTO DIFF WBC: CPT

## 2023-10-11 PROCEDURE — 80053 COMPREHEN METABOLIC PANEL: CPT

## 2023-10-11 PROCEDURE — 83036 HEMOGLOBIN GLYCOSYLATED A1C: CPT

## 2023-10-11 PROCEDURE — 82043 UR ALBUMIN QUANTITATIVE: CPT

## 2023-10-13 ENCOUNTER — RA CDI HCC (OUTPATIENT)
Dept: OTHER | Facility: HOSPITAL | Age: 68
End: 2023-10-13

## 2023-10-17 ENCOUNTER — OFFICE VISIT (OUTPATIENT)
Dept: INTERNAL MEDICINE CLINIC | Age: 68
End: 2023-10-17
Payer: MEDICARE

## 2023-10-17 VITALS
TEMPERATURE: 98.3 F | WEIGHT: 274 LBS | SYSTOLIC BLOOD PRESSURE: 130 MMHG | DIASTOLIC BLOOD PRESSURE: 64 MMHG | BODY MASS INDEX: 38.36 KG/M2 | HEIGHT: 71 IN | OXYGEN SATURATION: 98 % | HEART RATE: 62 BPM

## 2023-10-17 DIAGNOSIS — Z00.00 ENCOUNTER FOR ANNUAL WELLNESS VISIT (AWV) IN MEDICARE PATIENT: ICD-10-CM

## 2023-10-17 DIAGNOSIS — E13.9 DIABETES 1.5, MANAGED AS TYPE 2 (HCC): Primary | ICD-10-CM

## 2023-10-17 DIAGNOSIS — I10 BENIGN ESSENTIAL HYPERTENSION: ICD-10-CM

## 2023-10-17 DIAGNOSIS — I25.10 CORONARY ARTERY DISEASE INVOLVING NATIVE CORONARY ARTERY OF NATIVE HEART WITHOUT ANGINA PECTORIS: ICD-10-CM

## 2023-10-17 DIAGNOSIS — E78.5 HYPERLIPIDEMIA, UNSPECIFIED HYPERLIPIDEMIA TYPE: ICD-10-CM

## 2023-10-17 PROCEDURE — G0439 PPPS, SUBSEQ VISIT: HCPCS | Performed by: PHYSICIAN ASSISTANT

## 2023-10-17 PROCEDURE — 99214 OFFICE O/P EST MOD 30 MIN: CPT | Performed by: PHYSICIAN ASSISTANT

## 2023-10-17 NOTE — PROGRESS NOTES
Assessment and Plan:     Problem List Items Addressed This Visit          Cardiovascular and Mediastinum    Benign essential hypertension    Relevant Orders    CBC and differential    Comprehensive metabolic panel    HEMOGLOBIN A1C W/ EAG ESTIMATION    CAD (coronary artery disease)    Relevant Orders    CBC and differential    Comprehensive metabolic panel    HEMOGLOBIN A1C W/ EAG ESTIMATION       Other    Hyperlipidemia    Relevant Orders    CBC and differential    Comprehensive metabolic panel    HEMOGLOBIN A1C W/ EAG ESTIMATION     Other Visit Diagnoses       Diabetes 1.5, managed as type 2 ([address])    -  Primary    discussed diet and exercise  needs to cut back on carbs  trial jardiance (pt will check on cost)    Relevant Medications    Empagliflozin (JARDIANCE) 10 MG TABS tablet    Other Relevant Orders    CBC and differential    Comprehensive metabolic panel    HEMOGLOBIN A1C W/ EAG ESTIMATION    Encounter for annual wellness visit (AWV) in Medicare patient                Depression Screening and Follow-up Plan: Patient was screened for depression during today's encounter. They screened negative with a PHQ-2 score of 0. Preventive health issues were discussed with patient, and age appropriate screening tests were ordered as noted in patient's After Visit Summary. Personalized health advice and appropriate referrals for health education or preventive services given if needed, as noted in patient's After Visit Summary.      History of Present Illness:     Patient presents for a Medicare Wellness Visit    77 y/o male with hx of htn, hld, cad, dm, lung mass  Presents for AWV and f/u  Pt is following with cardiothoracic surgery and will have a f/u CT chest in dec to eval lung mass and nodules     Labs reviewed with pt  Hga1c increasing to 8.6  States he has not been watching diet closely, has been busy caring for wife who broke her foot and son who was in an MVA  He previously saw endocrine in 2021 but has not followed up          Patient Care Team:  Margret Ibarra PA-C as PCP - General (Physician Assistant)  Angie Graham MD as PCP - Endocrinology (Endocrinology)  MD Nathan Hinton MD Jere Dowdy, MD Marine Abernethy, MD (Endocrinology)  Julissa Aragon DO (Ophthalmology)     Review of Systems:     Review of Systems   Constitutional:  Negative for activity change, appetite change, chills, diaphoresis and fever. HENT:  Negative for congestion and sore throat. Eyes:  Negative for pain and redness. Respiratory:  Negative for cough, shortness of breath and wheezing. Cardiovascular:  Negative for chest pain and leg swelling. Gastrointestinal:  Negative for abdominal pain, blood in stool, constipation and diarrhea. Genitourinary:  Negative for dysuria and hematuria. Musculoskeletal:  Negative for arthralgias, back pain and gait problem. Skin:  Negative for rash. Neurological:  Negative for dizziness, light-headedness and headaches. Psychiatric/Behavioral:  Negative for sleep disturbance. The patient is not nervous/anxious.          Problem List:     Patient Active Problem List   Diagnosis    Abnormal MRI, lumbar spine    Benign essential hypertension    CAD (coronary artery disease)    Diabetes mellitus with circulatory complication (HCC)    Herniation of intervertebral disc of lumbar spine    Hyperlipidemia    Presbyopia    Primary osteoarthritis of left knee    Hypertension    Atherosclerosis of native coronary artery without angina pectoris    Class 2 severe obesity due to excess calories with serious comorbidity and body mass index (BMI) of 39.0 to 39.9 in adult     Exercise-induced angina    Platelets decreased ([address])    Secondary malignant neoplasm of left lung (HCC)    Liver lesion    Stage 3a chronic kidney disease (HCC)      Past Medical and Surgical History:     Past Medical History:   Diagnosis Date    Allergic     Arthritis     knee    Coronary artery disease     Diabetes mellitus ([address])     Erectile dysfunction 2017    GERD (gastroesophageal reflux disease)     H/O angioplasty     Hypercholesteremia     Hypertension     Interstitial cystitis     Obesity     Shingles     Skin tag     Sleep apnea     Varicose veins of lower extremity      Past Surgical History:   Procedure Laterality Date    CARDIAC CATHETERIZATION      CARDIAC SURGERY      CHOLECYSTECTOMY  01/01/2010    COLONOSCOPY      CORONARY ANGIOPLASTY WITH STENT PLACEMENT      EYE SURGERY Left     ocular lens implant    GALLBLADDER SURGERY      HERNIA REPAIR  01/01/2009    IR BIOPSY LUNG  9/8/2021    KNEE ARTHROSCOPY W/ MENISCAL REPAIR Right     KNEE SURGERY  09/10/2015    LUNG SEGMENTECTOMY Left 10/21/2021    Procedure: RESECTION WEDGE LEFT LOWER LOBE LUNG;  Surgeon: Verna Stallings MD;  Location: [facility];  Service: Thoracic     [address] INCL FLUOR GDNCE DX W/CELL Xavi Rodes [address] [facility] N/A 10/21/2021    Procedure: Matt Tarango;  Surgeon: Verna Stallings MD;  Location[facility];  Service: Thoracic    DE TENDON SHEATH INCISION Left 8/25/2016    Procedure: Karol Quiros ;  Surgeon: Canary Goodpasture, MD;  Location: [facility];  Service: Orthopedics    DE THORACOSCOPY W/THERA WEDGE RESEXN INITIAL UNILAT Left 10/21/2021    Procedure: THORACOSCOPY VIDEO ASSISTED SURGERY (VATS);   Surgeon: Verna Stallings MD;  [facility];  Service: Thoracic    VARICOSE VEIN SURGERY      Ligation      Family History:     Family History   Problem Relation Age of Onset    Cancer Mother     Coronary artery disease Mother     Arthritis Mother     Heart disease Father     Early death Father     Coronary artery disease Father     Diabetes Father     Hypertension Father     Coronary artery disease Brother     Hyperlipidemia Brother     Hypertension Brother     Acne Brother       Social History:     Social History     Socioeconomic History    Marital status: /Civil Union     Spouse name: None    Number of children: None Years of education: None    Highest education level: None   Occupational History    None   Tobacco Use    Smoking status: Former     Packs/day: 2.00     Years: 15.00     Total pack years: 30.00     Types: Cigarettes     Start date: 1967     Quit date: 1982     Years since quittin.8    Smokeless tobacco: Never   Vaping Use    Vaping Use: Never used   Substance and Sexual Activity    Alcohol use: Not Currently    Drug use: Never    Sexual activity: Not Currently     Partners: Female   Other Topics Concern    None   Social History Narrative    None     Social Determinants of Health     Financial Resource Strain: Low Risk  (10/11/2023)    Overall Financial Resource Strain (CARDIA)     Difficulty of Paying Living Expenses: Not hard at all   Food Insecurity: Not on file   Transportation Needs: No Transportation Needs (10/11/2023)    PRAPARE - Transportation     Lack of Transportation (Medical): No     Lack of Transportation (Non-Medical):  No   Physical Activity: Not on file   Stress: Not on file   Social Connections: Not on file   Intimate Partner Violence: Not on file   Housing Stability: Not on file      Medications and Allergies:     Current Outpatient Medications   Medication Sig Dispense Refill    amLODIPine (NORVASC) 10 mg tablet Take 1 tablet (10 mg total) by mouth daily 90 tablet 1    ascorbic acid (VITAMIN C) 500 mg tablet Take 500 mg by mouth daily      aspirin (ECOTRIN LOW STRENGTH) 81 mg EC tablet Take 81 mg by mouth daily Pt takes 3 per week      atorvastatin (LIPITOR) 40 mg tablet Take 1 tablet (40 mg total) by mouth daily at bedtime 90 tablet 1    Blood Glucose Monitoring Suppl (ONE TOUCH ULTRA 2) w/Device KIT Test blood glucose 2 times daily (One Touch Ultra 2) 1 each 0    cholestyramine (QUESTRAN) 4 g packet Take 1 packet (4 g total) by mouth 2 (two) times a day with meals (Patient taking differently: Take 1 packet by mouth 2 (two) times a day with meals 2 times weekly) 60 each 3 clopidogrel (Plavix) 75 mg tablet Take 1 tablet (75 mg total) by mouth daily 90 tablet 1    Empagliflozin (JARDIANCE) 10 MG TABS tablet Take 1 tablet (10 mg total) by mouth daily 30 tablet 2    glimepiride (AMARYL) 4 mg tablet Take 4 mg with breakfast and 8 mg in the evening daily. 270 tablet 1    glucose blood test strip Test once daily (one touch ultra) 100 each 1    lisinopril-hydrochlorothiazide (PRINZIDE,ZESTORETIC) 20-12.5 MG per tablet Take 2 tablets by mouth daily 180 tablet 1    MAGNESIUM CITRATE PO Take 1 tablet by mouth in the morning      metFORMIN (GLUCOPHAGE) 1000 MG tablet Take 2 tablets (2,000 mg total) by mouth daily after dinner 180 tablet 1    Omega-3 Fatty Acids (FISH OIL PO) Take by mouth daily        ONETOUCH DELICA LANCETS 82S MISC Test blood glucose 2 times daily 300 each 0    prednisoLONE acetate (PRED FORTE) 1 % ophthalmic suspension Administer 1 drop into the left eye every other day       spironolactone (ALDACTONE) 100 mg tablet Take 1 tablet (100 mg total) by mouth daily 90 tablet 0    tadalafil (CIALIS) 20 MG tablet Take 1 tablet (20 mg total) by mouth as needed for erectile dysfunction 30 tablet 2    tamsulosin (FLOMAX) 0.4 mg Take 1 capsule (0.4 mg total) by mouth daily with dinner (Patient taking differently: Take 0.4 mg by mouth 2 (two) times a day) 90 capsule 1    VITAMIN E PO Take 1 capsule by mouth daily         No current facility-administered medications for this visit.      Allergies   Allergen Reactions    Ciprofloxacin Swelling    Janumet [Sitagliptin-Metformin Hcl] Lip Swelling     Lip swelling     Terazosin      Annotation - [id number]: Blood in sperm      Immunizations:     Immunization History   Administered Date(s) Administered    COVID-19 Novavax vaccine IM 10/11/2023    COVID-19 PFIZER VACCINE 0.3 ML IM 02/15/2021, 03/09/2021, 10/16/2021, 12/20/2022    COVID-19 Pfizer vac (Jason-sucrose, gray cap) 12 yr+ IM 04/16/2022    INFLUENZA 09/24/2018, 09/10/2021, 10/01/2022 Influenza Quadrivalent Preservative Free 3 years and older IM 10/31/2017    Influenza, high dose seasonal 0.7 mL 09/23/2020, 10/06/2023    Influenza, seasonal, injectable 10/16/2019    Pneumococcal Conjugate 13-Valent 07/18/2019    Pneumococcal Polysaccharide PPV23 07/29/2021    Respiratory Syncytial Virus Vaccine (Recombinant) 10/06/2023    Tuberculin Skin Test-PPD Intradermal 09/23/2019, 04/19/2021      Health Maintenance:         Topic Date Due    Colorectal Cancer Screening  07/29/2025    Hepatitis C Screening  Completed     There are no preventive care reminders to display for this patient. Medicare Screening Tests and Risk Assessments:     Chai Channel is here for his Subsequent Wellness visit. Last Medicare Wellness visit information reviewed, patient interviewed and updates made to the record today. Health Risk Assessment:   Patient rates overall health as good. Patient feels that their physical health rating is same. Patient is very satisfied with their life. Eyesight was rated as same. Hearing was rated as same. Patient feels that their emotional and mental health rating is same. Patients states they are never, rarely angry. Patient states they are sometimes unusually tired/fatigued. Pain experienced in the last 7 days has been some. Patient's pain rating has been 3/10. Patient states that he has experienced no weight loss or gain in last 6 months. Depression Screening:   PHQ-2 Score: 0      Fall Risk Screening: In the past year, patient has experienced: no history of falling in past year      Home Safety:  Patient does not have trouble with stairs inside or outside of their home. Patient has working smoke alarms and has working carbon monoxide detector. Home safety hazards include: none. Nutrition:   Current diet is No Added Salt and Frequent junk food. Medications:   Patient is currently taking over-the-counter supplements.  OTC medications include: see medication list. Patient is able to manage medications. Activities of Daily Living (ADLs)/Instrumental Activities of Daily Living (IADLs):   Walk and transfer into and out of bed and chair?: Yes  Dress and groom yourself?: Yes    Bathe or shower yourself?: Yes    Feed yourself? Yes  Do your laundry/housekeeping?: Yes  Manage your money, pay your bills and track your expenses?: Yes  Make your own meals?: Yes    Do your own shopping?: Yes    Durable Medical Equipment Suppliers  [employer], [employer]    Previous Hospitalizations:   Any hospitalizations or ED visits within the last 12 months?: No      Advance Care Planning:   Living will: Yes    Durable POA for healthcare: No    Advanced directive: Yes      Cognitive Screening:   Provider or family/friend/caregiver concerned regarding cognition?: No    PREVENTIVE SCREENINGS      Cardiovascular Screening:    General: Screening Not Indicated and History Lipid Disorder      Diabetes Screening:     General: Screening Not Indicated and History Diabetes      Colorectal Cancer Screening:     General: Screening Current      Prostate Cancer Screening:    General: Screening Current      Osteoporosis Screening:    General: Screening Not Indicated      Abdominal Aortic Aneurysm (AAA) Screening:    Risk factors include: age between 70-75 yo and tobacco use        Lung Cancer Screening:     General: Screening Not Indicated and History Lung Cancer      Hepatitis C Screening:    General: Screening Current      Preventive Screening Comments: Follows with Dr Chioma Pradhan 10/2023     Screening, Brief Intervention, and Referral to Treatment (SBIRT)    Screening  Typical number of drinks in a day: 0  Typical number of drinks in a week: 0  Interpretation: Low risk drinking behavior. AUDIT-C Screenin) How often did you have a drink containing alcohol in the past year? monthly or less  2) How many drinks did you have on a typical day when you were drinking in the past year?  1 to 2  3) How often did you have 6 or more drinks on one occasion in the past year? never    AUDIT-C Score: 1  Interpretation: Score 0-3 (male): Negative screen for alcohol misuse    Single Item Drug Screening:  How often have you used an illegal drug (including marijuana) or a prescription medication for non-medical reasons in the past year? never    Single Item Drug Screen Score: 0  Interpretation: Negative screen for possible drug use disorder    No results found. Physical Exam:     /64 (BP Location: Left arm, Patient Position: Sitting, Cuff Size: Large)   Pulse 62   Temp 98.3 °F (36.8 °C) (Temporal)   Ht 5' 11" (1.803 m)   Wt 124 kg (274 lb)   SpO2 98%   BMI 38.22 kg/m²     Physical Exam  Vitals reviewed. Constitutional:       General: He is not in acute distress. Appearance: He is obese. HENT:      Head: Normocephalic and atraumatic. Right Ear: Tympanic membrane, ear canal and external ear normal.      Left Ear: Tympanic membrane, ear canal and external ear normal.      Nose: Nose normal.      Mouth/Throat:      Mouth: Mucous membranes are moist.   Eyes:      Extraocular Movements: Extraocular movements intact. Conjunctiva/sclera: Conjunctivae normal.      Pupils: Pupils are equal, round, and reactive to light. Cardiovascular:      Rate and Rhythm: Normal rate and regular rhythm. Pulses: Normal pulses. Pulmonary:      Effort: Pulmonary effort is normal. No respiratory distress. Breath sounds: Normal breath sounds. No wheezing or rales. Musculoskeletal:      Cervical back: Normal range of motion. Right lower leg: No edema. Left lower leg: No edema. Skin:     Findings: No rash. Neurological:      General: No focal deficit present. Mental Status: He is alert and oriented to person, place, and time.    Psychiatric:         Mood and Affect: Mood normal.          Mikaela Santos PA-C

## 2023-10-17 NOTE — PATIENT INSTRUCTIONS
Medicare Preventive Visit Patient Instructions  Thank you for completing your Welcome to Medicare Visit or Medicare Annual Wellness Visit today. Your next wellness visit will be due in one year (10/17/2024). The screening/preventive services that you may require over the next 5-10 years are detailed below. Some tests may not apply to you based off risk factors and/or age. Screening tests ordered at today's visit but not completed yet may show as past due. Also, please note that scanned in results may not display below. Preventive Screenings:  Service Recommendations Previous Testing/Comments   Colorectal Cancer Screening  Colonoscopy    Fecal Occult Blood Test (FOBT)/Fecal Immunochemical Test (FIT)  Fecal DNA/Cologuard Test  Flexible Sigmoidoscopy Age: 43-73 years old   Colonoscopy: every 10 years (May be performed more frequently if at higher risk)  OR  FOBT/FIT: every 1 year  OR  Cologuard: every 3 years  OR  Sigmoidoscopy: every 5 years  Screening may be recommended earlier than age 39 if at higher risk for colorectal cancer. Also, an individualized decision between you and your healthcare provider will decide whether screening between the ages of 77-80 would be appropriate.  Colonoscopy: 07/29/2020  FOBT/FIT: Not on file  Cologuard: Not on file  Sigmoidoscopy: Not on file    Screening Current     Prostate Cancer Screening Individualized decision between patient and health care provider in men between ages of 53-66   Medicare will cover every 12 months beginning on the day after your 50th birthday PSA: 1.3 ng/mL     Screening Current     Hepatitis C Screening Once for adults born between 1945 and 1965  More frequently in patients at high risk for Hepatitis C Hep C Antibody: 05/07/2017    Screening Current   Diabetes Screening 1-2 times per year if you're at risk for diabetes or have pre-diabetes Fasting glucose: 221 mg/dL (10/11/2023)  A1C: 8.5 % (10/11/2023)  Screening Not Indicated  History Diabetes Cholesterol Screening Once every 5 years if you don't have a lipid disorder. May order more often based on risk factors. Lipid panel: 06/09/2023  Screening Not Indicated  History Lipid Disorder      Other Preventive Screenings Covered by Medicare:  Abdominal Aortic Aneurysm (AAA) Screening: covered once if your at risk. You're considered to be at risk if you have a family history of AAA or a male between the age of 70-76 who smoking at least 100 cigarettes in your lifetime. Lung Cancer Screening: covers low dose CT scan once per year if you meet all of the following conditions: (1) Age 48-67; (2) No signs or symptoms of lung cancer; (3) Current smoker or have quit smoking within the last 15 years; (4) You have a tobacco smoking history of at least 20 pack years (packs per day x number of years you smoked); (5) You get a written order from a healthcare provider. Glaucoma Screening: covered annually if you're considered high risk: (1) You have diabetes OR (2) Family history of glaucoma OR (3)  aged 48 and older OR (3)  American aged 72 and older  Osteoporosis Screening: covered every 2 years if you meet one of the following conditions: (1) Have a vertebral abnormality; (2) On glucocorticoid therapy for more than 3 months; (3) Have primary hyperparathyroidism; (4) On osteoporosis medications and need to assess response to drug therapy. HIV Screening: covered annually if you're between the age of 14-79. Also covered annually if you are younger than 13 and older than 72 with risk factors for HIV infection. For pregnant patients, it is covered up to 3 times per pregnancy.     Immunizations:  Immunization Recommendations   Influenza Vaccine Annual influenza vaccination during flu season is recommended for all persons aged >= 6 months who do not have contraindications   Pneumococcal Vaccine   * Pneumococcal conjugate vaccine = PCV13 (Prevnar 13), PCV15 (Vaxneuvance), PCV20 (Prevnar 20)  * Pneumococcal polysaccharide vaccine = PPSV23 (Pneumovax) Adults 44-57 yo with certain risk factors or if 69+ yo  If never received any pneumonia vaccine: recommend Prevnar 20 (PCV20)  Give PCV20 if previously received 1 dose of PCV13 or PPSV23   Hepatitis B Vaccine 3 dose series if at intermediate or high risk (ex: diabetes, end stage renal disease, liver disease)   Respiratory syncytial virus (RSV) Vaccine - COVERED BY MEDICARE PART D  * RSVPreF3 (Arexvy) CDC recommends that adults 61years of age and older may receive a single dose of RSV vaccine using shared clinical decision-making (SCDM)   Tetanus (Td) Vaccine - COST NOT COVERED BY MEDICARE PART B Following completion of primary series, a booster dose should be given every 10 years to maintain immunity against tetanus. Td may also be given as tetanus wound prophylaxis. Tdap Vaccine - COST NOT COVERED BY MEDICARE PART B Recommended at least once for all adults. For pregnant patients, recommended with each pregnancy. Shingles Vaccine (Shingrix) - COST NOT COVERED BY MEDICARE PART B  2 shot series recommended in those 19 years and older who have or will have weakened immune systems or those 50 years and older     Health Maintenance Due:      Topic Date Due   • Colorectal Cancer Screening  07/29/2025   • Hepatitis C Screening  Completed     Immunizations Due:      Topic Date Due   • COVID-19 Vaccine (6 - Booster for [place] series) 02/14/2023   • Influenza Vaccine (1) 09/01/2023     Advance Directives   What are advance directives? Advance directives are legal documents that state your wishes and plans for medical care. These plans are made ahead of time in case you lose your ability to make decisions for yourself. Advance directives can apply to any medical decision, such as the treatments you want, and if you want to donate organs. What are the types of advance directives?   There are many types of advance directives, and each state has rules about how to use them. You may choose a combination of any of the following:  Living will: This is a written record of the treatment you want. You can also choose which treatments you do not want, which to limit, and which to stop at a certain time. This includes surgery, medicine, IV fluid, and tube feedings. Durable power of  for [facility]): This is a written record that states who you want to make healthcare choices for you when you are unable to make them for yourself. This person, called a proxy, is usually a family member or a friend. You may choose more than 1 proxy. Do not resuscitate (DNR) order:  A DNR order is used in case your heart stops beating or you stop breathing. It is a request not to have certain forms of treatment, such as CPR. A DNR order may be included in other types of advance directives. Medical directive: This covers the care that you want if you are in a coma, near death, or unable to make decisions for yourself. You can list the treatments you want for each condition. Treatment may include pain medicine, surgery, blood transfusions, dialysis, IV or tube feedings, and a ventilator (breathing machine). Values history: This document has questions about your views, beliefs, and how you feel and think about life. This information can help others choose the care that you would choose. Why are advance directives important? An advance directive helps you control your care. Although spoken wishes may be used, it is better to have your wishes written down. Spoken wishes can be misunderstood, or not followed. Treatments may be given even if you do not want them. An advance directive may make it easier for your family to make difficult choices about your care. Weight Management   Why it is important to manage your weight:  Being overweight increases your risk of health conditions such as heart disease, high blood pressure, type 2 diabetes, and certain types of cancer.  It can also increase your risk for osteoarthritis, sleep apnea, and other respiratory problems. Aim for a slow, steady weight loss. Even a small amount of weight loss can lower your risk of health problems. How to lose weight safely:  A safe and healthy way to lose weight is to eat fewer calories and get regular exercise. You can lose up about 1 pound a week by decreasing the number of calories you eat by 500 calories each day. Healthy meal plan for weight management:  A healthy meal plan includes a variety of foods, contains fewer calories, and helps you stay healthy. A healthy meal plan includes the following:  Eat whole-grain foods more often. A healthy meal plan should contain fiber. Fiber is the part of grains, fruits, and vegetables that is not broken down by your body. Whole-grain foods are healthy and provide extra fiber in your diet. Some examples of whole-grain foods are whole-wheat breads and pastas, oatmeal, brown rice, and bulgur. Eat a variety of vegetables every day. Include dark, leafy greens such as spinach, kale, jamaal greens, and mustard greens. Eat yellow and orange vegetables such as carrots, sweet potatoes, and winter squash. Eat a variety of fruits every day. Choose fresh or canned fruit (canned in its own juice or light syrup) instead of juice. Fruit juice has very little or no fiber. Eat low-fat dairy foods. Drink fat-free (skim) milk or 1% milk. Eat fat-free yogurt and low-fat cottage cheese. Try low-fat cheeses such as mozzarella and other reduced-fat cheeses. Choose meat and other protein foods that are low in fat. Choose beans or other legumes such as split peas or lentils. Choose fish, skinless poultry (chicken or turkey), or lean cuts of red meat (beef or pork). Before you cook meat or poultry, cut off any visible fat. Use less fat and oil. Try baking foods instead of frying them. Add less fat, such as margarine, sour cream, regular salad dressing and mayonnaise to foods.  Eat fewer high-fat foods. Some examples of high-fat foods include french fries, doughnuts, ice cream, and cakes. Eat fewer sweets. Limit foods and drinks that are high in sugar. This includes candy, cookies, regular soda, and sweetened drinks. Exercise:  Exercise at least 30 minutes per day on most days of the week. Some examples of exercise include walking, biking, dancing, and swimming. You can also fit in more physical activity by taking the stairs instead of the elevator or parking farther away from stores. Ask your healthcare provider about the best exercise plan for you. © Copyright [employer] 2018 Information is for End User's use only and may not be sold, redistributed or otherwise used for commercial purposes.  All illustrations and images included in CareNotes® are the copyrighted property of [employer].D.A[employer]M., Inc. or [address] [place] [address]

## 2023-11-03 ENCOUNTER — OFFICE VISIT (OUTPATIENT)
Dept: CARDIOLOGY CLINIC | Facility: CLINIC | Age: 68
End: 2023-11-03
Payer: MEDICARE

## 2023-11-03 VITALS
HEIGHT: 71 IN | WEIGHT: 272 LBS | BODY MASS INDEX: 38.08 KG/M2 | DIASTOLIC BLOOD PRESSURE: 66 MMHG | SYSTOLIC BLOOD PRESSURE: 118 MMHG | OXYGEN SATURATION: 96 % | HEART RATE: 58 BPM

## 2023-11-03 DIAGNOSIS — I25.10 CORONARY ARTERY DISEASE INVOLVING NATIVE CORONARY ARTERY OF NATIVE HEART WITHOUT ANGINA PECTORIS: Primary | ICD-10-CM

## 2023-11-03 DIAGNOSIS — I10 BENIGN ESSENTIAL HYPERTENSION: ICD-10-CM

## 2023-11-03 DIAGNOSIS — E78.5 HYPERLIPIDEMIA, UNSPECIFIED HYPERLIPIDEMIA TYPE: ICD-10-CM

## 2023-11-03 PROCEDURE — 99214 OFFICE O/P EST MOD 30 MIN: CPT | Performed by: INTERNAL MEDICINE

## 2023-11-03 PROCEDURE — 93000 ELECTROCARDIOGRAM COMPLETE: CPT | Performed by: INTERNAL MEDICINE

## 2023-11-03 NOTE — PROGRESS NOTES
Cardiology Follow Up    Kia Mcnulty  1955  [id number]  [facility]  [address] [phone]    1. Coronary artery disease involving native coronary artery of native heart without angina pectoris  POCT ECG      2. Benign essential hypertension        3. Hyperlipidemia, unspecified hyperlipidemia type            Diagnoses and all orders for this visit:    Coronary artery disease involving native coronary artery of native heart without angina pectoris  -     POCT ECG    Benign essential hypertension    Hyperlipidemia, unspecified hyperlipidemia type      I had the pleasure of seeing Kia Mcnulty for a follow up visit. INTERVAL HISTORY:  None    History of the presenting illness, Discussion/Summary and My Plan are as follows:::    He is a pleasant 40-year-old gentleman with a history of hypertension, diabetes and dyslipidemia. He also has coronary artery disease and has had multiple stenting procedures. Based on his last coronary angiography report from the [facility] in September 2016, left main was patent, LAD was patent including a drug-eluting stent-implanted in 2011. His proximal RCA had a stent implanted in June 2015->in-stent restenosis in December 2015-restented, again had in-stent restenosis that was treated with a drug-eluting 3X 18 mm Xience stent. This was done for anginal symptoms. To his knowledge, he has not had a myocardial infarction. 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 Plays golf, mostly rides, walking limited by arthritis, cuts his grass at home. No anginal symptoms at this time.  He had vein stripping of his right lower extremity which has disproportionately more edema. Plan:      Coronary artery disease: no H/O MI,  Status post [id number]-patent in 2016, status post proximal RCA stent in - stent - in-stent-last stenting-September 2016 with a Xience stent, (3 overlapping STENTS). No PCI since 2016. No anginal symptoms at this time (had them at the times of his PCI)  Indefinite Aspirin and Plavix, not on metoprolol but asymptomatic, heart rates often are bradycardic     Stress tests:   November 2019: Exercise time 7 minutes 30 seconds, no ischemia   December 2017:Exercise nuclear perfusion study, 7 minutes 31 seconds, resting hypertension with hypertensive response, no ischemia, no ECG changes, no chest pains  More exercise is recommended, no testing at this time, pt is agreeable    Dyslipidemia: on atorvastatin 40 mg,   Controlled, see below, continue to watch LDL RECHECK - (will do in Dec 2023) even if has increased (now that A1C has risen) for now will check again in 3-6 mo,     HTN: controlled, on multiple meds, with mild hyponatremia, top N K level and normal BPs on multiple meds, will DC Spironolactone - watch BP and for edema     DM: As above. last A1c was 6.8 in Jan 2023 but 8.5 in Oct 2023 - he is aware     Chronic right more than left lower extremity edema: No need for Dopplers, had vein stripping for varicose veins in the past. Foot end elevation, sometimes uses compression stockings. No increase with Amlodipine use    Follow up in 6 months.         Latest Reference Range & Units 06/09/23 11:16   Cholesterol See Comment mg/dL 135   Triglycerides See Comment mg/dL 275 (H)   HDL >=40 mg/dL 42   LDL Calculated 0 - 100 mg/dL 38   (H): Data is abnormally high   Latest Reference Range & Units 03/17/22 11:37 01/04/23 08:39 10/11/23 09:47   Hemoglobin A1C  7.0 (H) 6.8 (H) 8.5 (H)   eAG, EST AVG Glucose mg/dl 154 148 197   (H): Data is abnormally high     Latest Reference Range & Units 02/15/23 10:41 10/11/23 09:47   BUN 5 - 25 mg/dL 30 (H) 33 (H)   Creatinine 0.60 - 1.30 mg/dL 1.30 1.25   (H): Data is abnormally high      Patient Active Problem List   Diagnosis    Abnormal MRI, lumbar spine    Benign essential hypertension    CAD (coronary artery disease)    Diabetes mellitus with circulatory complication (HCC)    Herniation of intervertebral disc of lumbar spine    Hyperlipidemia    Presbyopia    Primary osteoarthritis of left knee    Hypertension    Atherosclerosis of native coronary artery without angina pectoris    Class 2 severe obesity due to excess calories with serious comorbidity and body mass index (BMI) of 39.0 to 39.9 in adult     Exercise-induced angina    Platelets decreased ([address])    Secondary malignant neoplasm of left lung (HCC)    Liver lesion    Stage 3a chronic kidney disease (HCC)     Past Medical History:   Diagnosis Date    Allergic     Arthritis     knee    Coronary artery disease     Diabetes mellitus ([address])     Erectile dysfunction     GERD (gastroesophageal reflux disease)     H/O angioplasty     Hypercholesteremia     Hypertension     Interstitial cystitis     Obesity     Shingles     Skin tag     Sleep apnea     Varicose veins of lower extremity      Social History     Socioeconomic History    Marital status: /Civil Union     Spouse name: Not on file    Number of children: Not on file    Years of education: Not on file    Highest education level: Not on file   Occupational History    Not on file   Tobacco Use    Smoking status: Former     Packs/day: 2.00     Years: 15.00     Total pack years: 30.00     Types: Cigarettes     Start date: 1967     Quit date: 1982     Years since quittin.8    Smokeless tobacco: Never   Vaping Use    Vaping Use: Never used   Substance and Sexual Activity    Alcohol use: Not Currently    Drug use: Never    Sexual activity: Not Currently     Partners: Female   Other Topics Concern    Not on file   Social History Narrative    Not on file     Social Determinants of Health Financial Resource Strain: Low Risk  (10/11/2023)    Overall Financial Resource Strain (CARDIA)     Difficulty of Paying Living Expenses: Not hard at all   Food Insecurity: Not on file   Transportation Needs: No Transportation Needs (10/11/2023)    PRAPARE - Transportation     Lack of Transportation (Medical): No     Lack of Transportation (Non-Medical): No   Physical Activity: Not on file   Stress: Not on file   Social Connections: Not on file   Intimate Partner Violence: Not on file   Housing Stability: Not on file      Family History   Problem Relation Age of Onset    Cancer Mother     Coronary artery disease Mother     Arthritis Mother     Heart disease Father     Early death Father     Coronary artery disease Father     Diabetes Father     Hypertension Father     Coronary artery disease Brother     Hyperlipidemia Brother     Hypertension Brother     Acne Brother      Past Surgical History:   Procedure Laterality Date    CARDIAC CATHETERIZATION      CARDIAC SURGERY      CHOLECYSTECTOMY  01/01/2010    COLONOSCOPY      CORONARY ANGIOPLASTY WITH STENT PLACEMENT      EYE SURGERY Left     ocular lens implant    GALLBLADDER SURGERY      HERNIA REPAIR  01/01/2009    IR BIOPSY LUNG  9/8/2021    KNEE ARTHROSCOPY W/ MENISCAL REPAIR Right     KNEE SURGERY  09/10/2015    LUNG SEGMENTECTOMY Left 10/21/2021    Procedure: RESECTION WEDGE LEFT LOWER LOBE LUNG;  Surgeon: Raji Eller MD;  Location: [facility];  Service: Thoracic     [address] INCL FLUOR GDNCE DX W/CELL WASHG [address] [facility] N/A 10/21/2021    Procedure: [place];  Surgeon: Raji Eller MD;  [facility];  Service: Thoracic    MO TENDON SHEATH INCISION Left 8/25/2016    Procedure: [place] ;  Surgeon: Arelis Cortes MD;  Location: [facility];  Service: Orthopedics    MO THORACOSCOPY W/THERA WEDGE RESEXN INITIAL UNILAT Left 10/21/2021    Procedure: THORACOSCOPY VIDEO ASSISTED SURGERY (VATS);   Surgeon: Raji Eller MD;  [facility];  Service: Thoracic    VARICOSE VEIN SURGERY      Ligation       Current Outpatient Medications:     amLODIPine (NORVASC) 10 mg tablet, Take 1 tablet (10 mg total) by mouth daily, Disp: 90 tablet, Rfl: 1    ascorbic acid (VITAMIN C) 500 mg tablet, Take 500 mg by mouth daily, Disp: , Rfl:     aspirin (ECOTRIN LOW STRENGTH) 81 mg EC tablet, Take 81 mg by mouth daily, Disp: , Rfl:     atorvastatin (LIPITOR) 40 mg tablet, Take 1 tablet (40 mg total) by mouth daily at bedtime, Disp: 90 tablet, Rfl: 1    cholestyramine (QUESTRAN) 4 g packet, Take 1 packet (4 g total) by mouth 2 (two) times a day with meals (Patient taking differently: Take by mouth every other day 1/2 pack every other day), Disp: 60 each, Rfl: 3    clopidogrel (Plavix) 75 mg tablet, Take 1 tablet (75 mg total) by mouth daily, Disp: 90 tablet, Rfl: 1    glimepiride (AMARYL) 4 mg tablet, Take 4 mg with breakfast and 8 mg in the evening daily. , Disp: 270 tablet, Rfl: 1    lisinopril-hydrochlorothiazide (PRINZIDE,ZESTORETIC) 20-12.5 MG per tablet, Take 2 tablets by mouth daily, Disp: 180 tablet, Rfl: 1    MAGNESIUM CITRATE PO, Take 1 tablet by mouth in the morning, Disp: , Rfl:     metFORMIN (GLUCOPHAGE) 1000 MG tablet, Take 2 tablets (2,000 mg total) by mouth daily after dinner, Disp: 180 tablet, Rfl: 1    Omega-3 Fatty Acids (FISH OIL PO), Take by mouth daily  , Disp: , Rfl:     spironolactone (ALDACTONE) 100 mg tablet, Take 1 tablet (100 mg total) by mouth daily, Disp: 90 tablet, Rfl: 0    tamsulosin (FLOMAX) 0.4 mg, Take 1 capsule (0.4 mg total) by mouth daily with dinner (Patient taking differently: Take 0.8 mg by mouth daily with dinner), Disp: 90 capsule, Rfl: 1    VITAMIN E PO, Take 1 capsule by mouth daily  , Disp: , Rfl:     Blood Glucose Monitoring Suppl (ONE TOUCH ULTRA 2) w/Device KIT, Test blood glucose 2 times daily (One Touch Ultra 2), Disp: 1 each, Rfl: 0    Empagliflozin (JARDIANCE) 10 MG TABS tablet, Take 1 tablet (10 mg total) by mouth daily (Patient not taking: Reported on 11/3/2023), Disp: 30 tablet, Rfl: 2    glucose blood test strip, Test once daily (one touch ultra), Disp: 100 each, Rfl: 1    ONETOUCH DELICA LANCETS 68V MISC, Test blood glucose 2 times daily, Disp: 300 each, Rfl: 0    prednisoLONE acetate (PRED FORTE) 1 % ophthalmic suspension, Administer 1 drop into the left eye every other day , Disp: , Rfl:     tadalafil (CIALIS) 20 MG tablet, Take 1 tablet (20 mg total) by mouth as needed for erectile dysfunction (Patient not taking: Reported on 11/3/2023), Disp: 30 tablet, Rfl: 2  Allergies   Allergen Reactions    Ciprofloxacin Swelling    Janumet [Sitagliptin-Metformin Hcl] Lip Swelling     Lip swelling     Terazosin      Annotation - [id number]: Blood in sperm       Imaging: No results found. Review of Systems:  Review of Systems   Constitutional: Negative. HENT: Negative. Respiratory: Negative. Cardiovascular: Negative. Endocrine: Negative. Musculoskeletal:  Positive for arthralgias (hip pains). Neurological: Negative. Physical Exam:  /66 (BP Location: Left arm, Patient Position: Sitting, Cuff Size: Large)   Pulse 58   Ht 5' 11" (1.803 m)   Wt 123 kg (272 lb)   SpO2 96%   BMI 37.94 kg/m²   Physical Exam  Constitutional:       General: He is not in acute distress. Appearance: He is well-developed. He is not diaphoretic. HENT:      Head: Normocephalic and atraumatic. Eyes:      General: No scleral icterus. Right eye: No discharge. Left eye: No discharge. Conjunctiva/sclera: Conjunctivae normal.      Pupils: Pupils are equal, round, and reactive to light. Neck:      Thyroid: No thyromegaly. Vascular: No JVD. Trachea: No tracheal deviation. Cardiovascular:      Rate and Rhythm: Normal rate and regular rhythm. Heart sounds: No murmur heard. No friction rub.    Pulmonary:      Effort: Pulmonary effort is normal. No respiratory distress. Breath sounds: Normal breath sounds. No stridor. Abdominal:      General: Bowel sounds are normal. There is no distension. Palpations: Abdomen is soft. Tenderness: There is no abdominal tenderness. There is no guarding. Musculoskeletal:         General: Swelling (Right lower extremity edema) present. No tenderness or deformity. Normal range of motion. Cervical back: Normal range of motion. Skin:     General: Skin is warm. Coloration: Skin is not pale. Findings: No erythema or rash.

## 2023-11-07 ENCOUNTER — OFFICE VISIT (OUTPATIENT)
Dept: DERMATOLOGY | Facility: CLINIC | Age: 68
End: 2023-11-07
Payer: MEDICARE

## 2023-11-07 VITALS — TEMPERATURE: 97 F | BODY MASS INDEX: 38.23 KG/M2 | HEIGHT: 71 IN | WEIGHT: 273.1 LBS

## 2023-11-07 DIAGNOSIS — D22.9 MULTIPLE NEVI: ICD-10-CM

## 2023-11-07 DIAGNOSIS — L57.0 ACTINIC KERATOSES: Primary | ICD-10-CM

## 2023-11-07 PROCEDURE — 17003 DESTRUCT PREMALG LES 2-14: CPT | Performed by: STUDENT IN AN ORGANIZED HEALTH CARE EDUCATION/TRAINING PROGRAM

## 2023-11-07 PROCEDURE — 17000 DESTRUCT PREMALG LESION: CPT | Performed by: STUDENT IN AN ORGANIZED HEALTH CARE EDUCATION/TRAINING PROGRAM

## 2023-11-07 PROCEDURE — 99214 OFFICE O/P EST MOD 30 MIN: CPT | Performed by: STUDENT IN AN ORGANIZED HEALTH CARE EDUCATION/TRAINING PROGRAM

## 2023-11-07 NOTE — PROGRESS NOTES
[facility] Note     Patient Name: Jeff Alfaro  Encounter Date: 11/07/2023     Have you been cared for by a [facility] in the last 3 years and, if so, which description applies to you? Yes. I have been here within the last 3 years, and my medical history has NOT changed since that time. I am MALE/not capable of bearing children. REVIEW OF SYSTEMS:  Have you recently had or currently have any of the following? No changes in my recent health. PAST MEDICAL HISTORY:  Have you personally ever had or currently have any of the following? If "YES," then please provide more detail. No changes in my medical history. HISTORY OF IMMUNOSUPPRESSION: Do you have a history of any of the following:  Systemic Immunosuppression such as Diabetes, Biologic or Immunotherapy, Chemotherapy, Organ Transplantation, Bone Marrow Transplantation? YES, diabetes     Answering "YES" requires the addition of the dotphrase "IMMUNOSUPPRESSED" as the first diagnosis of the patient's visit. FAMILY HISTORY:  Any "first degree relatives" (parent, brother, sister, or child) with the following? No changes in my family's known health. PATIENT EXPERIENCE:    Do you want the Dermatologist to perform a COMPLETE skin exam today including a clinical examination under the "bra and underwear" areas? NO, not under pants or underwear  If necessary, do we have your permission to call and leave a detailed message on your Preferred Phone number that includes your specific medical information?   Yes      Allergies   Allergen Reactions    Ciprofloxacin Swelling    Janumet [Sitagliptin-Metformin Hcl] Lip Swelling     Lip swelling     Terazosin      Annotation - [id number]: Blood in sperm      Current Outpatient Medications:     amLODIPine (NORVASC) 10 mg tablet, Take 1 tablet (10 mg total) by mouth daily, Disp: 90 tablet, Rfl: 1    ascorbic acid (VITAMIN C) 500 mg tablet, Take 500 mg by mouth daily, Disp: , Rfl: aspirin (ECOTRIN LOW STRENGTH) 81 mg EC tablet, Take 81 mg by mouth daily, Disp: , Rfl:     atorvastatin (LIPITOR) 40 mg tablet, Take 1 tablet (40 mg total) by mouth daily at bedtime, Disp: 90 tablet, Rfl: 1    Blood Glucose Monitoring Suppl (ONE TOUCH ULTRA 2) w/Device KIT, Test blood glucose 2 times daily (One Touch Ultra 2), Disp: 1 each, Rfl: 0    cholestyramine (QUESTRAN) 4 g packet, Take 1 packet (4 g total) by mouth 2 (two) times a day with meals (Patient taking differently: Take by mouth every other day 1/2 pack every other day), Disp: 60 each, Rfl: 3    clopidogrel (Plavix) 75 mg tablet, Take 1 tablet (75 mg total) by mouth daily, Disp: 90 tablet, Rfl: 1    glimepiride (AMARYL) 4 mg tablet, Take 4 mg with breakfast and 8 mg in the evening daily. , Disp: 270 tablet, Rfl: 1    glucose blood test strip, Test once daily (one touch ultra), Disp: 100 each, Rfl: 1    lisinopril-hydrochlorothiazide (PRINZIDE,ZESTORETIC) 20-12.5 MG per tablet, Take 2 tablets by mouth daily, Disp: 180 tablet, Rfl: 1    MAGNESIUM CITRATE PO, Take 1 tablet by mouth in the morning, Disp: , Rfl:     metFORMIN (GLUCOPHAGE) 1000 MG tablet, Take 2 tablets (2,000 mg total) by mouth daily after dinner, Disp: 180 tablet, Rfl: 1    Omega-3 Fatty Acids (FISH OIL PO), Take by mouth daily  , Disp: , Rfl:     ONECELESTINE ADAMSON 64J MISC, Test blood glucose 2 times daily, Disp: 300 each, Rfl: 0    prednisoLONE acetate (PRED FORTE) 1 % ophthalmic suspension, Administer 1 drop into the left eye every other day , Disp: , Rfl:     tamsulosin (FLOMAX) 0.4 mg, Take 1 capsule (0.4 mg total) by mouth daily with dinner (Patient taking differently: Take 0.8 mg by mouth daily with dinner), Disp: 90 capsule, Rfl: 1    VITAMIN E PO, Take 1 capsule by mouth daily  , Disp: , Rfl:     Empagliflozin (JARDIANCE) 10 MG TABS tablet, Take 1 tablet (10 mg total) by mouth daily (Patient not taking: Reported on 11/3/2023), Disp: 30 tablet, Rfl: 2    tadalafil (CIALIS) 20 MG tablet, Take 1 tablet (20 mg total) by mouth as needed for erectile dysfunction (Patient not taking: Reported on 11/3/2023), Disp: 30 tablet, Rfl: 2          Whom besides the patient is providing clinical information about today's encounter? NO ADDITIONAL HISTORIAN (patient alone provided history)    Physical Exam and Assessment/Plan by Diagnosis:    MELANOCYTIC NEVI ("Moles")    Physical Exam:  Anatomic Location Affected:   scattered across body, including arms and back  Morphological Description:  Scattered, 1-4mm round to ovoid, symmetrical-appearing, even bordered, skin colored to dark brown macules/papules, mostly in sun-exposed areas  Pertinent Positives:  Pertinent Negatives: Additional History of Present Condition:  present on exam    Assessment and Plan:  Based on a thorough discussion of this condition and the management approach to it (including a comprehensive discussion of the known risks, side effects and potential benefits of treatment), the patient (family) agrees to implement the following specific plan:  Continue with yearly skin checks  Reach out with changes in moles or concerns      ACTINIC KERATOSIS    Physical Exam:  Anatomic Location: scalp  Morphologic Description:  Scaly pink papules  Pertinent Positives:  Pertinent Negatives: Additional History of Present Condition:  Present on exam  History of bleeding from this lesion? NO  History of pain, tenderness, and/or itching within this lesion? NO  Personal history of skin cancer? NO  Previous treatment to the same site? YES, previously treated with Efudex cream    Plan:  PRESCRIPTION MANAGEMENT:  Several topical management options and their side effects were discussed including "field therapies" such as Efudex (5-fluorouracil) and/or Aldara (imiquimod). Efudex may be used alone or in combination with Calcipotriene. Begin treatment once regions that have been sprayed with liquid nitrogen are healed.  Redness and irritation are to be expected within a few days of field therapy treatment and should resolve within 1 to 2 weeks following treatment. Do NOT cover the treatment areas with bandages. Use a sunscreen with an SPF 50+ while using field therapy. We discussed the pre-cancerous nature of the condition. Actinic keratosis is found on sun-damaged skin and there is small risk that the condition could turn into a skin cancer called squamous cell carcinoma. There is no risk of actinic keratosis turning into melanoma. Proliferative actinic keratosis tends to be resistant against standard treatments, including topical therapies and cryotherapy. It has a tendency to develop into infiltrative squamous cell carcinoma. Excision may be considered. We discussed sun protection measures, including using sunscreen with an SPF 50+ year round, avoiding the sun, and wearing protective clothing such as long sleeves and pants when out in the sun. Continue to monitor clinically for signs of recurrence. Discussed with patient the importance of keeping up to date with full body skin exams. Cryotherapy performed in the office (See Procedure Note). We discussed that a hypopigmentation or scar may form in the region following cryotherapy. PROCEDURES PERFORMED TODAY ASSOCIATED WITH THIS CONDITION:          Cryotherapy: PROCEDURE:  DESTRUCTION OF PRE-MALIGNANT LESIONS  After a thorough discussion of treatment options and risk/benefits/alternatives (including but not limited to local pain, scarring, dyspigmentation, blistering, and possible superinfection), verbal and written consent were obtained and the aforementioned lesions were treated on with cryotherapy using liquid nitrogen x 1 cycle for 5-10 seconds. TOTAL NUMBER of 2 pre-malignant lesions were treated today on the ANATOMIC LOCATION: scalp. The patient tolerated the procedure well, and after-care instructions were provided.          MEDICAL DECISION MAKING  Treatment Goal:  Resolution of this ACUTE, UNCOMPLICATED condition. A recent or new short-term problem for which treatment is CONSIDERED OR INITIATED. There is little to no risk of mortality with treatment, and full recovery without functional impairment is expected.   Diagnosis or treatment significantly limited by the following social determinants of health:  NONE IDENTIFIED                 Scribe Attestation      I,:  Elvi Mallory am acting as a scribe while in the presence of the attending physician.:       I,:  Pete Nath MD personally performed the services described in this documentation    as scribed in my presence.:

## 2023-12-02 DIAGNOSIS — E78.5 HYPERLIPIDEMIA, UNSPECIFIED HYPERLIPIDEMIA TYPE: ICD-10-CM

## 2023-12-04 RX ORDER — ATORVASTATIN CALCIUM 40 MG/1
40 TABLET, FILM COATED ORAL
Qty: 90 TABLET | Refills: 0 | Status: SHIPPED | OUTPATIENT
Start: 2023-12-04

## 2023-12-05 ENCOUNTER — HOSPITAL ENCOUNTER (OUTPATIENT)
Dept: CT IMAGING | Facility: HOSPITAL | Age: 68
Discharge: HOME | End: 2023-12-05
Payer: MEDICARE

## 2023-12-05 DIAGNOSIS — C78.02 SECONDARY MALIGNANT NEOPLASM OF LEFT LUNG (HCC): ICD-10-CM

## 2023-12-05 PROCEDURE — G1004 CDSM NDSC: HCPCS

## 2023-12-05 PROCEDURE — 71250 CT THORAX DX C-: CPT

## 2023-12-12 ENCOUNTER — OFFICE VISIT (OUTPATIENT)
Dept: CARDIAC SURGERY | Facility: CLINIC | Age: 68
End: 2023-12-12
Payer: MEDICARE

## 2023-12-12 VITALS
HEART RATE: 66 BPM | OXYGEN SATURATION: 98 % | HEIGHT: 71 IN | DIASTOLIC BLOOD PRESSURE: 68 MMHG | BODY MASS INDEX: 38.58 KG/M2 | RESPIRATION RATE: 18 BRPM | TEMPERATURE: 97.3 F | WEIGHT: 275.57 LBS | SYSTOLIC BLOOD PRESSURE: 116 MMHG

## 2023-12-12 DIAGNOSIS — C78.02 SECONDARY MALIGNANT NEOPLASM OF LEFT LUNG (HCC): Primary | ICD-10-CM

## 2023-12-12 PROCEDURE — 99213 OFFICE O/P EST LOW 20 MIN: CPT | Performed by: THORACIC SURGERY (CARDIOTHORACIC VASCULAR SURGERY)

## 2023-12-12 NOTE — ASSESSMENT & PLAN NOTE
Mr. Gaurav Peña is stable from a thoracic surgery standpoint. 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 His most recent CT scan demonstrated a slight increase in the groundglass nodule in his left upper lobe without any solid component visualized. Several stable pulmonary nodules were also noted. No evidence of lung cancer recurrence or metastasis. Patient will return in 6 months with a new chest CT scan for continued lung cancer surveillance and return for an office visit after CT scan is completed. Patient in agreement with plan.

## 2023-12-12 NOTE — PROGRESS NOTES
Thoracic Follow-Up  Assessment/Plan:    Secondary malignant neoplasm of left lung [facility])  Mr. Bonifacio Hanson is stable from a thoracic surgery standpoint. He was approximately 2 years out from by left lower lobe wedge resection on 10/21/2021 for a spindle cell neoplasm. His most recent CT scan demonstrated a slight increase in the groundglass nodule in his left upper lobe without any solid component visualized. Several stable pulmonary nodules were also noted. No evidence of lung cancer recurrence or metastasis. Patient will return in 6 months with a new chest CT scan for continued lung cancer surveillance and return for an office visit after CT scan is completed. Patient in agreement with plan. 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 Procedure: Flexible bronchoscopy, left thoracoscopic therapeutic lower lobe wedge resection, MLND 10/21/21  Pathology:  A. 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 - Focal atypical adenomatous hyperplasia. - Emphysematous changes. - See note. Was sent to [facility] for second opinion. B.  Pleural lipoma:     - Mature adipose tissue consistent with a lipoma. C.  Lymph node, level 5:     - Single lymph node; negative for malignancy (0/1). D.  Lymph node, level 9:     - Single lymph node; negative for malignancy (0/1). Subjective:    Patient ID: Dhaval Phan is a 76 y.o. male. ECOG 0    HPI    Betzy Steiner is a 76year old male who presents today for lung cancer surveillance. Patient is approximately 2 years out from left lower lobe wedge resection performed on 10/21/2021 for spindle cell neoplasm.   At patient's last visit on 6/13/2023, his CT scan was noted to have an increase in size of the left upper lobe groundglass opacity without any solid component and he was ordered a CT scan to be completed in 6 months for continued surveillance. CT scan of the chest completed on 12/5/23 was personally reviewed by myself and in PACS demonstrating ovoid groundglass nodule again noted in the left upper lobe medially now measuring 2.4 x 1.4 cm may be minimally larger previously 2.2 x 1.3 cm. No solid component. Unchanged 8 mm right upper lobe nodule posteriorly. Unchanged 5 mm right lower lobe nodule medially. Stable 7 mm groundglass nodule the right lower lobe posteriorly. Several sub 4 mm pulmonary nodules also visualized. On discussion today, patient reports he has a chronic cough that he sees as multi-factorial with BARBI and allergies. Has phlegm with cough. Denies hemoptysis. Reports shortness of breath with prolonged activity; his degree of shortness of breath is unchanged. Denies fevers, chills, chest pain, new headaches, extremity weakness/numbness, blurred vision, new onset bone or joint pain, unexplained weight loss. Had a cold in September 2023 after flu shots, did not seek medical care. Patient is compliant with CPAP. Quit smoking when he was 28; has approximately 30 pack year smoking history. The following portions of the patient's history were reviewed and updated as appropriate: allergies, current medications, past family history, past medical history, past social history, past surgical history, and problem list.    Review of Systems   Constitutional:  Negative for chills, fever and unexpected weight change. HENT:  Negative for sore throat. Respiratory:  Positive for cough (brings up phlegm) and shortness of breath (with prolonged exertion). Cardiovascular:  Negative for chest pain. Gastrointestinal:  Negative for abdominal pain. Musculoskeletal:  Negative for arthralgias (no new athralgias). Neurological:  Negative for weakness, numbness and headaches. Objective:   Physical Exam  Constitutional:       General: He is not in acute distress. HENT:      Head: Normocephalic and atraumatic. Nose: Nose normal.   Eyes:      Extraocular Movements: Extraocular movements intact. Comments: Wears glasses     Cardiovascular:      Rate and Rhythm: Normal rate and regular rhythm. Pulmonary:      Effort: Pulmonary effort is normal.      Breath sounds: Normal breath sounds. Abdominal:      General: Bowel sounds are normal.      Palpations: Abdomen is soft. Musculoskeletal:      Right lower leg: Edema (larger compared to LLE, stable swelling patient reports in setting of previous vein stripping) present. Left lower leg: No edema. Skin:     General: Skin is warm and dry. /68   Pulse 66   Temp (!) 97.3 °F (36.3 °C)   Resp 18   Ht 5' 11" (1.803 m)   Wt 125 kg (275 lb 9.2 oz)   SpO2 98%   BMI 38.43 kg/m²     No Chest XR results available for this patient. CT chest wo contrast    Result Date: 12/11/2023  Narrative CT CHEST WITHOUT IV CONTRAST INDICATION:   C78.02: Secondary malignant neoplasm of left lung. COMPARISON: CT chest 6/6/2023. TECHNIQUE: CT examination of the chest was performed without intravenous contrast. Multiplanar 2D reformatted images were created from the source data. This examination, like all CT scans performed in the [facility], was performed utilizing techniques to minimize radiation dose exposure, including the use of iterative reconstruction and automated exposure control. Radiation dose length product (DLP) for this visit:  719 mGy-cm FINDINGS: LUNGS: Ovoid groundglass nodule again noted in the left upper lobe medially now measuring 2.4 x 1.4 cm image 78 series 3 may be minimally larger previously 2.2 x 1.3 cm. No solid component. Unchanged 8 mm right upper lobe nodule posteriorly image 31 series 5. Unchanged 5 mm right lower lobe nodule medially image 61 series 5.  Stable 7 mm groundglass nodule the right lower lobe posteriorly image 141 series 3. Additional small pulmonary nodules again seen. For example: Unchanged 3 mm right lower lobe nodule centrally image 130 series 3. Tiny stable 3 mm groundglass nodule in the right upper lobe laterally image 80 series 3. Stable findings for left lower lobe wedge resection. PLEURA:  Unremarkable. HEART/GREAT VESSELS: Heavy atherosclerotic coronary artery calcification is noted. Heart is otherwise unremarkable. No thoracic aortic aneurysm. MEDIASTINUM AND OLIVIA:  Unremarkable. CHEST WALL AND LOWER NECK:  Unremarkable. VISUALIZED STRUCTURES IN THE UPPER ABDOMEN: Status post cholecystectomy. OSSEOUS STRUCTURES:  No acute fracture or destructive osseous lesion. Slight depression at the upper sternum probably related to old healed fracture, stable. Scattered degenerative spurring of the visualized spine. Impression 1. Left upper lobe ovoid groundglass nodule again noted medially now measuring 2.4 x 1.4 cm, may be minimally larger previously 2.2 x 1.3 cm. No solid component. Again this could represent a low-grade adenocarcinoma. This can be followed with CT. 2.  The remaining subcentimeter pulmonary nodules are stable. Workstation performed: [id number]     CT chest wo contrast    Result Date: 6/9/2023  Narrative CT CHEST WITHOUT IV CONTRAST INDICATION:   R91.8: Other nonspecific abnormal finding of lung field. Left lower lobe 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 on 10/21/2021. COMPARISON: Chest CT  12/1/2022, 6/2/2022, 8/12/2021, 11/24/2020. TECHNIQUE: Chest CT without intravenous contrast.  Axial, sagittal, coronal 2D reformats and coronal MIPS from source data. Radiation dose length product (DLP):  724 mGy-cm . Radiation dose exposure minimized using iterative reconstruction and automated exposure control.  FINDINGS: LUNGS: Increased in left upper lobe paramediastinal groundglass nodule from December 2022 from 1.8 x 1.0 cm to 2.2 x 1.2 cm (3/86), with no solid component. 8 mm solid right upper lobe nodule (3/77), 5 mm solid right lower lobe nodule (3/145), and 5 mm and smaller groundglass nodules in the posterior segment right upper lobe (3/93), right lower lobe (3/158, 186), stable since November 2020. Left lower lobe wedge resection with mild left lower lobe scar. AIRWAYS: No significant filling defects. PLEURA:  Unremarkable. HEART/GREAT VESSELS: Normal heart size. Moderate coronary artery calcification indicating atherosclerotic heart disease. Pulmonary artery enlargement. MEDIASTINUM AND OLIVIA:  Unremarkable. CHEST WALL AND LOWER NECK: Unremarkable. UPPER ABDOMEN: 2.0 cm low-attenuation lesion in the medial segment left hepatic lobe shown on MRI from July 2022 to be focal steatosis (2/118). Persistent splenomegaly at 16 cm. Cholecystectomy. OSSEOUS STRUCTURES: Mild degenerative disease in the spine. Impression Nothing indicate local tumor recurrence. Continued enlargement of left upper lobe groundglass nodule with no solid component, question adenocarcinoma spectrum lesion. Several additional 8 mm and smaller solid and groundglass nodules stable since November 2020. Workstation performed: [id number]     No CT Chest,Abdomen,Pelvis results available for this patient. No NM PET CT results available for this patient. No Barium Swallow results available for this patient.

## 2024-01-13 DIAGNOSIS — E11.9 NON-INSULIN DEPENDENT TYPE 2 DIABETES MELLITUS (HCC): ICD-10-CM

## 2024-01-13 DIAGNOSIS — I10 BENIGN ESSENTIAL HYPERTENSION: ICD-10-CM

## 2024-01-13 DIAGNOSIS — I10 HYPERTENSION, UNSPECIFIED TYPE: ICD-10-CM

## 2024-01-15 RX ORDER — AMLODIPINE BESYLATE 10 MG/1
10 TABLET ORAL DAILY
Qty: 90 TABLET | Refills: 1 | Status: SHIPPED | OUTPATIENT
Start: 2024-01-15

## 2024-01-15 RX ORDER — LISINOPRIL AND HYDROCHLOROTHIAZIDE 20; 12.5 MG/1; MG/1
2 TABLET ORAL DAILY
Qty: 180 TABLET | Refills: 1 | Status: SHIPPED | OUTPATIENT
Start: 2024-01-15

## 2024-01-31 ENCOUNTER — APPOINTMENT (OUTPATIENT)
Dept: LAB | Facility: CLINIC | Age: 69
End: 2024-01-31
Payer: MEDICARE

## 2024-01-31 DIAGNOSIS — E78.5 HYPERLIPIDEMIA, UNSPECIFIED HYPERLIPIDEMIA TYPE: ICD-10-CM

## 2024-01-31 DIAGNOSIS — E13.9 DIABETES 1.5, MANAGED AS TYPE 2 (HCC): ICD-10-CM

## 2024-01-31 DIAGNOSIS — I25.10 CORONARY ARTERY DISEASE INVOLVING NATIVE CORONARY ARTERY OF NATIVE HEART WITHOUT ANGINA PECTORIS: ICD-10-CM

## 2024-01-31 DIAGNOSIS — I10 BENIGN ESSENTIAL HYPERTENSION: ICD-10-CM

## 2024-01-31 LAB
ALBUMIN SERPL BCP-MCNC: 4.3 G/DL (ref 3.5–5)
ALP SERPL-CCNC: 41 U/L (ref 34–104)
ALT SERPL W P-5'-P-CCNC: 21 U/L (ref 7–52)
ANION GAP SERPL CALCULATED.3IONS-SCNC: 6 MMOL/L
AST SERPL W P-5'-P-CCNC: 15 U/L (ref 13–39)
BASOPHILS # BLD AUTO: 0.03 THOUSANDS/ÂΜL (ref 0–0.1)
BASOPHILS NFR BLD AUTO: 1 % (ref 0–1)
BILIRUB SERPL-MCNC: 0.6 MG/DL (ref 0.2–1)
BUN SERPL-MCNC: 18 MG/DL (ref 5–25)
CALCIUM SERPL-MCNC: 9.5 MG/DL (ref 8.4–10.2)
CHLORIDE SERPL-SCNC: 102 MMOL/L (ref 96–108)
CHOLEST SERPL-MCNC: 117 MG/DL
CO2 SERPL-SCNC: 30 MMOL/L (ref 21–32)
CREAT SERPL-MCNC: 1.02 MG/DL (ref 0.6–1.3)
EOSINOPHIL # BLD AUTO: 0.11 THOUSAND/ÂΜL (ref 0–0.61)
EOSINOPHIL NFR BLD AUTO: 2 % (ref 0–6)
ERYTHROCYTE [DISTWIDTH] IN BLOOD BY AUTOMATED COUNT: 14.5 % (ref 11.6–15.1)
EST. AVERAGE GLUCOSE BLD GHB EST-MCNC: 166 MG/DL
GFR SERPL CREATININE-BSD FRML MDRD: 74 ML/MIN/1.73SQ M
GLUCOSE P FAST SERPL-MCNC: 197 MG/DL (ref 65–99)
HBA1C MFR BLD: 7.4 %
HCT VFR BLD AUTO: 46 % (ref 36.5–49.3)
HDLC SERPL-MCNC: 42 MG/DL
HGB BLD-MCNC: 15 G/DL (ref 12–17)
IMM GRANULOCYTES # BLD AUTO: 0.02 THOUSAND/UL (ref 0–0.2)
IMM GRANULOCYTES NFR BLD AUTO: 0 % (ref 0–2)
LDLC SERPL CALC-MCNC: 26 MG/DL (ref 0–100)
LYMPHOCYTES # BLD AUTO: 1.16 THOUSANDS/ÂΜL (ref 0.6–4.47)
LYMPHOCYTES NFR BLD AUTO: 21 % (ref 14–44)
MCH RBC QN AUTO: 30.9 PG (ref 26.8–34.3)
MCHC RBC AUTO-ENTMCNC: 32.6 G/DL (ref 31.4–37.4)
MCV RBC AUTO: 95 FL (ref 82–98)
MONOCYTES # BLD AUTO: 0.41 THOUSAND/ÂΜL (ref 0.17–1.22)
MONOCYTES NFR BLD AUTO: 7 % (ref 4–12)
NEUTROPHILS # BLD AUTO: 3.93 THOUSANDS/ÂΜL (ref 1.85–7.62)
NEUTS SEG NFR BLD AUTO: 69 % (ref 43–75)
NRBC BLD AUTO-RTO: 0 /100 WBCS
PLATELET # BLD AUTO: 133 THOUSANDS/UL (ref 149–390)
PLATELET BLD QL SMEAR: ADEQUATE
PMV BLD AUTO: 10 FL (ref 8.9–12.7)
POTASSIUM SERPL-SCNC: 4.3 MMOL/L (ref 3.5–5.3)
PROT SERPL-MCNC: 6.8 G/DL (ref 6.4–8.4)
RBC # BLD AUTO: 4.86 MILLION/UL (ref 3.88–5.62)
RBC MORPH BLD: NORMAL
SODIUM SERPL-SCNC: 138 MMOL/L (ref 135–147)
TRIGL SERPL-MCNC: 245 MG/DL
WBC # BLD AUTO: 5.66 THOUSAND/UL (ref 4.31–10.16)

## 2024-01-31 PROCEDURE — 80061 LIPID PANEL: CPT

## 2024-01-31 PROCEDURE — 36415 COLL VENOUS BLD VENIPUNCTURE: CPT

## 2024-01-31 PROCEDURE — 80053 COMPREHEN METABOLIC PANEL: CPT

## 2024-01-31 PROCEDURE — 83036 HEMOGLOBIN GLYCOSYLATED A1C: CPT

## 2024-01-31 PROCEDURE — 85025 COMPLETE CBC W/AUTO DIFF WBC: CPT

## 2024-02-06 ENCOUNTER — OFFICE VISIT (OUTPATIENT)
Dept: INTERNAL MEDICINE CLINIC | Age: 69
End: 2024-02-06
Payer: MEDICARE

## 2024-02-06 VITALS
HEIGHT: 71 IN | BODY MASS INDEX: 38.92 KG/M2 | SYSTOLIC BLOOD PRESSURE: 114 MMHG | HEART RATE: 58 BPM | OXYGEN SATURATION: 98 % | DIASTOLIC BLOOD PRESSURE: 68 MMHG | TEMPERATURE: 98 F | WEIGHT: 278 LBS

## 2024-02-06 DIAGNOSIS — E13.9 DIABETES 1.5, MANAGED AS TYPE 2 (HCC): Primary | ICD-10-CM

## 2024-02-06 DIAGNOSIS — N18.31 STAGE 3A CHRONIC KIDNEY DISEASE (HCC): ICD-10-CM

## 2024-02-06 DIAGNOSIS — C78.02 SECONDARY MALIGNANT NEOPLASM OF LEFT LUNG (HCC): ICD-10-CM

## 2024-02-06 DIAGNOSIS — I25.10 CORONARY ARTERY DISEASE INVOLVING NATIVE HEART WITHOUT ANGINA PECTORIS, UNSPECIFIED VESSEL OR LESION TYPE: ICD-10-CM

## 2024-02-06 DIAGNOSIS — E66.01 CLASS 2 SEVERE OBESITY DUE TO EXCESS CALORIES WITH SERIOUS COMORBIDITY AND BODY MASS INDEX (BMI) OF 39.0 TO 39.9 IN ADULT: ICD-10-CM

## 2024-02-06 DIAGNOSIS — D69.6 PLATELETS DECREASED (HCC): ICD-10-CM

## 2024-02-06 DIAGNOSIS — K90.9 DIARRHEA DUE TO MALABSORPTION: ICD-10-CM

## 2024-02-06 DIAGNOSIS — E11.59 TYPE 2 DIABETES MELLITUS WITH OTHER CIRCULATORY COMPLICATION, WITHOUT LONG-TERM CURRENT USE OF INSULIN (HCC): ICD-10-CM

## 2024-02-06 DIAGNOSIS — R19.7 DIARRHEA DUE TO MALABSORPTION: ICD-10-CM

## 2024-02-06 PROCEDURE — 99214 OFFICE O/P EST MOD 30 MIN: CPT | Performed by: PHYSICIAN ASSISTANT

## 2024-02-06 RX ORDER — CHOLESTYRAMINE 4 G/9G
4 POWDER, FOR SUSPENSION ORAL DAILY
Qty: 378 G | Refills: 1 | Status: SHIPPED | OUTPATIENT
Start: 2024-02-06

## 2024-02-06 RX ORDER — CHOLESTYRAMINE 4 G/9G
4 POWDER, FOR SUSPENSION ORAL DAILY
Qty: 348.6 G | Refills: 1 | Status: SHIPPED | OUTPATIENT
Start: 2024-02-06 | End: 2024-02-06

## 2024-02-06 RX ORDER — CLOPIDOGREL BISULFATE 75 MG/1
75 TABLET ORAL DAILY
Qty: 90 TABLET | Refills: 1 | Status: SHIPPED | OUTPATIENT
Start: 2024-02-06

## 2024-02-06 RX ORDER — GLIMEPIRIDE 4 MG/1
TABLET ORAL
Qty: 270 TABLET | Refills: 1 | Status: SHIPPED | OUTPATIENT
Start: 2024-02-06

## 2024-02-06 NOTE — PROGRESS NOTES
Assessment/Plan:         Diagnoses and all orders for this visit:    Diabetes 1.5, managed as type 2 (HCC)  Comments:  discussed diet and exercise   f/u hga1c   discussed impt of maintaining good control of blood sugars  limit carbs  Orders:  -     glimepiride (AMARYL) 4 mg tablet; Take 4 mg with breakfast and 8 mg in the evening daily.  -     Ambulatory Referral to Ophthalmology; Future  -     tirzepatide (Mounjaro) 2.5 MG/0.5ML; Inject 0.5 mL (2.5 mg total) under the skin every 7 days  -     CBC and differential; Future  -     Comprehensive metabolic panel; Future  -     Hemoglobin A1C; Future    Coronary artery disease involving native heart without angina pectoris, unspecified vessel or lesion type  -     clopidogrel (Plavix) 75 mg tablet; Take 1 tablet (75 mg total) by mouth daily  -     CBC and differential; Future  -     Comprehensive metabolic panel; Future  -     Hemoglobin A1C; Future    Type 2 diabetes mellitus with other circulatory complication, without long-term current use of insulin (HCC)  -     Ambulatory Referral to Ophthalmology; Future    Diarrhea due to malabsorption  -     Discontinue: cholestyramine (QUESTRAN) 4 GM/DOSE powder; Take 1 packet (4 g total) by mouth daily  -     CBC and differential; Future  -     Comprehensive metabolic panel; Future  -     Hemoglobin A1C; Future  -     cholestyramine (QUESTRAN) 4 GM/DOSE powder; Take 1 packet (4 g total) by mouth daily BOTTLE ONLY- TAKE 1 SCOOP  (4 g total) DAILY    Secondary malignant neoplasm of left lung (HCC)    Platelets decreased (HCC)  -     CBC and differential; Future  -     Comprehensive metabolic panel; Future  -     Hemoglobin A1C; Future    Class 2 severe obesity due to excess calories with serious comorbidity and body mass index (BMI) of 39.0 to 39.9 in adult   -     tirzepatide (Mounjaro) 2.5 MG/0.5ML; Inject 0.5 mL (2.5 mg total) under the skin every 7 days    Stage 3a chronic kidney disease (HCC)  -     CBC and differential;  Future  -     Comprehensive metabolic panel; Future  -     Hemoglobin A1C; Future      Will attempt to get mounjaro approved for patient   Limited options due to coverage, states it is on tier 3/5    Subjective:      Patient ID: Alvarez Webster is a 69 y.o. male.    70 y/o male with hx of dm, cad, gerd, hld, htn presents for f/u   Pt was unable to afford Jardiance (216/ month)   Continues with glimepiride    Hga1c improved slightly from 8.4 to 7.4  Discussed other medication options   Due for diabetic eye exam     Bp well controlled             The following portions of the patient's history were reviewed and updated as appropriate: allergies, current medications, past family history, past medical history, past social history, past surgical history, and problem list.    Review of Systems   Constitutional:  Negative for activity change, appetite change, chills, diaphoresis and fever.   HENT:  Negative for congestion and sore throat.    Eyes:  Negative for pain and redness.   Respiratory:  Negative for cough and shortness of breath.    Cardiovascular:  Negative for leg swelling.   Gastrointestinal:  Negative for abdominal pain, constipation, diarrhea and nausea.   Musculoskeletal:  Negative for arthralgias.   Skin:  Negative for rash.   Neurological:  Negative for light-headedness.   Psychiatric/Behavioral:  Negative for sleep disturbance.          Past Medical History:   Diagnosis Date    Allergic     Arthritis     knee    Coronary artery disease     Diabetes mellitus (HCC)     Erectile dysfunction 2017    GERD (gastroesophageal reflux disease)     H/O angioplasty     Hypercholesteremia     Hypertension     Interstitial cystitis     Obesity     Shingles     Skin tag     Sleep apnea     Varicose veins of lower extremity     Visual impairment 1980         Current Outpatient Medications:     amLODIPine (NORVASC) 10 mg tablet, Take 1 tablet (10 mg total) by mouth daily, Disp: 90 tablet, Rfl: 1    ascorbic acid (VITAMIN  C) 500 mg tablet, Take 500 mg by mouth daily, Disp: , Rfl:     aspirin (ECOTRIN LOW STRENGTH) 81 mg EC tablet, Take 81 mg by mouth daily, Disp: , Rfl:     atorvastatin (LIPITOR) 40 mg tablet, Take 1 tablet (40 mg total) by mouth daily at bedtime, Disp: 90 tablet, Rfl: 0    Blood Glucose Monitoring Suppl (ONE TOUCH ULTRA 2) w/Device KIT, Test blood glucose 2 times daily (One Touch Ultra 2), Disp: 1 each, Rfl: 0    cholestyramine (QUESTRAN) 4 g packet, Take 1 packet (4 g total) by mouth 2 (two) times a day with meals (Patient taking differently: Take by mouth every other day 1/2 pack every other day), Disp: 60 each, Rfl: 3    cholestyramine (QUESTRAN) 4 GM/DOSE powder, Take 1 packet (4 g total) by mouth daily BOTTLE ONLY- TAKE 1 SCOOP  (4 g total) DAILY, Disp: 378 g, Rfl: 1    clopidogrel (Plavix) 75 mg tablet, Take 1 tablet (75 mg total) by mouth daily, Disp: 90 tablet, Rfl: 1    glimepiride (AMARYL) 4 mg tablet, Take 4 mg with breakfast and 8 mg in the evening daily., Disp: 270 tablet, Rfl: 1    glucose blood test strip, Test once daily (one touch ultra), Disp: 100 each, Rfl: 1    lisinopril-hydrochlorothiazide (PRINZIDE,ZESTORETIC) 20-12.5 MG per tablet, Take 2 tablets by mouth daily, Disp: 180 tablet, Rfl: 1    MAGNESIUM CITRATE PO, Take 1 tablet by mouth in the morning, Disp: , Rfl:     metFORMIN (GLUCOPHAGE) 1000 MG tablet, Take 2 tablets (2,000 mg total) by mouth daily after dinner, Disp: 180 tablet, Rfl: 0    Omega-3 Fatty Acids (FISH OIL PO), Take by mouth daily  , Disp: , Rfl:     ONETOUCH DELICA LANCETS 33G MISC, Test blood glucose 2 times daily, Disp: 300 each, Rfl: 0    prednisoLONE acetate (PRED FORTE) 1 % ophthalmic suspension, Administer 1 drop into the left eye every other day , Disp: , Rfl:     tadalafil (CIALIS) 20 MG tablet, Take 1 tablet (20 mg total) by mouth as needed for erectile dysfunction, Disp: 30 tablet, Rfl: 2    tamsulosin (FLOMAX) 0.4 mg, Take 1 capsule (0.4 mg total) by mouth daily  with dinner (Patient taking differently: Take 0.8 mg by mouth daily with dinner), Disp: 90 capsule, Rfl: 1    tirzepatide (Mounjaro) 2.5 MG/0.5ML, Inject 0.5 mL (2.5 mg total) under the skin every 7 days, Disp: 2 mL, Rfl: 0    VITAMIN E PO, Take 1 capsule by mouth daily  , Disp: , Rfl:     Allergies   Allergen Reactions    Ciprofloxacin Swelling    Janumet [Sitagliptin-Metformin Hcl] Lip Swelling     Lip swelling     Terazosin      [facility] - [id number]: Blood in sperm       Social History   Past Surgical History:   Procedure Laterality Date    CARDIAC CATHETERIZATION      CARDIAC SURGERY      CHOLECYSTECTOMY  01/01/2010    COLONOSCOPY      CORONARY ANGIOPLASTY WITH STENT PLACEMENT      EYE SURGERY Left     ocular lens implant    GALLBLADDER SURGERY      HERNIA REPAIR  01/01/2009    IR BIOPSY LUNG  9/8/2021    KNEE ARTHROSCOPY W/ MENISCAL REPAIR Right     KNEE SURGERY  09/10/2015    LUNG SEGMENTECTOMY Left 10/21/2021    Procedure: RESECTION WEDGE LEFT LOWER LOBE LUNG;  Surgeon: Jacky Aparicio MD;  [facility];  Service: Thoracic    DE [facility] INCL FLUOR GDNCE DX W/CELL WASHG SPX N/A 10/21/2021    Procedure: BRONCHOSCOPY FLEXIBLE;  Surgeon: Jacky Aparicio MD;  [facility];  Service: Thoracic    DE TENDON SHEATH INCISION Left 8/25/2016    Procedure: RING TRIGGER FINGER RELEASE ;  Surgeon: Arpit Zamora MD;  [facility];  Service: Orthopedics    DE THORACOSCOPY W/THERA WEDGE RESEXN INITIAL UNILAT Left 10/21/2021    Procedure: THORACOSCOPY VIDEO ASSISTED SURGERY (VATS);  Surgeon: Jacky Aparicio MD;  [facility];  Service: Thoracic    VARICOSE VEIN SURGERY      Ligation     Family History   Problem Relation Age of Onset    Cancer Mother     Coronary artery disease Mother     Arthritis Mother     Heart disease Father     Early death Father     Coronary artery disease Father     Diabetes Father     Hypertension Father     Coronary artery disease Brother     Hyperlipidemia  "Brother     Hypertension Brother     Acne Brother     Cancer Brother     Coronary artery disease Brother     Heart disease Brother     Diabetes Brother        Objective:  /68 (BP Location: Left arm, Patient Position: Sitting, Cuff Size: Large)   Pulse 58   Temp 98 °F (36.7 °C) (Temporal)   Ht 5' 11\" (1.803 m)   Wt 126 kg (278 lb)   SpO2 98% Comment: room air  BMI 38.77 kg/m²        Physical Exam  Vitals reviewed.   Constitutional:       General: He is not in acute distress.     Appearance: He is obese.   HENT:      Head: Normocephalic and atraumatic.      Right Ear: Tympanic membrane, ear canal and external ear normal. There is no impacted cerumen.      Left Ear: Tympanic membrane, ear canal and external ear normal. There is no impacted cerumen.      Nose: Nose normal.   Eyes:      General:         Right eye: No discharge.         Left eye: No discharge.      Extraocular Movements: Extraocular movements intact.      Conjunctiva/sclera: Conjunctivae normal.      Pupils: Pupils are equal, round, and reactive to light.   Cardiovascular:      Rate and Rhythm: Normal rate and regular rhythm.   Pulmonary:      Effort: Pulmonary effort is normal. No respiratory distress.      Breath sounds: Normal breath sounds. No wheezing or rales.   Abdominal:      General: Bowel sounds are normal.   Musculoskeletal:      Cervical back: Normal range of motion.   Skin:     General: Skin is warm.      Findings: No erythema or rash.   Neurological:      General: No focal deficit present.      Mental Status: He is alert and oriented to person, place, and time.   Psychiatric:         Mood and Affect: Mood normal.         "

## 2024-02-08 DIAGNOSIS — E13.9 DIABETES 1.5, MANAGED AS TYPE 2 (HCC): Primary | ICD-10-CM

## 2024-02-08 DIAGNOSIS — E11.59 TYPE 2 DIABETES MELLITUS WITH OTHER CIRCULATORY COMPLICATION, WITHOUT LONG-TERM CURRENT USE OF INSULIN (HCC): ICD-10-CM

## 2024-03-06 DIAGNOSIS — E66.01 CLASS 2 SEVERE OBESITY DUE TO EXCESS CALORIES WITH SERIOUS COMORBIDITY AND BODY MASS INDEX (BMI) OF 39.0 TO 39.9 IN ADULT: ICD-10-CM

## 2024-03-06 DIAGNOSIS — I25.10 CORONARY ARTERY DISEASE INVOLVING NATIVE HEART WITHOUT ANGINA PECTORIS, UNSPECIFIED VESSEL OR LESION TYPE: ICD-10-CM

## 2024-03-06 DIAGNOSIS — I10 HYPERTENSION, UNSPECIFIED TYPE: ICD-10-CM

## 2024-03-06 DIAGNOSIS — E11.9 NON-INSULIN DEPENDENT TYPE 2 DIABETES MELLITUS (HCC): ICD-10-CM

## 2024-03-06 DIAGNOSIS — N39.41 URGE INCONTINENCE OF URINE: ICD-10-CM

## 2024-03-06 DIAGNOSIS — I10 BENIGN ESSENTIAL HYPERTENSION: ICD-10-CM

## 2024-03-06 DIAGNOSIS — E13.9 DIABETES 1.5, MANAGED AS TYPE 2 (HCC): ICD-10-CM

## 2024-03-06 DIAGNOSIS — E78.5 HYPERLIPIDEMIA, UNSPECIFIED HYPERLIPIDEMIA TYPE: ICD-10-CM

## 2024-03-06 RX ORDER — GLIMEPIRIDE 4 MG/1
TABLET ORAL
Qty: 270 TABLET | Refills: 1 | Status: SHIPPED | OUTPATIENT
Start: 2024-03-06

## 2024-03-06 RX ORDER — ATORVASTATIN CALCIUM 40 MG/1
40 TABLET, FILM COATED ORAL
Qty: 90 TABLET | Refills: 1 | Status: SHIPPED | OUTPATIENT
Start: 2024-03-06

## 2024-03-06 RX ORDER — CLOPIDOGREL BISULFATE 75 MG/1
75 TABLET ORAL DAILY
Qty: 90 TABLET | Refills: 1 | Status: SHIPPED | OUTPATIENT
Start: 2024-03-06

## 2024-03-06 RX ORDER — TAMSULOSIN HYDROCHLORIDE 0.4 MG/1
0.4 CAPSULE ORAL
Qty: 90 CAPSULE | Refills: 1 | Status: SHIPPED | OUTPATIENT
Start: 2024-03-06

## 2024-03-06 RX ORDER — AMLODIPINE BESYLATE 10 MG/1
10 TABLET ORAL DAILY
Qty: 90 TABLET | Refills: 1 | Status: SHIPPED | OUTPATIENT
Start: 2024-03-06

## 2024-03-06 RX ORDER — LISINOPRIL AND HYDROCHLOROTHIAZIDE 20; 12.5 MG/1; MG/1
2 TABLET ORAL DAILY
Qty: 180 TABLET | Refills: 1 | Status: SHIPPED | OUTPATIENT
Start: 2024-03-06

## 2024-03-14 PROBLEM — E11.22 TYPE 2 DIABETES MELLITUS WITH DIABETIC CHRONIC KIDNEY DISEASE (HCC): Status: ACTIVE | Noted: 2024-03-14

## 2024-03-15 PROBLEM — I12.9 HYPERTENSIVE CKD (CHRONIC KIDNEY DISEASE): Status: ACTIVE | Noted: 2019-11-12

## 2024-03-15 PROBLEM — I25.118 ATHEROSCLEROTIC HEART DISEASE OF NATIVE CORONARY ARTERY WITH OTHER FORMS OF ANGINA PECTORIS (HCC): Status: ACTIVE | Noted: 2024-03-15

## 2024-03-15 PROBLEM — E13.22 OTHER SPECIFIED DIABETES MELLITUS WITH DIABETIC CHRONIC KIDNEY DISEASE (HCC): Status: ACTIVE | Noted: 2024-03-15

## 2024-03-15 PROBLEM — R93.7 ABNORMAL MRI, LUMBAR SPINE: Status: RESOLVED | Noted: 2017-09-27 | Resolved: 2024-03-15

## 2024-03-15 PROBLEM — C78.02 SECONDARY MALIGNANT NEOPLASM OF LEFT LUNG (HCC): Status: RESOLVED | Noted: 2022-04-06 | Resolved: 2024-03-15

## 2024-03-15 PROBLEM — E13.59 OTHER SPECIFIED DIABETES MELLITUS WITH OTHER CIRCULATORY COMPLICATIONS (HCC): Status: ACTIVE | Noted: 2024-03-15

## 2024-03-15 PROBLEM — I12.9 HYPERTENSIVE CKD (CHRONIC KIDNEY DISEASE): Status: ACTIVE | Noted: 2024-03-15

## 2024-03-15 PROBLEM — E13.22 OTHER SPECIFIED DIABETES MELLITUS WITH DIABETIC CHRONIC KIDNEY DISEASE (HCC): Status: ACTIVE | Noted: 2024-03-14

## 2024-03-15 PROBLEM — I12.9 HYPERTENSIVE CKD (CHRONIC KIDNEY DISEASE): Status: ACTIVE | Noted: 2017-01-26

## 2024-04-08 ENCOUNTER — TELEPHONE (OUTPATIENT)
Dept: INTERNAL MEDICINE CLINIC | Facility: OTHER | Age: 69
End: 2024-04-08

## 2024-04-08 NOTE — TELEPHONE ENCOUNTER
Patient concerned with recent elevated BP readings and frequent urination. Appointment offered with other provider but prefers to wait until Kirsten is back in the office on Wednesday.

## 2024-04-10 ENCOUNTER — OFFICE VISIT (OUTPATIENT)
Dept: INTERNAL MEDICINE CLINIC | Age: 69
End: 2024-04-10
Payer: MEDICARE

## 2024-04-10 VITALS
BODY MASS INDEX: 37.1 KG/M2 | DIASTOLIC BLOOD PRESSURE: 82 MMHG | WEIGHT: 265 LBS | OXYGEN SATURATION: 98 % | HEART RATE: 62 BPM | SYSTOLIC BLOOD PRESSURE: 126 MMHG | TEMPERATURE: 98.2 F | HEIGHT: 71 IN

## 2024-04-10 DIAGNOSIS — I25.118 ATHEROSCLEROTIC HEART DISEASE OF NATIVE CORONARY ARTERY WITH OTHER FORMS OF ANGINA PECTORIS (HCC): ICD-10-CM

## 2024-04-10 DIAGNOSIS — E13.59 OTHER SPECIFIED DIABETES MELLITUS WITH OTHER CIRCULATORY COMPLICATIONS (HCC): ICD-10-CM

## 2024-04-10 DIAGNOSIS — N18.2 HYPERTENSIVE KIDNEY DISEASE WITH STAGE 2 CHRONIC KIDNEY DISEASE: ICD-10-CM

## 2024-04-10 DIAGNOSIS — I12.9 HYPERTENSIVE KIDNEY DISEASE WITH STAGE 2 CHRONIC KIDNEY DISEASE: ICD-10-CM

## 2024-04-10 DIAGNOSIS — E13.22 OTHER SPECIFIED DIABETES MELLITUS WITH CHRONIC KIDNEY DISEASE, WITHOUT LONG-TERM CURRENT USE OF INSULIN, UNSPECIFIED CKD STAGE (HCC): ICD-10-CM

## 2024-04-10 DIAGNOSIS — R35.0 URINARY FREQUENCY: ICD-10-CM

## 2024-04-10 DIAGNOSIS — A08.4 VIRAL GASTROENTERITIS: Primary | ICD-10-CM

## 2024-04-10 DIAGNOSIS — N39.41 URGE INCONTINENCE OF URINE: ICD-10-CM

## 2024-04-10 LAB
BACTERIA UR QL AUTO: ABNORMAL /HPF
BILIRUB UR QL STRIP: NEGATIVE
CLARITY UR: CLEAR
COLOR UR: YELLOW
GLUCOSE UR STRIP-MCNC: NEGATIVE MG/DL
HGB UR QL STRIP.AUTO: NEGATIVE
HYALINE CASTS #/AREA URNS LPF: ABNORMAL /LPF
KETONES UR STRIP-MCNC: NEGATIVE MG/DL
LEUKOCYTE ESTERASE UR QL STRIP: NEGATIVE
MUCOUS THREADS UR QL AUTO: ABNORMAL
NITRITE UR QL STRIP: NEGATIVE
NON-SQ EPI CELLS URNS QL MICRO: ABNORMAL /HPF
PH UR STRIP.AUTO: 5.5 [PH]
PROT UR STRIP-MCNC: ABNORMAL MG/DL
RBC #/AREA URNS AUTO: ABNORMAL /HPF
SL AMB  POCT GLUCOSE, UA: NORMAL
SL AMB LEUKOCYTE ESTERASE,UA: NORMAL
SL AMB POCT BILIRUBIN,UA: NORMAL
SL AMB POCT BLOOD,UA: NORMAL
SL AMB POCT CLARITY,UA: CLEAR
SL AMB POCT COLOR,UA: YELLOW
SL AMB POCT KETONES,UA: NORMAL
SL AMB POCT NITRITE,UA: NORMAL
SL AMB POCT PH,UA: 5.5
SL AMB POCT SPECIFIC GRAVITY,UA: >=1.03
SL AMB POCT URINE PROTEIN: NORMAL
SL AMB POCT UROBILINOGEN: 1
SP GR UR STRIP.AUTO: 1.02 (ref 1–1.03)
UROBILINOGEN UR STRIP-ACNC: <2 MG/DL
WBC #/AREA URNS AUTO: ABNORMAL /HPF

## 2024-04-10 PROCEDURE — 81001 URINALYSIS AUTO W/SCOPE: CPT | Performed by: PHYSICIAN ASSISTANT

## 2024-04-10 PROCEDURE — 99214 OFFICE O/P EST MOD 30 MIN: CPT | Performed by: PHYSICIAN ASSISTANT

## 2024-04-10 PROCEDURE — 81003 URINALYSIS AUTO W/O SCOPE: CPT | Performed by: PHYSICIAN ASSISTANT

## 2024-04-10 RX ORDER — TAMSULOSIN HYDROCHLORIDE 0.4 MG/1
0.8 CAPSULE ORAL
Start: 2024-04-10

## 2024-04-10 NOTE — PROGRESS NOTES
" Assessment/Plan:         Diagnoses and all orders for this visit:    Viral gastroenteritis  Comments:  encourage fluids, bland diet  montior for further diarrhea    Urge incontinence of urine  -     tamsulosin (FLOMAX) 0.4 mg; Take 2 capsules (0.8 mg total) by mouth daily with dinner  -     POCT urine dip auto non-scope  -     Cancel: UA w Reflex to Microscopic w Reflex to Culture; Future  -     UA w Reflex to Microscopic w Reflex to Culture; Future  -     UA w Reflex to Microscopic w Reflex to Culture    Urinary frequency  Comments:  check u/a  Orders:  -     POCT urine dip auto non-scope  -     Cancel: UA w Reflex to Microscopic w Reflex to Culture; Future  -     UA w Reflex to Microscopic w Reflex to Culture; Future  -     UA w Reflex to Microscopic w Reflex to Culture    Other specified diabetes mellitus with chronic kidney disease, without long-term current use of insulin, unspecified CKD stage (HCC)    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)    Atherosclerotic heart disease of native coronary artery with other forms of angina pectoris (HCC)    Hypertensive kidney disease with stage 2 chronic kidney disease  Comments:  controlled  increase water intake  Orders:  -     Cancel: UA w Reflex to Microscopic w Reflex to Culture; Future  -     UA w Reflex to Microscopic w Reflex to Culture; Future  -     UA w Reflex to Microscopic w Reflex to Culture        Pt to report any recurrence of ab pain, diarrhea, nausea immediately or go to ED  Advance diet as tolerated  Encouraged to increase water intake  Avoid use of anti diarrheal meds  Will consider imaging abdomen if any pain or persistent diarrhea       Subjective:      Patient ID: Alvarez Webster is a 69 y.o. male.    70 y/o male c/o change in stools and \"gurgling\" +chills which started 1 week ago on Tuesday. Pt states he was stopped up (bowels) and finally had BM Sunday but it was watery diarrhea all day. Pt states he continued " with loose stools Monday. Pt denies taking anything for constipation. He has not taken cholestyramine since this started. Pt concerned that it may have been food poisoning. He also had R sided abdominal pain which resolved. Pt reports at the same time his BP was elevated around 150/80. This has since resolved. Pt reports not BM today.   No sick contacts, no household contacts with similar sx  Pt states he ate Fish sandwich at [employer] Thursday and hamburger Friday     Pt concerned with stones in ducts - s/p cholecystectomy   Pt reports BPH - on tamsulosin   Pt reports urinary frequency on and off   Pt reports it has been more noticeable lately     DM - hga1c improved in Jan  Discussed low carb diet           The following portions of the patient's history were reviewed and updated as appropriate: allergies, current medications, past family history, past medical history, past social history, past surgical history, and problem list.    Review of Systems   Constitutional:  Negative for activity change, appetite change, chills and fever.   HENT:  Negative for congestion and sore throat.    Eyes:  Negative for pain and redness.   Respiratory:  Negative for cough, shortness of breath and wheezing.    Cardiovascular:  Negative for chest pain and leg swelling.   Gastrointestinal:  Positive for constipation and diarrhea. Negative for abdominal distention, abdominal pain, nausea and vomiting.   Musculoskeletal:  Negative for arthralgias and back pain.   Skin:  Negative for rash.   Neurological:  Negative for dizziness, light-headedness and headaches.         Past Medical History:   Diagnosis Date    Allergic     Arthritis     knee    Coronary artery disease     Diabetes mellitus (HCC)     Erectile dysfunction 2017    GERD (gastroesophageal reflux disease)     H/O angioplasty     Hypercholesteremia     Hypertension     Interstitial cystitis     Obesity     Secondary malignant neoplasm of left lung (HCC) 04/06/2022    Shingles      Skin tag     Sleep apnea     Varicose veins of lower extremity     Visual impairment 1980         Current Outpatient Medications:     amLODIPine (NORVASC) 10 mg tablet, Take 1 tablet (10 mg total) by mouth daily, Disp: 90 tablet, Rfl: 1    ascorbic acid (VITAMIN C) 500 mg tablet, Take 500 mg by mouth daily, Disp: , Rfl:     aspirin (ECOTRIN LOW STRENGTH) 81 mg EC tablet, Take 81 mg by mouth daily, Disp: , Rfl:     atorvastatin (LIPITOR) 40 mg tablet, Take 1 tablet (40 mg total) by mouth daily at bedtime, Disp: 90 tablet, Rfl: 1    cholestyramine (QUESTRAN) 4 g packet, Take 1 packet (4 g total) by mouth 2 (two) times a day with meals (Patient taking differently: Take by mouth every other day 1/2 pack every other day), Disp: 60 each, Rfl: 3    cholestyramine (QUESTRAN) 4 GM/DOSE powder, Take 1 packet (4 g total) by mouth daily BOTTLE ONLY- TAKE 1 SCOOP  (4 g total) DAILY, Disp: 378 g, Rfl: 1    clopidogrel (Plavix) 75 mg tablet, Take 1 tablet (75 mg total) by mouth daily, Disp: 90 tablet, Rfl: 1    glimepiride (AMARYL) 4 mg tablet, Take 4 mg with breakfast and 8 mg in the evening daily., Disp: 270 tablet, Rfl: 1    glucose blood test strip, Test once daily (one touch ultra), Disp: 100 each, Rfl: 1    lisinopril-hydrochlorothiazide (PRINZIDE,ZESTORETIC) 20-12.5 MG per tablet, Take 2 tablets by mouth daily, Disp: 180 tablet, Rfl: 1    MAGNESIUM CITRATE PO, Take 1 tablet by mouth in the morning, Disp: , Rfl:     metFORMIN (GLUCOPHAGE) 1000 MG tablet, Take 2 tablets (2,000 mg total) by mouth daily after dinner, Disp: 180 tablet, Rfl: 1    Omega-3 Fatty Acids (FISH OIL PO), Take by mouth daily  , Disp: , Rfl:     ONETOUCH DELICA LANCETS 33G MISC, Test blood glucose 2 times daily, Disp: 300 each, Rfl: 0    prednisoLONE acetate (PRED FORTE) 1 % ophthalmic suspension, Administer 1 drop into the left eye every other day , Disp: , Rfl:     tadalafil (CIALIS) 20 MG tablet, Take 1 tablet (20 mg total) by mouth as needed for  erectile dysfunction, Disp: 30 tablet, Rfl: 2    tamsulosin (FLOMAX) 0.4 mg, Take 2 capsules (0.8 mg total) by mouth daily with dinner, Disp: , Rfl:     VITAMIN E PO, Take 1 capsule by mouth daily  , Disp: , Rfl:     Allergies   Allergen Reactions    Ciprofloxacin Swelling    Janumet [Sitagliptin-Metformin Hcl] Lip Swelling     Lip swelling     Terazosin      [facility] - [id number]: Blood in sperm       Social History   Past Surgical History:   Procedure Laterality Date    CARDIAC CATHETERIZATION      CARDIAC SURGERY      CHOLECYSTECTOMY  01/01/2010    COLONOSCOPY      CORONARY ANGIOPLASTY WITH STENT PLACEMENT      EYE SURGERY Left     ocular lens implant    GALLBLADDER SURGERY      HERNIA REPAIR  01/01/2009    IR BIOPSY LUNG  9/8/2021    KNEE ARTHROSCOPY W/ MENISCAL REPAIR Right     KNEE SURGERY  09/10/2015    LUNG SEGMENTECTOMY Left 10/21/2021    Procedure: RESECTION WEDGE LEFT LOWER LOBE LUNG;  Surgeon: Jacky Aparicio MD;  [facility];  Service: Thoracic    ND [facility] INCL FLUOR GDNCE DX W/CELL WASHG SPX N/A 10/21/2021    Procedure: BRONCHOSCOPY FLEXIBLE;  Surgeon: Jacky Aparicio MD;  [facility];  Service: Thoracic    ND TENDON SHEATH INCISION Left 8/25/2016    Procedure: RING TRIGGER FINGER RELEASE ;  Surgeon: Arpit Zamora MD;  [facility];  Service: Orthopedics    ND THORACOSCOPY W/THERA WEDGE RESEXN INITIAL UNILAT Left 10/21/2021    Procedure: THORACOSCOPY VIDEO ASSISTED SURGERY (VATS);  Surgeon: Jacky Aparicio MD;  [facility];  Service: Thoracic    VARICOSE VEIN SURGERY      Ligation     Family History   Problem Relation Age of Onset    Cancer Mother     Coronary artery disease Mother     Arthritis Mother     Heart disease Father     Early death Father     Coronary artery disease Father     Diabetes Father     Hypertension Father     Coronary artery disease Brother     Hyperlipidemia Brother     Hypertension Brother     Acne Brother     Cancer Brother      "Coronary artery disease Brother     Heart disease Brother     Diabetes Brother        Objective:  /82 (BP Location: Left arm, Patient Position: Sitting, Cuff Size: Standard)   Pulse 62   Temp 98.2 °F (36.8 °C) (Temporal)   Ht 5' 11\" (1.803 m)   Wt 120 kg (265 lb)   SpO2 98%   BMI 36.96 kg/m²        Physical Exam  Vitals reviewed.   Constitutional:       Appearance: He is obese.   HENT:      Head: Normocephalic and atraumatic.      Nose: Nose normal.      Mouth/Throat:      Mouth: Mucous membranes are moist.   Eyes:      General:         Right eye: No discharge.         Left eye: No discharge.      Extraocular Movements: Extraocular movements intact.      Conjunctiva/sclera: Conjunctivae normal.      Pupils: Pupils are equal, round, and reactive to light.   Cardiovascular:      Rate and Rhythm: Normal rate and regular rhythm.      Pulses: no weak pulses.           Dorsalis pedis pulses are 2+ on the right side and 2+ on the left side.        Posterior tibial pulses are 2+ on the right side and 2+ on the left side.   Pulmonary:      Effort: Pulmonary effort is normal. No respiratory distress.      Breath sounds: Normal breath sounds. No wheezing or rales.   Abdominal:      General: Bowel sounds are normal. There is no distension.      Palpations: There is no mass.      Tenderness: There is no abdominal tenderness. There is no rebound.   Musculoskeletal:         General: Normal range of motion.      Cervical back: Normal range of motion. No rigidity.      Right lower leg: No edema.      Left lower leg: No edema.      Comments: Full ROM R shoulder    Feet:      Right foot:      Skin integrity: Dry skin present. No ulcer, skin breakdown, erythema, warmth or callus.      Left foot:      Skin integrity: Dry skin present. No ulcer, skin breakdown, erythema, warmth or callus.   Neurological:      General: No focal deficit present.      Mental Status: He is alert.   Psychiatric:         Mood and Affect: Mood " normal.       Diabetic Foot Exam    Patient's shoes and socks removed.    Right Foot/Ankle   Right Foot Inspection  Skin Exam: skin normal, skin intact and dry skin. No warmth, no callus, no erythema, no maceration, no abnormal color, no pre-ulcer, no ulcer and no callus.     Toe Exam: ROM and strength within normal limits. No swelling and erythema    Sensory   Monofilament testing: intact    Vascular  Capillary refills: < 3 seconds  The right DP pulse is 2+. The right PT pulse is 2+.     Left Foot/Ankle  Left Foot Inspection  Skin Exam: skin normal, skin intact and dry skin. No warmth, no erythema, no maceration, normal color, no pre-ulcer, no ulcer and no callus.     Toe Exam: ROM and strength within normal limits. No swelling and no erythema.     Sensory   Monofilament testing: intact    Vascular  Capillary refills: < 3 seconds  The left DP pulse is 2+. The left PT pulse is 2+.     Assign Risk Category  No deformity present  No loss of protective sensation  No weak pulses  Risk: 0

## 2024-04-11 DIAGNOSIS — K90.9 DIARRHEA DUE TO MALABSORPTION: ICD-10-CM

## 2024-04-11 DIAGNOSIS — R19.7 DIARRHEA DUE TO MALABSORPTION: ICD-10-CM

## 2024-04-11 DIAGNOSIS — R19.5 CHANGE IN STOOL: Primary | ICD-10-CM

## 2024-04-18 ENCOUNTER — TELEPHONE (OUTPATIENT)
Dept: INTERNAL MEDICINE CLINIC | Age: 69
End: 2024-04-18

## 2024-04-19 ENCOUNTER — ESTABLISHED COMPREHENSIVE EXAM (OUTPATIENT)
Dept: URBAN - METROPOLITAN AREA CLINIC 6 | Facility: CLINIC | Age: 69
End: 2024-04-19

## 2024-04-19 DIAGNOSIS — E11.9: ICD-10-CM

## 2024-04-19 DIAGNOSIS — H04.123: ICD-10-CM

## 2024-04-19 DIAGNOSIS — D31.32: ICD-10-CM

## 2024-04-19 DIAGNOSIS — Z96.1: ICD-10-CM

## 2024-04-19 DIAGNOSIS — H35.372: ICD-10-CM

## 2024-04-19 DIAGNOSIS — D31.31: ICD-10-CM

## 2024-04-19 DIAGNOSIS — H25.811: ICD-10-CM

## 2024-04-19 LAB
LEFT EYE DIABETIC RETINOPATHY: NORMAL
RIGHT EYE DIABETIC RETINOPATHY: NORMAL

## 2024-04-19 PROCEDURE — 92250 FUNDUS PHOTOGRAPHY W/I&R: CPT

## 2024-04-19 PROCEDURE — 92014 COMPRE OPH EXAM EST PT 1/>: CPT

## 2024-04-19 ASSESSMENT — VISUAL ACUITY
OU_CC: J1+
OD_CC: 20/20-1
OS_CC: 20/30

## 2024-04-19 ASSESSMENT — TONOMETRY
OD_IOP_MMHG: 14
OS_IOP_MMHG: 24

## 2024-04-30 DIAGNOSIS — E13.9 DIABETES 1.5, MANAGED AS TYPE 2 (HCC): ICD-10-CM

## 2024-05-01 RX ORDER — GLIMEPIRIDE 4 MG/1
TABLET ORAL
Qty: 270 TABLET | Refills: 0 | Status: SHIPPED | OUTPATIENT
Start: 2024-05-01

## 2024-05-30 ENCOUNTER — RA CDI HCC (OUTPATIENT)
Dept: OTHER | Facility: HOSPITAL | Age: 69
End: 2024-05-30

## 2024-05-31 ENCOUNTER — APPOINTMENT (OUTPATIENT)
Dept: LAB | Facility: CLINIC | Age: 69
End: 2024-05-31
Payer: MEDICARE

## 2024-05-31 DIAGNOSIS — N18.31 STAGE 3A CHRONIC KIDNEY DISEASE (HCC): ICD-10-CM

## 2024-05-31 DIAGNOSIS — E13.9 DIABETES 1.5, MANAGED AS TYPE 2 (HCC): ICD-10-CM

## 2024-05-31 DIAGNOSIS — D69.6 PLATELETS DECREASED (HCC): ICD-10-CM

## 2024-05-31 DIAGNOSIS — R19.7 DIARRHEA DUE TO MALABSORPTION: ICD-10-CM

## 2024-05-31 DIAGNOSIS — I25.10 CORONARY ARTERY DISEASE INVOLVING NATIVE HEART WITHOUT ANGINA PECTORIS, UNSPECIFIED VESSEL OR LESION TYPE: ICD-10-CM

## 2024-05-31 DIAGNOSIS — R35.1 NOCTURIA: ICD-10-CM

## 2024-05-31 DIAGNOSIS — K90.9 DIARRHEA DUE TO MALABSORPTION: ICD-10-CM

## 2024-05-31 LAB
ALBUMIN SERPL BCP-MCNC: 4.4 G/DL (ref 3.5–5)
ALP SERPL-CCNC: 48 U/L (ref 34–104)
ALT SERPL W P-5'-P-CCNC: 12 U/L (ref 7–52)
ANION GAP SERPL CALCULATED.3IONS-SCNC: 6 MMOL/L (ref 4–13)
AST SERPL W P-5'-P-CCNC: 11 U/L (ref 13–39)
BASOPHILS # BLD AUTO: 0.03 THOUSANDS/ÂΜL (ref 0–0.1)
BASOPHILS NFR BLD AUTO: 1 % (ref 0–1)
BILIRUB SERPL-MCNC: 0.63 MG/DL (ref 0.2–1)
BUN SERPL-MCNC: 21 MG/DL (ref 5–25)
CALCIUM SERPL-MCNC: 9.6 MG/DL (ref 8.4–10.2)
CHLORIDE SERPL-SCNC: 103 MMOL/L (ref 96–108)
CO2 SERPL-SCNC: 30 MMOL/L (ref 21–32)
CREAT SERPL-MCNC: 1.05 MG/DL (ref 0.6–1.3)
EOSINOPHIL # BLD AUTO: 0.11 THOUSAND/ÂΜL (ref 0–0.61)
EOSINOPHIL NFR BLD AUTO: 2 % (ref 0–6)
ERYTHROCYTE [DISTWIDTH] IN BLOOD BY AUTOMATED COUNT: 14 % (ref 11.6–15.1)
EST. AVERAGE GLUCOSE BLD GHB EST-MCNC: 137 MG/DL
GFR SERPL CREATININE-BSD FRML MDRD: 72 ML/MIN/1.73SQ M
GLUCOSE P FAST SERPL-MCNC: 195 MG/DL (ref 65–99)
HBA1C MFR BLD: 6.4 %
HCT VFR BLD AUTO: 44.8 % (ref 36.5–49.3)
HGB BLD-MCNC: 14.8 G/DL (ref 12–17)
IMM GRANULOCYTES # BLD AUTO: 0.01 THOUSAND/UL (ref 0–0.2)
IMM GRANULOCYTES NFR BLD AUTO: 0 % (ref 0–2)
LYMPHOCYTES # BLD AUTO: 1.21 THOUSANDS/ÂΜL (ref 0.6–4.47)
LYMPHOCYTES NFR BLD AUTO: 21 % (ref 14–44)
MCH RBC QN AUTO: 31.2 PG (ref 26.8–34.3)
MCHC RBC AUTO-ENTMCNC: 33 G/DL (ref 31.4–37.4)
MCV RBC AUTO: 94 FL (ref 82–98)
MONOCYTES # BLD AUTO: 0.39 THOUSAND/ÂΜL (ref 0.17–1.22)
MONOCYTES NFR BLD AUTO: 7 % (ref 4–12)
NEUTROPHILS # BLD AUTO: 3.93 THOUSANDS/ÂΜL (ref 1.85–7.62)
NEUTS SEG NFR BLD AUTO: 69 % (ref 43–75)
NRBC BLD AUTO-RTO: 0 /100 WBCS
PLATELET # BLD AUTO: 137 THOUSANDS/UL (ref 149–390)
PLATELET BLD QL SMEAR: ADEQUATE
PMV BLD AUTO: 10.2 FL (ref 8.9–12.7)
POTASSIUM SERPL-SCNC: 4.4 MMOL/L (ref 3.5–5.3)
PROT SERPL-MCNC: 7 G/DL (ref 6.4–8.4)
PSA SERPL-MCNC: 3.25 NG/ML (ref 0–4)
RBC # BLD AUTO: 4.75 MILLION/UL (ref 3.88–5.62)
RBC MORPH BLD: NORMAL
SODIUM SERPL-SCNC: 139 MMOL/L (ref 135–147)
WBC # BLD AUTO: 5.68 THOUSAND/UL (ref 4.31–10.16)

## 2024-05-31 PROCEDURE — 83036 HEMOGLOBIN GLYCOSYLATED A1C: CPT

## 2024-05-31 PROCEDURE — 36415 COLL VENOUS BLD VENIPUNCTURE: CPT

## 2024-05-31 PROCEDURE — G0103 PSA SCREENING: HCPCS

## 2024-05-31 PROCEDURE — 85025 COMPLETE CBC W/AUTO DIFF WBC: CPT

## 2024-05-31 PROCEDURE — 80053 COMPREHEN METABOLIC PANEL: CPT

## 2024-06-07 ENCOUNTER — HOSPITAL ENCOUNTER (OUTPATIENT)
Dept: CT IMAGING | Facility: HOSPITAL | Age: 69
Discharge: HOME | End: 2024-06-07
Payer: MEDICARE

## 2024-06-07 DIAGNOSIS — C78.02 SECONDARY MALIGNANT NEOPLASM OF LEFT LUNG (HCC): ICD-10-CM

## 2024-06-07 PROCEDURE — G1004 CDSM NDSC: HCPCS

## 2024-06-07 PROCEDURE — 71250 CT THORAX DX C-: CPT

## 2024-06-18 ENCOUNTER — OFFICE VISIT (OUTPATIENT)
Dept: CARDIAC SURGERY | Facility: CLINIC | Age: 69
End: 2024-06-18
Payer: MEDICARE

## 2024-06-18 VITALS
HEIGHT: 71 IN | TEMPERATURE: 98.2 F | HEART RATE: 59 BPM | WEIGHT: 270.95 LBS | BODY MASS INDEX: 37.93 KG/M2 | SYSTOLIC BLOOD PRESSURE: 158 MMHG | OXYGEN SATURATION: 97 % | DIASTOLIC BLOOD PRESSURE: 78 MMHG | RESPIRATION RATE: 14 BRPM

## 2024-06-18 DIAGNOSIS — C78.02 SECONDARY MALIGNANT NEOPLASM OF LEFT LUNG (HCC): Primary | ICD-10-CM

## 2024-06-18 PROCEDURE — 99213 OFFICE O/P EST LOW 20 MIN: CPT | Performed by: THORACIC SURGERY (CARDIOTHORACIC VASCULAR SURGERY)

## 2024-06-18 NOTE — PROGRESS NOTES
Assessment/Plan:    Secondary malignant neoplasm of left lung (HCC)  Mr. Webster is approximately 2 years and 8 months out from left therapeutic lower lobe wedge resection performed in the setting of a spindle cell neoplasm.  Recent CT scan of the chest demonstrates a 2.4 x 1.4 cm left upper lobe nodule that is mostly groundglass but is now developed a 5 mm solid component.  There are other scattered stable nodules with the largest being an 8 mm solid right upper lobe nodule.  In regards to the left upper lobe nodule, we discussed that this most likely does represent an adenocarcinoma spectrum lesion.  We discussed that it has appeared to progress some with the development of a solid component but the solid component is still quite small and below the threshold for biopsy or evaluation with a PET CT scan.  After discussion with Dr. Aparicio, would recommend repeat CT scan in 6 months.   Patient CT scan also mentioned severe coronary artery disease.  On his CT scan 1 year prior it was rated as moderate.  Patient is on aspirin and a statin; he additionally follows with cardiology, and we discussed that he should discuss these findings further with his cardiologist at his next appointment.       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   Procedure: Flexible bronchoscopy, left thoracoscopic therapeutic lower lobe wedge resection, MLND 10/21/21  Pathology:  A.  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     - Focal atypical adenomatous hyperplasia.     - Emphysematous changes.     - See note. Was sent to [facility] for second opinion.  B.  Pleural lipoma:     - Mature adipose tissue consistent with a  lipoma.  C.  Lymph node, level 5:     - Single lymph node; negative for malignancy (0/1).  D.  Lymph node, level 9:     - Single lymph node; negative for malignancy (0/1).    Cancer Staging   No matching staging information was found for the patient.    Oncology History    No history exists.            Subjective:    Patient ID: Alvarez Webster is a 69 y.o. male.    HPI    Alvarez Webster is a 69 y.o. male who presents today for lung cancer surveillance. Patient is approximately 2 years and 8 months out from left therapeutic lower lobe wedge resection performed on 10/21/2021 for a spindle cell neoplasm.  At time of last office visit on 12/12/2023, patient's most recent CT scan had demonstrated slight increase in groundglass nodule in the left upper lobe without any solid component and he was recommended to have a repeat CT scan in 6 months.    CT scan of the chest completed on 6/7/24 was personally reviewed by myself and in PACS demonstrating a 2.4 x 1.4 cm left upper lobe mostly groundglass nodule with interval development of solid components measuring up to 5 mm. There are small stable lung nodules including a 8 mm right upper lobe solid nodule, 4 mm groundglass right upper lobe nodule, 5 mm right lower lobe solid nodule, and 7 mm groundglass right lower lobe nodule.  There are a few additional sub-3 mm stable nodules.    On discussion today, patient reports he is able to perform activities without largely being affected by his breathing.  He cuts the gas regularly and goes golfing.  He takes appropriate breaks. Denies cough, fevers, chills. Reports some chest pressure after eating that is relieved with passing gas.  Patient is a retired .  He is a former smoker who quit smoking around age 35.      The following portions of the patient's history were reviewed and updated as appropriate: allergies, current medications, past family history, past medical history, past social history, past surgical history  "and problem list.    Review of Systems   Constitutional:  Negative for chills and fever.   Eyes:         Uveitis follows with an eye doctor.    Respiratory:  Negative for cough and shortness of breath.    Hematological:  Bruises/bleeds easily.         Objective:   Physical Exam  Constitutional:       General: He is not in acute distress.  HENT:      Head: Normocephalic and atraumatic.      Nose: Nose normal.   Eyes:      Extraocular Movements: Extraocular movements intact.      Comments: Wears glasses   Cardiovascular:      Rate and Rhythm: Normal rate and regular rhythm.   Pulmonary:      Effort: Pulmonary effort is normal.      Breath sounds: Normal breath sounds.   Abdominal:      Palpations: Abdomen is soft.      Tenderness: There is no abdominal tenderness.   Musculoskeletal:      Right lower leg: Edema (non-pitting, wearing compression stockings) present.      Left lower leg: Edema (non-pitting, wearing compression stockings) present.   Skin:     General: Skin is warm and dry.     /78 (BP Location: Left arm, Patient Position: Sitting, Cuff Size: Large)   Pulse 59   Temp 98.2 °F (36.8 °C) (Temporal)   Resp 14   Ht 5' 11\" (1.803 m)   Wt 123 kg (270 lb 15.1 oz)   SpO2 97%   BMI 37.79 kg/m²     No Chest XR results available for this patient.   CT chest wo contrast    Result Date: 6/14/2024  Narrative CT CHEST WITHOUT IV CONTRAST INDICATION: C78.02: Secondary malignant neoplasm of left lung. COMPARISON: Many priors, recently a CT chest from 12/5/2023. TECHNIQUE: CT examination of the chest was performed without intravenous contrast. Multiplanar 2D reformatted images were created from the source data. This examination, like all CT scans performed in the [facility], was performed utilizing techniques to minimize radiation dose exposure, including the use of iterative reconstruction and automated exposure control. Radiation dose length product (DLP) for this visit: 651 mGy-cm FINDINGS: " LUNGS: -2.4 x 1.4 cm left upper lobe mostly groundglass nodule with interval development of solid components measuring up to 5 mm. The nodule is further increased in size from older studies; for example it measured 1.6 x 0.9 cm on 8/12/2021. -Stable 8 mm right upper lobe solid nodule (series 3, image 68) -Stable 4 mm purely groundglass nodule in the right upper lobe (series 3, image 113) when remeasured -Stable 5 mm right lower lobe solid nodule (series 3, image 129) -Stable 7 mm right lower lobe purely groundglass nodule (series 3, image 152) There are a few additional stable sub-3 mm solid and nonsolid nodules. Postprocedural changes from left lower lobe wedge resection. PLEURA: Unremarkable. HEART/GREAT VESSELS: Normal heart size. Severe coronary artery calcification indicating atherosclerotic heart disease. No thoracic aortic aneurysm. MEDIASTINUM AND OLIVIA: Unremarkable. CHEST WALL AND LOWER NECK: Similar 1.4 cm nodule in the inferior right thyroid lobe. Incidental discovery of one or more thyroid nodule(s) measuring less than 1.5 cm and without suspicious features is noted in this patient who is above 35 years old; according to guidelines published in the February 2015 white paper on incidental thyroid nodules in the Journal of the American College of Radiology (JACR), no further evaluation is recommended. VISUALIZED STRUCTURES IN THE UPPER ABDOMEN: Cholecystectomy. OSSEOUS STRUCTURES: No acute fracture or destructive osseous lesion. Old healed fracture of the upper sternum. Osseous degenerative changes.     Impression 1. Interval development of a small solid component measuring 5 mm within a 2.4 x 1.4 cm part solid left upper lobe pulmonary nodule compared to the most recent CT. The nodule is further increase in size from older studies, previously measuring 1.6 x 0.9 cm on 8/12/2021. Findings compatible with lung adenocarcinoma. Given interval development of a solid component, subspecialty consultation is  recommended. 2. Remainder of scattered subcentimeter pulmonary nodules are stable. The study was marked in EPIC for significant notification. Workstation performed: [id number]     CT chest wo contrast    Result Date: 12/11/2023  Narrative CT CHEST WITHOUT IV CONTRAST INDICATION:   C78.02: Secondary malignant neoplasm of left lung. COMPARISON: CT chest 6/6/2023. TECHNIQUE: CT examination of the chest was performed without intravenous contrast. Multiplanar 2D reformatted images were created from the source data. This examination, like all CT scans performed in the [facility], was performed utilizing techniques to minimize radiation dose exposure, including the use of iterative reconstruction and automated exposure control. Radiation dose length product (DLP) for this visit:  719 mGy-cm FINDINGS: LUNGS: Ovoid groundglass nodule again noted in the left upper lobe medially now measuring 2.4 x 1.4 cm image 78 series 3 may be minimally larger previously 2.2 x 1.3 cm. No solid component. Unchanged 8 mm right upper lobe nodule posteriorly image 31 series 5. Unchanged 5 mm right lower lobe nodule medially image 61 series 5. Stable 7 mm groundglass nodule the right lower lobe posteriorly image 141 series 3. Additional small pulmonary nodules again seen. For example: Unchanged 3 mm right lower lobe nodule centrally image 130 series 3. Tiny stable 3 mm groundglass nodule in the right upper lobe laterally image 80 series 3. Stable findings for left lower lobe wedge resection. PLEURA:  Unremarkable. HEART/GREAT VESSELS: Heavy atherosclerotic coronary artery calcification is noted.  Heart is otherwise unremarkable.  No thoracic aortic aneurysm. MEDIASTINUM AND OLIVIA:  Unremarkable. CHEST WALL AND LOWER NECK:  Unremarkable. VISUALIZED STRUCTURES IN THE UPPER ABDOMEN: Status post cholecystectomy. OSSEOUS STRUCTURES:  No acute fracture or destructive osseous lesion. Slight depression at the upper sternum probably  related to old healed fracture, stable. Scattered degenerative spurring of the visualized spine.     Impression 1. Left upper lobe ovoid groundglass nodule again noted medially now measuring 2.4 x 1.4 cm, may be minimally larger previously 2.2 x 1.3 cm. No solid component. Again this could represent a low-grade adenocarcinoma.  This can be followed with CT. 2.  The remaining subcentimeter pulmonary nodules are stable. Workstation performed: [id number]     No CT Chest,Abdomen,Pelvis results available for this patient.   No NM PET CT results available for this patient.   No Barium Swallow results available for this patient.

## 2024-06-19 ENCOUNTER — TELEPHONE (OUTPATIENT)
Dept: CARDIAC SURGERY | Facility: CLINIC | Age: 69
End: 2024-06-19

## 2024-06-19 NOTE — ASSESSMENT & PLAN NOTE
Mr. Webster is approximately 2 years and 8 months out from left therapeutic lower lobe wedge resection performed in the setting of a spindle cell neoplasm.  Recent CT scan of the chest demonstrates a 2.4 x 1.4 cm left upper lobe nodule that is mostly groundglass but is now developed a 5 mm solid component.  There are other scattered stable nodules with the largest being an 8 mm solid right upper lobe nodule.  In regards to the left upper lobe nodule, we discussed that this most likely does represent an adenocarcinoma spectrum lesion.  We discussed that it has appeared to progress some with the development of a solid component but the solid component is still quite small and below the threshold for biopsy or evaluation with a PET CT scan.  After discussion with Dr. Aparicio, would recommend repeat CT scan in 6 months.   Patient CT scan also mentioned severe coronary artery disease.  On his CT scan 1 year prior it was rated as moderate.  Patient is on aspirin and a statin; he additionally follows with cardiology, and we discussed that he should discuss these findings further with his cardiologist at his next appointment.

## 2024-06-19 NOTE — TELEPHONE ENCOUNTER
Left voice message for patient informing him that his follow up appt was scheduled incorrectly and reschedule to meet with Dr. Aparicio on 12/24 to discuss results of 6 month CT chest scan. Provided office number, should he have any further questions.

## 2024-07-10 ENCOUNTER — OFFICE VISIT (OUTPATIENT)
Dept: INTERNAL MEDICINE CLINIC | Age: 69
End: 2024-07-10
Payer: MEDICARE

## 2024-07-10 VITALS
OXYGEN SATURATION: 97 % | SYSTOLIC BLOOD PRESSURE: 132 MMHG | TEMPERATURE: 97.7 F | HEIGHT: 71 IN | WEIGHT: 274 LBS | DIASTOLIC BLOOD PRESSURE: 84 MMHG | BODY MASS INDEX: 38.36 KG/M2 | HEART RATE: 52 BPM

## 2024-07-10 DIAGNOSIS — I10 HYPERTENSION, UNSPECIFIED TYPE: ICD-10-CM

## 2024-07-10 DIAGNOSIS — E13.9 DIABETES 1.5, MANAGED AS TYPE 2 (HCC): ICD-10-CM

## 2024-07-10 DIAGNOSIS — E78.5 HYPERLIPIDEMIA, UNSPECIFIED HYPERLIPIDEMIA TYPE: ICD-10-CM

## 2024-07-10 DIAGNOSIS — H93.13 TINNITUS OF BOTH EARS: Primary | ICD-10-CM

## 2024-07-10 DIAGNOSIS — H91.93 BILATERAL HEARING LOSS, UNSPECIFIED HEARING LOSS TYPE: ICD-10-CM

## 2024-07-10 PROCEDURE — 99214 OFFICE O/P EST MOD 30 MIN: CPT | Performed by: PHYSICIAN ASSISTANT

## 2024-07-10 NOTE — PROGRESS NOTES
" Assessment/Plan:         Diagnoses and all orders for this visit:    Tinnitus of both ears  Comments:  recommend ENT eval  Orders:  -     Ambulatory Referral to Otolaryngology; Future  -     CBC and differential; Future  -     Comprehensive metabolic panel; Future  -     Hemoglobin A1C; Future    Bilateral hearing loss, unspecified hearing loss type  -     Ambulatory Referral to Otolaryngology; Future    Diabetes 1.5, managed as type 2 (HCC)  Comments:  improved, continue portion control and dietary changes  continue fiber supplement  f/u labs in 3 months  Orders:  -     CBC and differential; Future  -     Comprehensive metabolic panel; Future  -     Hemoglobin A1C; Future    Hyperlipidemia, unspecified hyperlipidemia type  -     CBC and differential; Future  -     Comprehensive metabolic panel; Future  -     Hemoglobin A1C; Future    Hypertension, unspecified type  Comments:  controlled  Orders:  -     CBC and differential; Future  -     Comprehensive metabolic panel; Future  -     Hemoglobin A1C; Future          Subjective:      Patient ID: Alvarez Webster is a 69 y.o. male.    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  Has cut down on caffeine use, trying to drink more water   Pt uses flonase daily and loratadine   States he believes the loratadine made him dizzy so he stopped   Denies notable hearing changes     DM hga1c improved with portion control - from 7.4 to 6.4  Pt has been more active and drinking more water    PSA slightly elevated from baseline, repeating per urology in August         The following portions of the patient's history were reviewed and updated as appropriate: allergies, current medications, past family history, past medical history, past social history, past surgical history, and " problem list.    Review of Systems   Constitutional:  Negative for activity change, appetite change, chills, diaphoresis and fever.   HENT:  Positive for tinnitus. Negative for congestion, ear discharge, ear pain, hearing loss and sore throat.    Eyes:  Negative for photophobia and pain.   Respiratory:  Negative for cough and shortness of breath.    Cardiovascular:  Negative for chest pain and leg swelling.   Gastrointestinal:  Negative for abdominal pain, constipation, diarrhea and nausea.   Musculoskeletal:  Negative for arthralgias, back pain and gait problem.   Skin:  Negative for rash.   Neurological:  Negative for dizziness and headaches.   Psychiatric/Behavioral:  Negative for dysphoric mood and sleep disturbance. The patient is not nervous/anxious.          Past Medical History:   Diagnosis Date    Allergic     Arthritis     knee    Coronary artery disease     Diabetes mellitus (HCC)     Erectile dysfunction 2017    GERD (gastroesophageal reflux disease)     H/O angioplasty     Hypercholesteremia     Hypertension     Interstitial cystitis     Obesity     Secondary malignant neoplasm of left lung (HCC) 04/06/2022    Shingles     Skin tag     Sleep apnea     Varicose veins of lower extremity     Visual impairment 1980         Current Outpatient Medications:     amLODIPine (NORVASC) 10 mg tablet, Take 1 tablet (10 mg total) by mouth daily, Disp: 90 tablet, Rfl: 1    ascorbic acid (VITAMIN C) 500 mg tablet, Take 500 mg by mouth daily, Disp: , Rfl:     aspirin (ECOTRIN LOW STRENGTH) 81 mg EC tablet, Take 81 mg by mouth daily, Disp: , Rfl:     atorvastatin (LIPITOR) 40 mg tablet, Take 1 tablet (40 mg total) by mouth daily at bedtime, Disp: 90 tablet, Rfl: 1    clopidogrel (Plavix) 75 mg tablet, Take 1 tablet (75 mg total) by mouth daily, Disp: 90 tablet, Rfl: 1    glimepiride (AMARYL) 4 mg tablet, Take 4 mg with breakfast and 8 mg in the evening daily., Disp: 270 tablet, Rfl: 0    glucose blood test strip, Test  once daily (one touch ultra), Disp: 100 each, Rfl: 1    lisinopril-hydrochlorothiazide (PRINZIDE,ZESTORETIC) 20-12.5 MG per tablet, Take 2 tablets by mouth daily, Disp: 180 tablet, Rfl: 1    MAGNESIUM CITRATE PO, Take 1 tablet by mouth in the morning, Disp: , Rfl:     metFORMIN (GLUCOPHAGE) 1000 MG tablet, Take 2 tablets (2,000 mg total) by mouth daily after dinner, Disp: 180 tablet, Rfl: 1    Omega-3 Fatty Acids (FISH OIL PO), Take by mouth daily  , Disp: , Rfl:     ONETOUCH DELICA LANCETS 33G MISC, Test blood glucose 2 times daily, Disp: 300 each, Rfl: 0    prednisoLONE acetate (PRED FORTE) 1 % ophthalmic suspension, Administer 1 drop into the left eye every other day , Disp: , Rfl:     tadalafil (CIALIS) 20 MG tablet, Take 1 tablet (20 mg total) by mouth as needed for erectile dysfunction, Disp: 30 tablet, Rfl: 2    tamsulosin (FLOMAX) 0.4 mg, Take 2 capsules (0.8 mg total) by mouth daily with dinner, Disp: , Rfl:     VITAMIN E PO, Take 1 capsule by mouth daily  , Disp: , Rfl:     Allergies   Allergen Reactions    Ciprofloxacin Swelling    Janumet [Sitagliptin-Metformin Hcl] Lip Swelling     Lip swelling     Terazosin      Annotation - [id number]: Blood in sperm       Social History   Past Surgical History:   Procedure Laterality Date    CARDIAC CATHETERIZATION      CARDIAC SURGERY      CHOLECYSTECTOMY  01/01/2010    COLONOSCOPY      CORONARY ANGIOPLASTY WITH STENT PLACEMENT      EYE SURGERY Left     ocular lens implant    GALLBLADDER SURGERY      HERNIA REPAIR  01/01/2009    IR BIOPSY LUNG  9/8/2021    KNEE ARTHROSCOPY W/ MENISCAL REPAIR Right     KNEE SURGERY  09/10/2015    LUNG SEGMENTECTOMY Left 10/21/2021    Procedure: RESECTION WEDGE LEFT LOWER LOBE LUNG;  Surgeon: Jacky Aparicio MD;  Location: [facility];  Service: Thoracic    [facility] INCL FLUOR GDNCE DX W/CELL WASHG SPX N/A 10/21/2021    Procedure: BRONCHOSCOPY FLEXIBLE;  Surgeon: Jacky Aparicio MD;  Location: [facility];  Service: Thoracic    [facility]  "TENDON SHEATH INCISION Left 8/25/2016    Procedure: RING TRIGGER FINGER RELEASE ;  Surgeon: Arpit Zamora MD;  Location: [facility];  Service: Orthopedics    [facility] THORACOSCOPY W/THERA WEDGE RESEXN INITIAL UNILAT Left 10/21/2021    Procedure: THORACOSCOPY VIDEO ASSISTED SURGERY (VATS);  Surgeon: Jacky Aparicio MD;  Location: [facility];  Service: Thoracic    VARICOSE VEIN SURGERY      Ligation     Family History   Problem Relation Age of Onset    Cancer Mother     Coronary artery disease Mother     Arthritis Mother     Heart disease Father     Early death Father     Coronary artery disease Father     Diabetes Father     Hypertension Father     Coronary artery disease Brother     Hyperlipidemia Brother     Hypertension Brother     Acne Brother     Cancer Brother     Coronary artery disease Brother     Heart disease Brother     Diabetes Brother        Objective:  /84 (BP Location: Left arm, Patient Position: Sitting, Cuff Size: Large)   Pulse (!) 52   Temp 97.7 °F (36.5 °C) (Temporal)   Ht 5' 11\" (1.803 m)   Wt 124 kg (274 lb)   SpO2 97%   BMI 38.22 kg/m²        Physical Exam  Vitals reviewed.   Constitutional:       Appearance: He is obese.   HENT:      Head: Normocephalic and atraumatic.      Right Ear: Tympanic membrane, ear canal and external ear normal. There is no impacted cerumen.      Left Ear: Tympanic membrane, ear canal and external ear normal. There is no impacted cerumen.      Nose: Nose normal.   Eyes:      Extraocular Movements: Extraocular movements intact.      Conjunctiva/sclera: Conjunctivae normal.      Pupils: Pupils are equal, round, and reactive to light.   Cardiovascular:      Rate and Rhythm: Normal rate and regular rhythm.   Pulmonary:      Effort: Pulmonary effort is normal. No respiratory distress.      Breath sounds: Normal breath sounds. No wheezing or rales.   Musculoskeletal:      Cervical back: Normal range of motion.      Right lower leg: No edema.      Left lower " leg: No edema.   Skin:     General: Skin is warm.      Findings: No rash.   Neurological:      General: No focal deficit present.      Mental Status: He is alert.   Psychiatric:         Mood and Affect: Mood normal.

## 2024-07-17 DIAGNOSIS — E13.9 DIABETES 1.5, MANAGED AS TYPE 2 (HCC): ICD-10-CM

## 2024-07-18 RX ORDER — GLIMEPIRIDE 4 MG/1
TABLET ORAL
Qty: 270 TABLET | Refills: 1 | Status: SHIPPED | OUTPATIENT
Start: 2024-07-18

## 2024-07-28 DIAGNOSIS — E78.5 HYPERLIPIDEMIA, UNSPECIFIED HYPERLIPIDEMIA TYPE: ICD-10-CM

## 2024-07-28 RX ORDER — ATORVASTATIN CALCIUM 40 MG/1
40 TABLET, FILM COATED ORAL
Qty: 90 TABLET | Refills: 1 | Status: SHIPPED | OUTPATIENT
Start: 2024-07-28

## 2024-08-12 ENCOUNTER — APPOINTMENT (OUTPATIENT)
Dept: LAB | Facility: CLINIC | Age: 69
End: 2024-08-12
Payer: MEDICARE

## 2024-08-12 DIAGNOSIS — R97.20 ELEVATED PROSTATE SPECIFIC ANTIGEN (PSA): ICD-10-CM

## 2024-08-12 LAB — PSA SERPL-MCNC: 2.96 NG/ML (ref 0–4)

## 2024-08-12 PROCEDURE — G0103 PSA SCREENING: HCPCS

## 2024-08-12 PROCEDURE — 84153 ASSAY OF PSA TOTAL: CPT

## 2024-08-12 PROCEDURE — 36415 COLL VENOUS BLD VENIPUNCTURE: CPT

## 2024-08-17 DIAGNOSIS — I10 BENIGN ESSENTIAL HYPERTENSION: ICD-10-CM

## 2024-08-17 DIAGNOSIS — E11.9 NON-INSULIN DEPENDENT TYPE 2 DIABETES MELLITUS (HCC): ICD-10-CM

## 2024-08-17 DIAGNOSIS — I10 HYPERTENSION, UNSPECIFIED TYPE: ICD-10-CM

## 2024-08-17 RX ORDER — LISINOPRIL AND HYDROCHLOROTHIAZIDE 12.5; 2 MG/1; MG/1
2 TABLET ORAL DAILY
Qty: 180 TABLET | Refills: 3 | Status: SHIPPED | OUTPATIENT
Start: 2024-08-17

## 2024-08-17 RX ORDER — AMLODIPINE BESYLATE 10 MG/1
10 TABLET ORAL DAILY
Qty: 90 TABLET | Refills: 3 | Status: SHIPPED | OUTPATIENT
Start: 2024-08-17

## 2024-08-27 ENCOUNTER — OFFICE VISIT (OUTPATIENT)
Dept: CARDIOLOGY CLINIC | Facility: CLINIC | Age: 69
End: 2024-08-27
Payer: MEDICARE

## 2024-08-27 VITALS
SYSTOLIC BLOOD PRESSURE: 150 MMHG | WEIGHT: 274.4 LBS | HEART RATE: 52 BPM | HEIGHT: 71 IN | BODY MASS INDEX: 38.42 KG/M2 | DIASTOLIC BLOOD PRESSURE: 70 MMHG | OXYGEN SATURATION: 97 %

## 2024-08-27 DIAGNOSIS — E78.5 HYPERLIPIDEMIA, UNSPECIFIED HYPERLIPIDEMIA TYPE: Primary | ICD-10-CM

## 2024-08-27 DIAGNOSIS — I10 HYPERTENSION, UNSPECIFIED TYPE: ICD-10-CM

## 2024-08-27 DIAGNOSIS — I25.10 CORONARY ARTERY DISEASE INVOLVING NATIVE HEART WITHOUT ANGINA PECTORIS, UNSPECIFIED VESSEL OR LESION TYPE: ICD-10-CM

## 2024-08-27 DIAGNOSIS — N18.2 HYPERTENSIVE KIDNEY DISEASE WITH STAGE 2 CHRONIC KIDNEY DISEASE: ICD-10-CM

## 2024-08-27 DIAGNOSIS — I12.9 HYPERTENSIVE KIDNEY DISEASE WITH STAGE 2 CHRONIC KIDNEY DISEASE: ICD-10-CM

## 2024-08-27 PROBLEM — I20.89 EXERCISE-INDUCED ANGINA: Status: RESOLVED | Noted: 2020-02-26 | Resolved: 2024-08-27

## 2024-08-27 PROCEDURE — 99214 OFFICE O/P EST MOD 30 MIN: CPT | Performed by: INTERNAL MEDICINE

## 2024-08-27 RX ORDER — DOXAZOSIN 2 MG/1
2 TABLET ORAL
Qty: 90 TABLET | Refills: 3 | Status: SHIPPED | OUTPATIENT
Start: 2024-08-27

## 2024-08-27 NOTE — PROGRESS NOTES
Cardiology Follow Up    Alvarez Webster  1955  [id number]  [facility] [place]  [address]  [address]  [place] [address] [phone]    No diagnosis found.      There are no diagnoses linked to this encounter.    I had the pleasure of seeing Alvarez Webster for a follow up visit.     INTERVAL HISTORY:  None    History of the presenting illness, Discussion/Summary and My Plan are as follows:::    He is a pleasant 69-year-old gentleman with a history of hypertension, diabetes and dyslipidemia. He also has coronary artery disease and has had multiple stenting procedures. Based on his last coronary angiography report from the [facility] in September 2016, left main was patent, LAD was patent including a drug-eluting stent-implanted in 2011. His proximal RCA had a stent implanted in June 2015->in-stent restenosis in December 2015-restented, again had in-stent restenosis that was treated with a drug-eluting 3X 18 mm Xience stent. This was done for anginal symptoms. To his knowledge, he has not had a myocardial infarction.     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 No anginal symptoms at this time. He had vein stripping of his right lower extremity which has disproportionately more edema.    Plan:      Coronary artery disease: no H/O MI,  Status post [id number]-patent in 2016, status post proximal RCA stent in - stent - in-stent-last stenting-September 2016 with a Xience stent, (3 overlapping STENTS). No PCI since 2016. No anginal symptoms at this time (had them at the times of his PCI)  Indefinite Aspirin and Plavix, not on metoprolol but asymptomatic, heart rates often are  bradycardic     Stress tests:   November 2019: Exercise time 7 minutes 30 seconds, no ischemia   December 2017:Exercise nuclear perfusion study, 7 minutes 31 seconds, resting hypertension with hypertensive response, no ischemia, no ECG changes, no chest pains  More exercise is recommended, no testing at this time, pt is agreeable    Dyslipidemia: on atorvastatin 40 mg,   Controlled, see below,     HTN: controlled, on multiple meds, with mild hyponatremia, top N K level and normal BPs on multiple meds, DC'd Spironolactone at prior visit, can add Alpha blocker Cardura 2 mg daily, (has prostatomegaly as well) (on Tamsulosin as well) watch for dizziness  Already on maximum dose of calcium channel blocker, thiazide diuretic and ACE inhibitor, bradycardia precludes beta-blockade, so no other good once daily options remain     DM: As above. last A1c was 6.4 in May 2024     Chronic right more than left lower extremity edema: No need for Dopplers, had vein stripping for varicose veins in the past. Foot end elevation, sometimes uses compression stockings. No increase with Amlodipine use    Follow up in 6 months.        Latest Reference Range & Units 06/09/23 11:16 10/11/23 09:47 01/31/24 12:25   Cholesterol See Comment mg/dL 135  117   Triglycerides See Comment mg/dL 275 (H)  245 (H)   HDL >=40 mg/dL 42  42   LDL Calculated 0 - 100 mg/dL 38  26   Hemoglobin A1C Normal 4.0-5.6%; PreDiabetic 5.7-6.4%; Diabetic >=6.5%; Glycemic control for adults with diabetes <7.0% %  8.5 (H) 7.4 (H)   (H): Data is abnormally high         Latest Reference Range & Units 06/09/23 11:16   Cholesterol See Comment mg/dL 135   Triglycerides See Comment mg/dL 275 (H)   HDL >=40 mg/dL 42   LDL Calculated 0 - 100 mg/dL 38   (H): Data is abnormally high   Latest Reference Range & Units 03/17/22 11:37 01/04/23 08:39 10/11/23 09:47   Hemoglobin A1C  7.0 (H) 6.8 (H) 8.5 (H)   eAG, EST AVG Glucose mg/dl 154 148 197   (H): Data is abnormally high     Latest  Reference Range & Units 02/15/23 10:41 10/11/23 09:47   BUN 5 - 25 mg/dL 30 (H) 33 (H)   Creatinine 0.60 - 1.30 mg/dL 1.30 1.25   (H): Data is abnormally high      Patient Active Problem List   Diagnosis    Herniation of intervertebral disc of lumbar spine    Hyperlipidemia    Presbyopia    Primary osteoarthritis of left knee    Class 2 severe obesity due to excess calories with serious comorbidity and body mass index (BMI) of 39.0 to 39.9 in adult (HCC)    Exercise-induced angina    Platelets decreased (HCC)    Secondary malignant neoplasm of left lung (HCC)    Liver lesion    Stage 3a chronic kidney disease (HCC)    Hypertensive CKD (chronic kidney disease)    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)    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 (HCC)    Atherosclerotic heart disease of native coronary artery with other forms of angina pectoris (HCC)     Past Medical History:   Diagnosis Date    Allergic     Arthritis     knee    Coronary artery disease     Diabetes mellitus (HCC)     Erectile dysfunction     GERD (gastroesophageal reflux disease)     H/O angioplasty     Hypercholesteremia     Hypertension     Interstitial cystitis     Obesity     Secondary malignant neoplasm of left lung (HCC) 2022    Shingles     Skin tag     Sleep apnea     Varicose veins of lower extremity     Visual impairment      Social History     Socioeconomic History    Marital status: /Civil Union     Spouse name: Not on file    Number of children: Not on file    Years of education: Not on file    Highest education level: Not on file   Occupational History    Not on file   Tobacco Use    Smoking status: Former     Current packs/day: 0.00     Average packs/day: 2.0 packs/day for 15.0 years (30.0 ttl pk-yrs)     Types: Cigarettes     Start date: 1967     Quit date: 1982     Years since quittin.6    Smokeless tobacco: Never   Vaping Use     Vaping status: Never Used   Substance and Sexual Activity    Alcohol use: Never    Drug use: Never    Sexual activity: Not Currently     Partners: Female   Other Topics Concern    Not on file   Social History Narrative    Not on file     Social Determinants of Health     Financial Resource Strain: Low Risk  (10/11/2023)    Overall Financial Resource Strain (CARDIA)     Difficulty of Paying Living Expenses: Not hard at all   Food Insecurity: Not on file   Transportation Needs: No Transportation Needs (10/11/2023)    PRAPARE - Transportation     Lack of Transportation (Medical): No     Lack of Transportation (Non-Medical): No   Physical Activity: Not on file   Stress: Not on file   Social Connections: Not on file   Intimate Partner Violence: Not on file   Housing Stability: Not on file      Family History   Problem Relation Age of Onset    Cancer Mother     Coronary artery disease Mother     Arthritis Mother     Heart disease Father     Early death Father     Coronary artery disease Father     Diabetes Father     Hypertension Father     Coronary artery disease Brother     Hyperlipidemia Brother     Hypertension Brother     Acne Brother     Cancer Brother     Coronary artery disease Brother     Heart disease Brother     Diabetes Brother      Past Surgical History:   Procedure Laterality Date    CARDIAC CATHETERIZATION      CARDIAC SURGERY      CHOLECYSTECTOMY  01/01/2010    COLONOSCOPY      CORONARY ANGIOPLASTY WITH STENT PLACEMENT      EYE SURGERY Left     ocular lens implant    GALLBLADDER SURGERY      HERNIA REPAIR  01/01/2009    IR BIOPSY LUNG  9/8/2021    KNEE ARTHROSCOPY W/ MENISCAL REPAIR Right     KNEE SURGERY  09/10/2015    LUNG SEGMENTECTOMY Left 10/21/2021    Procedure: RESECTION WEDGE LEFT LOWER LOBE LUNG;  Surgeon: Jacky Aparicio MD;  [facility];  Service: Thoracic    MT BRNCC INCL FLUOR GDNCE DX W/CELL WASHG SPX N/A 10/21/2021    Procedure: BRONCHOSCOPY FLEXIBLE;  Surgeon: Jacky Aparicio  MD;  [facility];  Service: Thoracic    [place] TENDON SHEATH INCISION Left 8/25/2016    Procedure: RING TRIGGER FINGER RELEASE ;  Surgeon: Arpit Zamora MD;  Location: [facility];  Service: Orthopedics    [place] THORACOSCOPY W/THERA WEDGE RESEXN INITIAL UNILAT Left 10/21/2021    Procedure: THORACOSCOPY VIDEO ASSISTED SURGERY (VATS);  Surgeon: Jacky Aparicio MD;  [facility];  Service: Thoracic    VARICOSE VEIN SURGERY      Ligation       Current Outpatient Medications:     amLODIPine (NORVASC) 10 mg tablet, TAKE 1 TABLET EVERY DAY, Disp: 90 tablet, Rfl: 3    ascorbic acid (VITAMIN C) 500 mg tablet, Take 500 mg by mouth daily, Disp: , Rfl:     aspirin (ECOTRIN LOW STRENGTH) 81 mg EC tablet, Take 81 mg by mouth daily, Disp: , Rfl:     atorvastatin (LIPITOR) 40 mg tablet, TAKE 1 TABLET AT BEDTIME, Disp: 90 tablet, Rfl: 1    clopidogrel (Plavix) 75 mg tablet, Take 1 tablet (75 mg total) by mouth daily, Disp: 90 tablet, Rfl: 1    lisinopril-hydrochlorothiazide (PRINZIDE,ZESTORETIC) 20-12.5 MG per tablet, TAKE 2 TABLETS EVERY DAY, Disp: 180 tablet, Rfl: 3    MAGNESIUM CITRATE PO, Take 1 tablet by mouth in the morning, Disp: , Rfl:     metFORMIN (GLUCOPHAGE) 1000 MG tablet, TAKE 2 TABLETS EVERY DAY AFTER DINNER, Disp: 180 tablet, Rfl: 3    Omega-3 Fatty Acids (FISH OIL PO), Take by mouth daily  , Disp: , Rfl:     tamsulosin (FLOMAX) 0.4 mg, Take 2 capsules (0.8 mg total) by mouth daily with dinner, Disp: , Rfl:     VITAMIN E PO, Take 1 capsule by mouth daily  , Disp: , Rfl:     glimepiride (AMARYL) 4 mg tablet, TAKE 1 TABLET WITH BREAKFAST AND TAKE 2 TABLETS IN THE EVENING, Disp: 270 tablet, Rfl: 1    glucose blood test strip, Test once daily (one touch ultra), Disp: 100 each, Rfl: 1    ONETOUCH DELICA LANCETS 33G MISC, Test blood glucose 2 times daily, Disp: 300 each, Rfl: 0    prednisoLONE acetate (PRED FORTE) 1 % ophthalmic suspension, Administer 1 drop into the left eye every other day , Disp: , Rfl:  "    tadalafil (CIALIS) 20 MG tablet, Take 1 tablet (20 mg total) by mouth as needed for erectile dysfunction (Patient not taking: Reported on 8/27/2024), Disp: 30 tablet, Rfl: 2  Allergies   Allergen Reactions    Ciprofloxacin Swelling    Janumet [Sitagliptin-Metformin Hcl] Lip Swelling     Lip swelling     Terazosin      Annotation - [id number]: Blood in sperm       Imaging: No results found.    Review of Systems:  Review of Systems   Constitutional: Negative.    HENT: Negative.     Respiratory: Negative.     Cardiovascular: Negative.    Endocrine: Negative.    Musculoskeletal:  Positive for arthralgias (hip pains).   Neurological: Negative.        Physical Exam:  /70 (BP Location: Right arm, Patient Position: Sitting, Cuff Size: Large)   Pulse (!) 52   Ht 5' 11\" (1.803 m)   Wt 124 kg (274 lb 6.4 oz)   SpO2 97%   BMI 38.27 kg/m²   Physical Exam  Constitutional:       General: He is not in acute distress.     Appearance: He is well-developed. He is not diaphoretic.   HENT:      Head: Normocephalic and atraumatic.   Eyes:      General: No scleral icterus.        Right eye: No discharge.         Left eye: No discharge.      Conjunctiva/sclera: Conjunctivae normal.      Pupils: Pupils are equal, round, and reactive to light.   Neck:      Thyroid: No thyromegaly.      Vascular: No JVD.      Trachea: No tracheal deviation.   Cardiovascular:      Rate and Rhythm: Normal rate and regular rhythm.      Heart sounds: No murmur heard.     No friction rub.   Pulmonary:      Effort: Pulmonary effort is normal. No respiratory distress.      Breath sounds: Normal breath sounds. No stridor.   Abdominal:      General: Bowel sounds are normal. There is no distension.      Palpations: Abdomen is soft.      Tenderness: There is no abdominal tenderness. There is no guarding.   Musculoskeletal:         General: Swelling (Right lower extremity edema) present. No tenderness or deformity. Normal range of motion.      Cervical " back: Normal range of motion.   Skin:     General: Skin is warm.      Coloration: Skin is not pale.      Findings: No erythema or rash.

## 2024-09-11 ENCOUNTER — OFFICE VISIT (OUTPATIENT)
Dept: OBGYN CLINIC | Facility: CLINIC | Age: 69
End: 2024-09-11
Payer: MEDICARE

## 2024-09-11 VITALS
BODY MASS INDEX: 38.36 KG/M2 | SYSTOLIC BLOOD PRESSURE: 134 MMHG | WEIGHT: 274 LBS | HEART RATE: 64 BPM | DIASTOLIC BLOOD PRESSURE: 74 MMHG | HEIGHT: 71 IN

## 2024-09-11 DIAGNOSIS — M67.432 GANGLION CYST OF VOLAR ASPECT OF LEFT WRIST: Primary | ICD-10-CM

## 2024-09-11 DIAGNOSIS — M19.042 PRIMARY OSTEOARTHRITIS OF LEFT HAND: ICD-10-CM

## 2024-09-11 PROCEDURE — 99213 OFFICE O/P EST LOW 20 MIN: CPT | Performed by: SURGERY

## 2024-09-11 NOTE — PROGRESS NOTES
ASSESSMENT/PLAN:      69 year old male here for global left wrist pain and a volar ganglion cyst. On exam, he has overall hand stiffness.   Recommendations for hand arthritis that includes heat in the morning and evening for 20 minutes each, using topical Voltaren gel.  Patient shows understanding and agrees with this plan of care.  If patient wishes to proceed with the ganglion cyst evaluation, an ultrasound can be ordered in the future with an aspiration.  Encourage the patient to attend occupational therapy for couple sessions prior to his follow-up.  We will follow-up with the patient in 4 weeks for reevaluation of his range of motion.    The patient verbalized understanding of exam findings and treatment plan. We engaged in the shared decision-making process and treatment options were discussed at length with the patient. Surgical and conservative management discussed today along with risks and benefits.    Diagnoses and all orders for this visit:    Ganglion cyst of volar aspect of left wrist    Primary osteoarthritis of left hand  -     Ambulatory Referral to PT/OT Hand Therapy; Future            Follow Up:  Return in about 4 weeks (around 10/9/2024) for left hand.      To Do Next Visit:  Re-evaluation of current issue  Last left hand x-rays were performed in 2016.  Updated x-rays may be warranted at the next visit.    ____________________________________________________________________________________________________________________________________________      CHIEF COMPLAINT:  Chief Complaint   Patient presents with    Follow-up     Patient is starting to have numbness in his hands. The cyst on his wrist is starting to bother him more        SUBJECTIVE:  Alvarez Webster is a 69 y.o. year old RHD male who presents today with difficulty with making a full composite fist of the left hand. He has mild swelling int he palm with soreness at the A1 pulley at the index and long fingers. He does have a ganglion  cyst at the base of the thumb on the volar wrist.         I have personally reviewed all the relevant PMH, PSH, SH, FH, Medications and allergies.     PAST MEDICAL HISTORY:  Past Medical History:   Diagnosis Date    Allergic     Arthritis     knee    Coronary artery disease     Diabetes mellitus (HCC)     Erectile dysfunction 2017    GERD (gastroesophageal reflux disease)     H/O angioplasty     Hypercholesteremia     Hypertension     Interstitial cystitis     Obesity     Secondary malignant neoplasm of left lung (HCC) 04/06/2022    Shingles     Skin tag     Sleep apnea     Varicose veins of lower extremity     Visual impairment 1980       PAST SURGICAL HISTORY:  Past Surgical History:   Procedure Laterality Date    CARDIAC CATHETERIZATION      CARDIAC SURGERY      CHOLECYSTECTOMY  01/01/2010    COLONOSCOPY      CORONARY ANGIOPLASTY WITH STENT PLACEMENT      EYE SURGERY Left     ocular lens implant    GALLBLADDER SURGERY      HERNIA REPAIR  01/01/2009    IR BIOPSY LUNG  9/8/2021    KNEE ARTHROSCOPY W/ MENISCAL REPAIR Right     KNEE SURGERY  09/10/2015    LUNG SEGMENTECTOMY Left 10/21/2021    Procedure: RESECTION WEDGE LEFT LOWER LOBE LUNG;  Surgeon: Jacky Aparicio MD;  [facility];  Service: Thoracic    [facility] W/CELL WASHG SPX N/A 10/21/2021    Procedure: BRONCHOSCOPY FLEXIBLE;  Surgeon: Jacky Aparicio MD;  [facility];  Service: Thoracic    ME TENDON SHEATH INCISION Left 8/25/2016    Procedure: RING TRIGGER FINGER RELEASE ;  Surgeon: Arpit Zamora MD;  [facility];  Service: Orthopedics    ME THORACOSCOPY W/THERA WEDGE RESEXN INITIAL UNILAT Left 10/21/2021    Procedure: THORACOSCOPY VIDEO ASSISTED SURGERY (VATS);  Surgeon: Jacky Aparicio MD;  [facility];  Service: Thoracic    VARICOSE VEIN SURGERY      Ligation       FAMILY HISTORY:  Family History   Problem Relation Age of Onset    Cancer Mother     Coronary artery disease Mother      Arthritis Mother     Heart disease Father     Early death Father     Coronary artery disease Father     Diabetes Father     Hypertension Father     Coronary artery disease Brother     Hyperlipidemia Brother     Hypertension Brother     Acne Brother     Cancer Brother     Coronary artery disease Brother     Heart disease Brother     Diabetes Brother        SOCIAL HISTORY:  Social History     Tobacco Use    Smoking status: Former     Current packs/day: 0.00     Average packs/day: 2.0 packs/day for 15.0 years (30.0 ttl pk-yrs)     Types: Cigarettes     Start date: 1967     Quit date: 1982     Years since quittin.7    Smokeless tobacco: Never   Vaping Use    Vaping status: Never Used   Substance Use Topics    Alcohol use: Never    Drug use: Never       MEDICATIONS:    Current Outpatient Medications:     amLODIPine (NORVASC) 10 mg tablet, TAKE 1 TABLET EVERY DAY, Disp: 90 tablet, Rfl: 3    ascorbic acid (VITAMIN C) 500 mg tablet, Take 500 mg by mouth daily, Disp: , Rfl:     aspirin (ECOTRIN LOW STRENGTH) 81 mg EC tablet, Take 81 mg by mouth daily, Disp: , Rfl:     atorvastatin (LIPITOR) 40 mg tablet, TAKE 1 TABLET AT BEDTIME, Disp: 90 tablet, Rfl: 1    clopidogrel (Plavix) 75 mg tablet, Take 1 tablet (75 mg total) by mouth daily, Disp: 90 tablet, Rfl: 1    doxazosin (CARDURA) 2 mg tablet, Take 1 tablet (2 mg total) by mouth daily at bedtime, Disp: 90 tablet, Rfl: 3    glimepiride (AMARYL) 4 mg tablet, TAKE 1 TABLET WITH BREAKFAST AND TAKE 2 TABLETS IN THE EVENING, Disp: 270 tablet, Rfl: 1    glucose blood test strip, Test once daily (one touch ultra), Disp: 100 each, Rfl: 1    lisinopril-hydrochlorothiazide (PRINZIDE,ZESTORETIC) 20-12.5 MG per tablet, TAKE 2 TABLETS EVERY DAY, Disp: 180 tablet, Rfl: 3    MAGNESIUM CITRATE PO, Take 1 tablet by mouth in the morning, Disp: , Rfl:     metFORMIN (GLUCOPHAGE) 1000 MG tablet, TAKE 2 TABLETS EVERY DAY AFTER DINNER, Disp: 180 tablet, Rfl: 3    Omega-3 Fatty Acids  (FISH OIL PO), Take by mouth daily  , Disp: , Rfl:     ONETOUCH DELICA LANCETS 33G MISC, Test blood glucose 2 times daily, Disp: 300 each, Rfl: 0    prednisoLONE acetate (PRED FORTE) 1 % ophthalmic suspension, Administer 1 drop into the left eye every other day , Disp: , Rfl:     tamsulosin (FLOMAX) 0.4 mg, Take 2 capsules (0.8 mg total) by mouth daily with dinner, Disp: , Rfl:     VITAMIN E PO, Take 1 capsule by mouth daily  , Disp: , Rfl:     tadalafil (CIALIS) 20 MG tablet, Take 1 tablet (20 mg total) by mouth as needed for erectile dysfunction (Patient not taking: Reported on 8/27/2024), Disp: 30 tablet, Rfl: 2    ALLERGIES:  Allergies   Allergen Reactions    Ciprofloxacin Swelling    Janumet [Sitagliptin-Metformin Hcl] Lip Swelling     Lip swelling     Terazosin      Annotation - [id number]: Blood in sperm       REVIEW OF SYSTEMS:  Review of Systems   Constitutional:  Negative for chills, fever and unexpected weight change.   HENT:  Negative for hearing loss, nosebleeds and sore throat.    Eyes:  Negative for pain, redness and visual disturbance.   Respiratory:  Negative for cough, shortness of breath and wheezing.    Cardiovascular:  Negative for chest pain, palpitations and leg swelling.   Gastrointestinal:  Negative for abdominal pain, nausea and vomiting.   Endocrine: Negative for polydipsia and polyuria.   Genitourinary:  Negative for dysuria and hematuria.   Musculoskeletal:  Positive for arthralgias and joint swelling.   Skin:  Negative for rash and wound.   Neurological:  Negative for dizziness, light-headedness and headaches.   Psychiatric/Behavioral:  Negative for decreased concentration, dysphoric mood and suicidal ideas. The patient is not nervous/anxious.        VITALS:  Vitals:    09/11/24 0917   BP: 134/74   Pulse: 64         _____________________________________________________  PHYSICAL EXAMINATION:  General: well developed and well nourished, alert, oriented times 3, and appears  comfortable  Psychiatric: Normal  HEENT: Normocephalic, Atraumatic Trachea Midline, No torticollis  Pulmonary: No audible wheezing or respiratory distress   Cardiovascular: No pitting edema, 2+ radial pulse   Abdominal/GI: abdomen non tender, non distended   Skin: No masses, erythema, lacerations, fluctation, ulcerations  Neurovascular: Sensation Intact to the Median, Ulnar, Radial Nerve, Motor Intact to the Median, Ulnar, Radial Nerve, and Pulses Intact  Musculoskeletal: Normal, except as noted in detailed exam and in HPI.      MUSCULOSKELETAL EXAMINATION:    left CMC Exam:  No adduction contracture  No hyperextension deformity of MCP joint  Negative localized tenderness over radial and dorsal aspect of thumb (CMC joint)  Grind test is Negative for pain and Negative for crepitus  Metacarpal load shift test Negative  No triggering or tenderness over the A1 pulley  Negative pain with Finkelstein’s maneuver     left index finger:  Negative palpable nodule over the A1 pulley.  Positive tenderness to palpation over A1 pulley. Negative catching. Negative clicking.          ___________________________________________________    STUDIES REVIEWED:  No images reviewed at today's visit.      LABS REVIEWED:    HgA1c:   Lab Results   Component Value Date    HGBA1C 6.4 (H) 05/31/2024     BMP:   Lab Results   Component Value Date    GLUCOSE 135 08/29/2015    CALCIUM 9.6 05/31/2024     08/29/2015    K 4.4 05/31/2024    CO2 30 05/31/2024     05/31/2024    BUN 21 05/31/2024    CREATININE 1.05 05/31/2024             PROCEDURES PERFORMED:  Procedures  No Procedures performed today    _____________________________________________________      Scribe Attestation      I,:  Fabiola Appiah am acting as a scribe while in the presence of the attending physician.:       I,:  Ladonna Wilcox MD personally performed the services described in this documentation    as scribed in my presence.:

## 2024-09-13 ENCOUNTER — EVALUATION (OUTPATIENT)
Dept: OCCUPATIONAL THERAPY | Facility: CLINIC | Age: 69
End: 2024-09-13
Payer: MEDICARE

## 2024-09-13 DIAGNOSIS — M19.042 PRIMARY OSTEOARTHRITIS OF LEFT HAND: ICD-10-CM

## 2024-09-13 PROCEDURE — 97165 OT EVAL LOW COMPLEX 30 MIN: CPT | Performed by: OCCUPATIONAL THERAPIST

## 2024-09-13 PROCEDURE — 97140 MANUAL THERAPY 1/> REGIONS: CPT | Performed by: OCCUPATIONAL THERAPIST

## 2024-09-13 NOTE — PROGRESS NOTES
OT Evaluation     Today's date: 2024  Patient name: Alvarez Webster  : 1955  MRN: [id number]  Referring provider: Ladonna Wilcox MD  Dx:   Encounter Diagnosis     ICD-10-CM    1. Primary osteoarthritis of left hand  M19.042 Ambulatory Referral to PT/OT Hand Therapy                     Assessment  Impairments: abnormal or restricted ROM, activity intolerance, impaired physical strength, lacks appropriate home exercise program, pain with function and activity limitations    Assessment details: Akbar is referred to therapy with a formal diagnosis of primary osteoarthritis of left hand.  While he is tender at the 1st CMC joint, his impairments are related to triggering of the left long finger. He is tender at the A1 pulley of the long finger, and there is crepitus with flexion of the digit. It is not currently locking. There is mild deficit in flexion and extension of the digit. There is mild intrinsic tightness occurring. The left hand demonstrates reduced pinch and  strength. He will benefit from skilled occupational therapy at a frequency of one time per week to address these deficits and to improve functional use of the left hand. See below for a detailed assessment.         Goals  STG: Patient will be compliant with home exercise program in 1 week.  STG: Pain will not exceed a 3/10 during appropriate activity and during therapy in 4 weeks.  STG: Range of motion of left middle finger will be improved to contralateral side measures in 4 weeks.  STG: Strength will be improved by 10 pounds  strength and 2 pounds pinch strength in 4 weeks.   STG: Incidence of symptoms in the left long finger will be reduced in 4 weeks.       LTG: Performance in ADLs and IADLS will be improved to prior level of function with the affected extremity within 6 weeks or discharge.   LTG: FOTO score increase by 20 points within 6 weeks or discharge.                 Plan  Patient would benefit from: skilled OT, OT fabby  and home program  Planned modality interventions: thermotherapy: hydrocollator packs    Planned therapy interventions: home exercise program, joint mobilization, manual therapy, therapeutic exercise, patient education, therapeutic activities, neuromuscular re-education, IASTM, strengthening and stretching    Frequency: 1x week  Plan of Care beginning date: 2024  Plan of Care expiration date: 2024  Treatment plan discussed with: patient  Plan details: Treatment to include modalities, manual therapy, PRE's, HEP, and orthotics as appropriate.         Subjective Evaluation    History of Present Illness  Mechanism of injury: Akbar reports that he started to experience clicking in the middle finger about a week ago. He also has had a ganglion cyst develop in the volar left wrist. He had a trigger finger release in the left ring finger a few years ago.   Patient Goals  Patient goals for therapy: decreased pain, increased motion, increased strength, independence with ADLs/IADLs and return to sport/leisure activities    Pain  At worst pain ratin  Location: A1 pulley of the LF    Hand dominance: ambidextrous      Diagnostic Tests  Abnormal x-ray: Degenerative changes of the 1st carpal metacarpal (CMC) articulation..  Treatments  Current treatment: occupational therapy      Objective     Tenderness     Left Wrist/Hand   Tenderness in the CMC joint.     Additional Tenderness Details  +A1 pulley LF    Active Range of Motion     Left Digits    Flexion   Middle     MCP: 70    PIP: 90    DIP: 45  Extension   Middle     MCP: -2    PIP: -5    DIP: 10    Right Digits   Flexion   Middle     MCP: 75    PIP: 98    DIP: 55  Extension   Middle     MCP: 5    PIP: 10    DIP: 12    Strength/Myotome Testing     Left Wrist/Hand      (2nd hand position)     Trial 1: 60.8    Thumb Strength  Palmar/Three-Point Pinch     Trial 1: 13.1    Right Wrist/Hand      (2nd hand position)     Trial 1: 89.9    Thumb Strength    Palmar/Three-Point Pinch     Trial 1: 15    Tests     Left Wrist/Hand   Positive intrinsic muscle tightness (LF).     Additional Tests Details  Crepitus at the LF A1 pulley with active digit flexion. No locking.       Flowsheet Rows      Flowsheet Row Most Recent Value   PT/OT G-Codes    Current Score 69   Projected Score 78               Precautions: Universal      Manuals 9/13            IASTM  8'                                                   Neuro Re-Ed                                                                                                        Ther Ex             HEP: Tendon gliding, intrinsic stretch, FDS gliding, pen rolls            keypegs             Pegs in with hook fist             Extension web             Wall walking              Towel scrunches                                       Ther Activity                                       Gait Training                                       Modalities             [facility] 5'

## 2024-09-22 DIAGNOSIS — I25.10 CORONARY ARTERY DISEASE INVOLVING NATIVE HEART WITHOUT ANGINA PECTORIS, UNSPECIFIED VESSEL OR LESION TYPE: ICD-10-CM

## 2024-09-23 RX ORDER — CLOPIDOGREL BISULFATE 75 MG/1
75 TABLET ORAL DAILY
Qty: 90 TABLET | Refills: 1 | Status: SHIPPED | OUTPATIENT
Start: 2024-09-23

## 2024-10-01 ENCOUNTER — OFFICE VISIT (OUTPATIENT)
Dept: OCCUPATIONAL THERAPY | Facility: CLINIC | Age: 69
End: 2024-10-01
Payer: MEDICARE

## 2024-10-01 DIAGNOSIS — M19.042 PRIMARY OSTEOARTHRITIS OF LEFT HAND: Primary | ICD-10-CM

## 2024-10-01 PROCEDURE — 97110 THERAPEUTIC EXERCISES: CPT | Performed by: OCCUPATIONAL THERAPIST

## 2024-10-01 NOTE — PROGRESS NOTES
"Daily Note     Today's date: 10/1/2024  Patient name: Alvarez Webster  : 1955  MRN: [id number]  Referring provider: Ladonna Wilcox MD  Dx:   Encounter Diagnosis     ICD-10-CM    1. Primary osteoarthritis of left hand  M19.042                      Subjective: \"it is the same\", \"I didn't even go golfing\"      Objective: See treatment diary below.       Assessment: Fabricated nighttime MCP extension splint for the LF. Tender at the A1 pulley of the LF. Mild crepitus with flexion.       Plan: Continue per plan of care.      Precautions: Universal      Manuals 9/13 10/1           IASTM  8' 8'                                                  Neuro Re-Ed                                                                                                        Ther Ex             HEP: Tendon gliding, intrinsic stretch, FDS gliding, pen rolls            keypegs  1x           Pegs in with hook fist  1x           Extension web  Orange, 30x           Wall walking   5x TB           Towel scrunches             Digiflex  Yellow, isolated, 10x                        Ther Activity                                       Gait Training                                       Modalities             P 5' 5'                             "

## 2024-10-09 ENCOUNTER — NURSE TRIAGE (OUTPATIENT)
Age: 69
End: 2024-10-09

## 2024-10-09 ENCOUNTER — OFFICE VISIT (OUTPATIENT)
Dept: OBGYN CLINIC | Facility: CLINIC | Age: 69
End: 2024-10-09
Payer: MEDICARE

## 2024-10-09 ENCOUNTER — TELEPHONE (OUTPATIENT)
Age: 69
End: 2024-10-09

## 2024-10-09 VITALS — BODY MASS INDEX: 38.36 KG/M2 | HEIGHT: 71 IN | WEIGHT: 274 LBS

## 2024-10-09 DIAGNOSIS — I10 BENIGN ESSENTIAL HYPERTENSION: ICD-10-CM

## 2024-10-09 DIAGNOSIS — M67.432 GANGLION CYST OF VOLAR ASPECT OF LEFT WRIST: ICD-10-CM

## 2024-10-09 DIAGNOSIS — M65.332 TRIGGER MIDDLE FINGER OF LEFT HAND: ICD-10-CM

## 2024-10-09 DIAGNOSIS — M19.042 PRIMARY OSTEOARTHRITIS OF LEFT HAND: Primary | ICD-10-CM

## 2024-10-09 PROCEDURE — 99213 OFFICE O/P EST LOW 20 MIN: CPT | Performed by: SURGERY

## 2024-10-09 NOTE — TELEPHONE ENCOUNTER
I dont understand the question  Pt is currently on lisinoipril / hctz 20mg / 12.5mg and is supposed to be taking 2 daily  He recently saw cardio and doxazosin was ordered as well    Can you clarify the question

## 2024-10-09 NOTE — TELEPHONE ENCOUNTER
Patient called stating that he thinks current Lisinopril medication is not working.  He has been having elevated BP of 170/72 for past few days.  Patient stated he took today in addition to regular medication an old discontinued Lisinopril-hydrochlorothiazide 20-25mg pill and stated BP came down to 136 top number.  Patient wants to know if new rx for previous Lisinopril medication can be ordered.  Please advise and contact patient.

## 2024-10-09 NOTE — TELEPHONE ENCOUNTER
Spoke with patient- Patient states he thinks he got a bad order of medication. States he did not have an issues before- Told patient to call center well and ask if manufacture has changed and explained he is not tolerating this manufacture. Patient aware and will call.

## 2024-10-09 NOTE — PROGRESS NOTES
ASSESSMENT/PLAN:      69-year-old male here for his improving left hand stiffness with occupational therapy and heat.  At this time he we will continue a couple more sessions of OT to maximize his motion and transition to a home exercise program.  Patient has no pain at the ganglion cyst and will not proceed with any interventions at this time.  Patient also has an early trigger finger of the left long and we will continue to watch.  We will follow-up with the patient as needed    The patient verbalized understanding of exam findings and treatment plan. We engaged in the shared decision-making process and treatment options were discussed at length with the patient. Surgical and conservative management discussed today along with risks and benefits.    Diagnoses and all orders for this visit:    Primary osteoarthritis of left hand    Ganglion cyst of volar aspect of left wrist    Trigger middle finger of left hand  Comments:  early        Follow Up:  Return if symptoms worsen or fail to improve.      To Do Next Visit:  Re-evaluation of current issue    ____________________________________________________________________________________________________________________________________________      CHIEF COMPLAINT:  Chief Complaint   Patient presents with    Follow-up     Patient still has clicking ganglion cyst has not grown       SUBJECTIVE:  Alvarez Webster is a 69 y.o. year old RHD male who presents today for a follow up for his left hand tightness, ganglion cyst over the volar aspect of left thumb/wrist.He has been treating with OT and heat. He has clocking in the left long finger but not sticking. He feels that his motion is getting better.        I have personally reviewed all the relevant PMH, PSH, SH, FH, Medications and allergies.     PAST MEDICAL HISTORY:  Past Medical History:   Diagnosis Date    Allergic     Arthritis     knee    Coronary artery disease     Diabetes mellitus (HCC)     Erectile dysfunction  2017    GERD (gastroesophageal reflux disease)     H/O angioplasty     Hypercholesteremia     Hypertension     Interstitial cystitis     Obesity     Secondary malignant neoplasm of left lung (HCC) 04/06/2022    Shingles     Skin tag     Sleep apnea     Varicose veins of lower extremity     Visual impairment 1980       PAST SURGICAL HISTORY:  Past Surgical History:   Procedure Laterality Date    CARDIAC CATHETERIZATION      CARDIAC SURGERY      CHOLECYSTECTOMY  01/01/2010    COLONOSCOPY      CORONARY ANGIOPLASTY WITH STENT PLACEMENT      EYE SURGERY Left     ocular lens implant    GALLBLADDER SURGERY      HERNIA REPAIR  01/01/2009    IR BIOPSY LUNG  9/8/2021    KNEE ARTHROSCOPY W/ MENISCAL REPAIR Right     KNEE SURGERY  09/10/2015    LUNG SEGMENTECTOMY Left 10/21/2021    Procedure: RESECTION WEDGE LEFT LOWER LOBE LUNG;  Surgeon: Jacky Aparicio MD;  Location: [facility];  Service: Thoracic    MD BRNCHSC INCL FLUOR GDNCE DX W/CELL WASHG SPX N/A 10/21/2021    Procedure: BRONCHOSCOPY FLEXIBLE;  Surgeon: Jacky Aparicio MD;  Location: [facility];  Service: Thoracic    MD TENDON SHEATH INCISION Left 8/25/2016    Procedure: RING TRIGGER FINGER RELEASE ;  Surgeon: Arpit Zamora MD;  Location: [facility];  Service: Orthopedics    MD THORACOSCOPY W/THERA WEDGE RESEXN INITIAL UNILAT Left 10/21/2021    Procedure: THORACOSCOPY VIDEO ASSISTED SURGERY (VATS);  Surgeon: Jacky Aparicio MD;  Location: [facility];  Service: Thoracic    VARICOSE VEIN SURGERY      Ligation       FAMILY HISTORY:  Family History   Problem Relation Age of Onset    Cancer Mother     Coronary artery disease Mother     Arthritis Mother     Heart disease Father     Early death Father     Coronary artery disease Father     Diabetes Father     Hypertension Father     Coronary artery disease Brother     Hyperlipidemia Brother     Hypertension Brother     Acne Brother     Cancer Brother     Coronary artery disease Brother     Heart disease Brother      Diabetes Brother        SOCIAL HISTORY:  Social History     Tobacco Use    Smoking status: Former     Current packs/day: 0.00     Average packs/day: 2.0 packs/day for 15.0 years (30.0 ttl pk-yrs)     Types: Cigarettes     Start date: 1967     Quit date: 1982     Years since quittin.8    Smokeless tobacco: Never   Vaping Use    Vaping status: Never Used   Substance Use Topics    Alcohol use: Never    Drug use: Never       MEDICATIONS:    Current Outpatient Medications:     amLODIPine (NORVASC) 10 mg tablet, TAKE 1 TABLET EVERY DAY, Disp: 90 tablet, Rfl: 3    ascorbic acid (VITAMIN C) 500 mg tablet, Take 500 mg by mouth daily, Disp: , Rfl:     aspirin (ECOTRIN LOW STRENGTH) 81 mg EC tablet, Take 81 mg by mouth daily, Disp: , Rfl:     atorvastatin (LIPITOR) 40 mg tablet, TAKE 1 TABLET AT BEDTIME, Disp: 90 tablet, Rfl: 1    clopidogrel (PLAVIX) 75 mg tablet, TAKE 1 TABLET EVERY DAY, Disp: 90 tablet, Rfl: 1    doxazosin (CARDURA) 2 mg tablet, Take 1 tablet (2 mg total) by mouth daily at bedtime, Disp: 90 tablet, Rfl: 3    glimepiride (AMARYL) 4 mg tablet, TAKE 1 TABLET WITH BREAKFAST AND TAKE 2 TABLETS IN THE EVENING, Disp: 270 tablet, Rfl: 1    glucose blood test strip, Test once daily (one touch ultra), Disp: 100 each, Rfl: 1    lisinopril-hydrochlorothiazide (PRINZIDE,ZESTORETIC) 20-12.5 MG per tablet, TAKE 2 TABLETS EVERY DAY, Disp: 180 tablet, Rfl: 3    MAGNESIUM CITRATE PO, Take 1 tablet by mouth in the morning, Disp: , Rfl:     metFORMIN (GLUCOPHAGE) 1000 MG tablet, TAKE 2 TABLETS EVERY DAY AFTER DINNER, Disp: 180 tablet, Rfl: 3    Omega-3 Fatty Acids (FISH OIL PO), Take by mouth daily  , Disp: , Rfl:     ONETOUCH DELICA LANCETS 33G MISC, Test blood glucose 2 times daily, Disp: 300 each, Rfl: 0    prednisoLONE acetate (PRED FORTE) 1 % ophthalmic suspension, Administer 1 drop into the left eye every other day , Disp: , Rfl:     tamsulosin (FLOMAX) 0.4 mg, Take 2 capsules (0.8 mg total) by mouth daily  with dinner, Disp: , Rfl:     VITAMIN E PO, Take 1 capsule by mouth daily  , Disp: , Rfl:     tadalafil (CIALIS) 20 MG tablet, Take 1 tablet (20 mg total) by mouth as needed for erectile dysfunction (Patient not taking: Reported on 8/27/2024), Disp: 30 tablet, Rfl: 2    ALLERGIES:  Allergies   Allergen Reactions    Ciprofloxacin Swelling    Janumet [Sitagliptin-Metformin Hcl] Lip Swelling     Lip swelling     Terazosin      Annotation - [id number]: Blood in sperm       REVIEW OF SYSTEMS:  Review of Systems   Constitutional:  Negative for chills, fever and unexpected weight change.   HENT:  Negative for hearing loss, nosebleeds and sore throat.    Eyes:  Negative for pain, redness and visual disturbance.   Respiratory:  Negative for cough, shortness of breath and wheezing.    Cardiovascular:  Negative for chest pain, palpitations and leg swelling.   Gastrointestinal:  Negative for abdominal pain, nausea and vomiting.   Endocrine: Negative for polydipsia and polyuria.   Genitourinary:  Negative for dysuria and hematuria.   Skin:  Negative for rash and wound.   Neurological:  Negative for dizziness, light-headedness and headaches.   Psychiatric/Behavioral:  Negative for decreased concentration, dysphoric mood and suicidal ideas. The patient is not nervous/anxious.        VITALS:  There were no vitals filed for this visit.      _____________________________________________________  PHYSICAL EXAMINATION:  General: well developed and well nourished, alert, oriented times 3, and appears comfortable  Psychiatric: Normal  HEENT: Normocephalic, Atraumatic Trachea Midline, No torticollis  Pulmonary: No audible wheezing or respiratory distress   Cardiovascular: No pitting edema, 2+ radial pulse   Abdominal/GI: abdomen non tender, non distended   Skin: No masses, erythema, lacerations, fluctation, ulcerations  Neurovascular: Sensation Intact to the Median, Ulnar, Radial Nerve, Motor Intact to the Median, Ulnar, Radial Nerve, and  Pulses Intact  Musculoskeletal: Normal, except as noted in detailed exam and in HPI.      MUSCULOSKELETAL EXAMINATION:    Left hand:     Mild tenderness at the A1 pulley of long finger with clicking  Full composite fist  Opposition intact  Sensation intact to light touch distally  Brisk capillary refill  ___________________________________________________    STUDIES REVIEWED:  No images required to be obtained or reviewed for today's visit   LABS REVIEWED:    HgA1c:   Lab Results   Component Value Date    HGBA1C 6.4 (H) 05/31/2024     BMP:   Lab Results   Component Value Date    GLUCOSE 135 08/29/2015    CALCIUM 9.6 05/31/2024     08/29/2015    K 4.4 05/31/2024    CO2 30 05/31/2024     05/31/2024    BUN 21 05/31/2024    CREATININE 1.05 05/31/2024           PROCEDURES PERFORMED:  Procedures  No Procedures performed today    _____________________________________________________      Scribe Attestation      I,:  Fabiola Appiah am acting as a scribe while in the presence of the attending physician.:       I,:  Ladonna Wilcox MD personally performed the services described in this documentation    as scribed in my presence.:

## 2024-10-09 NOTE — TELEPHONE ENCOUNTER
"Chief Complaint: medication concerns     History of Present Illness (HPI): Received call from pt reporting that since his last shipment of lisinopril-HTZ came in he's noticed his BP have been running higher.  His SBP has been running 150-160's.  Pt did not provide any diastolic numbers.  Pt reports he had been getting headaches and some visual changes but had not missed any doses of prescribed antihypertensives.  He sates his last shipment of lisinopril-HTZ had pills that looked very different from the ones he normally takes.  He had some left from the previous shipment so he took that to see if it would make a difference and his systolic BP came down to the 130's and he no longer had a headache or visual disturbances.  Pt will continue taking the left over medications that seem to be helping him in the meantime but does not have that many pills left.      VS/Weight: /?    Pain: No    Risk Factors: Hypertension, CAD    Recent Testing: Other none    Medicine: Plavix 75mg, Cardura 2mg, Lisinopril-HTZ 20-12.5mg, Amlodipine 10mg, ASA 81mg    Upcoming Office Visit: Yes 3/5/25    Last Office Visit: 8/27/24    Additional Comments: Encouraged pt to reach out to [facility] pharmacy with concerns as well as his PCP.                Reason for Disposition   Taking BP medications and feels is having side effects (e.g., impotence, cough, dizziness)    Answer Assessment - Initial Assessment Questions  1. BLOOD PRESSURE: \"What is the blood pressure?\" \"Did you take at least two measurements 5 minutes apart?\"      168/? This morning 136/? This afternoon  2. ONSET: \"When did you take your blood pressure?\"      This morning and a few hours after taking his BP meds  3. HOW: \"How did you obtain the blood pressure?\" (e.g., visiting nurse, automatic home BP monitor)      cuff  4. HISTORY: \"Do you have a history of high blood pressure?\"      yes  5. MEDICATIONS: \"Are you taking any medications for blood pressure?\" \"Have you missed any " "doses recently?\"      Yes, had not missed any doses  6. OTHER SYMPTOMS: \"Do you have any symptoms?\" (e.g., headache, chest pain, blurred vision, difficulty breathing, weakness)      Pt had a headache this morning to the top of his head as well as some vision changes but feels much better now with his BP in the 130's    Protocols used: High Blood Pressure-ADULT-[place]    "

## 2024-10-10 RX ORDER — LISINOPRIL AND HYDROCHLOROTHIAZIDE 12.5; 2 MG/1; MG/1
2 TABLET ORAL DAILY
Qty: 180 TABLET | Refills: 1 | Status: SHIPPED | OUTPATIENT
Start: 2024-10-10

## 2024-10-10 NOTE — TELEPHONE ENCOUNTER
Pt stated he called and [facility]iffany who did confirm a change in manufacture, pt asked if lisinopril-hydrochlorothiazide could be sent to [facility] pt stated he took lisinipril 25 he had from before and felt better today.

## 2024-10-11 ENCOUNTER — APPOINTMENT (OUTPATIENT)
Dept: LAB | Facility: CLINIC | Age: 69
End: 2024-10-11
Payer: MEDICARE

## 2024-10-11 DIAGNOSIS — I10 HYPERTENSION, UNSPECIFIED TYPE: ICD-10-CM

## 2024-10-11 DIAGNOSIS — E13.9 DIABETES 1.5, MANAGED AS TYPE 2 (HCC): ICD-10-CM

## 2024-10-11 DIAGNOSIS — H93.13 TINNITUS OF BOTH EARS: ICD-10-CM

## 2024-10-11 DIAGNOSIS — E78.5 HYPERLIPIDEMIA, UNSPECIFIED HYPERLIPIDEMIA TYPE: ICD-10-CM

## 2024-10-11 LAB
ALBUMIN SERPL BCG-MCNC: 4.4 G/DL (ref 3.5–5)
ALP SERPL-CCNC: 47 U/L (ref 34–104)
ALT SERPL W P-5'-P-CCNC: 21 U/L (ref 7–52)
ANION GAP SERPL CALCULATED.3IONS-SCNC: 6 MMOL/L (ref 4–13)
AST SERPL W P-5'-P-CCNC: 15 U/L (ref 13–39)
BASOPHILS # BLD AUTO: 0.02 THOUSANDS/ΜL (ref 0–0.1)
BASOPHILS NFR BLD AUTO: 0 % (ref 0–1)
BILIRUB SERPL-MCNC: 0.72 MG/DL (ref 0.2–1)
BUN SERPL-MCNC: 21 MG/DL (ref 5–25)
CALCIUM SERPL-MCNC: 9.8 MG/DL (ref 8.4–10.2)
CHLORIDE SERPL-SCNC: 102 MMOL/L (ref 96–108)
CO2 SERPL-SCNC: 30 MMOL/L (ref 21–32)
CREAT SERPL-MCNC: 1.03 MG/DL (ref 0.6–1.3)
EOSINOPHIL # BLD AUTO: 0.06 THOUSAND/ΜL (ref 0–0.61)
EOSINOPHIL NFR BLD AUTO: 1 % (ref 0–6)
ERYTHROCYTE [DISTWIDTH] IN BLOOD BY AUTOMATED COUNT: 14.8 % (ref 11.6–15.1)
EST. AVERAGE GLUCOSE BLD GHB EST-MCNC: 160 MG/DL
GFR SERPL CREATININE-BSD FRML MDRD: 73 ML/MIN/1.73SQ M
GLUCOSE P FAST SERPL-MCNC: 211 MG/DL (ref 65–99)
HBA1C MFR BLD: 7.2 %
HCT VFR BLD AUTO: 42.7 % (ref 36.5–49.3)
HGB BLD-MCNC: 14.2 G/DL (ref 12–17)
IMM GRANULOCYTES # BLD AUTO: 0.02 THOUSAND/UL (ref 0–0.2)
IMM GRANULOCYTES NFR BLD AUTO: 0 % (ref 0–2)
LYMPHOCYTES # BLD AUTO: 1.02 THOUSANDS/ΜL (ref 0.6–4.47)
LYMPHOCYTES NFR BLD AUTO: 20 % (ref 14–44)
MCH RBC QN AUTO: 32.2 PG (ref 26.8–34.3)
MCHC RBC AUTO-ENTMCNC: 33.3 G/DL (ref 31.4–37.4)
MCV RBC AUTO: 97 FL (ref 82–98)
MONOCYTES # BLD AUTO: 0.33 THOUSAND/ΜL (ref 0.17–1.22)
MONOCYTES NFR BLD AUTO: 6 % (ref 4–12)
NEUTROPHILS # BLD AUTO: 3.79 THOUSANDS/ΜL (ref 1.85–7.62)
NEUTS SEG NFR BLD AUTO: 73 % (ref 43–75)
NRBC BLD AUTO-RTO: 0 /100 WBCS
PLATELET # BLD AUTO: 134 THOUSANDS/UL (ref 149–390)
PMV BLD AUTO: 10.2 FL (ref 8.9–12.7)
POTASSIUM SERPL-SCNC: 4.2 MMOL/L (ref 3.5–5.3)
PROT SERPL-MCNC: 7.1 G/DL (ref 6.4–8.4)
RBC # BLD AUTO: 4.41 MILLION/UL (ref 3.88–5.62)
SODIUM SERPL-SCNC: 138 MMOL/L (ref 135–147)
WBC # BLD AUTO: 5.24 THOUSAND/UL (ref 4.31–10.16)

## 2024-10-11 PROCEDURE — 83036 HEMOGLOBIN GLYCOSYLATED A1C: CPT

## 2024-10-11 PROCEDURE — 36415 COLL VENOUS BLD VENIPUNCTURE: CPT

## 2024-10-11 PROCEDURE — 80053 COMPREHEN METABOLIC PANEL: CPT

## 2024-10-11 PROCEDURE — 85025 COMPLETE CBC W/AUTO DIFF WBC: CPT

## 2024-10-11 NOTE — TELEPHONE ENCOUNTER
Spoke with pt, pt stated his pcp contacted the [facility] pharmacy and it's a different . His pcp's office is working with pt to have this medication refilled at his [facility] pharmacy.     Made pt aware of Dr Delgado's message. For now will hold off on sending any new prescriptions. Advised pt to contact our office with any concerns.

## 2024-10-22 ENCOUNTER — OFFICE VISIT (OUTPATIENT)
Dept: INTERNAL MEDICINE CLINIC | Age: 69
End: 2024-10-22
Payer: MEDICARE

## 2024-10-22 VITALS
SYSTOLIC BLOOD PRESSURE: 138 MMHG | HEIGHT: 71 IN | OXYGEN SATURATION: 98 % | DIASTOLIC BLOOD PRESSURE: 60 MMHG | HEART RATE: 57 BPM | WEIGHT: 274 LBS | BODY MASS INDEX: 38.36 KG/M2 | TEMPERATURE: 98.2 F

## 2024-10-22 DIAGNOSIS — I25.10 CORONARY ARTERY DISEASE INVOLVING NATIVE HEART WITHOUT ANGINA PECTORIS, UNSPECIFIED VESSEL OR LESION TYPE: ICD-10-CM

## 2024-10-22 DIAGNOSIS — I10 HYPERTENSION, UNSPECIFIED TYPE: ICD-10-CM

## 2024-10-22 DIAGNOSIS — E11.9 DIABETES MELLITUS TYPE 2, NONINSULIN DEPENDENT (HCC): ICD-10-CM

## 2024-10-22 DIAGNOSIS — I10 BENIGN ESSENTIAL HYPERTENSION: Primary | ICD-10-CM

## 2024-10-22 DIAGNOSIS — E13.9 DIABETES 1.5, MANAGED AS TYPE 2 (HCC): ICD-10-CM

## 2024-10-22 DIAGNOSIS — Z00.00 ENCOUNTER FOR ANNUAL WELLNESS VISIT (AWV) IN MEDICARE PATIENT: ICD-10-CM

## 2024-10-22 DIAGNOSIS — E78.5 HYPERLIPIDEMIA, UNSPECIFIED HYPERLIPIDEMIA TYPE: ICD-10-CM

## 2024-10-22 PROCEDURE — G0439 PPPS, SUBSEQ VISIT: HCPCS | Performed by: PHYSICIAN ASSISTANT

## 2024-10-22 PROCEDURE — 99214 OFFICE O/P EST MOD 30 MIN: CPT | Performed by: PHYSICIAN ASSISTANT

## 2024-10-22 RX ORDER — LISINOPRIL AND HYDROCHLOROTHIAZIDE 12.5; 2 MG/1; MG/1
2 TABLET ORAL DAILY
Qty: 180 TABLET | Refills: 1 | Status: SHIPPED | OUTPATIENT
Start: 2024-10-22

## 2024-10-22 NOTE — ASSESSMENT & PLAN NOTE
Stable   Orders:    CBC and differential; Future    Comprehensive metabolic panel; Future    Hemoglobin A1C; Future

## 2024-10-22 NOTE — PATIENT INSTRUCTIONS
Medicare Preventive Visit Patient Instructions  Thank you for completing your Welcome to Medicare Visit or Medicare Annual Wellness Visit today. Your next wellness visit will be due in one year (10/23/2025).  The screening/preventive services that you may require over the next 5-10 years are detailed below. Some tests may not apply to you based off risk factors and/or age. Screening tests ordered at today's visit but not completed yet may show as past due. Also, please note that scanned in results may not display below.  Preventive Screenings:  Service Recommendations Previous Testing/Comments   Colorectal Cancer Screening  Colonoscopy    Fecal Occult Blood Test (FOBT)/Fecal Immunochemical Test (FIT)  Fecal DNA/Cologuard Test  Flexible Sigmoidoscopy Age: 45-75 years old   Colonoscopy: every 10 years (May be performed more frequently if at higher risk)  OR  FOBT/FIT: every 1 year  OR  Cologuard: every 3 years  OR  Sigmoidoscopy: every 5 years  Screening may be recommended earlier than age 45 if at higher risk for colorectal cancer. Also, an individualized decision between you and your healthcare provider will decide whether screening between the ages of 76-85 would be appropriate. Colonoscopy: 07/29/2020  FOBT/FIT: Not on file  Cologuard: Not on file  Sigmoidoscopy: Not on file    Screening Current     Prostate Cancer Screening Individualized decision between patient and health care provider in men between ages of 55-69   Medicare will cover every 12 months beginning on the day after your 50th birthday PSA: 2.962 ng/mL     Screening Current     Hepatitis C Screening Once for adults born between 1945 and 1965  More frequently in patients at high risk for Hepatitis C Hep C Antibody: 05/07/2017    Screening Current   Diabetes Screening 1-2 times per year if you're at risk for diabetes or have pre-diabetes Fasting glucose: 211 mg/dL (10/11/2024)  A1C: 7.2 % (10/11/2024)  Screening Not Indicated  History Diabetes    Cholesterol Screening Once every 5 years if you don't have a lipid disorder. May order more often based on risk factors. Lipid panel: 01/31/2024  Screening Not Indicated  History Lipid Disorder      Other Preventive Screenings Covered by Medicare:  Abdominal Aortic Aneurysm (AAA) Screening: covered once if your at risk. You're considered to be at risk if you have a family history of AAA or a male between the age of 65-75 who smoking at least 100 cigarettes in your lifetime.  Lung Cancer Screening: covers low dose CT scan once per year if you meet all of the following conditions: (1) Age 55-77; (2) No signs or symptoms of lung cancer; (3) Current smoker or have quit smoking within the last 15 years; (4) You have a tobacco smoking history of at least 20 pack years (packs per day x number of years you smoked); (5) You get a written order from a healthcare provider.  Glaucoma Screening: covered annually if you're considered high risk: (1) You have diabetes OR (2) Family history of glaucoma OR (3)  aged 50 and older OR (4)  American aged 65 and older  Osteoporosis Screening: covered every 2 years if you meet one of the following conditions: (1) Have a vertebral abnormality; (2) On glucocorticoid therapy for more than 3 months; (3) Have primary hyperparathyroidism; (4) On osteoporosis medications and need to assess response to drug therapy.  HIV Screening: covered annually if you're between the age of 15-65. Also covered annually if you are younger than 15 and older than 65 with risk factors for HIV infection. For pregnant patients, it is covered up to 3 times per pregnancy.    Immunizations:  Immunization Recommendations   Influenza Vaccine Annual influenza vaccination during flu season is recommended for all persons aged >= 6 months who do not have contraindications   Pneumococcal Vaccine   * Pneumococcal conjugate vaccine = PCV13 (Prevnar 13), PCV15 (Vaxneuvance), PCV20 (Prevnar 20)  *  Pneumococcal polysaccharide vaccine = PPSV23 (Pneumovax) Adults 19-65 yo with certain risk factors or if 65+ yo  If never received any pneumonia vaccine: recommend Prevnar 20 (PCV20)  Give PCV20 if previously received 1 dose of PCV13 or PPSV23   Hepatitis B Vaccine 3 dose series if at intermediate or high risk (ex: diabetes, end stage renal disease, liver disease)   Respiratory syncytial virus (RSV) Vaccine - COVERED BY MEDICARE PART D  * RSVPreF3 (Arexvy) CDC recommends that adults 60 years of age and older may receive a single dose of RSV vaccine using shared clinical decision-making (SCDM)   Tetanus (Td) Vaccine - COST NOT COVERED BY MEDICARE PART B Following completion of primary series, a booster dose should be given every 10 years to maintain immunity against tetanus. Td may also be given as tetanus wound prophylaxis.   Tdap Vaccine - COST NOT COVERED BY MEDICARE PART B Recommended at least once for all adults. For pregnant patients, recommended with each pregnancy.   Shingles Vaccine (Shingrix) - COST NOT COVERED BY MEDICARE PART B  2 shot series recommended in those 19 years and older who have or will have weakened immune systems or those 50 years and older     Health Maintenance Due:      Topic Date Due   • Colorectal Cancer Screening  07/29/2025   • Hepatitis C Screening  Completed     Immunizations Due:  There are no preventive care reminders to display for this patient.  Advance Directives   What are advance directives?  Advance directives are legal documents that state your wishes and plans for medical care. These plans are made ahead of time in case you lose your ability to make decisions for yourself. Advance directives can apply to any medical decision, such as the treatments you want, and if you want to donate organs.   What are the types of advance directives?  There are many types of advance directives, and each state has rules about how to use them. You may choose a combination of any of the  following:  Living will:  This is a written record of the treatment you want. You can also choose which treatments you do not want, which to limit, and which to stop at a certain time. This includes surgery, medicine, IV fluid, and tube feedings.   Durable power of  for healthcare (DPAHC):  This is a written record that states who you want to make healthcare choices for you when you are unable to make them for yourself. This person, called a proxy, is usually a family member or a friend. You may choose more than 1 proxy.  Do not resuscitate (DNR) order:  A DNR order is used in case your heart stops beating or you stop breathing. It is a request not to have certain forms of treatment, such as CPR. A DNR order may be included in other types of advance directives.  Medical directive:  This covers the care that you want if you are in a coma, near death, or unable to make decisions for yourself. You can list the treatments you want for each condition. Treatment may include pain medicine, surgery, blood transfusions, dialysis, IV or tube feedings, and a ventilator (breathing machine).  Values history:  This document has questions about your views, beliefs, and how you feel and think about life. This information can help others choose the care that you would choose.  Why are advance directives important?  An advance directive helps you control your care. Although spoken wishes may be used, it is better to have your wishes written down. Spoken wishes can be misunderstood, or not followed. Treatments may be given even if you do not want them. An advance directive may make it easier for your family to make difficult choices about your care.   Weight Management   Why it is important to manage your weight:  Being overweight increases your risk of health conditions such as heart disease, high blood pressure, type 2 diabetes, and certain types of cancer. It can also increase your risk for osteoarthritis, sleep apnea, and  other respiratory problems. Aim for a slow, steady weight loss. Even a small amount of weight loss can lower your risk of health problems.  How to lose weight safely:  A safe and healthy way to lose weight is to eat fewer calories and get regular exercise. You can lose up about 1 pound a week by decreasing the number of calories you eat by 500 calories each day.   Healthy meal plan for weight management:  A healthy meal plan includes a variety of foods, contains fewer calories, and helps you stay healthy. A healthy meal plan includes the following:  Eat whole-grain foods more often.  A healthy meal plan should contain fiber. Fiber is the part of grains, fruits, and vegetables that is not broken down by your body. Whole-grain foods are healthy and provide extra fiber in your diet. Some examples of whole-grain foods are whole-wheat breads and pastas, oatmeal, brown rice, and bulgur.  Eat a variety of vegetables every day.  Include dark, leafy greens such as spinach, kale, jamaal greens, and mustard greens. Eat yellow and orange vegetables such as carrots, sweet potatoes, and winter squash.   Eat a variety of fruits every day.  Choose fresh or canned fruit (canned in its own juice or light syrup) instead of juice. Fruit juice has very little or no fiber.  Eat low-fat dairy foods.  Drink fat-free (skim) milk or 1% milk. Eat fat-free yogurt and low-fat cottage cheese. Try low-fat cheeses such as mozzarella and other reduced-fat cheeses.  Choose meat and other protein foods that are low in fat.  Choose beans or other legumes such as split peas or lentils. Choose fish, skinless poultry (chicken or turkey), or lean cuts of red meat (beef or pork). Before you cook meat or poultry, cut off any visible fat.   Use less fat and oil.  Try baking foods instead of frying them. Add less fat, such as margarine, sour cream, regular salad dressing and mayonnaise to foods. Eat fewer high-fat foods. Some examples of high-fat foods  "include french fries, doughnuts, ice cream, and cakes.  Eat fewer sweets.  Limit foods and drinks that are high in sugar. This includes candy, cookies, regular soda, and sweetened drinks.  Exercise:  Exercise at least 30 minutes per day on most days of the week. Some examples of exercise include walking, biking, dancing, and swimming. You can also fit in more physical activity by taking the stairs instead of the elevator or parking farther away from stores. Ask your healthcare provider about the best exercise plan for you.   Alcohol Use and Your Health    Drinking too much can harm your health.  Excessive alcohol use leads to about 88,000 death in the [place] each year, and shortens the life of those who diet by almost 30 years.  Further, excessive drinking cost the economy $249 billion in 2010.  Most excessive drinkers are not alcohol dependent.    Excessive alcohol use has immediate effects that increase the risk of many harmful health conditions.  These are most often the result of binge drinking.  Over time, excessive alcohol use can lead to the development of chronic diseases and other series health problems.    What is considered a \"drink\"?        Excessive alcohol use includes:  Binge Drinking: For women, 4 or more drinks consumed on one occasion. For men, 5 or more drinks consumed on one occasion.  Heavy Drinking: For women, 8 or more drinks per week. For men, 15 or more drinks per week  Any alcohol used by pregnant women  Any alcohol used by those under the age of 21 years    If you choose to drink, do so in moderation:  Do not drink at all if you are under the age of 21, or if you are or may be pregnant, or have health problems that could be made worse by drinking.  For women, up to 1 drink per day  For men, up to 2 drinks a day    No one should begin drinking or drink more frequently based on potential health benefits    Short-Term Health Risks:  Injuries: motor vehicle crashes, falls, drownings, " burns  Violence: homicide, suicide, sexual assault, intimate partner violence  Alcohol poisoning  Reproductive health: risky sexual behaviors, unintended prengnacy, sexually transmitted diseases, miscarriage, stillbirth, fetal alcohol syndrome    Long-Term Health Risks:  Chronic diseases: high blood pressure, heart disease, stroke, liver disease, digestive problems  Cancers: breast, mouth and throat, liver, colon  Learning and memory problems: dementia, poor school performance  Mental health: depression, anxiety, insomnia  Social problems: lost productivity, family problems, unemployment  Alcohol dependence    For support and more information:  Substance Abuse and Mental Health Services Administration  [address]  [place] [address]  Web Address: http://www.sama.gov    Alcoholics Anonymous        Web Address: http://www.aa.org    https://www.cdc.gov/alcohol/fact-sheets/alcohol-use.htm     © Copyright [employer] 2018 Information is for End User's use only and may not be sold, redistributed or otherwise used for commercial purposes. All illustrations and images included in CareNotes® are the copyrighted property of [employer]M., Inc. or [facility]

## 2024-10-22 NOTE — PROGRESS NOTES
Ambulatory Visit  Name: Alvarez Webster      : 1955      MRN: [id number]  Encounter Provider: Kirsten Florez PA-C  Encounter Date: 10/22/2024   Encounter department: [facility]    Assessment & Plan  Diabetes 1.5, managed as type 2 (HCC)    Lab Results   Component Value Date    HGBA1C 7.2 (H) 10/11/2024       Orders:    Albumin / creatinine urine ratio; Future    Diabetes mellitus type 2, noninsulin dependent (HCC)    Lab Results   Component Value Date    HGBA1C 7.2 (H) 10/11/2024       Orders:    glucose blood test strip; Test once daily (one touch ultra)    CBC and differential; Future    Comprehensive metabolic panel; Future    Hemoglobin A1C; Future    Benign essential hypertension  Improved  Continue to monitor at home   Orders:    lisinopril-hydrochlorothiazide (PRINZIDE,ZESTORETIC) 20-12.5 MG per tablet; Take 2 tablets by mouth daily    CBC and differential; Future    Comprehensive metabolic panel; Future    Hemoglobin A1C; Future    Coronary artery disease involving native heart without angina pectoris, unspecified vessel or lesion type  Stable   Orders:    CBC and differential; Future    Comprehensive metabolic panel; Future    Hemoglobin A1C; Future    Encounter for annual wellness visit (AWV) in Medicare patient  Discussed shingles vaccine, pt agreeable to consider        Hypertension, unspecified type         Hyperlipidemia, unspecified hyperlipidemia type    Orders:    CBC and differential; Future    Comprehensive metabolic panel; Future    Hemoglobin A1C; Future      Depression Screening and Follow-up Plan: Patient was screened for depression during today's encounter. They screened negative with a PHQ-2 score of 0.      Preventive health issues were discussed with patient, and age appropriate screening tests were ordered as noted in patient's After Visit Summary. Personalized health advice and appropriate referrals for health education or preventive services  given if needed, as noted in patient's After Visit Summary.    History of Present Illness     70 y/o male with hx of dm, htn, hld presents for f/u and AWV  Pt reports when getting a different generic brand lisinopril / hctz and BP went up  Pt states he changed back to previous  and BP has improved  Has been monitoring at home     Labs reviewed with pt  Hga1c went up slightly 6.4 to 7.2  Pt has been less careful with diet lately            Patient Care Team:  Kirsten Florez PA-C as PCP - General (Physician Assistant)  Idalia Duggan MD as PCP - Endocrinology (Endocrinology)  MD Danita Johnston MD Stanley Walker, MD Renee E Amori, MD (Endocrinology)  Manuel Posada DO (Ophthalmology)    Review of Systems   Constitutional:  Negative for activity change, appetite change, chills, diaphoresis and fever.   HENT:  Negative for congestion and sore throat.    Eyes:  Negative for pain and redness.   Respiratory:  Negative for cough, shortness of breath and wheezing.    Cardiovascular:  Negative for chest pain and leg swelling.   Gastrointestinal:  Negative for abdominal pain, constipation, diarrhea and nausea.   Genitourinary:  Negative for dysuria and frequency.   Musculoskeletal:  Negative for arthralgias, back pain and gait problem.   Skin:  Negative for rash and wound.   Allergic/Immunologic: Negative for environmental allergies.   Neurological:  Negative for dizziness, light-headedness and headaches.   Psychiatric/Behavioral:  Negative for sleep disturbance. The patient is not nervous/anxious.      Medical History Reviewed by provider this encounter:       Annual Wellness Visit Questionnaire   Alvarez is here for his Subsequent Wellness visit. Last Medicare Wellness visit information reviewed, patient interviewed and updates made to the record today.      Health Risk Assessment:   Patient rates overall health as good. Patient feels that their physical health rating is same. Patient is very  satisfied with their life. Eyesight was rated as slightly worse. Hearing was rated as same. Patient feels that their emotional and mental health rating is same. Patients states they are never, rarely angry. Patient states they are never, rarely unusually tired/fatigued. Pain experienced in the last 7 days has been some. Patient's pain rating has been 3/10. Patient states that he has experienced no weight loss or gain in last 6 months.     Depression Screening:   PHQ-2 Score: 0      Fall Risk Screening:   In the past year, patient has experienced: no history of falling in past year      Home Safety:  Patient does not have trouble with stairs inside or outside of their home. Patient has working smoke alarms and has working carbon monoxide detector. Home safety hazards include: none.     Nutrition:   Current diet is Regular.     Medications:   Patient is currently taking over-the-counter supplements. OTC medications include: see medication list. Patient is able to manage medications.     Activities of Daily Living (ADLs)/Instrumental Activities of Daily Living (IADLs):   Walk and transfer into and out of bed and chair?: Yes  Dress and groom yourself?: Yes    Bathe or shower yourself?: Yes    Feed yourself? Yes  Do your laundry/housekeeping?: Yes  Manage your money, pay your bills and track your expenses?: Yes  Make your own meals?: Yes    Do your own shopping?: Yes    Durable Medical Equipment Suppliers  [place]    Previous Hospitalizations:   Any hospitalizations or ED visits within the last 12 months?: No      Advance Care Planning:   Living will: Yes    Durable POA for healthcare: No    Advanced directive: Yes      Cognitive Screening:   Provider or family/friend/caregiver concerned regarding cognition?: No    PREVENTIVE SCREENINGS      Cardiovascular Screening:    General: Screening Not Indicated and History Lipid Disorder      Diabetes Screening:     General: Screening Not Indicated and History Diabetes       Colorectal Cancer Screening:     General: Screening Current      Prostate Cancer Screening:    General: Screening Current      Osteoporosis Screening:    General: Screening Not Indicated      Abdominal Aortic Aneurysm (AAA) Screening:    Risk factors include: age between 65-74 yo and tobacco use        General: Screening Current      Lung Cancer Screening:     General: Screening Not Indicated      Hepatitis C Screening:    General: Screening Current      Preventive Screening Comments: MRI abdomen  negative for AAA    Screening, Brief Intervention, and Referral to Treatment (SBIRT)    Screening  Typical number of drinks in a day: 0  Typical number of drinks in a week: 0  Interpretation: Low risk drinking behavior.    AUDIT-C Screenin) How often did you have a drink containing alcohol in the past year? 4 or more times a week  2) How many drinks did you have on a typical day when you were drinking in the past year? 1 to 2  3) How often did you have 6 or more drinks on one occasion in the past year? never    AUDIT-C Score: 4  Interpretation: Score 4-12 (male): POSITIVE screen for alcohol misuse    AUDIT Screenin) How often during the last year have you found that you were not able to stop drinking once you had started? 0 - never  5) How often during the last year have you failed to do what was normally expected from you because of drinking? 0 - never  6) How often during the last year have you needed a first drink in the morning to get yourself going after a heavy drinking session? 0 - never  7) How often during the last year have you had a feeling of guilt or remorse after drinking? 0 - never  8) How often during the last year have you been unable to remember what happened the night before because you had been drinking? 0 - never  9) Have you or someone else been injured as a result of your drinking? 0 - no  10) Has a relative or friend or a doctor or another health worker been concerned about your  "drinking or suggested you cut down? 0 - no    AUDIT Score: 4  Interpretation: Low risk alcohol consumption    Single Item Drug Screening:  How often have you used an illegal drug (including marijuana) or a prescription medication for non-medical reasons in the past year? never    Single Item Drug Screen Score: 0  Interpretation: Negative screen for possible drug use disorder    Brief Intervention  Alcohol & drug use screenings were reviewed. No concerns regarding substance use disorder identified.     Other Counseling Topics:   Calcium and vitamin D intake.     Social Determinants of Health     Financial Resource Strain: Low Risk  (10/11/2023)    Overall Financial Resource Strain (CARDIA)     Difficulty of Paying Living Expenses: Not hard at all   Food Insecurity: No Food Insecurity (10/22/2024)    Hunger Vital Sign     Worried About Running Out of Food in the Last Year: Never true     Ran Out of Food in the Last Year: Never true   Transportation Needs: No Transportation Needs (10/22/2024)    PRAPARE - Transportation     Lack of Transportation (Medical): No     Lack of Transportation (Non-Medical): No   Housing Stability: Unknown (10/22/2024)    Housing Stability Vital Sign     Unable to Pay for Housing in the Last Year: No     Homeless in the Last Year: No   Utilities: Not At Risk (10/22/2024)    [facility] Utilities     Threatened with loss of utilities: No     No results found.    Objective     /60 (BP Location: Left arm, Patient Position: Sitting, Cuff Size: Large)   Pulse 57   Temp 98.2 °F (36.8 °C) (Temporal)   Ht 5' 11\" (1.803 m)   Wt 124 kg (274 lb)   SpO2 98%   BMI 38.22 kg/m²     Physical Exam  Vitals reviewed.   Constitutional:       General: He is not in acute distress.     Appearance: He is obese.   HENT:      Head: Normocephalic and atraumatic.      Right Ear: Tympanic membrane, ear canal and external ear normal. There is no impacted cerumen.      Left Ear: Tympanic membrane, ear canal and " external ear normal. There is no impacted cerumen.      Nose: Nose normal.      Mouth/Throat:      Mouth: Mucous membranes are moist.   Eyes:      General:         Right eye: No discharge.         Left eye: No discharge.      Extraocular Movements: Extraocular movements intact.      Conjunctiva/sclera: Conjunctivae normal.      Pupils: Pupils are equal, round, and reactive to light.   Cardiovascular:      Rate and Rhythm: Normal rate and regular rhythm.   Pulmonary:      Effort: Pulmonary effort is normal. No respiratory distress.      Breath sounds: Normal breath sounds. No wheezing or rales.   Abdominal:      General: Bowel sounds are normal. There is no distension.   Musculoskeletal:      Cervical back: Normal range of motion. No rigidity.      Right lower leg: No edema.      Left lower leg: No edema.   Lymphadenopathy:      Cervical: No cervical adenopathy.   Skin:     Findings: No erythema or rash.   Neurological:      General: No focal deficit present.      Mental Status: He is alert and oriented to person, place, and time.   Psychiatric:         Mood and Affect: Mood normal.       Administrative Statements   I have spent a total time of 30 minutes in caring for this patient on the day of the visit/encounter including Documenting in the medical record, Reviewing / ordering tests, medicine, procedures  , and Obtaining or reviewing history  .

## 2024-12-16 ENCOUNTER — HOSPITAL ENCOUNTER (OUTPATIENT)
Dept: CT IMAGING | Facility: HOSPITAL | Age: 69
Discharge: HOME | End: 2024-12-16
Payer: MEDICARE

## 2024-12-16 DIAGNOSIS — C78.02 SECONDARY MALIGNANT NEOPLASM OF LEFT LUNG (HCC): ICD-10-CM

## 2024-12-16 PROCEDURE — 71250 CT THORAX DX C-: CPT

## 2024-12-20 DIAGNOSIS — E13.9 DIABETES 1.5, MANAGED AS TYPE 2 (HCC): ICD-10-CM

## 2024-12-20 RX ORDER — GLIMEPIRIDE 4 MG/1
TABLET ORAL
Qty: 270 TABLET | Refills: 1 | Status: SHIPPED | OUTPATIENT
Start: 2024-12-20

## 2024-12-22 DIAGNOSIS — E78.5 HYPERLIPIDEMIA, UNSPECIFIED HYPERLIPIDEMIA TYPE: ICD-10-CM

## 2024-12-24 ENCOUNTER — OFFICE VISIT (OUTPATIENT)
Dept: CARDIAC SURGERY | Facility: CLINIC | Age: 69
End: 2024-12-24
Payer: MEDICARE

## 2024-12-24 VITALS
WEIGHT: 276.46 LBS | BODY MASS INDEX: 38.7 KG/M2 | SYSTOLIC BLOOD PRESSURE: 142 MMHG | DIASTOLIC BLOOD PRESSURE: 75 MMHG | HEIGHT: 71 IN | TEMPERATURE: 98.1 F | HEART RATE: 63 BPM | OXYGEN SATURATION: 96 % | RESPIRATION RATE: 15 BRPM

## 2024-12-24 DIAGNOSIS — C78.02 SECONDARY MALIGNANT NEOPLASM OF LEFT LUNG (HCC): Primary | ICD-10-CM

## 2024-12-24 PROCEDURE — 99213 OFFICE O/P EST LOW 20 MIN: CPT | Performed by: THORACIC SURGERY (CARDIOTHORACIC VASCULAR SURGERY)

## 2024-12-24 RX ORDER — ATORVASTATIN CALCIUM 40 MG/1
40 TABLET, FILM COATED ORAL
Qty: 90 TABLET | Refills: 1 | Status: SHIPPED | OUTPATIENT
Start: 2024-12-24

## 2024-12-24 NOTE — PROGRESS NOTES
Name: Alvarez Webster      : 1955      MRN: [id number]  Encounter Provider: Jacky Aparicio MD  Encounter Date: 2024   Encounter department: [facility] [place]  :  Assessment & Plan  Secondary malignant neoplasm of left lung (HCC)  Mr. Webster has had no radiographic or clinical signs of recurrent spindle cell neoplasm.  We continue to follow multiple pulmonary nodules, the largest of which is in his left upper lobe and is predominantly groundglass.  We will see him back in 6 months with a repeat noncontrasted CT scan of the chest.  The left upper lobe nodule is most likely a low-grade adenocarcinoma and if the solid component increases we will discuss biopsy and treatment.    Orders:    CT chest wo contrast; Future        Thoracic History     Problem   Secondary Malignant Neoplasm of Left Lung (Hcc)        History of Present Illness   HPI  Alvarez Webster is a 69 y.o. male who presents today for lung cancer surveillance. 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  We have been following multiple solid and part solid nodules, the largest of which is a mixed density nodule in his left upper lobe.    He underwent a CT scan of the chest on 2024 and this is personally reviewed.  There is complete stability of his multiple, bilateral pulmonary nodules.  The left upper lobe mixed density nodule is stable at 2.4 cm with a perhaps 5 mm solid component.  I do not see any worrisome mediastinal or hilar lymph nodes.  There are no new pulmonary nodules.  Stable postsurgical changes in his lower left hemithorax related to his prior therapeutic wedge resection.    He has had no major intercurrent medical illnesses.  He denies unexplained weight loss, new headaches or blurry vision,  "new bone or joint pains, or new numbness or weakness in extremity.  He denies cough, purulent sputum production, or hemoptysis.    Review of Systems   Constitutional:  Negative for activity change, appetite change, chills, fever and unexpected weight change.   HENT:  Negative for voice change.    Respiratory:  Negative for cough, shortness of breath and wheezing.    Cardiovascular:  Positive for leg swelling (Bilateral, stable, wears compression socks). Negative for chest pain and palpitations.   Gastrointestinal:  Positive for constipation. Negative for abdominal pain, diarrhea, nausea and vomiting.   Musculoskeletal:  Negative for arthralgias and myalgias.   Skin:  Negative for rash.   Neurological:  Negative for dizziness, seizures, weakness, numbness and headaches.   Hematological:  Negative for adenopathy.   Psychiatric/Behavioral:  Negative for confusion.            Objective   /75 (BP Location: Left arm, Patient Position: Sitting, Cuff Size: Large)   Pulse 63   Temp 98.1 °F (36.7 °C) (Temporal)   Resp 15   Ht 5' 11\" (1.803 m)   Wt 125 kg (276 lb 7.3 oz)   SpO2 96%   BMI 38.56 kg/m²     ECOG ECOG Performance Status: 0 - Fully active, able to carry on all pre-disease performance without restriction  Physical Exam  Vitals reviewed.   Constitutional:       General: He is not in acute distress.     Appearance: Normal appearance. He is well-developed.   HENT:      Head: Normocephalic and atraumatic.   Eyes:      General: No scleral icterus.     Conjunctiva/sclera: Conjunctivae normal.      Pupils: Pupils are equal, round, and reactive to light.   Neck:      Trachea: No tracheal deviation.   Cardiovascular:      Rate and Rhythm: Normal rate and regular rhythm.      Heart sounds: Murmur (1/6 systolic ejection murmur) heard.   Pulmonary:      Effort: Pulmonary effort is normal. No respiratory distress.      Breath sounds: Normal breath sounds. No wheezing or rales.   Abdominal:      General: Bowel sounds " are normal. There is no distension.      Palpations: Abdomen is soft.      Tenderness: There is no abdominal tenderness.   Musculoskeletal:      Cervical back: Normal range of motion and neck supple.      Right lower leg: No edema.      Left lower leg: No edema.   Lymphadenopathy:      Cervical: No cervical adenopathy.   Skin:     General: Skin is warm and dry.   Neurological:      Mental Status: He is alert and oriented to person, place, and time.      Cranial Nerves: No cranial nerve deficit.   Psychiatric:         Behavior: Behavior normal.         Thought Content: Thought content normal.         Judgment: Judgment normal.         Labs:     Radiology Results Review : I have reviewed images/report studies in PACS as described above (in the HPI).  Pathology: I have reviewed pathology reports described above.

## 2024-12-24 NOTE — ASSESSMENT & PLAN NOTE
Mr. Webster has had no radiographic or clinical signs of recurrent spindle cell neoplasm.  We continue to follow multiple pulmonary nodules, the largest of which is in his left upper lobe and is predominantly groundglass.  We will see him back in 6 months with a repeat noncontrasted CT scan of the chest.  The left upper lobe nodule is most likely a low-grade adenocarcinoma and if the solid component increases we will discuss biopsy and treatment.    Orders:    CT chest wo contrast; Future

## 2025-02-11 ENCOUNTER — APPOINTMENT (OUTPATIENT)
Dept: LAB | Facility: CLINIC | Age: 70
End: 2025-02-11
Payer: MEDICARE

## 2025-02-11 DIAGNOSIS — R97.20 ELEVATED PROSTATE SPECIFIC ANTIGEN (PSA): ICD-10-CM

## 2025-02-11 DIAGNOSIS — I25.10 CORONARY ARTERY DISEASE INVOLVING NATIVE HEART WITHOUT ANGINA PECTORIS, UNSPECIFIED VESSEL OR LESION TYPE: ICD-10-CM

## 2025-02-11 DIAGNOSIS — I10 BENIGN ESSENTIAL HYPERTENSION: ICD-10-CM

## 2025-02-11 DIAGNOSIS — E13.9 DIABETES 1.5, MANAGED AS TYPE 2 (HCC): ICD-10-CM

## 2025-02-11 DIAGNOSIS — E78.5 HYPERLIPIDEMIA, UNSPECIFIED HYPERLIPIDEMIA TYPE: ICD-10-CM

## 2025-02-11 DIAGNOSIS — E11.9 DIABETES MELLITUS TYPE 2, NONINSULIN DEPENDENT (HCC): ICD-10-CM

## 2025-02-11 LAB — PSA SERPL-MCNC: 3.13 NG/ML (ref 0–4)

## 2025-02-11 PROCEDURE — G0103 PSA SCREENING: HCPCS

## 2025-02-11 PROCEDURE — 36415 COLL VENOUS BLD VENIPUNCTURE: CPT

## 2025-02-24 DIAGNOSIS — I25.10 CORONARY ARTERY DISEASE INVOLVING NATIVE HEART WITHOUT ANGINA PECTORIS, UNSPECIFIED VESSEL OR LESION TYPE: ICD-10-CM

## 2025-02-25 RX ORDER — CLOPIDOGREL BISULFATE 75 MG/1
75 TABLET ORAL DAILY
Qty: 90 TABLET | Refills: 1 | Status: SHIPPED | OUTPATIENT
Start: 2025-02-25
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
## 2025-03-05 ENCOUNTER — OFFICE VISIT (OUTPATIENT)
Dept: CARDIOLOGY CLINIC | Facility: CLINIC | Age: 70
End: 2025-03-05
Payer: MEDICARE

## 2025-03-05 VITALS
SYSTOLIC BLOOD PRESSURE: 144 MMHG | BODY MASS INDEX: 38.78 KG/M2 | HEIGHT: 71 IN | OXYGEN SATURATION: 96 % | WEIGHT: 277 LBS | DIASTOLIC BLOOD PRESSURE: 70 MMHG

## 2025-03-05 DIAGNOSIS — E13.59 OTHER SPECIFIED DIABETES MELLITUS WITH OTHER CIRCULATORY COMPLICATIONS (HCC): ICD-10-CM

## 2025-03-05 DIAGNOSIS — I10 HYPERTENSION, UNSPECIFIED TYPE: ICD-10-CM

## 2025-03-05 DIAGNOSIS — I12.9 HYPERTENSIVE KIDNEY DISEASE WITH STAGE 2 CHRONIC KIDNEY DISEASE: ICD-10-CM

## 2025-03-05 DIAGNOSIS — E78.5 HYPERLIPIDEMIA, UNSPECIFIED HYPERLIPIDEMIA TYPE: ICD-10-CM

## 2025-03-05 DIAGNOSIS — N18.2 HYPERTENSIVE KIDNEY DISEASE WITH STAGE 2 CHRONIC KIDNEY DISEASE: ICD-10-CM

## 2025-03-05 DIAGNOSIS — I25.10 CORONARY ARTERY DISEASE INVOLVING NATIVE HEART WITHOUT ANGINA PECTORIS, UNSPECIFIED VESSEL OR LESION TYPE: Primary | ICD-10-CM

## 2025-03-05 PROCEDURE — 99214 OFFICE O/P EST MOD 30 MIN: CPT | Performed by: INTERNAL MEDICINE

## 2025-03-05 RX ORDER — DOXAZOSIN 4 MG/1
4 TABLET ORAL
Start: 2025-03-05

## 2025-03-05 NOTE — PROGRESS NOTES
Cardiology Follow Up    Alvarez Webster  1955  [id number]  [facility] [place]  [address]  [address]  [place] [id number] [phone]    1. Coronary artery disease involving native heart without angina pectoris, unspecified vessel or lesion type        2. Hypertensive kidney disease with stage 2 chronic kidney disease        3. Hyperlipidemia, unspecified hyperlipidemia type        4. Hypertension, unspecified type              Diagnoses and all orders for this visit:    Coronary artery disease involving native heart without angina pectoris, unspecified vessel or lesion type    Hypertensive kidney disease with stage 2 chronic kidney disease    Hyperlipidemia, unspecified hyperlipidemia type    Hypertension, unspecified type        I had the pleasure of seeing Alvarez Webster for a follow up visit.     INTERVAL HISTORY:  None    History of the presenting illness, Discussion/Summary and My Plan are as follows:::    He is a pleasant 70-year-old gentleman with a history of hypertension, diabetes and dyslipidemia. He also has coronary artery disease and has had multiple stenting procedures. Based on his last coronary angiography report from the [facility] in September 2016, left main was patent, LAD was patent including a drug-eluting stent-implanted in 2011. His proximal RCA had a stent implanted in June 2015->in-stent restenosis in December 2015-restented, again had in-stent restenosis that was treated with a drug-eluting 3X 18 mm Xience stent. This was done for anginal symptoms. To his knowledge, he has not had a myocardial infarction.     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 No anginal symptoms at this time. He had vein stripping of his right lower extremity which has disproportionately more edema.    Plan:      Coronary artery disease: no H/O MI,  Status post [id number]-patent in 2016, status post proximal RCA stent in - stent - in-stent-last stenting-September 2016 with a Xience stent, (3 overlapping STENTS). No PCI since 2016. No anginal symptoms at this time (had them at the times of his PCI)  Indefinite Aspirin and Plavix, not on metoprolol but asymptomatic, heart rates often are bradycardic     Stress tests:   November 2019: Exercise time 7 minutes 30 seconds, no ischemia   December 2017:Exercise nuclear perfusion study, 7 minutes 31 seconds, resting hypertension with hypertensive response, no ischemia, no ECG changes, no chest pains  More exercise is recommended, no testing at this time, pt is agreeable    Dyslipidemia: on atorvastatin 40 mg,   Controlled, see below,     HTN: controlled, on multiple meds, with mild hyponatremia, top N K level and normal BPs on multiple meds, DC'd Spironolactone at prior visit,     At his last visit I added Cardura 2 mg daily since he also has prostatomegaly but he is also on tamsulosin.  So far no dizziness but has not found much of an improvement in blood pressure at home.    At this time we will increase it to 4 milligrams at bedtime and check blood pressures and if persistently elevated with no improvement, will switch to a different medication.      Of note he was on spironolactone before with good blood pressure control but discontinued when he had a top normal potassium at 5.0 in November 2023.  If his blood pressures are elevated will restart this at 25 mg daily with a BMP in 1 week.    Already on maximum dose of calcium channel blocker, thiazide diuretic and ACE inhibitor, bradycardia precludes beta-blockade, so no other good once daily options remain     DM: As above. last A1c  was 7.2 in Oct 2024     Chronic right more than left lower extremity edema: No need for Dopplers, had vein stripping for varicose veins in the past. Foot end elevation, sometimes uses compression stockings. No increase with Amlodipine use    Follow up in 6 months.        Latest Reference Range & Units 01/31/24 12:25 05/31/24 10:53 10/11/24 10:06   Hemoglobin A1C Normal 4.0-5.6%; PreDiabetic 5.7-6.4%; Diabetic >=6.5%; Glycemic control for adults with diabetes <7.0% % 7.4 (H) 6.4 (H) 7.2 (H)   (H): Data is abnormally high         Latest Reference Range & Units 06/09/23 11:16 10/11/23 09:47 01/31/24 12:25   Cholesterol See Comment mg/dL 135  117   Triglycerides See Comment mg/dL 275 (H)  245 (H)   HDL >=40 mg/dL 42  42   LDL Calculated 0 - 100 mg/dL 38  26   Hemoglobin A1C Normal 4.0-5.6%; PreDiabetic 5.7-6.4%; Diabetic >=6.5%; Glycemic control for adults with diabetes <7.0% %  8.5 (H) 7.4 (H)   (H): Data is abnormally high         Latest Reference Range & Units 06/09/23 11:16   Cholesterol See Comment mg/dL 135   Triglycerides See Comment mg/dL 275 (H)   HDL >=40 mg/dL 42   LDL Calculated 0 - 100 mg/dL 38   (H): Data is abnormally high   Latest Reference Range & Units 03/17/22 11:37 01/04/23 08:39 10/11/23 09:47   Hemoglobin A1C  7.0 (H) 6.8 (H) 8.5 (H)   eAG, EST AVG Glucose mg/dl 154 148 197   (H): Data is abnormally high     Latest Reference Range & Units 02/15/23 10:41 10/11/23 09:47   BUN 5 - 25 mg/dL 30 (H) 33 (H)   Creatinine 0.60 - 1.30 mg/dL 1.30 1.25   (H): Data is abnormally high      Patient Active Problem List   Diagnosis    Herniation of intervertebral disc of lumbar spine    Hyperlipidemia    Presbyopia    Primary osteoarthritis of left knee    Class 2 severe obesity due to excess calories with serious comorbidity and body mass index (BMI) of 39.0 to 39.9 in adult (HCC)    Platelets decreased (HCC)    Secondary malignant neoplasm of left lung (HCC)    Liver lesion    Stage 3a chronic kidney disease  (HCC)    Hypertensive CKD (chronic kidney disease)    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)    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 (HCC)    Coronary artery disease involving native heart without angina pectoris     Past Medical History:   Diagnosis Date    Allergic     Arthritis     knee    Coronary artery disease     Diabetes mellitus (HCC)     Erectile dysfunction     GERD (gastroesophageal reflux disease)     H/O angioplasty     Hypercholesteremia     Hypertension     Interstitial cystitis     Obesity     Secondary malignant neoplasm of left lung (HCC) 2022    Shingles     Skin tag     Sleep apnea     Varicose veins of lower extremity     Visual impairment      Social History     Socioeconomic History    Marital status: /Civil Union     Spouse name: Not on file    Number of children: Not on file    Years of education: Not on file    Highest education level: Not on file   Occupational History    Not on file   Tobacco Use    Smoking status: Former     Current packs/day: 0.00     Average packs/day: 2.0 packs/day for 15.0 years (30.0 ttl pk-yrs)     Types: Cigarettes     Start date: 1967     Quit date: 1982     Years since quittin.2    Smokeless tobacco: Never   Vaping Use    Vaping status: Never Used   Substance and Sexual Activity    Alcohol use: Never    Drug use: Never    Sexual activity: Not Currently     Partners: Female   Other Topics Concern    Not on file   Social History Narrative    Not on file     Social Drivers of Health     Financial Resource Strain: Low Risk  (10/11/2023)    Overall Financial Resource Strain (CARDIA)     Difficulty of Paying Living Expenses: Not hard at all   Food Insecurity: No Food Insecurity (10/22/2024)    Hunger Vital Sign     Worried About Running Out of Food in the Last Year: Never true     Ran Out of Food in the Last Year: Never true   Transportation Needs: No  Transportation Needs (10/22/2024)    PRAPARE - Transportation     Lack of Transportation (Medical): No     Lack of Transportation (Non-Medical): No   Physical Activity: Not on file   Stress: Not on file   Social Connections: Not on file   Intimate Partner Violence: Not on file   Housing Stability: Unknown (10/22/2024)    Housing Stability Vital Sign     Unable to Pay for Housing in the Last Year: No     Number of Times Moved in the Last Year: Not on file     Homeless in the Last Year: No      Family History   Problem Relation Age of Onset    Cancer Mother     Coronary artery disease Mother     Arthritis Mother     Heart disease Father     Early death Father     Coronary artery disease Father     Diabetes Father     Hypertension Father     Coronary artery disease Brother     Hyperlipidemia Brother     Hypertension Brother     Acne Brother     Cancer Brother     Coronary artery disease Brother     Heart disease Brother     Diabetes Brother      Past Surgical History:   Procedure Laterality Date    CARDIAC CATHETERIZATION      CARDIAC SURGERY      CHOLECYSTECTOMY  01/01/2010    COLONOSCOPY      CORONARY ANGIOPLASTY WITH STENT PLACEMENT      EYE SURGERY Left     ocular lens implant    GALLBLADDER SURGERY      HERNIA REPAIR  01/01/2009    IR BIOPSY LUNG  9/8/2021    KNEE ARTHROSCOPY W/ MENISCAL REPAIR Right     KNEE SURGERY  09/10/2015    LUNG SEGMENTECTOMY Left 10/21/2021    Procedure: RESECTION WEDGE LEFT LOWER LOBE LUNG;  Surgeon: Jacky Aparicio MD;  Location: [facility];  Service: Thoracic    PA BRNCHSC INCL FLUOR GDNCE DX W/CELL WASHG SPX N/A 10/21/2021    Procedure: BRONCHOSCOPY FLEXIBLE;  Surgeon: Jacky Aparicio MD;  Location: [facility];  Service: Thoracic    PA TENDON SHEATH INCISION Left 8/25/2016    Procedure: RING TRIGGER FINGER RELEASE ;  Surgeon: Arpit Zamora MD;  Location: [facility];  Service: Orthopedics    PA THORACOSCOPY W/THERA WEDGE RESEXN INITIAL UNILAT Left 10/21/2021    Procedure:  THORACOSCOPY VIDEO ASSISTED SURGERY (VATS);  Surgeon: Jacky Aparicio MD;  Location: [facility];  Service: Thoracic    VARICOSE VEIN SURGERY      Ligation       Current Outpatient Medications:     amLODIPine (NORVASC) 10 mg tablet, TAKE 1 TABLET EVERY DAY, Disp: 90 tablet, Rfl: 3    ascorbic acid (VITAMIN C) 500 mg tablet, Take 500 mg by mouth daily, Disp: , Rfl:     aspirin (ECOTRIN LOW STRENGTH) 81 mg EC tablet, Take 81 mg by mouth daily, Disp: , Rfl:     atorvastatin (LIPITOR) 40 mg tablet, TAKE 1 TABLET AT BEDTIME, Disp: 90 tablet, Rfl: 1    clopidogrel (PLAVIX) 75 mg tablet, TAKE 1 TABLET EVERY DAY, Disp: 90 tablet, Rfl: 1    doxazosin (CARDURA) 2 mg tablet, Take 1 tablet (2 mg total) by mouth daily at bedtime, Disp: 90 tablet, Rfl: 3    glimepiride (AMARYL) 4 mg tablet, TAKE 1 TABLET WITH BREAKFAST AND TAKE 2 TABLETS IN THE EVENING, Disp: 270 tablet, Rfl: 1    lisinopril-hydrochlorothiazide (PRINZIDE,ZESTORETIC) 20-12.5 MG per tablet, Take 2 tablets by mouth daily, Disp: 180 tablet, Rfl: 1    MAGNESIUM CITRATE PO, Take 1 tablet by mouth in the morning, Disp: , Rfl:     metFORMIN (GLUCOPHAGE) 1000 MG tablet, TAKE 2 TABLETS EVERY DAY AFTER DINNER, Disp: 180 tablet, Rfl: 3    Omega-3 Fatty Acids (FISH OIL PO), Take by mouth daily  , Disp: , Rfl:     prednisoLONE acetate (PRED FORTE) 1 % ophthalmic suspension, Administer 1 drop into the left eye every other day , Disp: , Rfl:     tadalafil (CIALIS) 20 MG tablet, Take 1 tablet (20 mg total) by mouth as needed for erectile dysfunction, Disp: 30 tablet, Rfl: 2    tamsulosin (FLOMAX) 0.4 mg, Take 2 capsules (0.8 mg total) by mouth daily with dinner, Disp: , Rfl:     VITAMIN E PO, Take 1 capsule by mouth daily  , Disp: , Rfl:     glucose blood test strip, Test once daily (one touch ultra), Disp: 100 each, Rfl: 1    ONETOUCH DELICA LANCETS 33G MISC, Test blood glucose 2 times daily, Disp: 300 each, Rfl: 0  Allergies   Allergen Reactions    Ciprofloxacin Swelling     "Janumet [Sitagliptin-Metformin Hcl] Lip Swelling     Lip swelling     Terazosin      Annotation - [id number]: Blood in sperm       Imaging: No results found.    Review of Systems:  Review of Systems   Constitutional: Negative.    HENT: Negative.     Respiratory: Negative.     Cardiovascular: Negative.    Endocrine: Negative.    Musculoskeletal:  Positive for arthralgias (hip pains).   Neurological: Negative.        Physical Exam:  /70   Ht 5' 11\" (1.803 m)   Wt 126 kg (277 lb)   SpO2 96%   BMI 38.63 kg/m²   Physical Exam  Constitutional:       General: He is not in acute distress.     Appearance: He is well-developed. He is not diaphoretic.   HENT:      Head: Normocephalic and atraumatic.   Eyes:      General: No scleral icterus.        Right eye: No discharge.         Left eye: No discharge.      Conjunctiva/sclera: Conjunctivae normal.      Pupils: Pupils are equal, round, and reactive to light.   Neck:      Thyroid: No thyromegaly.      Vascular: No JVD.      Trachea: No tracheal deviation.   Cardiovascular:      Rate and Rhythm: Normal rate and regular rhythm.      Heart sounds: No murmur heard.     No friction rub.   Pulmonary:      Effort: Pulmonary effort is normal. No respiratory distress.      Breath sounds: Normal breath sounds. No stridor.   Abdominal:      General: Bowel sounds are normal. There is no distension.      Palpations: Abdomen is soft.      Tenderness: There is no abdominal tenderness. There is no guarding.   Musculoskeletal:         General: Swelling (Right lower extremity edema) present. No tenderness or deformity. Normal range of motion.      Cervical back: Normal range of motion.   Skin:     General: Skin is warm.      Coloration: Skin is not pale.      Findings: No erythema or rash.         "

## 2025-03-13 ENCOUNTER — OFFICE VISIT (OUTPATIENT)
Age: 70
End: 2025-03-13
Payer: MEDICARE

## 2025-03-13 VITALS — TEMPERATURE: 97 F | BODY MASS INDEX: 38.35 KG/M2 | WEIGHT: 275 LBS

## 2025-03-13 DIAGNOSIS — D23.9 DERMATOFIBROMA: ICD-10-CM

## 2025-03-13 DIAGNOSIS — L57.0 ACTINIC KERATOSES: Primary | ICD-10-CM

## 2025-03-13 PROCEDURE — 17000 DESTRUCT PREMALG LESION: CPT | Performed by: REGISTERED NURSE

## 2025-03-13 PROCEDURE — 99214 OFFICE O/P EST MOD 30 MIN: CPT | Performed by: REGISTERED NURSE

## 2025-03-13 PROCEDURE — 17003 DESTRUCT PREMALG LES 2-14: CPT | Performed by: REGISTERED NURSE

## 2025-03-13 RX ORDER — FLUOROURACIL 50 MG/G
CREAM TOPICAL 2 TIMES DAILY
Qty: 40 G | Refills: 1 | Status: SHIPPED | OUTPATIENT
Start: 2025-03-13

## 2025-03-13 NOTE — PROGRESS NOTES
"[facility] Dermatology Clinic Note     Patient Name: Alvarez Webster  Encounter Date: 3/13/25     Have you been cared for by a [facility] Dermatologist in the last 3 years and, if so, which description applies to you?    Yes.  I have been here within the last 3 years, and my medical history has NOT changed since that time.  I am MALE/not capable of bearing children.    REVIEW OF SYSTEMS:  Have you recently had or currently have any of the following? No changes in my recent health.   PAST MEDICAL HISTORY:  Have you personally ever had or currently have any of the following?  If \"YES,\" then please provide more detail. No changes in my medical history.   HISTORY OF IMMUNOSUPPRESSION: Do you have a history of any of the following:  Systemic Immunosuppression such as Diabetes, Biologic or Immunotherapy, Chemotherapy, Organ Transplantation, Bone Marrow Transplantation or Prednisone?  YES, diabetes     Answering \"YES\" requires the addition of the dotphrase \"IMMUNOSUPPRESSED\" as the first diagnosis of the patient's visit.   FAMILY HISTORY:  Any \"first degree relatives\" (parent, brother, sister, or child) with the following?    No changes in my family's known health.   PATIENT EXPERIENCE:    Do you want the Dermatologist to perform a COMPLETE skin exam today including a clinical examination under the \"bra and underwear\" areas?  NO  If necessary, do we have your permission to call and leave a detailed message on your Preferred Phone number that includes your specific medical information?  Yes      Allergies   Allergen Reactions    Ciprofloxacin Swelling    Janumet [Sitagliptin-Metformin Hcl] Lip Swelling     Lip swelling     Terazosin      Annotation - [id number]: Blood in sperm      Current Outpatient Medications:     amLODIPine (NORVASC) 10 mg tablet, TAKE 1 TABLET EVERY DAY, Disp: 90 tablet, Rfl: 3    ascorbic acid (VITAMIN C) 500 mg tablet, Take 500 mg by mouth daily, Disp: , Rfl:     aspirin (ECOTRIN LOW STRENGTH) 81 mg " EC tablet, Take 81 mg by mouth daily, Disp: , Rfl:     atorvastatin (LIPITOR) 40 mg tablet, TAKE 1 TABLET AT BEDTIME, Disp: 90 tablet, Rfl: 1    clopidogrel (PLAVIX) 75 mg tablet, TAKE 1 TABLET EVERY DAY, Disp: 90 tablet, Rfl: 1    doxazosin (CARDURA) 4 mg tablet, Take 1 tablet (4 mg total) by mouth daily at bedtime, Disp: , Rfl:     glimepiride (AMARYL) 4 mg tablet, TAKE 1 TABLET WITH BREAKFAST AND TAKE 2 TABLETS IN THE EVENING, Disp: 270 tablet, Rfl: 1    glucose blood test strip, Test once daily (one touch ultra), Disp: 100 each, Rfl: 1    lisinopril-hydrochlorothiazide (PRINZIDE,ZESTORETIC) 20-12.5 MG per tablet, Take 2 tablets by mouth daily, Disp: 180 tablet, Rfl: 1    MAGNESIUM CITRATE PO, Take 1 tablet by mouth in the morning, Disp: , Rfl:     metFORMIN (GLUCOPHAGE) 1000 MG tablet, TAKE 2 TABLETS EVERY DAY AFTER DINNER, Disp: 180 tablet, Rfl: 3    Omega-3 Fatty Acids (FISH OIL PO), Take by mouth daily  , Disp: , Rfl:     ONETOUCH DELICA LANCETS 33G MISC, Test blood glucose 2 times daily, Disp: 300 each, Rfl: 0    prednisoLONE acetate (PRED FORTE) 1 % ophthalmic suspension, Administer 1 drop into the left eye every other day , Disp: , Rfl:     tadalafil (CIALIS) 20 MG tablet, Take 1 tablet (20 mg total) by mouth as needed for erectile dysfunction, Disp: 30 tablet, Rfl: 2    tamsulosin (FLOMAX) 0.4 mg, Take 2 capsules (0.8 mg total) by mouth daily with dinner, Disp: , Rfl:     VITAMIN E PO, Take 1 capsule by mouth daily  , Disp: , Rfl:         Whom besides the patient is providing clinical information about today's encounter?   NO ADDITIONAL HISTORIAN (patient alone provided history)    Physical Exam and Assessment/Plan by Diagnosis:      ACTINIC KERATOSES  Physical Exam:  Anatomic Location Affected:  scalp  Morphological Description:  Thin pink papule(s) with gritty scale     Assessment and Plan:  Based on a thorough discussion of this condition and the management approach to it (including a comprehensive  discussion of the known risks, side effects and potential benefits of treatment), the patient (family) agrees to implement the following specific plan:  Treated with cryotherapy today; written and verbal consent obtained   In 2-3 weeks once the cryotherapy-treated spots are healed, start applying a thin layer of Efudex to the scalp twice daily for 4 weeks.    PROCEDURE:  DESTRUCTION OF PRE-MALIGNANT LESIONS  After a thorough discussion of treatment options and risk/benefits/alternatives (including but not limited to local pain, scarring, dyspigmentation, blistering, and possible superinfection), verbal and written consent were obtained and the aforementioned lesions were treated on with cryotherapy using liquid nitrogen x 2 cycles for 5-10 seconds. The patient tolerated the procedure well, and after-care instructions were provided.     TOTAL NUMBER of 5 pre-malignant lesions were treated today on the ANATOMIC LOCATION: scalp.    Patient instructions:  Your pre-cancerous lesions (called actinic keratosis) were treated with liquid nitrogen today. The treated areas will get more red, crusted over the next few days. There might be some blistering. Apply vaseline to the treated area for the next week to help it heal fully. Do not pick at the area. Return in 3-4 weeks for another round of liquid nitrogen treatment if lesion(s)  fails to fully resolve.      DERMATOFIBROMA  Physical Exam:  Anatomic Location Affected:  right lower leg  Morphological Description:  hyperpigmented subcutaneous nodule/scar tissue  Pertinent Positives:  Pertinent Negatives:    Additional History of Present Condition:  noted on exam. Patient had varicose vein surgery 30 years ago.    Assessment and Plan:  Based on a thorough discussion of this condition and the management approach to it (including a comprehensive discussion of the known risks, side effects and potential benefits of treatment), the patient (family) agrees to implement the following  specific plan:  Reassured benign.      Scribe Attestation      I,:  Kia Marr MA am acting as a scribe while in the presence of the attending physician.:       I,:  Soham Barajas MD personally performed the services described in this documentation    as scribed in my presence.:           Judi Delcid MD  Dermatology, PGY-2

## 2025-03-18 DIAGNOSIS — I25.10 CORONARY ARTERY DISEASE INVOLVING NATIVE HEART WITHOUT ANGINA PECTORIS, UNSPECIFIED VESSEL OR LESION TYPE: ICD-10-CM

## 2025-03-18 DIAGNOSIS — N18.2 HYPERTENSIVE KIDNEY DISEASE WITH STAGE 2 CHRONIC KIDNEY DISEASE: Primary | ICD-10-CM

## 2025-03-18 DIAGNOSIS — I12.9 HYPERTENSIVE KIDNEY DISEASE WITH STAGE 2 CHRONIC KIDNEY DISEASE: Primary | ICD-10-CM

## 2025-03-18 RX ORDER — SPIRONOLACTONE 25 MG/1
25 TABLET ORAL DAILY
Qty: 90 TABLET | Refills: 3 | Status: SHIPPED | OUTPATIENT
Start: 2025-03-18

## 2025-03-18 NOTE — PROGRESS NOTES
Persistently elevated blood pressures, start spironolactone 25 mg daily, he is also on lisinopril/hydrochlorothiazide with normal potassium levels and normal renal function, and's recheck BMP in 1 week

## 2025-03-27 ENCOUNTER — APPOINTMENT (OUTPATIENT)
Dept: LAB | Facility: CLINIC | Age: 70
End: 2025-03-27
Payer: MEDICARE

## 2025-03-27 ENCOUNTER — RESULTS FOLLOW-UP (OUTPATIENT)
Dept: NON INVASIVE DIAGNOSTICS | Facility: HOSPITAL | Age: 70
End: 2025-03-27

## 2025-03-27 DIAGNOSIS — E11.9 DIABETES MELLITUS TYPE 2, NONINSULIN DEPENDENT (HCC): ICD-10-CM

## 2025-03-27 DIAGNOSIS — I25.10 CORONARY ARTERY DISEASE INVOLVING NATIVE HEART WITHOUT ANGINA PECTORIS, UNSPECIFIED VESSEL OR LESION TYPE: ICD-10-CM

## 2025-03-27 DIAGNOSIS — E78.5 HYPERLIPIDEMIA, UNSPECIFIED HYPERLIPIDEMIA TYPE: ICD-10-CM

## 2025-03-27 DIAGNOSIS — I12.9 HYPERTENSIVE KIDNEY DISEASE WITH STAGE 2 CHRONIC KIDNEY DISEASE: ICD-10-CM

## 2025-03-27 DIAGNOSIS — N18.2 HYPERTENSIVE KIDNEY DISEASE WITH STAGE 2 CHRONIC KIDNEY DISEASE: ICD-10-CM

## 2025-03-27 LAB
ALBUMIN SERPL BCG-MCNC: 4.4 G/DL (ref 3.5–5)
ALP SERPL-CCNC: 50 U/L (ref 34–104)
ALT SERPL W P-5'-P-CCNC: 22 U/L (ref 7–52)
ANION GAP SERPL CALCULATED.3IONS-SCNC: 6 MMOL/L (ref 4–13)
AST SERPL W P-5'-P-CCNC: 14 U/L (ref 13–39)
BASOPHILS # BLD AUTO: 0.03 THOUSANDS/ÂΜL (ref 0–0.1)
BASOPHILS NFR BLD AUTO: 1 % (ref 0–1)
BILIRUB SERPL-MCNC: 0.67 MG/DL (ref 0.2–1)
BUN SERPL-MCNC: 22 MG/DL (ref 5–25)
CALCIUM SERPL-MCNC: 9.8 MG/DL (ref 8.4–10.2)
CHLORIDE SERPL-SCNC: 103 MMOL/L (ref 96–108)
CHOLEST SERPL-MCNC: 116 MG/DL (ref ?–200)
CO2 SERPL-SCNC: 28 MMOL/L (ref 21–32)
CREAT SERPL-MCNC: 0.96 MG/DL (ref 0.6–1.3)
CREAT UR-MCNC: 97.4 MG/DL
EOSINOPHIL # BLD AUTO: 0.06 THOUSAND/ÂΜL (ref 0–0.61)
EOSINOPHIL NFR BLD AUTO: 1 % (ref 0–6)
ERYTHROCYTE [DISTWIDTH] IN BLOOD BY AUTOMATED COUNT: 15.1 % (ref 11.6–15.1)
EST. AVERAGE GLUCOSE BLD GHB EST-MCNC: 163 MG/DL
GFR SERPL CREATININE-BSD FRML MDRD: 79 ML/MIN/1.73SQ M
GLUCOSE P FAST SERPL-MCNC: 252 MG/DL (ref 65–99)
HBA1C MFR BLD: 7.3 %
HCT VFR BLD AUTO: 42.4 % (ref 36.5–49.3)
HDLC SERPL-MCNC: 43 MG/DL
HGB BLD-MCNC: 14.4 G/DL (ref 12–17)
IMM GRANULOCYTES # BLD AUTO: 0.04 THOUSAND/UL (ref 0–0.2)
IMM GRANULOCYTES NFR BLD AUTO: 1 % (ref 0–2)
LDLC SERPL CALC-MCNC: 42 MG/DL (ref 0–100)
LYMPHOCYTES # BLD AUTO: 0.95 THOUSANDS/ÂΜL (ref 0.6–4.47)
LYMPHOCYTES NFR BLD AUTO: 17 % (ref 14–44)
MCH RBC QN AUTO: 32 PG (ref 26.8–34.3)
MCHC RBC AUTO-ENTMCNC: 34 G/DL (ref 31.4–37.4)
MCV RBC AUTO: 94 FL (ref 82–98)
MICROALBUMIN UR-MCNC: 75.1 MG/L
MICROALBUMIN/CREAT 24H UR: 77 MG/G CREATININE (ref 0–30)
MONOCYTES # BLD AUTO: 0.38 THOUSAND/ÂΜL (ref 0.17–1.22)
MONOCYTES NFR BLD AUTO: 7 % (ref 4–12)
NEUTROPHILS # BLD AUTO: 4.02 THOUSANDS/ÂΜL (ref 1.85–7.62)
NEUTS SEG NFR BLD AUTO: 73 % (ref 43–75)
NRBC BLD AUTO-RTO: 0 /100 WBCS
PLATELET # BLD AUTO: 130 THOUSANDS/UL (ref 149–390)
PMV BLD AUTO: 9.9 FL (ref 8.9–12.7)
POTASSIUM SERPL-SCNC: 4.3 MMOL/L (ref 3.5–5.3)
PROT SERPL-MCNC: 6.9 G/DL (ref 6.4–8.4)
RBC # BLD AUTO: 4.5 MILLION/UL (ref 3.88–5.62)
SODIUM SERPL-SCNC: 137 MMOL/L (ref 135–147)
TRIGL SERPL-MCNC: 156 MG/DL (ref ?–150)
WBC # BLD AUTO: 5.48 THOUSAND/UL (ref 4.31–10.16)

## 2025-03-27 PROCEDURE — 83036 HEMOGLOBIN GLYCOSYLATED A1C: CPT

## 2025-03-27 PROCEDURE — 80053 COMPREHEN METABOLIC PANEL: CPT

## 2025-03-27 PROCEDURE — 80061 LIPID PANEL: CPT

## 2025-03-27 PROCEDURE — 82570 ASSAY OF URINE CREATININE: CPT

## 2025-03-27 PROCEDURE — 85025 COMPLETE CBC W/AUTO DIFF WBC: CPT

## 2025-03-27 PROCEDURE — 82043 UR ALBUMIN QUANTITATIVE: CPT

## 2025-04-04 DIAGNOSIS — I12.9 HYPERTENSIVE KIDNEY DISEASE WITH STAGE 2 CHRONIC KIDNEY DISEASE: ICD-10-CM

## 2025-04-04 DIAGNOSIS — N18.2 HYPERTENSIVE KIDNEY DISEASE WITH STAGE 2 CHRONIC KIDNEY DISEASE: ICD-10-CM

## 2025-04-04 DIAGNOSIS — I25.10 CORONARY ARTERY DISEASE INVOLVING NATIVE HEART WITHOUT ANGINA PECTORIS, UNSPECIFIED VESSEL OR LESION TYPE: ICD-10-CM

## 2025-04-04 RX ORDER — SPIRONOLACTONE 50 MG/1
50 TABLET, FILM COATED ORAL DAILY
Qty: 30 TABLET | Refills: 3 | Status: SHIPPED | OUTPATIENT
Start: 2025-04-04

## 2025-04-04 NOTE — PROGRESS NOTES
So far not much response to spironolactone added at 25 mg daily    Will increase this to 50 mg daily and await next blood pressure log,  If well-controlled and this dose is to continue, we will check a BMP at that point to ensure that potassium levels are within normal limits.    Most recently, potassium was 4.3-3/27/2025, 1 week after starting spironolactone 25 mg daily

## 2025-04-15 NOTE — PROGRESS NOTES
Hi,   Sunday --156/82/56--168/74/60 Monday--170/84/60--177/90/57 Tuesday--166/80/68--160/82/71 Wednesday --157/81/71--171/80/57 Thursday--145/81/63--178/91/68 Friday --168/82/64--183/82/62 Saturday --184/73/68--166/85/57  I read where you can take up to   20-12.5 Lisinopril/HCTZ /80 mg a day, so, I tried 3 Lisinopril morning of this Sunday and Monday, both days wer under 160 on the sys..not great with the spironolactone, even with drinking more and more magnesium,   Still get cramps in my hands after working outside or inside.. hope we can figure something out. Don't want   To get cramps golfing!!     Thank you,     Akbar Webster    I clarified with the patient on the phone.  He means that despite taking 3 of his lisinopril/hydrochlorothiazide-would be 60 mg of lisinopril, blood pressures were only marginally better.  He also has not seen any improvement in his blood pressures with spironolactone even at 50 mg daily.    In this setting, he will resume his usual medications-lisinopril/hydrochlorothiazide  20/12.52 pills a day, amlodipine 10 mg a day, and have his home blood pressure machine checked at his next primary care provider's office visit which is coming up.  If it is accurate and his blood pressures are confirmed as being elevated, can add hydralazine 25 mg 3 times daily which he is okay with.

## 2025-04-22 ENCOUNTER — ESTABLISHED COMPREHENSIVE EXAM (OUTPATIENT)
Dept: URBAN - METROPOLITAN AREA CLINIC 6 | Facility: CLINIC | Age: 70
End: 2025-04-22

## 2025-04-22 DIAGNOSIS — H35.372: ICD-10-CM

## 2025-04-22 DIAGNOSIS — H04.123: ICD-10-CM

## 2025-04-22 DIAGNOSIS — D31.31: ICD-10-CM

## 2025-04-22 DIAGNOSIS — E11.9: ICD-10-CM

## 2025-04-22 DIAGNOSIS — H25.811: ICD-10-CM

## 2025-04-22 DIAGNOSIS — Z96.1: ICD-10-CM

## 2025-04-22 DIAGNOSIS — D31.32: ICD-10-CM

## 2025-04-22 LAB
LEFT EYE DIABETIC RETINOPATHY: NORMAL
RIGHT EYE DIABETIC RETINOPATHY: NORMAL

## 2025-04-22 PROCEDURE — 92134 CPTRZ OPH DX IMG PST SGM RTA: CPT

## 2025-04-22 PROCEDURE — 92014 COMPRE OPH EXAM EST PT 1/>: CPT

## 2025-04-22 ASSESSMENT — TONOMETRY
OD_IOP_MMHG: 15
OS_IOP_MMHG: 21

## 2025-04-22 ASSESSMENT — VISUAL ACUITY
OU_CC: J1+
OD_CC: 20/20-2
OU_CC: 20/20
OS_CC: 20/20-2

## 2025-04-23 ENCOUNTER — OFFICE VISIT (OUTPATIENT)
Dept: INTERNAL MEDICINE CLINIC | Age: 70
End: 2025-04-23
Payer: MEDICARE

## 2025-04-23 VITALS
OXYGEN SATURATION: 98 % | TEMPERATURE: 97.4 F | SYSTOLIC BLOOD PRESSURE: 112 MMHG | HEIGHT: 71 IN | DIASTOLIC BLOOD PRESSURE: 70 MMHG | HEART RATE: 58 BPM | WEIGHT: 271 LBS | BODY MASS INDEX: 37.94 KG/M2

## 2025-04-23 DIAGNOSIS — E66.01 CLASS 2 SEVERE OBESITY DUE TO EXCESS CALORIES WITH SERIOUS COMORBIDITY AND BODY MASS INDEX (BMI) OF 39.0 TO 39.9 IN ADULT (HCC): ICD-10-CM

## 2025-04-23 DIAGNOSIS — C78.02 SECONDARY MALIGNANT NEOPLASM OF LEFT LUNG (HCC): ICD-10-CM

## 2025-04-23 DIAGNOSIS — I10 BENIGN ESSENTIAL HYPERTENSION: ICD-10-CM

## 2025-04-23 DIAGNOSIS — E13.59 OTHER SPECIFIED DIABETES MELLITUS WITH OTHER CIRCULATORY COMPLICATIONS (HCC): Primary | ICD-10-CM

## 2025-04-23 DIAGNOSIS — R09.89 BRUIT: ICD-10-CM

## 2025-04-23 DIAGNOSIS — N18.31 STAGE 3A CHRONIC KIDNEY DISEASE (HCC): ICD-10-CM

## 2025-04-23 DIAGNOSIS — E66.812 CLASS 2 SEVERE OBESITY DUE TO EXCESS CALORIES WITH SERIOUS COMORBIDITY AND BODY MASS INDEX (BMI) OF 39.0 TO 39.9 IN ADULT (HCC): ICD-10-CM

## 2025-04-23 DIAGNOSIS — E78.5 HYPERLIPIDEMIA, UNSPECIFIED HYPERLIPIDEMIA TYPE: ICD-10-CM

## 2025-04-23 DIAGNOSIS — E11.9 DIABETES MELLITUS TYPE 2, NONINSULIN DEPENDENT (HCC): ICD-10-CM

## 2025-04-23 PROCEDURE — 99214 OFFICE O/P EST MOD 30 MIN: CPT | Performed by: PHYSICIAN ASSISTANT

## 2025-04-23 NOTE — PROGRESS NOTES
Name: Alvarez Webster      : 1955      MRN: [id number]  Encounter Provider: Kirsten Florez PA-C  Encounter Date: 2025   Encounter department: [facility]  :  Assessment & Plan  Diabetes mellitus type 2, noninsulin dependent (HCC)  Discussed diet and exercise  Pt to continue to monitor at home   Lab Results   Component Value Date    HGBA1C 7.3 (H) 2025       Orders:    glucose blood test strip; Test once daily (one touch ultra)    VAS carotid complete study; Future    CBC and differential; Future    Lipid panel; Future    Hemoglobin A1C; Future    Comprehensive metabolic panel; Future    Secondary malignant neoplasm of left lung (HCC)         Class 2 severe obesity due to excess calories with serious comorbidity and body mass index (BMI) of 39.0 to 39.9 in adult (HCC)    GLP meds not covered by prescription plan        Stage 3a chronic kidney disease (HCC)  Lab Results   Component Value Date    EGFR 79 2025    EGFR 73 10/11/2024    EGFR 72 2024    CREATININE 0.96 2025    CREATININE 1.03 10/11/2024    CREATININE 1.05 2024       Orders:    CBC and differential; Future    Lipid panel; Future    Hemoglobin A1C; Future    Comprehensive metabolic panel; Future    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)    Lab Results   Component Value Date    HGBA1C 7.3 (H) 2025            Hyperlipidemia, unspecified hyperlipidemia type    Orders:    VAS carotid complete study; Future    CBC and differential; Future    Lipid panel; Future    Hemoglobin A1C; Future    Comprehensive metabolic panel; Future    Bruit    Orders:    VAS carotid complete study; Future    Benign essential hypertension  Well controlled  Stopped spironolactone due to urinary complaints, reports he informed his cardiology about this  Orders:    CBC and differential; Future    Lipid panel; Future    Hemoglobin A1C; Future    Comprehensive metabolic  "panel; Future           History of Present Illness   69 y/o male with a history of hypertension, diabetes, dyslipidemia, CAD, presents for f/u  Labs reviewed with pt, hga1c 7.3 (previous 7.2)  Pt states he had eaten shoe fly pie the night prior which made his FBS be quite elevated  Pt does monitor BS at home     BP well controlled -states his home blood pressure cuff was reading in the 140s but may be old and needs replacement  He has been compliant with medications however states he stopped his spironolactone approximately 1 month ago due to urinary side effects - pt states he stopped and informed his cardiologist of this, denies swelling in the legs since stopping this       Review of Systems   Constitutional:  Negative for activity change, appetite change, chills, diaphoresis, fatigue and fever.   HENT:  Positive for hearing loss. Negative for congestion, ear pain, postnasal drip and sore throat.    Eyes:  Negative for pain and redness.   Respiratory:  Negative for cough, shortness of breath and wheezing.    Cardiovascular:  Negative for chest pain, palpitations and leg swelling.   Gastrointestinal:  Negative for abdominal pain, constipation, diarrhea and nausea.   Endocrine: Negative for cold intolerance and heat intolerance.   Genitourinary:  Negative for dysuria and flank pain.   Musculoskeletal:  Negative for arthralgias and back pain.   Skin:  Negative for rash.   Neurological:  Negative for dizziness, light-headedness and headaches.   Psychiatric/Behavioral:  Negative for sleep disturbance. The patient is not nervous/anxious.        Objective   /70 (BP Location: Left arm, Patient Position: Sitting, Cuff Size: Large)   Pulse 58   Temp (!) 97.4 °F (36.3 °C) (Temporal)   Ht 5' 11\" (1.803 m)   Wt 123 kg (271 lb)   SpO2 98%   BMI 37.80 kg/m²      Physical Exam  Vitals reviewed.   Constitutional:       General: He is not in acute distress.     Appearance: He is obese.   HENT:      Head: Normocephalic " and atraumatic.      Right Ear: Tympanic membrane, ear canal and external ear normal. There is no impacted cerumen.      Left Ear: Tympanic membrane, ear canal and external ear normal. There is no impacted cerumen.      Nose: Nose normal.      Mouth/Throat:      Mouth: Mucous membranes are moist.   Eyes:      Extraocular Movements: Extraocular movements intact.      Conjunctiva/sclera: Conjunctivae normal.      Pupils: Pupils are equal, round, and reactive to light.   Cardiovascular:      Rate and Rhythm: Normal rate and regular rhythm.      Pulses: no weak pulses.           Dorsalis pedis pulses are 2+ on the right side and 2+ on the left side.        Posterior tibial pulses are 2+ on the right side and 2+ on the left side.   Pulmonary:      Effort: Pulmonary effort is normal. No respiratory distress.      Breath sounds: Normal breath sounds. No wheezing.   Musculoskeletal:         General: Normal range of motion.      Cervical back: Normal range of motion. No rigidity.      Right lower leg: No edema.      Left lower leg: No edema.   Feet:      Right foot:      Skin integrity: No ulcer, skin breakdown, erythema, warmth, callus or dry skin.      Left foot:      Skin integrity: No ulcer, skin breakdown, erythema, warmth, callus or dry skin.   Lymphadenopathy:      Cervical: No cervical adenopathy.   Neurological:      General: No focal deficit present.      Mental Status: He is alert.      Cranial Nerves: No cranial nerve deficit.   Psychiatric:         Mood and Affect: Mood normal.       Diabetic Foot Exam    Patient's shoes and socks removed.    Right Foot/Ankle   Right Foot Inspection  Skin Exam: skin normal and skin intact. No dry skin, no warmth, no callus, no erythema, no maceration, no abnormal color, no pre-ulcer, no ulcer and no callus.     Toe Exam: ROM and strength within normal limits. No swelling, no tenderness and erythema    Sensory   Proprioception: intact  Monofilament testing:  intact    Vascular  The right DP pulse is 2+. The right PT pulse is 2+.     Left Foot/Ankle  Left Foot Inspection  Skin Exam: skin normal and skin intact. No dry skin, no warmth, no erythema, no maceration, normal color, no pre-ulcer, no ulcer and no callus.     Toe Exam: ROM and strength within normal limits. No swelling, no tenderness and no erythema.     Sensory   Proprioception: intact  Monofilament testing: intact    Vascular  The left DP pulse is 2+. The left PT pulse is 2+.     Assign Risk Category  No deformity present  No loss of protective sensation  No weak pulses  Risk: 0

## 2025-04-23 NOTE — ASSESSMENT & PLAN NOTE
Orders:    VAS carotid complete study; Future    CBC and differential; Future    Lipid panel; Future    Hemoglobin A1C; Future    Comprehensive metabolic panel; Future

## 2025-04-23 NOTE — ASSESSMENT & PLAN NOTE
Lab Results   Component Value Date    EGFR 79 03/27/2025    EGFR 73 10/11/2024    EGFR 72 05/31/2024    CREATININE 0.96 03/27/2025    CREATININE 1.03 10/11/2024    CREATININE 1.05 05/31/2024       Orders:    CBC and differential; Future    Lipid panel; Future    Hemoglobin A1C; Future    Comprehensive metabolic panel; Future

## 2025-04-29 DIAGNOSIS — I10 BENIGN ESSENTIAL HYPERTENSION: ICD-10-CM

## 2025-04-29 RX ORDER — LISINOPRIL AND HYDROCHLOROTHIAZIDE 12.5; 2 MG/1; MG/1
2 TABLET ORAL DAILY
Qty: 180 TABLET | Refills: 1 | Status: SHIPPED | OUTPATIENT
Start: 2025-04-29

## 2025-05-14 ENCOUNTER — APPOINTMENT (OUTPATIENT)
Dept: LAB | Facility: CLINIC | Age: 70
End: 2025-05-14
Attending: UROLOGY
Payer: MEDICARE

## 2025-05-14 DIAGNOSIS — R97.20 ELEVATED PROSTATE SPECIFIC ANTIGEN (PSA): ICD-10-CM

## 2025-05-14 LAB
PSA FREE MFR SERPL: 26.62 %
PSA FREE SERPL-MCNC: 0.89 NG/ML
PSA SERPL-MCNC: 3.35 NG/ML (ref 0–4)

## 2025-05-14 PROCEDURE — 84154 ASSAY OF PSA FREE: CPT

## 2025-05-14 PROCEDURE — 84153 ASSAY OF PSA TOTAL: CPT

## 2025-05-14 PROCEDURE — 36415 COLL VENOUS BLD VENIPUNCTURE: CPT

## 2025-05-19 DIAGNOSIS — E78.5 HYPERLIPIDEMIA, UNSPECIFIED HYPERLIPIDEMIA TYPE: ICD-10-CM

## 2025-05-19 DIAGNOSIS — E13.9 DIABETES 1.5, MANAGED AS TYPE 2 (HCC): ICD-10-CM

## 2025-05-19 RX ORDER — GLIMEPIRIDE 4 MG/1
TABLET ORAL
Qty: 270 TABLET | Refills: 1 | Status: SHIPPED | OUTPATIENT
Start: 2025-05-19

## 2025-05-19 RX ORDER — ATORVASTATIN CALCIUM 40 MG/1
40 TABLET, FILM COATED ORAL
Qty: 90 TABLET | Refills: 1 | Status: SHIPPED | OUTPATIENT
Start: 2025-05-19

## 2025-06-07 DIAGNOSIS — E11.9 NON-INSULIN DEPENDENT TYPE 2 DIABETES MELLITUS (HCC): ICD-10-CM

## 2025-06-07 DIAGNOSIS — I10 HYPERTENSION, UNSPECIFIED TYPE: ICD-10-CM

## 2025-06-07 RX ORDER — AMLODIPINE BESYLATE 10 MG/1
10 TABLET ORAL DAILY
Qty: 90 TABLET | Refills: 1 | Status: SHIPPED | OUTPATIENT
Start: 2025-06-07

## 2025-06-16 ENCOUNTER — HOSPITAL ENCOUNTER (OUTPATIENT)
Dept: CT IMAGING | Facility: HOSPITAL | Age: 70
Discharge: HOME | End: 2025-06-16
Attending: THORACIC SURGERY (CARDIOTHORACIC VASCULAR SURGERY)
Payer: MEDICARE

## 2025-06-16 DIAGNOSIS — C78.02 SECONDARY MALIGNANT NEOPLASM OF LEFT LUNG (HCC): ICD-10-CM

## 2025-06-16 PROCEDURE — 71250 CT THORAX DX C-: CPT

## 2025-06-25 ENCOUNTER — OFFICE VISIT (OUTPATIENT)
Dept: CARDIAC SURGERY | Facility: CLINIC | Age: 70
End: 2025-06-25
Payer: MEDICARE

## 2025-06-25 VITALS
TEMPERATURE: 97.6 F | RESPIRATION RATE: 15 BRPM | BODY MASS INDEX: 37.93 KG/M2 | SYSTOLIC BLOOD PRESSURE: 141 MMHG | HEIGHT: 71 IN | WEIGHT: 270.95 LBS | OXYGEN SATURATION: 95 % | DIASTOLIC BLOOD PRESSURE: 78 MMHG | HEART RATE: 56 BPM

## 2025-06-25 DIAGNOSIS — C78.02 SECONDARY MALIGNANT NEOPLASM OF LEFT LUNG (HCC): Primary | ICD-10-CM

## 2025-06-25 PROCEDURE — 99213 OFFICE O/P EST LOW 20 MIN: CPT
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
## 2025-06-25 NOTE — ASSESSMENT & PLAN NOTE
Alvarez Webster is a little over 3.5 years out from left lower lobe therapeutic wedge resection performed in the setting of spindle cell neoplasm. Recent CT scan of the chest on 6/16/2025 notes relative stability of his pulmonary nodules.  His most prominent nodule is a mixed density left upper lobe nodule with overall size measuring 22 x 15 mm.  There is a slight increase in density of this nodule but the solid components remain small overall and appear to be below threshold for biopsy or PET/CT evaluation.  Given the CT scan impression recommending possible PET CT scan, I will additionally discuss with Dr. Aparicio and make any changes as appropriate.  Remainder of nodules on most recent CT scan of the chest are stable in size with the largest of those being 8 mm in right upper lobe.  Recommend patient return for a visit after obtaining an updated CT scan in 6 months.   Orders:    CT chest wo contrast; Future

## 2025-06-26 ENCOUNTER — OFFICE VISIT (OUTPATIENT)
Age: 70
End: 2025-06-26
Payer: MEDICARE

## 2025-06-26 VITALS — BODY MASS INDEX: 37.94 KG/M2 | WEIGHT: 272 LBS | TEMPERATURE: 97.6 F

## 2025-06-26 DIAGNOSIS — L91.8 SKIN TAGS, MULTIPLE ACQUIRED: ICD-10-CM

## 2025-06-26 DIAGNOSIS — D22.60 MULTIPLE BENIGN MELANOCYTIC NEVI OF UPPER EXTREMITY, LOWER EXTREMITY, AND TRUNK: ICD-10-CM

## 2025-06-26 DIAGNOSIS — L81.4 LENTIGINES: Primary | ICD-10-CM

## 2025-06-26 DIAGNOSIS — L82.1 SEBORRHEIC KERATOSES: ICD-10-CM

## 2025-06-26 DIAGNOSIS — D22.70 MULTIPLE BENIGN MELANOCYTIC NEVI OF UPPER EXTREMITY, LOWER EXTREMITY, AND TRUNK: ICD-10-CM

## 2025-06-26 DIAGNOSIS — D18.01 CHERRY ANGIOMA: ICD-10-CM

## 2025-06-26 DIAGNOSIS — D22.5 MULTIPLE BENIGN MELANOCYTIC NEVI OF UPPER EXTREMITY, LOWER EXTREMITY, AND TRUNK: ICD-10-CM

## 2025-06-26 PROCEDURE — 99213 OFFICE O/P EST LOW 20 MIN: CPT | Performed by: REGISTERED NURSE

## 2025-06-26 NOTE — PROGRESS NOTES
"[facility] Note     Patient Name: Alvarez Webster  Encounter Date: 6/26/25       Have you been cared for by a [facility] Dermatologist in the last 3 years and, if so, which description applies to you? Yes. I have been here within the last 3 years, and my medical history has NOT changed since that time. I am not of child-bearing potential.     REVIEW OF SYSTEMS:  Have you recently had or currently have any of the following? No changes in my recent health.   PAST MEDICAL HISTORY:  Have you personally ever had or currently have any of the following?  If \"YES,\" then please provide more detail. No changes in my medical history.   HISTORY OF IMMUNOSUPPRESSION: Do you have a history of any of the following:  Systemic Immunosuppression such as Diabetes, Biologic or Immunotherapy, Chemotherapy, Organ Transplantation, Bone Marrow Transplantation or Prednisone?  YES, diabetes     Answering \"YES\" requires the addition of the dotphrase \"IMMUNOSUPPRESSED\" as the first diagnosis of the patient's visit.   FAMILY HISTORY:  Any \"first degree relatives\" (parent, brother, sister, or child) with the following?    No changes in my family's known health.   PATIENT EXPERIENCE:    Do you want the Dermatologist to perform a COMPLETE skin exam today including a clinical examination under the \"bra and underwear\" areas?  NO - not below the underwear  If necessary, do we have your permission to call and leave a detailed message on your Preferred Phone number that includes your specific medical information?  Yes      Allergies[1] Current Medications[2]        Whom besides the patient is providing clinical information about today's encounter?   NO ADDITIONAL HISTORIAN (patient alone provided history)    Physical Exam and Assessment/Plan by Diagnosis:    CHERRY ANGIOMAS  Physical Exam:  Anatomic Location Affected:  Trunk and extremities  Morphological Description:  Scattered cherry red papules  Denies pain, itch, bleeding. No " "treatments tried. Present for years. Present constantly; no modifying factors which make it worse or better.     Assessment and Plan:  Based on a thorough discussion of this condition and the management approach to it (including a comprehensive discussion of the known risks, side effects and potential benefits of treatment), the patient (family) agrees to implement the following specific plan:  Reassure benign        SEBORRHEIC KERATOSIS; NON-INFLAMED  Physical Exam:  Anatomic Location Affected:  Trunk and extremities  Morphological Description:  Waxy, smooth to warty textured, yellow to brownish-grey to dark brown to blackish, discrete, \"stuck-on\" appearing papules.  Present for years. Denies pain, itch, bleeding.      Additional History of Present Condition:  Present constantly; no modifying factors which make it worse or better. No prior treatment.       Assessment and Plan:  Based on a thorough discussion of this condition and the management approach to it (including a comprehensive discussion of the known risks, side effects and potential benefits of treatment), the patient (family) agrees to implement the following specific plan:  Reassure benign  Use sun protection.  Apply SPF 30 or higher at least three times a day.  Wear sun protecting clothing and hats.        SOLAR LENTIGINES   OTHER SKIN CHANGES DUE TO CHRONIC EXPOSURE TO NONIONIZING RADIATION  Physical Exam:  Anatomic Location Affected:  Sun exposed areas of back, chest, arms, legs  Morphological Description:  Multiple scattered brown to tan evenly pigmented macules   Denies pain, itch, bleeding. No treatments tried. Present for months - years. Reports getting newer lesions with sun exposure.         Assessment and Plan:  Based on a thorough discussion of this condition and the management approach to it (including a comprehensive discussion of the known risks, side effects and potential benefits of treatment), the patient (family) agrees to implement " "the following specific plan:  Reassure benign  Use sun protection.  Apply SPF 30 or higher at least three times a day.  Wear sun protecting clothing and hats.         MULTIPLE MELANOCYTIC NEVI (\"Moles\")  Physical Exam:  Anatomic Location Affected: Trunk and extremities  Morphological Description:  Scattered, round to ovoid, symmetrical-appearing, even bordered, skin colored to dark brown macules/papules  Denies pain, itch, bleeding. No treatments tried. Present for years. Present constantly; no modifying factors which make it worse or better. Denies actively changing or growing moles.      Assessment and Plan:  Based on a thorough discussion of this condition and the management approach to it (including a comprehensive discussion of the known risks, side effects and potential benefits of treatment), the patient (family) agrees to implement the following specific plan:  Reassure benign  Monitor for changes  Use sun protection.  Apply SPF 30 or higher at least three times a day.  Wear sun protecting clothing and hats.     Worrisome signs of skin malignancy discussed, questions answered. Regular self-skin check discussed. Advised to call or return to office if patient notices any spots of concern, rapidly growing/changing lesions, bleeding lesions, non-healing lesions. Advised regular SPF use.       ACROCHORDON (\"SKIN TAG\")  Physical Exam:  Anatomic Location Affected:  neck, face  Morphological Description:  skin colored pedunculated papule   Pertinent Positives:  Pertinent Negatives:    Additional History of Present Condition:  noted on exam.    Assessment and Plan:  Based on a thorough discussion of this condition and the management approach to it (including a comprehensive discussion of the known risks, side effects and potential benefits of treatment), the patient (family) agrees to implement the following specific plan:  Reassured benign.      ULCERATED PAPULE  Physical Exam:  Anatomic Location Affected:  Left " malar cheek   Morphological Description:  ulcerated papule   Pertinent Positives:  Pertinent Negatives:    Additional History of Present Condition:   Patient reports that one of his skin tags on the left cheek was ripped off by his CPAP recently and that is the cause of the ulcerated papule     Assessment and Plan:  Based on a thorough discussion of this condition and the management approach to it (including a comprehensive discussion of the known risks, side effects and potential benefits of treatment), the patient (family) agrees to implement the following specific plan:  Apply Vaseline to the spot where the tag was ripped off.  If the lesion doesn't heal in 2 months, call our office to schedule a follow up to biopsy it.    Scribe Attestation      I,:  Kia Marr MA am acting as a scribe while in the presence of the attending physician.:       I,:  Soham Barajas MD personally performed the services described in this documentation    as scribed in my presence.:           Judi Delcid MD  Dermatology, PGY-2         [1]   Allergies  Allergen Reactions    Ciprofloxacin Swelling    Janumet [Sitagliptin Phos-Metformin Hcl] Lip Swelling     Lip swelling     Terazosin      Annotation - [id number]: Blood in sperm   [2]   Current Outpatient Medications:     amLODIPine (NORVASC) 10 mg tablet, TAKE 1 TABLET EVERY DAY, Disp: 90 tablet, Rfl: 1    ascorbic acid (VITAMIN C) 500 mg tablet, Take 500 mg by mouth in the morning., Disp: , Rfl:     aspirin (ECOTRIN LOW STRENGTH) 81 mg EC tablet, Take 81 mg by mouth in the morning., Disp: , Rfl:     atorvastatin (LIPITOR) 40 mg tablet, TAKE 1 TABLET AT BEDTIME, Disp: 90 tablet, Rfl: 1    clopidogrel (PLAVIX) 75 mg tablet, TAKE 1 TABLET EVERY DAY, Disp: 90 tablet, Rfl: 1    doxazosin (CARDURA) 4 mg tablet, Take 1 tablet (4 mg total) by mouth daily at bedtime, Disp: , Rfl:     fluorouracil (EFUDEX) 5 % cream, Apply topically 2 (two) times a day For 4 weeks to the scalp (Patient not  taking: Reported on 4/23/2025), Disp: 40 g, Rfl: 1    glimepiride (AMARYL) 4 mg tablet, TAKE 1 TABLET WITH BREAKFAST AND TAKE 2 TABLETS IN THE EVENING, Disp: 270 tablet, Rfl: 1    glucose blood test strip, Test once daily (one touch ultra), Disp: 100 each, Rfl: 1    lisinopril-hydrochlorothiazide (PRINZIDE,ZESTORETIC) 20-12.5 MG per tablet, Take 2 tablets by mouth once daily, Disp: 180 tablet, Rfl: 1    MAGNESIUM CITRATE PO, Take 1 tablet by mouth in the morning, Disp: , Rfl:     metFORMIN (GLUCOPHAGE) 1000 MG tablet, TAKE 2 TABLETS EVERY DAY AFTER DINNER, Disp: 180 tablet, Rfl: 1    Omega-3 Fatty Acids (FISH OIL PO), Take by mouth in the morning., Disp: , Rfl:     ONETOUCH DELICA LANCETS 33G MISC, Test blood glucose 2 times daily, Disp: 300 each, Rfl: 0    prednisoLONE acetate (PRED FORTE) 1 % ophthalmic suspension, Administer 1 drop into the left eye every other day, Disp: , Rfl:     tadalafil (CIALIS) 20 MG tablet, Take 1 tablet (20 mg total) by mouth as needed for erectile dysfunction, Disp: 30 tablet, Rfl: 2    tamsulosin (FLOMAX) 0.4 mg, Take 2 capsules (0.8 mg total) by mouth daily with dinner, Disp: , Rfl:     VITAMIN E PO, Take 1 capsule by mouth in the morning., Disp: , Rfl:

## 2025-07-01 DIAGNOSIS — I10 HYPERTENSION, UNSPECIFIED TYPE: ICD-10-CM

## 2025-07-01 DIAGNOSIS — N18.2 HYPERTENSIVE KIDNEY DISEASE WITH STAGE 2 CHRONIC KIDNEY DISEASE: ICD-10-CM

## 2025-07-01 DIAGNOSIS — I12.9 HYPERTENSIVE KIDNEY DISEASE WITH STAGE 2 CHRONIC KIDNEY DISEASE: ICD-10-CM

## 2025-07-03 RX ORDER — DOXAZOSIN 2 MG/1
2 TABLET ORAL
Qty: 90 TABLET | Refills: 3 | OUTPATIENT
Start: 2025-07-03

## 2025-07-03 NOTE — TELEPHONE ENCOUNTER
Spoke with pt, per pt he is taking doxazosin 4 mg tablet by mouth daily at bedtime. Please deny this refill.

## 2025-07-07 DIAGNOSIS — N18.2 HYPERTENSIVE KIDNEY DISEASE WITH STAGE 2 CHRONIC KIDNEY DISEASE: ICD-10-CM

## 2025-07-07 DIAGNOSIS — I12.9 HYPERTENSIVE KIDNEY DISEASE WITH STAGE 2 CHRONIC KIDNEY DISEASE: ICD-10-CM

## 2025-07-07 DIAGNOSIS — I10 HYPERTENSION, UNSPECIFIED TYPE: ICD-10-CM

## 2025-07-09 ENCOUNTER — HOSPITAL ENCOUNTER (OUTPATIENT)
Dept: NON INVASIVE DIAGNOSTICS | Facility: CLINIC | Age: 70
Discharge: HOME | End: 2025-07-09
Payer: MEDICARE

## 2025-07-09 DIAGNOSIS — E78.5 HYPERLIPIDEMIA, UNSPECIFIED HYPERLIPIDEMIA TYPE: ICD-10-CM

## 2025-07-09 DIAGNOSIS — R09.89 BRUIT: ICD-10-CM

## 2025-07-09 DIAGNOSIS — E11.9 DIABETES MELLITUS TYPE 2, NONINSULIN DEPENDENT (HCC): ICD-10-CM

## 2025-07-09 PROCEDURE — 93880 EXTRACRANIAL BILAT STUDY: CPT | Performed by: SURGERY

## 2025-07-09 PROCEDURE — 93880 EXTRACRANIAL BILAT STUDY: CPT

## 2025-07-09 RX ORDER — DOXAZOSIN 4 MG/1
4 TABLET ORAL
Qty: 90 TABLET | Refills: 1 | Status: SHIPPED | OUTPATIENT
Start: 2025-07-09

## 2025-08-07 ENCOUNTER — ESTABLISHED COMPREHENSIVE EXAM (OUTPATIENT)
Dept: URBAN - METROPOLITAN AREA CLINIC 6 | Facility: CLINIC | Age: 70
End: 2025-08-07

## 2025-08-07 DIAGNOSIS — Z96.1: ICD-10-CM

## 2025-08-07 DIAGNOSIS — E11.9: ICD-10-CM

## 2025-08-07 DIAGNOSIS — H35.372: ICD-10-CM

## 2025-08-07 DIAGNOSIS — H04.123: ICD-10-CM

## 2025-08-07 DIAGNOSIS — D31.32: ICD-10-CM

## 2025-08-07 DIAGNOSIS — H25.811: ICD-10-CM

## 2025-08-07 DIAGNOSIS — D31.31: ICD-10-CM

## 2025-08-07 PROCEDURE — 92014 COMPRE OPH EXAM EST PT 1/>: CPT

## 2025-08-07 PROCEDURE — 92134 CPTRZ OPH DX IMG PST SGM RTA: CPT

## 2025-08-07 ASSESSMENT — TONOMETRY
OD_IOP_MMHG: 14
OS_IOP_MMHG: 21

## 2025-08-07 ASSESSMENT — VISUAL ACUITY
OD_CC: 20/20
OS_CC: 20/30
OD_GLARE: 20/50
OS_GLARE: 20/50

## (undated) DEVICE — SINGLE USE BIOPSY VALVE MAJ-210: Brand: SINGLE USE BIOPSY VALVE (STERILE)

## (undated) DEVICE — INTENDED FOR TISSUE SEPARATION, AND OTHER PROCEDURES THAT REQUIRE A SHARP SURGICAL BLADE TO PUNCTURE OR CUT.: Brand: BARD-PARKER SAFETY BLADES SIZE 10, STERILE

## (undated) DEVICE — WOUND RETRACTOR AND PROTECTOR: Brand: ALEXIS WOUND PROTECTOR-RETRACTOR

## (undated) DEVICE — INTENDED FOR TISSUE SEPARATION, AND OTHER PROCEDURES THAT REQUIRE A SHARP SURGICAL BLADE TO PUNCTURE OR CUT.: Brand: BARD-PARKER SAFETY BLADES SIZE 15, STERILE

## (undated) DEVICE — NEEDLE 25G X 1 1/2

## (undated) DEVICE — SUT VICRYL 3-0 SH 27 IN J416H

## (undated) DEVICE — SUT PROLENE 0 CT-1 30 IN 8424H

## (undated) DEVICE — SUT VICRYL 0 CT-1 27 IN J260H

## (undated) DEVICE — PAD GROUNDING ADULT

## (undated) DEVICE — ADAPTOR TRACH SWIVEL

## (undated) DEVICE — SUT VICRYL 2-0 SH 27 IN UNDYED J417H

## (undated) DEVICE — 24 FR STRAIGHT – SOFT PVC CATHETER: Brand: PVC THORACIC CATHETERS

## (undated) DEVICE — THE ECHELON FLEX POWERED PLUS ARTICULATING ENDOSCOPIC LINEAR CUTTERS ARE STERILE, SINGLE PATIENT USE INSTRUMENTS THAT SIMULTANEOUSLYCUT AND STAPLE TISSUE. THERE ARE SIX STAGGERED ROWS OF STAPLES, THREE ON EITHER SIDE OF THE CUT LINE. THE ECHELON FLEX 45 POWERED PLUSINSTRUMENTS HAVE A STAPLE LINE THAT IS APPROXIMATELY 45 MM LONG AND A CUT LINE THAT IS APPROXIMATELY 42 MM LONG. THE SHAFT CAN ROTATE FREELYIN BOTH DIRECTIONS AND AN ARTICULATION MECHANISM ENABLES THE DISTAL PORTION OF THE SHAFT TO PIVOT TO FACILITATE LATERAL ACCESS TO THE OPERATIVESITE.THE INSTRUMENTS ARE PACKAGED WITH A PRIMARY LITHIUM BATTERY PACK THAT MUST BE INSTALLED PRIOR TO USE. THERE ARE SPECIFIC REQUIREMENTS FORDISPOSING OF THE BATTERY PACK. REFER TO THE BATTERY PACK DISPOSAL SECTION.THE INSTRUMENTS ARE PACKAGED WITHOUT A RELOAD AND MUST BE LOADED PRIOR TO USE. A STAPLE RETAINING CAP ON THE RELOAD PROTECTS THE STAPLE LEGPOINTS DURING SHIPPING AND TRANSPORTATION. THE INSTRUMENTS’ LOCK-OUT FEATURE IS DESIGNED TO PREVENT A USED OR IMPROPERLY INSTALLED RELOADFROM BEING REFIRED OR AN INSTRUMENT FROM BEING FIRED WITHOUT A RELOAD.: Brand: ECHELON FLEX

## (undated) DEVICE — SINGLE USE SUCTION VALVE MAJ-209: Brand: SINGLE USE SUCTION VALVE (STERILE)

## (undated) DEVICE — GLOVE INDICATOR PI UNDERGLOVE SZ 8 BLUE

## (undated) DEVICE — ADHESIVE SKIN HIGH VISCOSITY EXOFIN 1ML

## (undated) DEVICE — FIRST STEP BEDSIDE KIT - STAND-UP POUCH, ENDOSCOPIC CLEANING PAD - 1 POUCH: Brand: FIRST STEP BEDSIDE KIT - STAND-UP POUCH, ENDOSCOPIC CLEANING PAD

## (undated) DEVICE — STERILE THORACIC PACK: Brand: CARDINAL HEALTH

## (undated) DEVICE — GAUZE SPONGES,16 PLY: Brand: CURITY

## (undated) DEVICE — UTILITY MARKER,BLACK WITH LABELS: Brand: DEVON

## (undated) DEVICE — TUBING SUCTION 5MM X 12 FT

## (undated) DEVICE — SUT MONOCRYL 4-0 PS-2 18 IN Y496G

## (undated) DEVICE — SYRINGE 10ML SLIP TIP LF

## (undated) DEVICE — GLOVE SRG BIOGEL ECLIPSE 7.5

## (undated) DEVICE — THE ECHELON, ECHELON ENDOPATH™ AND ECHELON FLEX™ FAMILIES OF ENDOSCOPIC LINEAR CUTTERS AND RELOADS ARE STERILE, SINGLE PATIENT USE INSTRUMENTS THAT SIMULTANEOUSLY CUT AND STAPLE TISSUE. THERE ARE SIX STAGGERED ROWS OF STAPLES, THREE ON EITHER SIDE OF THE CUT LINE. THE 45 MM INSTRUMENTS HAVE A STAPLE LINE THATIS APPROXIMATELY 45 MM LONG AND A CUT LINE THAT IS APPROXIMATELY 42 MM LONG. THE SHAFT CAN ROTATE FREELY IN BOTH DIRECTIONS AND AN ARTICULATION MECHANISM ON ARTICULATING INSTRUMENTS ENABLES BENDING THE DISTAL PORTIONOF THE SHAFT TO FACILITATE LATERAL ACCESS OF THE OPERATIVE SITE.THE INSTRUMENTS ARE SHIPPED WITHOUT A RELOAD AND MUST BE LOADED PRIOR TO USE. A STAPLE RETAINING CAP ON THE RELOAD PROTECTS THE STAPLE LEG POINTS DURING SHIPPING AND TRANSPORTATION. THE INSTRUMENTS’ LOCK-OUT FEATURE IS DESIGNED TO PREVENT A USED RELOAD FROM BEING REFIRED.: Brand: ECHELON ENDOPATH